# Patient Record
Sex: FEMALE | Race: WHITE | NOT HISPANIC OR LATINO | Employment: FULL TIME | URBAN - NONMETROPOLITAN AREA
[De-identification: names, ages, dates, MRNs, and addresses within clinical notes are randomized per-mention and may not be internally consistent; named-entity substitution may affect disease eponyms.]

---

## 2017-01-20 ENCOUNTER — HISTORY (OUTPATIENT)
Dept: FAMILY MEDICINE | Facility: OTHER | Age: 27
End: 2017-01-20

## 2017-01-20 ENCOUNTER — OFFICE VISIT - GICH (OUTPATIENT)
Dept: FAMILY MEDICINE | Facility: OTHER | Age: 27
End: 2017-01-20

## 2017-01-20 DIAGNOSIS — F41.1 GENERALIZED ANXIETY DISORDER: ICD-10-CM

## 2017-01-20 DIAGNOSIS — K21.00 GASTRO-ESOPHAGEAL REFLUX DISEASE WITH ESOPHAGITIS: ICD-10-CM

## 2017-01-20 ASSESSMENT — ANXIETY QUESTIONNAIRES
4. TROUBLE RELAXING: NEARLY EVERY DAY
2. NOT BEING ABLE TO STOP OR CONTROL WORRYING: MORE THAN HALF THE DAYS
1. FEELING NERVOUS, ANXIOUS, OR ON EDGE: NEARLY EVERY DAY
7. FEELING AFRAID AS IF SOMETHING AWFUL MIGHT HAPPEN: NOT AT ALL
6. BECOMING EASILY ANNOYED OR IRRITABLE: NEARLY EVERY DAY
5. BEING SO RESTLESS THAT IT IS HARD TO SIT STILL: NEARLY EVERY DAY
3. WORRYING TOO MUCH ABOUT DIFFERENT THINGS: MORE THAN HALF THE DAYS
GAD7 TOTAL SCORE: 16

## 2017-01-20 ASSESSMENT — PATIENT HEALTH QUESTIONNAIRE - PHQ9: SUM OF ALL RESPONSES TO PHQ QUESTIONS 1-9: 23

## 2017-02-03 ENCOUNTER — OFFICE VISIT - GICH (OUTPATIENT)
Dept: FAMILY MEDICINE | Facility: OTHER | Age: 27
End: 2017-02-03

## 2017-02-03 DIAGNOSIS — E28.2 POLYCYSTIC OVARIAN SYNDROME: ICD-10-CM

## 2017-02-03 DIAGNOSIS — F41.0 PANIC DISORDER WITHOUT AGORAPHOBIA: ICD-10-CM

## 2017-02-03 DIAGNOSIS — F41.1 GENERALIZED ANXIETY DISORDER: ICD-10-CM

## 2017-02-03 LAB — ESTRADIOL SERPL-MCNC: 33 PG/ML

## 2017-02-03 ASSESSMENT — PATIENT HEALTH QUESTIONNAIRE - PHQ9: SUM OF ALL RESPONSES TO PHQ QUESTIONS 1-9: 20

## 2017-02-03 ASSESSMENT — ANXIETY QUESTIONNAIRES
2. NOT BEING ABLE TO STOP OR CONTROL WORRYING: MORE THAN HALF THE DAYS
3. WORRYING TOO MUCH ABOUT DIFFERENT THINGS: MORE THAN HALF THE DAYS
1. FEELING NERVOUS, ANXIOUS, OR ON EDGE: MORE THAN HALF THE DAYS
5. BEING SO RESTLESS THAT IT IS HARD TO SIT STILL: NEARLY EVERY DAY
6. BECOMING EASILY ANNOYED OR IRRITABLE: MORE THAN HALF THE DAYS
GAD7 TOTAL SCORE: 14
4. TROUBLE RELAXING: NEARLY EVERY DAY
7. FEELING AFRAID AS IF SOMETHING AWFUL MIGHT HAPPEN: NOT AT ALL

## 2017-03-06 ENCOUNTER — AMBULATORY - GICH (OUTPATIENT)
Dept: FAMILY MEDICINE | Facility: OTHER | Age: 27
End: 2017-03-06

## 2017-03-10 ENCOUNTER — OFFICE VISIT - GICH (OUTPATIENT)
Dept: FAMILY MEDICINE | Facility: OTHER | Age: 27
End: 2017-03-10

## 2017-03-10 ENCOUNTER — HISTORY (OUTPATIENT)
Dept: FAMILY MEDICINE | Facility: OTHER | Age: 27
End: 2017-03-10

## 2017-03-10 DIAGNOSIS — M25.512 PAIN IN LEFT SHOULDER: ICD-10-CM

## 2017-03-13 ENCOUNTER — OFFICE VISIT - GICH (OUTPATIENT)
Dept: FAMILY MEDICINE | Facility: OTHER | Age: 27
End: 2017-03-13

## 2017-03-13 DIAGNOSIS — M25.512 PAIN IN LEFT SHOULDER: ICD-10-CM

## 2017-03-22 ENCOUNTER — OFFICE VISIT - GICH (OUTPATIENT)
Dept: FAMILY MEDICINE | Facility: OTHER | Age: 27
End: 2017-03-22

## 2017-03-22 ENCOUNTER — HISTORY (OUTPATIENT)
Dept: FAMILY MEDICINE | Facility: OTHER | Age: 27
End: 2017-03-22

## 2017-03-22 DIAGNOSIS — F41.1 GENERALIZED ANXIETY DISORDER: ICD-10-CM

## 2017-03-22 ASSESSMENT — ANXIETY QUESTIONNAIRES
7. FEELING AFRAID AS IF SOMETHING AWFUL MIGHT HAPPEN: NOT AT ALL
3. WORRYING TOO MUCH ABOUT DIFFERENT THINGS: MORE THAN HALF THE DAYS
2. NOT BEING ABLE TO STOP OR CONTROL WORRYING: NEARLY EVERY DAY
1. FEELING NERVOUS, ANXIOUS, OR ON EDGE: MORE THAN HALF THE DAYS
GAD7 TOTAL SCORE: 15
5. BEING SO RESTLESS THAT IT IS HARD TO SIT STILL: MORE THAN HALF THE DAYS
4. TROUBLE RELAXING: NEARLY EVERY DAY
6. BECOMING EASILY ANNOYED OR IRRITABLE: NEARLY EVERY DAY

## 2017-03-22 ASSESSMENT — PATIENT HEALTH QUESTIONNAIRE - PHQ9: SUM OF ALL RESPONSES TO PHQ QUESTIONS 1-9: 21

## 2017-05-03 ENCOUNTER — OFFICE VISIT - GICH (OUTPATIENT)
Dept: FAMILY MEDICINE | Facility: OTHER | Age: 27
End: 2017-05-03

## 2017-05-03 ENCOUNTER — HISTORY (OUTPATIENT)
Dept: FAMILY MEDICINE | Facility: OTHER | Age: 27
End: 2017-05-03

## 2017-05-03 DIAGNOSIS — F41.1 GENERALIZED ANXIETY DISORDER: ICD-10-CM

## 2017-05-03 ASSESSMENT — ANXIETY QUESTIONNAIRES
5. BEING SO RESTLESS THAT IT IS HARD TO SIT STILL: MORE THAN HALF THE DAYS
7. FEELING AFRAID AS IF SOMETHING AWFUL MIGHT HAPPEN: NOT AT ALL
GAD7 TOTAL SCORE: 14
1. FEELING NERVOUS, ANXIOUS, OR ON EDGE: MORE THAN HALF THE DAYS
4. TROUBLE RELAXING: MORE THAN HALF THE DAYS
3. WORRYING TOO MUCH ABOUT DIFFERENT THINGS: MORE THAN HALF THE DAYS
6. BECOMING EASILY ANNOYED OR IRRITABLE: NEARLY EVERY DAY
2. NOT BEING ABLE TO STOP OR CONTROL WORRYING: NEARLY EVERY DAY

## 2017-05-03 ASSESSMENT — PATIENT HEALTH QUESTIONNAIRE - PHQ9: SUM OF ALL RESPONSES TO PHQ QUESTIONS 1-9: 21

## 2017-05-04 ENCOUNTER — OFFICE VISIT - GICH (OUTPATIENT)
Dept: OBGYN | Facility: OTHER | Age: 27
End: 2017-05-04

## 2017-05-04 ENCOUNTER — HISTORY (OUTPATIENT)
Dept: OBGYN | Facility: OTHER | Age: 27
End: 2017-05-04

## 2017-05-04 DIAGNOSIS — E28.2 POLYCYSTIC OVARIAN SYNDROME: ICD-10-CM

## 2017-05-04 LAB
A/G RATIO - HISTORICAL: 1.3 (ref 1–2)
ALBUMIN SERPL-MCNC: 4.1 G/DL (ref 3.5–5.7)
ALP SERPL-CCNC: 68 IU/L (ref 34–104)
ALT (SGPT) - HISTORICAL: 20 IU/L (ref 7–52)
AST SERPL-CCNC: 17 IU/L (ref 13–39)
BILIRUB SERPL-MCNC: 0.4 MG/DL (ref 0.3–1)
BILIRUBIN, DIRECT: 0.07 MG/DL (ref 0.03–0.18)
BILIRUBIN,INDIRECT: 0.3 MG/DL (ref 0.2–0.8)
CREAT SERPL-MCNC: 0.68 MG/DL (ref 0.7–1.3)
GFR IF NOT AFRICAN AMERICAN - HISTORICAL: >60 ML/MIN/1.73M2
GLOBULIN - HISTORICAL: 3.1 G/DL (ref 2–3.7)
PROLACTIN - HISTORICAL: 16.32 NG/ML
PROT SERPL-MCNC: 7.2 G/DL (ref 6.4–8.9)
TESTOST SERPL-MCNC: 63 NG/DL (ref 175–781)

## 2017-05-05 LAB — CORTISOL, SERUM: 7.3 UG/DL

## 2017-05-10 ENCOUNTER — AMBULATORY - GICH (OUTPATIENT)
Dept: OBGYN | Facility: OTHER | Age: 27
End: 2017-05-10

## 2017-05-10 LAB — 17 OH PROGESTERONE - HISTORICAL: <40 NG/DL

## 2017-05-23 ENCOUNTER — OFFICE VISIT - GICH (OUTPATIENT)
Dept: OBGYN | Facility: OTHER | Age: 27
End: 2017-05-23

## 2017-05-23 DIAGNOSIS — E28.2 POLYCYSTIC OVARIAN SYNDROME: ICD-10-CM

## 2017-05-23 DIAGNOSIS — N97.0 FEMALE INFERTILITY ASSOCIATED WITH ANOVULATION: ICD-10-CM

## 2017-06-20 ENCOUNTER — AMBULATORY - GICH (OUTPATIENT)
Dept: FAMILY MEDICINE | Facility: OTHER | Age: 27
End: 2017-06-20

## 2017-06-20 DIAGNOSIS — E28.2 POLYCYSTIC OVARIAN SYNDROME: ICD-10-CM

## 2017-06-22 ENCOUNTER — AMBULATORY - GICH (OUTPATIENT)
Dept: FAMILY MEDICINE | Facility: OTHER | Age: 27
End: 2017-06-22

## 2017-06-22 DIAGNOSIS — E28.2 POLYCYSTIC OVARIAN SYNDROME: ICD-10-CM

## 2017-06-23 ENCOUNTER — OFFICE VISIT - GICH (OUTPATIENT)
Dept: OBGYN | Facility: OTHER | Age: 27
End: 2017-06-23

## 2017-06-23 DIAGNOSIS — E28.2 POLYCYSTIC OVARIAN SYNDROME: ICD-10-CM

## 2017-06-23 DIAGNOSIS — N97.0 FEMALE INFERTILITY ASSOCIATED WITH ANOVULATION: ICD-10-CM

## 2017-07-19 ENCOUNTER — HISTORY (OUTPATIENT)
Dept: FAMILY MEDICINE | Facility: OTHER | Age: 27
End: 2017-07-19

## 2017-07-19 ENCOUNTER — OFFICE VISIT - GICH (OUTPATIENT)
Dept: FAMILY MEDICINE | Facility: OTHER | Age: 27
End: 2017-07-19

## 2017-07-19 DIAGNOSIS — E28.2 POLYCYSTIC OVARIAN SYNDROME: ICD-10-CM

## 2017-07-19 DIAGNOSIS — R10.13 EPIGASTRIC PAIN: ICD-10-CM

## 2017-07-19 LAB
A/G RATIO - HISTORICAL: 1.4 (ref 1–2)
ABSOLUTE BASOPHILS - HISTORICAL: 0 THOU/CU MM
ABSOLUTE EOSINOPHILS - HISTORICAL: 0.2 THOU/CU MM
ABSOLUTE IMMATURE GRANULOCYTES(METAS,MYELOS,PROS) - HISTORICAL: 0 THOU/CU MM
ABSOLUTE LYMPHOCYTES - HISTORICAL: 2.1 THOU/CU MM (ref 0.9–2.9)
ABSOLUTE MONOCYTES - HISTORICAL: 0.4 THOU/CU MM
ABSOLUTE NEUTROPHILS - HISTORICAL: 3.7 THOU/CU MM (ref 1.7–7)
ALBUMIN SERPL-MCNC: 3.7 G/DL (ref 3.5–5.7)
ALP SERPL-CCNC: 53 IU/L (ref 34–104)
ALT (SGPT) - HISTORICAL: 18 IU/L (ref 7–52)
AMYLASE SERPL-CCNC: 34 IU/L (ref 29–103)
ANION GAP - HISTORICAL: 9 (ref 5–18)
AST SERPL-CCNC: 14 IU/L (ref 13–39)
BASOPHILS # BLD AUTO: 0.6 %
BILIRUB SERPL-MCNC: 0.3 MG/DL (ref 0.3–1)
BUN SERPL-MCNC: 12 MG/DL (ref 7–25)
BUN/CREAT RATIO - HISTORICAL: 20
CALCIUM SERPL-MCNC: 8.8 MG/DL (ref 8.6–10.3)
CHLORIDE SERPLBLD-SCNC: 109 MMOL/L (ref 98–107)
CO2 SERPL-SCNC: 22 MMOL/L (ref 21–31)
CREAT SERPL-MCNC: 0.61 MG/DL (ref 0.7–1.3)
EOSINOPHIL NFR BLD AUTO: 2.3 %
ERYTHROCYTE [DISTWIDTH] IN BLOOD BY AUTOMATED COUNT: 12.1 % (ref 11.5–15.5)
ESTIMATED AVERAGE GLUCOSE: 105 MG/DL
GFR IF NOT AFRICAN AMERICAN - HISTORICAL: >60 ML/MIN/1.73M2
GLOBULIN - HISTORICAL: 2.6 G/DL (ref 2–3.7)
GLUCOSE SERPL-MCNC: 102 MG/DL (ref 70–105)
HCG BETA QUANT,PREGNANCY - HISTORICAL: <0.6 MIU/ML
HCT VFR BLD AUTO: 37.6 % (ref 33–51)
HEMOGLOBIN A1C MONITORING (POCT) - HISTORICAL: 5.3 % (ref 4–6.2)
HEMOGLOBIN: 12.5 G/DL (ref 12–16)
IMMATURE GRANULOCYTES(METAS,MYELOS,PROS) - HISTORICAL: 0.5 %
LIPASE SERPL-CCNC: 30.1 IU/L (ref 11–82)
LYMPHOCYTES NFR BLD AUTO: 32.8 % (ref 20–44)
MCH RBC QN AUTO: 28.8 PG (ref 26–34)
MCHC RBC AUTO-ENTMCNC: 33.2 G/DL (ref 32–36)
MCV RBC AUTO: 87 FL (ref 80–100)
MONOCYTES NFR BLD AUTO: 5.8 %
NEUTROPHILS NFR BLD AUTO: 58 % (ref 42–72)
PLATELET # BLD AUTO: 277 THOU/CU MM (ref 140–440)
PMV BLD: 9.8 FL (ref 6.5–11)
POTASSIUM SERPL-SCNC: 4.2 MMOL/L (ref 3.5–5.1)
PROT SERPL-MCNC: 6.3 G/DL (ref 6.4–8.9)
RED BLOOD COUNT - HISTORICAL: 4.34 MIL/CU MM (ref 4–5.2)
SODIUM SERPL-SCNC: 140 MMOL/L (ref 133–143)
WHITE BLOOD COUNT - HISTORICAL: 6.4 THOU/CU MM (ref 4.5–11)

## 2017-08-24 ENCOUNTER — HISTORY (OUTPATIENT)
Dept: FAMILY MEDICINE | Facility: OTHER | Age: 27
End: 2017-08-24

## 2017-08-24 ENCOUNTER — OFFICE VISIT - GICH (OUTPATIENT)
Dept: FAMILY MEDICINE | Facility: OTHER | Age: 27
End: 2017-08-24

## 2017-08-24 DIAGNOSIS — M25.511 PAIN IN RIGHT SHOULDER: ICD-10-CM

## 2017-08-24 DIAGNOSIS — S46.911A STRAIN OF UNSPECIFIED MUSCLE, FASCIA AND TENDON AT SHOULDER AND UPPER ARM LEVEL, RIGHT ARM, INITIAL ENCOUNTER: ICD-10-CM

## 2017-11-09 ENCOUNTER — AMBULATORY - GICH (OUTPATIENT)
Dept: FAMILY MEDICINE | Facility: OTHER | Age: 27
End: 2017-11-09

## 2017-11-09 DIAGNOSIS — N92.1 EXCESSIVE AND FREQUENT MENSTRUATION WITH IRREGULAR CYCLE: ICD-10-CM

## 2017-12-15 ENCOUNTER — AMBULATORY - GICH (OUTPATIENT)
Dept: FAMILY MEDICINE | Facility: OTHER | Age: 27
End: 2017-12-15

## 2017-12-27 NOTE — PROGRESS NOTES
Patient Information     Patient Name MRN Ana Beyer [id number] Female 1990      Progress Notes by Abiola Ashton DO at 2017  8:45 AM     Author:  Abiola Ashton DO Service:  (none) Author Type:  PHYS- Osteopathic     Filed:  2017 10:17 AM Encounter Date:  2017 Status:  Signed     :  Abiola Ashton DO (PHYS- Osteopathic)            SUBJECTIVE:  Ana Stahl is a 26 y.o. female who presents for concerns of stomach.    RAEANN Perez is here with ~2+ months of epigastric pain.  Has tried tracking it with eating, menstrual cycle, medication intake, stooling/voiding - all without pattern.  She states food can occasionally make it worse (~1 hour after eating) but not necessarily all the time.  Symptoms can last all day long; occasionally having difficulty falling asleep. She is busy working a lot, therefore usually sleeps well.  Her mood has worsened the last few months as it has been the anniversary of her conception date, her sister is due and now coming up on the date of her miscarriage.  She is currently not trying at this time, and wants to get through the next couple months before trying again.  Had previously used Clomid x 2 months due to her PCOS.    Also stopped all of her medications one week ago, including metformin, wellbutrin, zoloft, xanax, zantac, clomid.  She states this did not worsen, or improve her abdominal symptoms; however she does notice more acid reflux.  Is coughing more frequently.    Sustained a fall a week ago and has a stiff R shoulder and L knee; has used Naproxen for it occasionally without worsening of stomach symptoms.  No blood in stools, or changes in stools/urination.  Gallbladder remains intact.      No Known Allergies,   Current Outpatient Prescriptions on File Prior to Visit       Medication  Sig Dispense Refill     metFORMIN (GLUCOPHAGE XR) 500 mg Extended-Release tablet 1 tablet po tid with meal. 270 tablet 3     prenatal  vitamin-folic acid 1 mg (PRENATAL VITAMIN) tablet/capsule Take 1 tablet by mouth once daily.  0     No current facility-administered medications on file prior to visit.     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   Respiratory: Positive for cough. Negative for hemoptysis.    Gastrointestinal: Positive for nausea.   All other systems reviewed and are negative.      OBJECTIVE:  /82  Pulse 72  Wt 110.6 kg (243 lb 12.8 oz)  LMP 06/22/2017  BMI 40.38 kg/m2    EXAM:   Physical Exam   Constitutional: She is well-developed, well-nourished, and in no distress.   HENT:   Head: Normocephalic and atraumatic.   Right Ear: External ear normal.   Left Ear: External ear normal.   Eyes: Pupils are equal, round, and reactive to light.   Cardiovascular: Normal rate and normal heart sounds.    Pulmonary/Chest: Effort normal and breath sounds normal.   Abdominal: Soft. Bowel sounds are normal. She exhibits no distension. There is no hepatosplenomegaly. There is tenderness in the right lower quadrant, epigastric area, suprapubic area and left lower quadrant. There is no rebound and no CVA tenderness.   Nursing note and vitals reviewed.    Results for orders placed or performed in visit on 07/19/17      COMPLETE METABOLIC PANEL      Result  Value Ref Range    SODIUM 140 133 - 143 mmol/L    POTASSIUM 4.2 3.5 - 5.1 mmol/L    CHLORIDE 109 (H) 98 - 107 mmol/L    CO2,TOTAL 22 21 - 31 mmol/L    ANION GAP 9 5 - 18                    GLUCOSE 102 70 - 105 mg/dL    CALCIUM 8.8 8.6 - 10.3 mg/dL    BUN 12 7 - 25 mg/dL    CREATININE 0.61 (L) 0.70 - 1.30 mg/dL    BUN/CREAT RATIO           20                    GFR if African American >60 >60 ml/min/1.73m2    GFR if not African American >60 >60 ml/min/1.73m2    ALBUMIN  3.7 3.5 - 5.7 g/dL    PROTEIN,TOTAL 6.3 (L) 6.4 - 8.9 g/dL    GLOBULIN                  2.6 2.0 - 3.7 g/dL    A/G RATIO 1.4 1.0 - 2.0                    BILIRUBIN,TOTAL 0.3 0.3 - 1.0 mg/dL    ALK PHOSPHATASE 53 34 - 104 IU/L    ALT (SGPT) 18 7 - 52 IU/L    AST (SGOT) 14 13 - 39 IU/L   LIPASE      Result  Value Ref Range    LIPASE 30.1 11.0 - 82.0 IU/L   AMYLASE      Result  Value Ref Range    AMYLASE 34 29 - 103 IU/L   Hgb A1c      Result  Value Ref Range    HEMOGLOBIN A1C MONITORING (POCT) 5.3 4.0 - 6.2 %    ESTIMATED AVERAGE GLUCOSE  105 mg/dL   HCG BETA QUANT,PREGNANCY      Result  Value Ref Range    HCG BETA QUANT,PREGNANCY GIH <0.6 <0.6 mIU/mL   CBC WITH AUTO DIFFERENTIAL      Result  Value Ref Range    WHITE BLOOD COUNT         6.4 4.5 - 11.0 thou/cu mm    RED BLOOD COUNT           4.34 4.00 - 5.20 mil/cu mm    HEMOGLOBIN                12.5 12.0 - 16.0 g/dL    HEMATOCRIT                37.6 33.0 - 51.0 %    MCV                       87 80 - 100 fL    MCH                       28.8 26.0 - 34.0 pg    MCHC                      33.2 32.0 - 36.0 g/dL    RDW                       12.1 11.5 - 15.5 %    PLATELET COUNT            277 140 - 440 thou/cu mm    MPV                       9.8 6.5 - 11.0 fL    NEUTROPHILS               58.0 42.0 - 72.0 %    LYMPHOCYTES               32.8 20.0 - 44.0 %    MONOCYTES                 5.8 <12.0 %    EOSINOPHILS               2.3 <8.0 %    BASOPHILS                 0.6 <3.0 %    IMMATURE GRANULOCYTES(METAS,MYELOS,PROS) 0.5 %    ABSOLUTE NEUTROPHILS      3.7 1.7 - 7.0 thou/cu mm    ABSOLUTE LYMPHOCYTES      2.1 0.9 - 2.9 thou/cu mm    ABSOLUTE MONOCYTES        0.4 <0.9 thou/cu mm    ABSOLUTE EOSINOPHILS      0.2 <0.5 thou/cu mm    ABSOLUTE BASOPHILS        0.0 <0.3 thou/cu mm    ABSOLUTE IMMATURE GRANULOCYTES(METAS,MYELOS,PROS) 0.0 <=0.3 thou/cu mm       ASSESSMENT/PLAN:    ICD-10-CM    1. Epigastric abdominal pain R10.13 CBC WITH DIFFERENTIAL      COMPLETE METABOLIC PANEL       LIPASE      AMYLASE      omeprazole (PRILOSEC) 20 mg Delayed-Release capsule      sucralfate (CARAFATE) 1 gram tablet      CBC WITH DIFFERENTIAL      COMPLETE METABOLIC PANEL      LIPASE      AMYLASE      CBC WITH AUTO DIFFERENTIAL   2. PCOS (polycystic ovarian syndrome) E28.2 Hgb A1c      HCG BETA QUANT,PREGNANCY      Hgb A1c      HCG BETA QUANT,PREGNANCY        Plan:   1. Epigastric abdominal pain:  Differential diagnosis discussed including: gastritis/worsening GERD, ulcer, gallbladder pathology/stones, pancreatitis, colitis, others.  Labs ordered as above and reassuring.  Due to history of uncontrolled reflux, even on Ranitidine - will plan to start omeprazole 20mg-40mg daily and carafate TID-QID x 2 weeks.  If significant improvement is seen, will wean off carafate and continue omperazole.  If no improvement, consider abdominal imaging vs [facility] referral for possible endoscopy.  2. With history of PCOS, encouraged use of metformin, and will also add Hcg level to above blood testing.    Follow up pending above response/results.

## 2017-12-27 NOTE — PROGRESS NOTES
Patient Information     Patient Name MRN Ana Beyer [id number] Female 1990      Progress Notes by Gerry Kim MD at 2017  9:00 AM     Author:  Gerry Kim MD Service:  (none) Author Type:  Physician     Filed:  2017  8:39 AM Encounter Date:  2017 Status:  Signed     :  Gerry Kim MD (Physician)            SUBJECTIVE:    Ana Stahl is a 26 y.o. female who presents for continued treatment for anovulation/PCOS    HPI  She was ovulatory last month on Clomid around day 18. She is day two of her menses. Patient's last menstrual period was 2017.    No Known Allergies and   Current Outpatient Prescriptions on File Prior to Visit       Medication  Sig Dispense Refill     ALPRAZolam (XANAX) 0.5 mg tablet Take 1 tablet by mouth 2 times daily if needed. 30 tablet 2     buPROPion (WELLBUTRIN XL) 300 mg Extended-Release tablet Take 1 tablet by mouth every morning. 90 tablet 4     clomiPHENE citrate (CLOMID) 50 mg tablet Take 1 tablet by mouth once daily. Max of 5 days 5 tablet 0     metFORMIN (GLUCOPHAGE XR) 500 mg Extended-Release tablet 1 tablet po tid with meal. 270 tablet 3     prenatal vitamin-folic acid 1 mg (PRENATAL VITAMIN) tablet/capsule Take 1 tablet by mouth once daily.  0     ranitidine (ZANTAC) 150 mg tablet Take 1 tablet by mouth 2 times daily. 60 tablet 11     sertraline (ZOLOFT) 100 mg tablet Take 1 tablet by mouth once daily. 90 tablet 4     No current facility-administered medications on file prior to visit.        REVIEW OF SYSTEMS:  Review of Systems   Constitutional: Negative.    Gastrointestinal: Positive for abdominal pain.   Endo/Heme/Allergies: Negative.    Psychiatric/Behavioral: Negative.        OBJECTIVE:  /78  Temp 98.4  F (36.9  C) (Tympanic)  Wt 108 kg (238 lb)  LMP 2017  Breastfeeding? No  BMI 39.41 kg/m2    EXAM:   Physical Exam   Genitourinary: Uterus is not deviated, not enlarged and not tender. Right adnexum  displays no mass and no tenderness. Left adnexum displays no mass and no tenderness.       ASSESSMENT/PLAN:    ICD-10-CM    1. Anovulation N97.0 clomiPHENE citrate (CLOMID) 50 mg tablet        Plan:  Clomid once daily for 5 days starting 6/26/17  Then timed intercourse every other day starting day 16 (7/7/17)  for one week.  Use ovulation detection kit starting day 16 until positive (check in afternoon)  Call with pos pregnancy test, or if no period by day 35

## 2017-12-27 NOTE — PROGRESS NOTES
Patient Information     Patient Name MRN Sex Ana Alonso [id number] Female 1990      Progress Notes by Klinefelter, Kristin K, RD at 2017  1:00 PM     Author:  Klinefelter, Kristin K, RD Service:  (none) Author Type:  NUTR- Registered Dietitian     Filed:  2017  9:03 AM Encounter Date:  2017 Status:  Signed     :  Klinefelter, Kristin K, RD (NUTR- Registered Dietitian)            MEDICAL NUTRITION THERAPY  INITIAL VISIT      Provider: Abiola Ashton    Reason for referral: PCOS, Trying to conceive, BMI 39    Assessment:  Client History: Ana is here today to review diet for PCOS. Is on Metformin. All other medications reviewed.  Taking a prenatal, B12 and Vit D.  She reports that she would like to lose a few pounds. BMI 39. Weight loss clinical goal initially: 7%.   Her work schedule has changed recently and she works two jobs, which makes it difficult to follow a meal plan.      24hr Diet Recall:  Time: Food/Drink:   bfast Often skips   lunch At work. Clinic or [facility]   dinner She or boyfriend cooks. Carbs and protein   snacks Very hungry and large portions at night   beverages Diet coke, trying to cut down, limited H2O     Weekly Activity: She does have a [employer] membership and [employer]  Cardio: Resistance: Stretching:   none none none      Nutrition Diagnosis:  Energy Balance: Predicted excessive energy intake  Weight: Overweight / obesity: Overweight, adult or pediatric  Other: PCOS     Intervention:  Nutrition Prescription  Nutrition Intervention: Meals and Snacks:  General/healthful diet:  Modify composition of meals/snacks: Energy-modified diet  Carbohydrate-modified diet  Nutrition Counseling:  Strategies:  Goal setting  Self-monitoring  Problem solving  Goal: (1) 6 small meals per day. Limit CHO to 15-30 Grams per meal  (2) limit/avoid simple sugars and diet sodas. She will try to increase her water intake  (3) 150 minutes of exercise per week    Education Materials  Provided:   Food List for Meal Planning  Sample Meals and Snacks    Monitoring and Evaluation:  Food Intake  Weight  Biochemical Data, Medical Tests and Procedures    Follow Up: 4 week(s)    Time spent with patient: 45 minutes.   Patient encouraged to call with further questions. Contact information provided.    Kristin K Klinefelter, RD ....................  6/23/2017   8:57 AM

## 2017-12-28 NOTE — PROGRESS NOTES
Patient Information     Patient Name MRN Ana Beyer [id number] Female 1990      Progress Notes by Merly Davis NP at 2017  5:00 PM     Author:  Merly Davis NP Service:  (none) Author Type:  PHYS- Nurse Practitioner     Filed:  2017  6:30 PM Encounter Date:  2017 Status:  Signed     :  Merly Davis NP (PHYS- Nurse Practitioner)            HPI:    Ana Stahl is a 26 y.o. female who presents to clinic today for arm pain.   Right shoulder and right arm pain started yesterday.  Today with some intermittent numbness and tingling into right fingers.  She does lots of over head reaching and lifting at her job but denies any known injury.  Random sharp pain in her right hand, wrist and shoulder.  Pain is worse with lifting.  Pulling sensation in right shoulder joint with movement.  Generalized joint aches and stiffness for the past couple of weeks.  Pain is interrupting sleep recently.  No fevers.  No rashes.  Chronically fatigued.  Denies any known tick bites, rarely outside as she works multiple jobs.   Taking Aleve twice daily on regular basis.  She has not tried any ice or heat.  She sees a chiropractor for chronic neck pain.  She had left shoulder pain in March of this year but she states this feels different.            Past Medical History:     Diagnosis  Date     Abnormal pap      Patellar malalignment syndrome-bilateral 2016     PCOS (polycystic ovarian syndrome) 3/18/2016     Past Surgical History:      Procedure  Laterality Date     NO PREVIOUS SURGERY       Social History       Substance Use Topics         Smoking status:   Never Smoker     Smokeless tobacco:   Never Used     Alcohol use   No      Comment: rare      Current Outpatient Prescriptions       Medication  Sig Dispense Refill     metFORMIN (GLUCOPHAGE XR) 500 mg Extended-Release tablet 1 tablet po tid with meal. 270 tablet 3     omeprazole (PRILOSEC) 20 mg Delayed-Release  "capsule Take 1-2 capsules by mouth once daily before a meal. 60 capsule 5     prenatal vitamin-folic acid 1 mg (PRENATAL VITAMIN) tablet/capsule Take 1 tablet by mouth once daily.  0     sucralfate (CARAFATE) 1 gram tablet Take 1 tablet by mouth 3 times daily before meals. 90 tablet 1     No current facility-administered medications for this visit.      Medications have been reviewed by me and are current to the best of my knowledge and ability.    No Known Allergies    Past medical history, past surgical history, current medications and allergies reviewed and accurate to the best of my knowledge.        ROS:  Refer to HPI    /70  Pulse 72  Temp 99.7  F (37.6  C) (Tympanic)   Ht 1.645 m (5' 4.75\")  Wt 111.1 kg (245 lb)  BMI 41.09 kg/m2    EXAM:  General Appearance: Well appearing adult female, appropriate appearance for age. No acute distress  Neck: supple without adenopathy, normal ROM   Respiratory: normal chest wall and respirations.  Normal effort.  Clear to auscultation bilaterally, no wheezing, crackles or rhonchi.  No increased work of breathing.  No cough appreciated  Cardiac: RRR with no murmurs.   CMS intact to right upper extremity, brisk capillary refill, strong radial pulse  Musculoskeletal:  Right shoulder and upper extremity without swelling or visible deformity.  Minimal tenderness to palpation over the AC joint.  Mild tenderness over right scapular area.  No tenderness to palpation over right clavicular area.  Full active ROM of right shoulder.  Negative empty can test.  strong right hand grasp.  Strength intact to right upper extremity against resistance.  Normal gait.  Equal movement of bilateral upper extremities.  Equal movement of bilateral lower extremities.    Dermatological: no rashes or lesions noted of exposed skin  Psychological: normal affect, alert and pleasant      Labs:  Not indicated    Xray:  Not indicated      ASSESSMENT/PLAN:    ICD-10-CM    1. Nontraumatic shoulder " pain, right M25.511 methocarbamol (ROBAXIN) 750 mg tablet      acetaminophen-codeine, 300-30 mg, (TYLENOL #3) tablet   2. Right shoulder strain, initial encounter S46.911A          Exam most consistent with muscle strain than injury   Robaxin 750 mg BID PRN (20 tabs, no refills)  Tylenol # 3 may take at bedtime PRN (8 tabs, no refills)  Recommend OTC TENS unit   Symptomatic treatment - Alternate ice and heat TID, rest as needed, avoid repetitive movements, avoid prolonged lifting, etc   Tylenol or ibuprofen or Naproxen PRN  Follow up with medical orthopedics if symptoms persist or worsen or concerns          Patient Instructions   Tylenol with codeine at bedtime as needed    Robaxin twice daily as needed    Over the counter TENS unit    Alternate ice and heat 15 minutes each 3 x day      Tylenol as needed     Aleve twice daily as needed      Follow up if symptoms persist or worsen

## 2017-12-28 NOTE — PATIENT INSTRUCTIONS
Patient Information     Patient Name MRN Sex Ana Alonso [id number] Female 1990      Patient Instructions by Merly Davis NP at 2017  5:00 PM     Author:  Merly Davis NP Service:  (none) Author Type:  PHYS- Nurse Practitioner     Filed:  2017  5:43 PM Encounter Date:  2017 Status:  Signed     :  Merly Davis NP (PHYS- Nurse Practitioner)            Tylenol with codeine at bedtime as needed    Robaxin twice daily as needed    Over the counter TENS unit    Alternate ice and heat 15 minutes each 3 x day      Tylenol as needed     Aleve twice daily as needed      Follow up if symptoms persist or worsen

## 2017-12-29 NOTE — PATIENT INSTRUCTIONS
Patient Information     Patient Name MRN nAa Beyer [id number] Female 1990      Patient Instructions by Gerry Kim MD at 2017  9:00 AM     Author:  Gerry Kim MD Service:  (none) Author Type:  Physician     Filed:  2017  8:26 AM Encounter Date:  2017 Status:  Signed     :  Gerry Kim MD (Physician)            Clomid once daily for 5 days starting 17  Then timed intercourse every other day starting day 14 for one week.  Use ovulation detection kit starting day 14 until positive (check in afternoon)  Call with pos pregnancy test, or if no period by day 35

## 2017-12-30 NOTE — NURSING NOTE
Patient Information     Patient Name MRN Ana Beyer [id number] Female 1990      Nursing Note by Christina Martel at 2017  8:45 AM     Author:  Christina Martel Service:  (none) Author Type:  (none)     Filed:  2017  8:51 AM Encounter Date:  2017 Status:  Signed     :  Christina Martel            Patient states she has been feeling sick all the time for the last few months. She has cramps, nausea, feels like she could vomit all the time. She also states her acid reflux does not go away anymore as well.  Christina Martel LPN............................... 2017 8:43 AM

## 2017-12-30 NOTE — NURSING NOTE
Patient Information     Patient Name MRN Sex Ana Alonso [id number] Female 1990      Nursing Note by Abiola Parra DO at 2017  7:41 AM     Author:  Abiola Parra DO Service:  (none) Author Type:  PHYS- Osteopathic     Filed:  2017  7:44 AM Encounter Date:  2017 Status:  Signed     :  Abiola Parra DO (PHYS- Osteopathic)            Patient with period x 20+ days; and trying to go through fertility treatments off and on; would like to get this regulated.  Will Rx Lo-ovral as directed.  ABIOLA PARRA DO

## 2017-12-30 NOTE — NURSING NOTE
Patient Information     Patient Name MRN Ana Beyer [id number] Female 1990      Nursing Note by Damari Chau at 2017  5:00 PM     Author:  Damari Chau Service:  (none) Author Type:  (none)     Filed:  2017  5:30 PM Encounter Date:  2017 Status:  Signed     :  Damari Chau            Patient presents to the clinic for right arm pain that started yesterday.  Damari BOLIVAR CMA.......2017..5:17 PM

## 2018-01-03 NOTE — PROGRESS NOTES
Patient Information     Patient Name MRN Ana Beyer [id number] Female 1990      Progress Notes by Merly Davis NP at 3/10/2017 12:45 PM     Author:  Merly Davis NP Service:  (none) Author Type:  PHYS- Nurse Practitioner     Filed:  3/10/2017  8:09 PM Encounter Date:  3/10/2017 Status:  Signed     :  Merly Davis NP (PHYS- Nurse Practitioner)            HPI:    Ana Stahl is a 26 y.o. female who presents to clinic today for left shoulder pain.  Left shoulder pain started yesterday.  No known injury.  Does not recall sleeping on it wrong.  Not lifting weights currently.  Pain worse with lifting.  Random sharp pain across front of left shoulder that radiates down the left arm.  No numbness or tingling.   Decreased ability to lift due to pain.  Taking Ibuprofen 400 mg and Tylenol 500 mg.  No ice or heat.            Past Medical History     Diagnosis  Date     Abnormal pap      Patellar malalignment syndrome-bilateral 2016     PCOS (polycystic ovarian syndrome) 3/18/2016     Past Surgical History      Procedure  Laterality Date     No previous surgery       Social History       Substance Use Topics         Smoking status:   Never Smoker     Smokeless tobacco:   Never Used     Alcohol use   No      Comment: rare      Current Outpatient Prescriptions       Medication  Sig Dispense Refill     ALPRAZolam (XANAX) 0.5 mg tablet Take 1 tablet by mouth 2 times daily if needed. 30 tablet 2     buPROPion (WELLBUTRIN XL) 300 mg Extended-Release tablet Take 1 tablet by mouth every morning. 30 tablet 2     LORazepam (ATIVAN) 0.5 mg tab Take 1 tablet by mouth at bedtime if needed for Anxiety or Sleep. 30 tablet 0     metFORMIN sustained release (GLUMETZA) 1,000 mg tablet Take 1 tablet by mouth once daily with a meal. 90 tablet 4     prenatal vitamin-folic acid 1 mg (PRENATAL VITAMIN) tablet/capsule Take 1 tablet by mouth once daily.  0     ranitidine (ZANTAC) 150 mg tablet  "Take 1 tablet by mouth 2 times daily. 60 tablet 11     traZODone (DESYREL) 50 mg tablet Take 1 tablet by mouth at bedtime if needed for Sleep. 30 tablet 0     No current facility-administered medications for this visit.      Medications have been reviewed by me and are current to the best of my knowledge and ability.    No Known Allergies    ROS:  Refer to HPI    Visit Vitals       Pulse 74     Temp 97.5  F (36.4  C) (Tympanic)     Resp 16     Ht 1.655 m (5' 5.16\")     Wt 105.1 kg (231 lb 9.6 oz)     Breastfeeding No     BMI 38.35 kg/m2       EXAM:  General Appearance: Well appearing female adult, appropriate appearance for age. No acute distress  Head: normocephalic, atraumatic  Neck: supple without adenopathy  Respiratory: normal chest wall and respirations.  Normal effort.  Clear to auscultation bilaterally, no wheezes or rhonchi or congestion, no cough appreciated  Cardiac: RRR with no murmurs.  CMS intact to left upper extremity, brisk capillary refill, strong radial pulse.    Musculoskeletal:   Left shoulder without visible swelling or deformity.  Left shoulder with tenderness to palpation over the AC joint.  No tenderness to palpation over left scapula.  No tenderness to palpation over left humerus.   Decreased ROM with forward extension and abduction.  Normal ROM with hyperextension.   Negative apprehension sign on left.  Normal ROM of left elbow and wrist.   No cervical spine tenderness to palpation.  Normal ROM of neck.  No thoracic tenderness to palpation.    Dermatological: No erythema, warmth, bruising, or rash noted of neck, chest, back, and upper extremities  Psychological: normal affect, alert and pleasant        ASSESSMENT/PLAN:    ICD-10-CM    1. Nontraumatic shoulder pain, left M25.512          No known injury.  No indications for xray.  Likely strain   Activity as tolerated, avoid lifting  ICE  Ibuprofen 600 mg Q 6 hours PRN  Tylenol PRN   Follow up with orthopedics early next week for further " evaluation and management           Patient Instructions   Ibuprofen 600 mg every 6 hours, take with food    Ice 15 minutes every 4 hours, alternating with heat    Rest, activity as tolerated, avoid lifting    Follow up with orthopedics

## 2018-01-03 NOTE — PROGRESS NOTES
"Patient Information     Patient Name MRN Ana Beyer [id number] Female 1990      Progress Notes by Abiola Ashton DO at 2017  8:30 AM     Author:  Abiola Ashton DO Service:  (none) Author Type:  PHYS- Osteopathic     Filed:  2017  9:08 AM Encounter Date:  2017 Status:  Signed     :  Abiola Ashton DO (PHYS- Osteopathic)            SUBJECTIVE:  Ana Stahl is a 26 y.o. female who presents for anxiety    HPI  Ana is here for concerns of worsening anxiety.  She has noticed she is having more anxiety attacks up to 3-4 times per week.  Worsened in the last month, but overall worse since her miscarriage in September.  She can feel her symptoms building, where she gets tightening of her chest, sweaty hands, heart beat feels faster.  Has missed many doses of her Zoloft, but doesn't feel like it is working.  She has Ativan to take, but it makes her very sleepy - and doesn't like taking it.  Has used 1/2 trazodone 50mg prn for sleeping; more makes her feel \"intoxicated\" within 20 minutes of taking it.  Is interested/has thought about in seeing a therapist or counselor.    VITA-7 ANXIETY SCREENING 2016   VITA date (doc flow) 2016   Nervous, anxious 3 3   Cannot stop worrying 3 2   Worry about different things 3 2   Cannot relax 2 3   Feeling restless 3 3   Easily annoyed/irritated 3 3   Afraid of awful event 0 0   Score 17 16   Severity severe anxiety severe anxiety     PHQ Depression Screening 2016   Date of PHQ exam (doc flow) 2016   1. Lack of interest/pleasure - 3 - Nearly every day   2. Feeling down/depressed - 3 - Nearly every day   PHQ-2 TOTAL SCORE - 6   3. Trouble sleeping - 3 - Nearly every day   4. Decreased energy - 3 - Nearly every day   5. Appetite change - 3 - Nearly every day   6. Feelings of failure - 2 - More than half the days   7. Trouble concentrating - 2 - More than half the days   8. " Activity level - 3 - Nearly every day   9. Hurting yourself - 1 - Several days   PHQ-9 TOTAL SCORE - 23   PHQ-9 Severity Level - severe   Functional Impairment - extremely difficult     No Known Allergies,   Current Outpatient Prescriptions on File Prior to Visit       Medication  Sig Dispense Refill     LORazepam (ATIVAN) 0.5 mg tab Take 1 tablet by mouth at bedtime if needed for Anxiety or Sleep. 30 tablet 0     metFORMIN sustained release (GLUMETZA) 1,000 mg tablet Take 1 tablet by mouth once daily with a meal. 90 tablet 4     prenatal vitamin-folic acid 1 mg (PRENATAL VITAMIN) tablet/capsule Take 1 tablet by mouth once daily.  0     traZODone (DESYREL) 50 mg tablet Take 1 tablet by mouth at bedtime if needed for Sleep. 30 tablet 0     No current facility-administered medications on file prior to visit.     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   Respiratory: Negative for cough.    Gastrointestinal: Negative for abdominal pain, nausea and vomiting.   Musculoskeletal: Positive for joint pain (R shoulder).   Skin: Negative for rash.       OBJECTIVE:  /76  Pulse 68  Wt 103 kg (227 lb)  LMP 10/28/2016  BMI 37.77 kg/m2    EXAM:   Physical Exam   Constitutional: She is well-developed, well-nourished, and in no distress.   HENT:   Head: Normocephalic and atraumatic.   Right Ear: External ear normal.   Left Ear: External ear normal.   Eyes: EOM are normal. Pupils are equal, round, and reactive to light.   Cardiovascular: Normal rate.    Pulmonary/Chest: Effort normal.   Psychiatric: Her mood appears anxious. She has a flat affect.   Nursing note and vitals reviewed.      ASSESSMENT/PLAN:    ICD-10-CM    1. Generalized anxiety disorder F41.1 buPROPion 75 mg tablet   2. Reflux esophagitis  K21.0 ranitidine (ZANTAC) 150 mg tablet        Plan:   1. VITA, worsening with panic attacks now:  D/C zoloft.  Start Wellbutrin 75mg BID.  Follow up in 2-4 weeks and likely change to XL version.  She will look into counseling/therapy in the meantime.    2. Reflux, chronic, stable:  Needing refill of her Zantac, which was also sent today.

## 2018-01-03 NOTE — NURSING NOTE
Patient Information     Patient Name MRN Ana Beyer [id number] Female 1990      Nursing Note by Ce Castro at 3/13/2017 10:00 AM     Author:  Ce Castro Service:  (none) Author Type:  (none)     Filed:  3/13/2017 10:12 AM Encounter Date:  3/13/2017 Status:  Signed     :  Ce Castro            Patient presents for a consult on left shoulder. States that she was unable to move her left arm on Friday.  Ce Castro LPN   3/13/2017  9:46 AM

## 2018-01-03 NOTE — PROGRESS NOTES
Patient Information     Patient Name MRN Ana Beyer [id number] Female 1990      Progress Notes by Abiola Ashton DO at 2/3/2017 12:45 PM     Author:  Abiola Ashton DO Service:  (none) Author Type:  PHYS- Osteopathic     Filed:  2017  6:44 AM Encounter Date:  2/3/2017 Status:  Signed     :  Abiola Ashton DO (PHYS- Osteopathic)            SUBJECTIVE:  Ana Stahl is a 26 y.o. female who presents for follow up to anxiety    HPI  Ana is here to follow up from Wellbutrin start.  She has not had any side effects of the medications; however she does not believe she has noticed a big difference.  She continues to have panic attacks occasionally.  With her PCOS and abnormal menstrual cycles; she is wondering if anything hormonal could be playing a role with her moods.    PHQ Depression Screening 2017 2/3/2017   Date of PHQ exam (doc flow) 2017 2/3/2017   1. Lack of interest/pleasure 3 - Nearly every day 2 - More than half the days   2. Feeling down/depressed 3 - Nearly every day 3 - Nearly every day   PHQ-2 TOTAL SCORE 6 5   3. Trouble sleeping 3 - Nearly every day 3 - Nearly every day   4. Decreased energy 3 - Nearly every day 3 - Nearly every day   5. Appetite change 3 - Nearly every day 3 - Nearly every day   6. Feelings of failure 2 - More than half the days 1 - Several days   7. Trouble concentrating 2 - More than half the days 2 - More than half the days   8. Activity level 3 - Nearly every day 3 - Nearly every day   9. Hurting yourself 1 - Several days 0 - Not at all   PHQ-9 TOTAL SCORE 23 20   PHQ-9 Severity Level severe severe   Functional Impairment extremely difficult extremely difficult     VITA-7 ANXIETY SCREENING 2017 2/3/2017   VITA date (doc flow) 2017 2/3/2017   Nervous, anxious 3 2   Cannot stop worrying 2 2   Worry about different things 2 2   Cannot relax 3 3   Feeling restless 3 3   Easily annoyed/irritated 3 2   Afraid of awful event 0  0   Score 16 14   Severity severe anxiety moderate anxiety       No Known Allergies,   Current Outpatient Prescriptions on File Prior to Visit       Medication  Sig Dispense Refill     LORazepam (ATIVAN) 0.5 mg tab Take 1 tablet by mouth at bedtime if needed for Anxiety or Sleep. 30 tablet 0     metFORMIN sustained release (GLUMETZA) 1,000 mg tablet Take 1 tablet by mouth once daily with a meal. 90 tablet 4     prenatal vitamin-folic acid 1 mg (PRENATAL VITAMIN) tablet/capsule Take 1 tablet by mouth once daily.  0     ranitidine (ZANTAC) 150 mg tablet Take 1 tablet by mouth 2 times daily. 60 tablet 11     traZODone (DESYREL) 50 mg tablet Take 1 tablet by mouth at bedtime if needed for Sleep. 30 tablet 0     No current facility-administered medications on file prior to visit.     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   Cardiovascular: Negative for chest pain and palpitations.   Neurological: Negative for headaches.   Psychiatric/Behavioral: The patient is nervous/anxious and has insomnia.        OBJECTIVE:  /74  Pulse 68  Wt 102.5 kg (226 lb)  LMP 10/28/2016  BMI 37.61 kg/m2    EXAM:   Physical Exam   Constitutional: She is well-developed, well-nourished, and in no distress.   HENT:   Head: Normocephalic and atraumatic.   Cardiovascular: Normal rate and normal heart sounds.    Pulmonary/Chest: Effort normal and breath sounds normal.   Psychiatric: Mood normal. She has a flat affect.   Nursing note and vitals reviewed.      ASSESSMENT/PLAN:    ICD-10-CM    1. Generalized anxiety disorder F41.1 buPROPion (WELLBUTRIN XL) 300 mg Extended-Release tablet   2. Panic attacks F41.0 ALPRAZolam (XANAX) 0.5 mg tablet   3. PCOS (polycystic ovarian syndrome) E28.2 ESTRADIOL      FSH       LUTEINIZING HORMONE      ESTRADIOL        Plan:   1. VITA, chronic, unchanged:  Increase dose of Wellbutrin due to no side effects.  Increase to 300mg daily.    2. Panic attacks, chronic, unchanged:  Start Xanax up to BID prn.  First few doses instructed to be at bedtime or when she could fall asleep due to sedation effects.  Exercising caution when taking medication and keeping medication around the house also reviewed due to abuse potential.  3. PCOS, chronic, unchanged:  Estradiol, FSH and LH obtained to monitor levels.  On Metformin for ovulation/menses regulation.      Follow up in 4-6 weeks.

## 2018-01-03 NOTE — PATIENT INSTRUCTIONS
Patient Information     Patient Name MRN Sex Ana Alonso [id number] Female 1990      Patient Instructions by Merly Davis NP at 3/10/2017 12:45 PM     Author:  Merly Davis NP Service:  (none) Author Type:  PHYS- Nurse Practitioner     Filed:  3/10/2017 12:42 PM Encounter Date:  3/10/2017 Status:  Signed     :  Merly Davis NP (PHYS- Nurse Practitioner)            Ibuprofen 600 mg every 6 hours, take with food    Ice 15 minutes every 4 hours, alternating with heat    Rest, activity as tolerated, avoid lifting    Follow up with orthopedics

## 2018-01-03 NOTE — PROGRESS NOTES
"Patient Information     Patient Name MRN Sex Ana Alonso [id number] Female 1990      Progress Notes by Laith Zurita MD at 3/13/2017 10:00 AM     Author:  Laith Zurita MD Service:  (none) Author Type:  Physician     Filed:  3/13/2017 10:12 AM Encounter Date:  3/13/2017 Status:  Signed     :  Laith Zurita MD (Physician)            SUBJECTIVE:  Ana Stahl is a 26 y.o. female here for left shoulder pain. Patient reports that she developed left shoulder pain on  and worsened on March 10. She states on March 10 she was \"barely able to move her left shoulder.\" Since then she's been using some ibuprofen and ice and she has had some improvement over the weekend. She does not recall any particular activity or trauma that brought this on. She is right-hand dominant. She's had no previous left shoulder problems.    ROS:    As above otherwise ROS is unremarkable.    OBJECTIVE:  /74  Pulse 68  Wt 102.5 kg (226 lb)  BMI 37.43 kg/m2    EXAM:  General Appearance: Pleasant, alert, appropriate appearance for age. No acute distress  Musculoskeletal: Left shoulder shows normal flexion, abduction and internal rotation. She has mild tenderness over her acromium clavicular joint. She has normal sleep spinatus, delta, internal rotation and external rotation strength testing. Pain with Albert, normal Neer's.. Normal biceps testing.  Neurologic Exam: Normal distal sensation to light touch.    ASSESSEMENT AND PLAN:    Ana was seen today for shoulder pain/problem.    Diagnoses and all orders for this visit:    Acute pain of left shoulder    Suspect that she likely has some subacromial bursitis. Recommend Aleve twice daily with meals for the next 2 weeks, ice and activity modification. She'll follow-up if her symptoms are not improving.      Amrit Zurita MD    This document was prepared using voice generated softwear.  While every attempt was made for accuracy, grammatical errors may " exist.

## 2018-01-03 NOTE — NURSING NOTE
Patient Information     Patient Name MRN Ana Beyer [id number] Female 1990      Nursing Note by Elisabeth Wood at 3/10/2017 12:45 PM     Author:  Elisabeth Wood Service:  (none) Author Type:  NURS- Student Practical Nurse     Filed:  3/10/2017 12:36 PM Encounter Date:  3/10/2017 Status:  Signed     :  Elisabeth Wood (NURS- Student Practical Nurse)            Patient presents with left shoulder pain when moving and deep breaths starting yesterday. Patient does not recall any reasoning. Patient has tried ibuprofen and tylenol without relief. Elisabeth Wood LPN .............3/10/2017  12:33 PM

## 2018-01-04 NOTE — PROGRESS NOTES
Patient Information     Patient Name MRN Ana Beyer [id number] Female 1990      Progress Notes by Abiola Ashton DO at 5/3/2017  7:45 AM     Author:  Abiola Ashton DO Service:  (none) Author Type:  PHYS- Osteopathic     Filed:  5/3/2017  8:15 AM Encounter Date:  5/3/2017 Status:  Signed     :  Abiola Ashton DO (PHYS- Osteopathic)            SUBJECTIVE:  Ana Stahl is a 26 y.o. female who presents for anxiety follow up/med check.    RAEANN Perez is here for medication follow up.  At last visit, ~6 weeks ago, we added Zoloft 50 --> 100mg to her current treatment of Wellbutrin.  She had been having difficulties due to the time of the year and her prior miscarriage. (Tomorrow, 17 would have been her due date).  She states things have been at least stable from last visit.  She has an appointment with Dr. Kim tomorrow to discuss future options of PCOS, fertility.  She continues to use her Xanax on a very infrequent basis.  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    PHQ Depression Screening 3/22/2017 5/3/2017   Date of PHQ exam (doc flow) 3/22/2017 5/3/2017   1. Lack of interest/pleasure 2 - More than half the days 3 - Nearly every day   2. Feeling down/depressed 3 - Nearly every day 2 - More than half the days   PHQ-2 TOTAL SCORE 5 5   3. Trouble sleeping 3 - Nearly every day 3 - Nearly every day   4. Decreased energy 3 - Nearly every day 3 - Nearly every day   5. Appetite change 3 - Nearly every day 3 - Nearly every day   6. Feelings of failure 2 - More than half the days 2 - More than half the days   7. Trouble concentrating 1 - Several days 2 - More than half the days   8. Activity level 3 - Nearly every day 2 - More than half the days   9. Hurting yourself 1 - Several days 1 - Several days   PHQ-9 TOTAL SCORE 21 21   PHQ-9 Severity Level severe severe   Functional Impairment very  difficult very difficult     VITA-7 ANXIETY SCREENING 3/22/2017 5/3/2017   VITA date (doc flow) 3/22/2017 5/3/2017   Nervous, anxious 2 2   Cannot stop worrying 3 3   Worry about different things 2 2   Cannot relax 3 2   Feeling restless 2 2   Easily annoyed/irritated 3 3   Afraid of awful event 0 0   Score 15 14   Severity severe anxiety moderate anxiety         No Known Allergies,   Current Outpatient Prescriptions on File Prior to Visit       Medication  Sig Dispense Refill     ALPRAZolam (XANAX) 0.5 mg tablet Take 1 tablet by mouth 2 times daily if needed. 30 tablet 2     buPROPion (WELLBUTRIN XL) 300 mg Extended-Release tablet Take 1 tablet by mouth every morning. 30 tablet 2     LORazepam (ATIVAN) 0.5 mg tab Take 1 tablet by mouth at bedtime if needed for Anxiety or Sleep. 30 tablet 0     prenatal vitamin-folic acid 1 mg (PRENATAL VITAMIN) tablet/capsule Take 1 tablet by mouth once daily.  0     ranitidine (ZANTAC) 150 mg tablet Take 1 tablet by mouth 2 times daily. 60 tablet 11     sertraline (ZOLOFT) 50 mg tablet Take 1 tablet by mouth once daily. For two weeks; then increase to 100mg daily. 180 tablet 1     traZODone (DESYREL) 50 mg tablet Take 1 tablet by mouth at bedtime if needed for Sleep. 30 tablet 0     No current facility-administered medications on file prior to visit.     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      OBJECTIVE:  /84  Pulse 68  Wt 104.8 kg (231 lb)  BMI 38.26 kg/m2    EXAM:   Physical Exam   Constitutional: She is well-developed, well-nourished, and in no distress.   HENT:   Head: Normocephalic and atraumatic.   Right Ear: External ear normal.   Left Ear: External ear normal.   Eyes: EOM are normal. Pupils are equal, round, and reactive to light.    Cardiovascular: Normal heart sounds.    Pulmonary/Chest: Effort normal and breath sounds normal.   Psychiatric: Mood and affect normal.   Nursing note and vitals reviewed.      ASSESSMENT/PLAN:    ICD-10-CM    1. Generalized anxiety disorder F41.1         Plan:   Continue with current plan.  Medications refilled.  Discussed dietary referral and discussion of weight loss to help with conceiving.   Continues on a prenatal vitamin and aware of need to stop her benzodiazepines with pregnancy.    Follow up in 6 months; sooner prn.

## 2018-01-04 NOTE — PROGRESS NOTES
Patient Information     Patient Name MRN Ana Beyer [id number] Female 1990      Progress Notes by Gerry Kim MD at 2017  2:00 PM     Author:  Gerry Kim MD Service:  (none) Author Type:  Physician     Filed:  2017  1:47 PM Encounter Date:  2017 Status:  Signed     :  Gerry Kim MD (Physician)            SUBJECTIVE:    Ana Stahl is a 26 y.o. female who presents for discussion of PCOS as it pertains to becoming pregnant.    HPI  She has very irregular cycles, about every four months. She was started on Metformin last . She gets pos LH tests but are pos throughout the month. She had a conception last  but miscarried early.    No Known Allergies,   Family History       Problem   Relation Age of Onset     Good Health  Mother      Good Health  Father      Cancer  Maternal Grandmother      Diabetes  Other      maternal side     ,   Current Outpatient Prescriptions on File Prior to Visit       Medication  Sig Dispense Refill     ALPRAZolam (XANAX) 0.5 mg tablet Take 1 tablet by mouth 2 times daily if needed. 30 tablet 2     buPROPion (WELLBUTRIN XL) 300 mg Extended-Release tablet Take 1 tablet by mouth every morning. 90 tablet 4     LORazepam (ATIVAN) 0.5 mg tab Take 1 tablet by mouth at bedtime if needed for Anxiety or Sleep. 30 tablet 0     prenatal vitamin-folic acid 1 mg (PRENATAL VITAMIN) tablet/capsule Take 1 tablet by mouth once daily.  0     ranitidine (ZANTAC) 150 mg tablet Take 1 tablet by mouth 2 times daily. 60 tablet 11     sertraline (ZOLOFT) 100 mg tablet Take 1 tablet by mouth once daily. 90 tablet 4     traZODone (DESYREL) 50 mg tablet Take 1 tablet by mouth at bedtime if needed for Sleep. 30 tablet 0     No current facility-administered medications on file prior to visit.    ,   Past Medical History:     Diagnosis  Date     Abnormal pap      Patellar malalignment syndrome-bilateral 2016     PCOS (polycystic ovarian syndrome)  3/18/2016   ,   Past Surgical History:      Procedure  Laterality Date     NO PREVIOUS SURGERY      and   Social History       Substance Use Topics         Smoking status:   Never Smoker     Smokeless tobacco:   Never Used     Alcohol use   No      Comment: rare        REVIEW OF SYSTEMS:  Review of Systems   Constitutional: Negative.    Skin: Negative.         No nipple discharge   Neurological: Positive for headaches.   Endo/Heme/Allergies: Negative.    All other systems reviewed and are negative.      OBJECTIVE:  /68  Wt 104.8 kg (231 lb)  LMP 02/27/2017  Breastfeeding? No  BMI 38.26 kg/m2    EXAM:   Physical Exam   Constitutional: She is well-developed, well-nourished, and in no distress.   Vitals reviewed.      ASSESSMENT/PLAN:    ICD-10-CM    1. PCOS (polycystic ovarian syndrome) E28.2 metFORMIN (GLUCOPHAGE XR) 500 mg Extended-Release tablet      PROLACTIN      17 OH PROGESTERONE      TESTOSTERONE,TOTAL      CORTISOL TOTAL      HEPATIC FUNCTION PANEL      CREATININE        Plan:  Discussed PCOS as a diagnosis of exclusion for CAH, cushing's syndrome, testosterone producing tumors, adrenal tumors, hyperprolactinemia due to pituitary tumors.  If her workup is normal (tsh was already normal) could consider ovulation induction with clomid. WOuld increase her metformin to 1500 mg per day if she tolerates it. Encouraged weight loss.  If her labs are normal could send in a provera challenge to star her cycle and begin clomid use. Patient verbalizes understanding and agreement.    Gerry Kim MD FACOG  1:47 PM 5/4/2017

## 2018-01-04 NOTE — PROGRESS NOTES
Patient Information     Patient Name MRN Ana Beyer [id number] Female 1990      Progress Notes by Abiola Ashton DO at 3/22/2017 12:45 PM     Author:  Abiola Ashton DO Service:  (none) Author Type:  PHYS- Osteopathic     Filed:  3/23/2017  9:56 PM Encounter Date:  3/22/2017 Status:  Signed     :  Abiola Ashton DO (PHYS- Osteopathic)            SUBJECTIVE:  Ana Stahl is a 26 y.o. female who presents for follow up for depression/medication changes.    RAEANN Perez has been titrating up on Wellbutrin for symptoms of depression and anxiety.  She admits today that this is a bad time of year for her because of her prior miscarriage and a lot of people around her are announcing pregnancies.  Therefore she has been undoubtedly more emotional since last visit.  She is uncertain if the Wellbutrin is doing much for her.  Has not met with counseling/therapy - wouldn't know how she could find time for that.    PHQ Depression Screening 3/13/2017 3/22/2017   Date of PHQ exam (doc flow) 3/13/2017 3/22/2017   1. Lack of interest/pleasure - 2 - More than half the days   2. Feeling down/depressed - 3 - Nearly every day   PHQ-2 TOTAL SCORE - 5   3. Trouble sleeping - 3 - Nearly every day   4. Decreased energy - 3 - Nearly every day   5. Appetite change - 3 - Nearly every day   6. Feelings of failure - 2 - More than half the days   7. Trouble concentrating - 1 - Several days   8. Activity level - 3 - Nearly every day   9. Hurting yourself - 1 - Several days   PHQ-9 TOTAL SCORE - 21   PHQ-9 Severity Level - severe   Functional Impairment - very difficult     VITA-7 ANXIETY SCREENING 2/3/2017 3/22/2017   VITA date (doc flow) 2/3/2017 3/22/2017   Nervous, anxious 2 2   Cannot stop worrying 2 3   Worry about different things 2 2   Cannot relax 3 3   Feeling restless 3 2   Easily annoyed/irritated 2 3   Afraid of awful event 0 0   Score 14 15   Severity moderate anxiety severe anxiety       No  Known Allergies,   Current Outpatient Prescriptions on File Prior to Visit       Medication  Sig Dispense Refill     ALPRAZolam (XANAX) 0.5 mg tablet Take 1 tablet by mouth 2 times daily if needed. 30 tablet 2     buPROPion (WELLBUTRIN XL) 300 mg Extended-Release tablet Take 1 tablet by mouth every morning. 30 tablet 2     LORazepam (ATIVAN) 0.5 mg tab Take 1 tablet by mouth at bedtime if needed for Anxiety or Sleep. 30 tablet 0     metFORMIN sustained release (GLUMETZA) 1,000 mg tablet Take 1 tablet by mouth once daily with a meal. 90 tablet 4     prenatal vitamin-folic acid 1 mg (PRENATAL VITAMIN) tablet/capsule Take 1 tablet by mouth once daily.  0     ranitidine (ZANTAC) 150 mg tablet Take 1 tablet by mouth 2 times daily. 60 tablet 11     traZODone (DESYREL) 50 mg tablet Take 1 tablet by mouth at bedtime if needed for Sleep. 30 tablet 0     No current facility-administered medications on file prior to visit.     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   Cardiovascular: Negative for chest pain and palpitations.   Skin: Negative for rash.       OBJECTIVE:  /82  Pulse 76  Wt 105 kg (231 lb 6.4 oz)  LMP 02/27/2017  BMI 38.32 kg/m2    EXAM:   Physical Exam   Constitutional: She is well-developed, well-nourished, and in no distress.   HENT:   Head: Normocephalic and atraumatic.   Eyes: EOM are normal. Pupils are equal, round, and reactive to light.   Cardiovascular: Normal rate and normal heart sounds.    Pulmonary/Chest: Effort normal and breath sounds normal.   Abdominal: Soft. Bowel sounds are normal.   Psychiatric:   Distant; fidgets and seems uneasy/anxious.   Nursing note and vitals reviewed.      ASSESSMENT/PLAN:    ICD-10-CM    1. Generalized anxiety disorder F41.1 sertraline  (ZOLOFT) 50 mg tablet        Plan:   Discussed addition of ssri to current regimen. Encourage some degree of counseling as a lot of her feelings are being bottled up.  Discussed a few coping mechanisms and praised her for the explanation of their struggles with miscarriage/infertility to family and friends.    I've explained to her that drugs of the SSRI class can have side effects such as weight gain, sexual dysfunction, insomnia, headache, nausea. These medications are generally effective at alleviating symptoms of anxiety and/or depression. Let me know if significant side effects do occur.     Follow up in 6 weeks; sooner prn.

## 2018-01-05 NOTE — PATIENT INSTRUCTIONS
Patient Information     Patient Name MRN Ana Beyer [id number] Female 1990      Patient Instructions by Gerry Kim MD at 2017  3:00 PM     Author:  Gerry Kim MD Service:  (none) Author Type:  Physician     Filed:  2017  1:42 PM Encounter Date:  2017 Status:  Signed     :  Gerry Kim MD (Physician)            Clomid once daily for 5 days starting 17  Then timed intercourse every other day starting day 14 (6/3/17) for one week.  Use ovulation detection kit starting day 14 until positive (check in afternoon)  Call with pos pregnancy test, or if no period by day 35

## 2018-01-05 NOTE — PROGRESS NOTES
Patient Information     Patient Name MRN Sex Ana Alonso [id number] Female 1990      Progress Notes by Gerry Kim MD at 2017  3:00 PM     Author:  Gerry Kim MD Service:  (none) Author Type:  Physician     Filed:  2017  1:48 PM Encounter Date:  2017 Status:  Signed     :  Gerry Kim MD (Physician)            SUBJECTIVE:    Ana Stahl is a 26 y.o. female who presents for ovulation induction with clomid.    HPI  OB History                    Para  Term     AB  Living     1   0  0  0   1  0     SAB   TAB  Ectopic  Multiple   Live Births       1   0  0  0                           # Outcome Date  GA  Lbr Mandeep/2nd  Weight Sex  Delivery Anes PTL Lv   1 SAB                                   She is day 3 today. Normal cycle. Just increased metformin to TID.    No Known Allergies,   Family History       Problem   Relation Age of Onset     Good Health  Mother      Good Health  Father      Cancer  Maternal Grandmother      Diabetes  Other      maternal side     ,   Current Outpatient Prescriptions on File Prior to Visit       Medication  Sig Dispense Refill     ALPRAZolam (XANAX) 0.5 mg tablet Take 1 tablet by mouth 2 times daily if needed. 30 tablet 2     buPROPion (WELLBUTRIN XL) 300 mg Extended-Release tablet Take 1 tablet by mouth every morning. 90 tablet 4     metFORMIN (GLUCOPHAGE XR) 500 mg Extended-Release tablet 1 tablet po tid with meal. 270 tablet 3     prenatal vitamin-folic acid 1 mg (PRENATAL VITAMIN) tablet/capsule Take 1 tablet by mouth once daily.  0     ranitidine (ZANTAC) 150 mg tablet Take 1 tablet by mouth 2 times daily. 60 tablet 11     sertraline (ZOLOFT) 100 mg tablet Take 1 tablet by mouth once daily. 90 tablet 4     No current facility-administered medications on file prior to visit.    ,   Past Medical History:     Diagnosis  Date     Abnormal pap      Patellar malalignment syndrome-bilateral 2016     PCOS (polycystic  ovarian syndrome) 3/18/2016   ,   Past Surgical History:      Procedure  Laterality Date     NO PREVIOUS SURGERY      and   Social History       Substance Use Topics         Smoking status:   Never Smoker     Smokeless tobacco:   Never Used     Alcohol use   No      Comment: rare        REVIEW OF SYSTEMS:  Review of Systems   Gastrointestinal: Negative.    Genitourinary: Negative.    Neurological: Negative.    Endo/Heme/Allergies: Negative.        OBJECTIVE:  /76  Temp 99.1  F (37.3  C) (Tympanic)  Wt 106.4 kg (234 lb 8 oz)  LMP 05/21/2017  Breastfeeding? No  BMI 38.84 kg/m2    EXAM:   Physical Exam   Constitutional: She is well-developed, well-nourished, and in no distress.   Genitourinary: Uterus normal, right adnexa normal and left adnexa normal. Uterus is not deviated, not enlarged, not fixed and not tender. Right adnexum displays no mass and no tenderness. Left adnexum displays no mass and no tenderness.   Vitals reviewed.      ASSESSMENT/PLAN:    ICD-10-CM    1. Anovulation N97.0 clomiPHENE citrate (CLOMID) 50 mg tablet        Plan:  Clomid once daily for 5 days starting 5/25/17  Then timed intercourse every other day starting day 14 for one week.  Use ovulation detection kit starting day 14 until positive (check in afternoon)  Call with pos pregnancy test, or if no period by day 35.

## 2018-01-22 ENCOUNTER — HISTORY (OUTPATIENT)
Dept: FAMILY MEDICINE | Facility: OTHER | Age: 28
End: 2018-01-22

## 2018-01-22 ENCOUNTER — OFFICE VISIT - GICH (OUTPATIENT)
Dept: FAMILY MEDICINE | Facility: OTHER | Age: 28
End: 2018-01-22

## 2018-01-22 DIAGNOSIS — B97.4 RESPIRATORY SYNCYTIAL VIRUS AS THE CAUSE OF DISEASES CLASSIFIED ELSEWHERE: ICD-10-CM

## 2018-01-22 DIAGNOSIS — R50.9 FEVER: ICD-10-CM

## 2018-01-22 DIAGNOSIS — R69 ILLNESS: ICD-10-CM

## 2018-01-22 LAB — RSV RNA SPEC QL NAA+PROBE: DETECTED

## 2018-01-25 ENCOUNTER — COMMUNICATION - GICH (OUTPATIENT)
Dept: FAMILY MEDICINE | Facility: OTHER | Age: 28
End: 2018-01-25

## 2018-01-25 VITALS — WEIGHT: 231 LBS | SYSTOLIC BLOOD PRESSURE: 114 MMHG | BODY MASS INDEX: 39.63 KG/M2 | DIASTOLIC BLOOD PRESSURE: 68 MMHG

## 2018-01-25 VITALS
WEIGHT: 227 LBS | DIASTOLIC BLOOD PRESSURE: 76 MMHG | HEIGHT: 65 IN | SYSTOLIC BLOOD PRESSURE: 128 MMHG | DIASTOLIC BLOOD PRESSURE: 74 MMHG | HEART RATE: 72 BPM | BODY MASS INDEX: 40.82 KG/M2 | WEIGHT: 245 LBS | DIASTOLIC BLOOD PRESSURE: 70 MMHG | BODY MASS INDEX: 38.32 KG/M2 | WEIGHT: 226 LBS | WEIGHT: 231.4 LBS | HEART RATE: 68 BPM | DIASTOLIC BLOOD PRESSURE: 82 MMHG | HEART RATE: 68 BPM | BODY MASS INDEX: 37.77 KG/M2 | SYSTOLIC BLOOD PRESSURE: 118 MMHG | TEMPERATURE: 99.7 F | BODY MASS INDEX: 37.43 KG/M2 | SYSTOLIC BLOOD PRESSURE: 104 MMHG | SYSTOLIC BLOOD PRESSURE: 108 MMHG | HEART RATE: 76 BPM

## 2018-01-25 VITALS
SYSTOLIC BLOOD PRESSURE: 112 MMHG | RESPIRATION RATE: 16 BRPM | HEART RATE: 68 BPM | HEART RATE: 72 BPM | DIASTOLIC BLOOD PRESSURE: 74 MMHG | TEMPERATURE: 97.5 F | BODY MASS INDEX: 40.38 KG/M2 | WEIGHT: 243.8 LBS | BODY MASS INDEX: 38.77 KG/M2 | WEIGHT: 226 LBS | WEIGHT: 231.6 LBS | SYSTOLIC BLOOD PRESSURE: 108 MMHG | BODY MASS INDEX: 38.59 KG/M2 | HEIGHT: 65 IN | HEART RATE: 74 BPM | DIASTOLIC BLOOD PRESSURE: 82 MMHG

## 2018-01-25 VITALS
TEMPERATURE: 99.1 F | SYSTOLIC BLOOD PRESSURE: 106 MMHG | WEIGHT: 234.5 LBS | DIASTOLIC BLOOD PRESSURE: 76 MMHG | DIASTOLIC BLOOD PRESSURE: 84 MMHG | WEIGHT: 231 LBS | SYSTOLIC BLOOD PRESSURE: 128 MMHG | BODY MASS INDEX: 39.63 KG/M2 | BODY MASS INDEX: 40.23 KG/M2 | HEART RATE: 68 BPM

## 2018-01-25 VITALS
WEIGHT: 238 LBS | TEMPERATURE: 98.4 F | DIASTOLIC BLOOD PRESSURE: 78 MMHG | SYSTOLIC BLOOD PRESSURE: 108 MMHG | BODY MASS INDEX: 39.41 KG/M2

## 2018-01-30 ENCOUNTER — HISTORY (OUTPATIENT)
Dept: FAMILY MEDICINE | Facility: OTHER | Age: 28
End: 2018-01-30

## 2018-01-30 ENCOUNTER — OFFICE VISIT - GICH (OUTPATIENT)
Dept: FAMILY MEDICINE | Facility: OTHER | Age: 28
End: 2018-01-30

## 2018-01-30 DIAGNOSIS — Z00.00 ENCOUNTER FOR GENERAL ADULT MEDICAL EXAMINATION WITHOUT ABNORMAL FINDINGS: ICD-10-CM

## 2018-01-30 DIAGNOSIS — F41.1 GENERALIZED ANXIETY DISORDER: ICD-10-CM

## 2018-01-30 ASSESSMENT — ANXIETY QUESTIONNAIRES
GAD7 TOTAL SCORE: 16
5. BEING SO RESTLESS THAT IT IS HARD TO SIT STILL: MORE THAN HALF THE DAYS
3. WORRYING TOO MUCH ABOUT DIFFERENT THINGS: SEVERAL DAYS
7. FEELING AFRAID AS IF SOMETHING AWFUL MIGHT HAPPEN: SEVERAL DAYS
GAD7 TOTAL SCORE: 13
GAD7 TOTAL SCORE: 14
2. NOT BEING ABLE TO STOP OR CONTROL WORRYING: SEVERAL DAYS
1. FEELING NERVOUS, ANXIOUS, OR ON EDGE: MORE THAN HALF THE DAYS
GAD7 TOTAL SCORE: 15
4. TROUBLE RELAXING: NEARLY EVERY DAY
GAD7 TOTAL SCORE: 14
6. BECOMING EASILY ANNOYED OR IRRITABLE: NEARLY EVERY DAY

## 2018-01-30 ASSESSMENT — PATIENT HEALTH QUESTIONNAIRE - PHQ9
SUM OF ALL RESPONSES TO PHQ QUESTIONS 1-9: 20
SUM OF ALL RESPONSES TO PHQ QUESTIONS 1-9: 23
SUM OF ALL RESPONSES TO PHQ QUESTIONS 1-9: 24
SUM OF ALL RESPONSES TO PHQ QUESTIONS 1-9: 21
SUM OF ALL RESPONSES TO PHQ QUESTIONS 1-9: 21

## 2018-02-09 ENCOUNTER — DOCUMENTATION ONLY (OUTPATIENT)
Dept: FAMILY MEDICINE | Facility: OTHER | Age: 28
End: 2018-02-09

## 2018-02-09 VITALS
SYSTOLIC BLOOD PRESSURE: 110 MMHG | HEART RATE: 80 BPM | DIASTOLIC BLOOD PRESSURE: 78 MMHG | BODY MASS INDEX: 40.21 KG/M2 | WEIGHT: 239.8 LBS

## 2018-02-09 VITALS
WEIGHT: 240.52 LBS | TEMPERATURE: 100.8 F | DIASTOLIC BLOOD PRESSURE: 78 MMHG | HEART RATE: 88 BPM | BODY MASS INDEX: 40.07 KG/M2 | SYSTOLIC BLOOD PRESSURE: 120 MMHG | HEIGHT: 65 IN

## 2018-02-09 PROBLEM — F41.1 GENERALIZED ANXIETY DISORDER: Status: ACTIVE | Noted: 2017-05-03

## 2018-02-09 RX ORDER — METHOCARBAMOL 750 MG/1
1 TABLET, FILM COATED ORAL 2 TIMES DAILY PRN
COMMUNITY
Start: 2017-08-24 | End: 2018-03-01

## 2018-02-09 RX ORDER — METFORMIN HCL 500 MG
1 TABLET, EXTENDED RELEASE 24 HR ORAL 4 TIMES DAILY
COMMUNITY
Start: 2017-05-04 | End: 2018-11-21

## 2018-02-09 RX ORDER — SUCRALFATE 1 G/1
1 TABLET ORAL
COMMUNITY
Start: 2017-07-19 | End: 2018-08-20

## 2018-02-09 RX ORDER — PRENATAL VIT/IRON FUM/FOLIC AC 27MG-0.8MG
1 TABLET ORAL DAILY
COMMUNITY
Start: 2016-03-18 | End: 2020-03-24

## 2018-02-10 ASSESSMENT — ANXIETY QUESTIONNAIRES: GAD7 TOTAL SCORE: 13

## 2018-02-10 ASSESSMENT — PATIENT HEALTH QUESTIONNAIRE - PHQ9: SUM OF ALL RESPONSES TO PHQ QUESTIONS 1-9: 24

## 2018-02-13 NOTE — PROGRESS NOTES
Patient Information     Patient Name MRN Ana Beyer [id number] Female 1990      Progress Notes by Abiola Ashton DO at 2018 12:45 PM     Author:  Abiola Ashton DO Service:  (none) Author Type:  PHYS- Osteopathic     Filed:  2018 10:59 AM Encounter Date:  2018 Status:  Signed     :  Abiola Ashton DO (PHYS- Osteopathic)            Nursing Notes:   Christina Martel  2018  1:05 PM  Signed  Patient states she would like to discuss a sleep med and depression medication.  Christina Martel LPN............................... 2018 1:00 PM      ANNUAL PHYSICAL - FEMALE    HPI: Ana Stahl is a 27 y.o. female who presents for a yearly exam.  Concerns include:  1.  Needing to go back on her anxiety medication.  Having difficulty sleeping - but is also now working nights and days alternating and not getting good sleep.  Her frustrations/overwhelming feelings are increasing with stress of working, planning a wedding and still becomes sad easily with thoughts of her miscarriage.  Had been on Prozac in the past with good relief, but with possible pregnancy - elected to change to Zoloft.  Most recently was on a combination of Zoloft/Wellbutrin.  Has been off everything for ~6 months.    PHQ Depression Screening 2018   Date of PHQ exam (doc flow) (No Data) 2018   1. Lack of interest/pleasure - 3 - Nearly every day   2. Feeling down/depressed - 3 - Nearly every day   PHQ-2 TOTAL SCORE - 6   3. Trouble sleeping - 3 - Nearly every day   4. Decreased energy - 3 - Nearly every day   5. Appetite change - 2 - More than half the days   6. Feelings of failure - 3 - Nearly every day   7. Trouble concentrating - 3 - Nearly every day   8. Activity level - 2 - More than half the days   9. Hurting yourself - 2 - More than half the days   PHQ-9 TOTAL SCORE - 24   PHQ-9 Severity Level - severe   Functional Impairment - extremely difficult   Some recent data  might be hidden       VITA-7 ANXIETY SCREENING 5/3/2017 1/30/2018   VITA date (doc flow) 5/3/2017 1/30/2018   Nervous, anxious 2 2   Cannot stop worrying 3 1   Worry about different things 2 1   Cannot relax 2 3   Feeling restless 2 2   Easily annoyed/irritated 3 3   Afraid of awful event 0 1   Score 14 13   Severity moderate anxiety moderate anxiety   Some recent data might be hidden       2. Also recently recovering from RSV.    No LMP recorded.   Contraception: None currently.  Has been getting some regular periods as of recently.  Usually irregular secondary to PCOS.  Risk for STI?: No  Last pap: 3/18/2016; normal. Due in 3 years.  Any hx of abnormal paps:  No  FH of early CA?: No  Cholesterol/DM concerns/screening: No  Tobacco?: No  Calcium intake: NA  DEXA: NA  Last mammo: NA  Colonoscopy: NA  Immunizations: Tdap and flu UTD.    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         Family History       Problem   Relation Age of Onset     Good Health  Mother      Good Health  Father      Cancer  Maternal Grandmother      Diabetes  Other      maternal side         Current Outpatient Prescriptions       Medication  Sig Dispense Refill     metFORMIN (GLUCOPHAGE XR) 500 mg Extended-Release tablet 1 tablet po tid with meal. 270 tablet 3     omeprazole (PRILOSEC) 20 mg Delayed-Release capsule Take 1-2 capsules by mouth once daily before a meal. 60 capsule 5     prenatal vitamin-folic acid 1 mg (PRENATAL VITAMIN) tablet/capsule Take 1 tablet by mouth once daily.  0     sertraline (ZOLOFT) 100 mg tablet Start: 1/2 tablet PO daily x 6 days; then increase to 1 tablet daily. 90 tablet 4     sucralfate (CARAFATE) 1 gram tablet Take 1 tablet by mouth 3 times daily before meals. 90 tablet 1     No current facility-administered medications for this visit.      Medications have been reviewed by me and are current to the best of my knowledge and ability.       REVIEW OF SYSTEMS:  Refer to HPI; all other systems reviewed and negative.    PHYSICAL EXAM:  /78 (Cuff Site: Right Arm, Position: Sitting, Cuff Size: Adult Regular)  Pulse 80  Wt 108.8 kg (239 lb 12.8 oz)  BMI 40.21 kg/m2  CONSTITUTIONAL:  Alert, cooperative, NAD.  EYES: No scleral icterus.  PERRLA.  Conjunctiva clear.  ENT/MOUTH: External ears and nose normal.  TMs normal.  Moist mucous membranes. Oropharynx clear.    ENDO: No thyromegaly or thyroid nodules.  LYMPH:  No cervical or supraclavicular LA.    BREASTS: deferred  CARDIOVASCULAR: Regular, S1, S2.  No S3 or S4.  No murmur/gallop/rub.  No peripheral edema.  RESPIRATORY: CTA bilaterally, no wheezes, rhonchi or rales.  GI: Bowel sounds wnl.  Soft, nontender, nondistended.  No masses or HSM.  No rebound or guarding.  : deferred as pap not due.  Pap smear obtained: no  MSKEL: Grossly normal ROM.   No clubbing.  INTEGUMENTARY:  Warm, dry.  No rash noted on exposed skin.  NEUROLOGIC: Facies symmetric.  Grossly normal movement and tone.  No tremor.  PSYCHIATRIC: Affect normal.  Speech fluent.      PHQ Depression Screen  Date of PHQ exam: 18  Over the last 2 weeks, how often have you been bothered by any of the following problems?  1. Little interest or pleasure in doing things: 3 - Nearly every day  2. Feeling down, depressed, or hopeless: 3 - Nearly every day  3. Trouble falling or staying asleep, or sleeping too much: 3 - Nearly every day  4. Feeling tired or having little energy: 3 - Nearly every day  5. Poor appetite or overeatin - More than half the days  6. Feeling bad about yourself - or that you are a failure or have let yourself or your family down: 3 - Nearly every day  7. Trouble concentrating on things, such as reading the newspaper or watching television: 3 - Nearly every day  8. Moving or speaking so slowly that other people could have noticed. Or the opposite - being so fidgety or restless that you have been moving around a lot more than usual: 2 - More than half the days  9. Thoughts that you would be better off dead, or of hurting yourself in some way: 2 - More than half the days    PHQ-9 TOTAL SCORE: (!) 24  Depression Severity Level: severe  If any answers were positive, how difficult have these problems made it for you to do your work, take care of things at home, or get along with other people: extremely difficult    ASSESSMENT/PLAN:    ICD-10-CM    1. Annual physical exam Z00.00    2. Generalized anxiety disorder F41.1 sertraline (ZOLOFT) 100 mg tablet      DISCONTINUED: sertraline (ZOLOFT) 100 mg tablet       Relevant cancer screening discussed.    Counseled on healthy diet, Calcium and vitamin D intake, and exercise.    Anxiety, acutely worsened: likely due to increased stressors with wedding and fiancee's pressure to having children sooner than later.  Re-start zoloft.  I've  explained to her that drugs of the SSRI class can have side effects such as weight gain, sexual dysfunction, insomnia, headache, nausea. These medications are generally effective at alleviating symptoms of anxiety and/or depression. Let me know if significant side effects do occur.   Consideration for addition of Wellbutrin in the future if needed.    CINDY PARRA, DO

## 2018-02-13 NOTE — NURSING NOTE
Patient Information     Patient Name MRN Ana Beyer [id number] Female 1990      Nursing Note by Allie Rowe at 2018  5:30 PM     Author:  Allie Rowe Service:  (none) Author Type:  (none)     Filed:  2018  5:51 PM Encounter Date:  2018 Status:  Signed     :  Allie Rowe            Patient presents today for cough, congestion, and headache that started yesterday. Patient has been exposed to influenza a, and b at her job. Patient has a fever of 100.8.    Allie Rowe LPN..............2018 5:44 PM

## 2018-02-13 NOTE — NURSING NOTE
Patient Information     Patient Name MRN Ana Beyer [id number] Female 1990      Nursing Note by Christina Martel at 2018 12:45 PM     Author:  Christina Martel Service:  (none) Author Type:  (none)     Filed:  2018  1:05 PM Encounter Date:  2018 Status:  Signed     :  Christina Martel            Patient states she would like to discuss a sleep med and depression medication.  Christina Martel LPN............................... 2018 1:00 PM

## 2018-02-13 NOTE — PROGRESS NOTES
Patient Information     Patient Name MRN Ana Beyer [id number] Female 1990      Progress Notes by Francisca Melvin NP at 2018  5:30 PM     Author:  Francisca Melvin NP Service:  (none) Author Type:  PHYS- Nurse Practitioner     Filed:  2018  9:25 AM Encounter Date:  2018 Status:  Signed     :  Francisca Melvin NP (PHYS- Nurse Practitioner)            HPI:  Nursing Notes:   Allie Rowe  2018  5:51 PM  Signed  Patient presents today for cough, congestion, and headache that started yesterday. Patient has been exposed to influenza a, and b at her job. Patient has a fever of 100.8.    Allie Rowe LPN..............2018 5:44 PM      Ana Stahl is a 27 y.o. female who presents to clinic today for cough, congestion, headache, fever, sore throat that started three days ago. Denies body aches, diarrhea or vomiting. Decreased appetite, drinking fluids without difficulty. Has been exposed to Influenza A/B and RSV.     Past Medical History:     Diagnosis  Date     Abnormal pap      Patellar malalignment syndrome-bilateral 2016     PCOS (polycystic ovarian syndrome) 3/18/2016     Past Surgical History:      Procedure  Laterality Date     NO PREVIOUS SURGERY       Social History       Substance Use Topics         Smoking status:   Never Smoker     Smokeless tobacco:   Never Used     Alcohol use   No      Comment: rare      Current Outpatient Prescriptions       Medication  Sig Dispense Refill     acetaminophen-codeine, 300-30 mg, (TYLENOL #3) tablet Take 1 tablet by mouth once daily if needed. Max acetaminophen dose: 4000mg in 24 hrs. 8 tablet 0     metFORMIN (GLUCOPHAGE XR) 500 mg Extended-Release tablet 1 tablet po tid with meal. 270 tablet 3     methocarbamol (ROBAXIN) 750 mg tablet Take one tab twice daily as needed 20 tablet 0     norgestrel-ethinyl estradiol, 0.3-30 mg-mcg, (LO-OVRAL) 0.3-30 mg-mcg tablet Take 4  "tablets PO daily x 5 days; then throw the pack away.  On Day #6, start NORMAL birth control pack (for one month.) 2 Packet 0     omeprazole (PRILOSEC) 20 mg Delayed-Release capsule Take 1-2 capsules by mouth once daily before a meal. 60 capsule 5     prenatal vitamin-folic acid 1 mg (PRENATAL VITAMIN) tablet/capsule Take 1 tablet by mouth once daily.  0     sucralfate (CARAFATE) 1 gram tablet Take 1 tablet by mouth 3 times daily before meals. 90 tablet 1     No current facility-administered medications for this visit.      Medications have been reviewed by me and are current to the best of my knowledge and ability.    No Known Allergies    ROS:  Refer to HPI    /78 (Cuff Site: Right Arm, Position: Sitting, Cuff Size: Adult Regular)  Pulse 88  Temp 100.8  F (38.2  C) (Tympanic)   Ht 1.645 m (5' 4.75\")  Wt 109.1 kg (240 lb 8.4 oz)  Breastfeeding? No  BMI 40.33 kg/m2    EXAM:  General Appearance: Ill appearing female, appropriate appearance for age. No acute distress  Ears: Left TM with bony landmarks appreciated with cone of light, no erythema, no effusion, no bulging, no purulence.  Right TM with bony landmarks appreciated with cone of light, no erythema, no effusion, no bulging, no purulence.   Left auditory canal clear, Right auditory canal clear, normal external ears, non tender.  Orophayrnx: moist mucous membranes, posterior pharynx, tonsils without hypertrophy, no erythema, no exudates or petechiae,  No sinus pain upon palpation of the frontal, maxillary, or ethmoid sinuses  Neck: supple without adenopathy  Respiratory: normal chest wall and respirations.  Normal effort.  Clear to auscultation bilaterally  Cardiac: RRR   Psychological: normal affect, alert and pleasant    ASSESSMENT/PLAN:    ICD-10-CM    1. RSV infection B97.4    2. Fever, unspecified fever cause R50.9 [facility] ONLY INFLUENZA A/B PCR      [facility] ONLY INFLUENZA A/B PCR      RSV PCR [facility] ONLY      RSV PCR [facility] ONLY   3. Influenza-like " illness R69 oseltamivir (TAMIFLU) 75 mg capsule   Cough, congestion and fever  On exam: ill appearing female with fever, lungs clear to auscultation, tonsils without erythema, ears without erythema  Results for orders placed or performed in visit on 01/22/18      [facility] ONLY INFLUENZA A/B PCR      Result  Value Ref Range    INFLUENZA  A  PCR Negative      INFLUENZA B PCR Negative     RSV PCR [facility] ONLY      Result  Value Ref Range    Respiratory Syncytial Virus Detected (A) NOT Detected   Diagnosis: RSV  Treat with rest and fluids  Tylenol or ibuprofen PRN  Follow up as needed    There are no Patient Instructions on file for this visit.

## 2018-03-01 ENCOUNTER — OFFICE VISIT (OUTPATIENT)
Dept: FAMILY MEDICINE | Facility: OTHER | Age: 28
End: 2018-03-01
Attending: FAMILY MEDICINE
Payer: COMMERCIAL

## 2018-03-01 VITALS
BODY MASS INDEX: 40.62 KG/M2 | SYSTOLIC BLOOD PRESSURE: 124 MMHG | HEIGHT: 65 IN | WEIGHT: 243.8 LBS | HEART RATE: 80 BPM | DIASTOLIC BLOOD PRESSURE: 86 MMHG

## 2018-03-01 DIAGNOSIS — M22.2X1 PATELLOFEMORAL SYNDROME, BILATERAL: ICD-10-CM

## 2018-03-01 DIAGNOSIS — S83.242A ACUTE MEDIAL MENISCUS TEAR, LEFT, INITIAL ENCOUNTER: Primary | ICD-10-CM

## 2018-03-01 DIAGNOSIS — M22.2X2 PATELLOFEMORAL SYNDROME, BILATERAL: ICD-10-CM

## 2018-03-01 PROCEDURE — 99213 OFFICE O/P EST LOW 20 MIN: CPT | Performed by: FAMILY MEDICINE

## 2018-03-01 RX ORDER — SERTRALINE HYDROCHLORIDE 100 MG/1
TABLET, FILM COATED ORAL
COMMUNITY
Start: 2018-01-30 | End: 2019-03-01

## 2018-03-01 ASSESSMENT — ANXIETY QUESTIONNAIRES
5. BEING SO RESTLESS THAT IT IS HARD TO SIT STILL: NEARLY EVERY DAY
7. FEELING AFRAID AS IF SOMETHING AWFUL MIGHT HAPPEN: NEARLY EVERY DAY
6. BECOMING EASILY ANNOYED OR IRRITABLE: NEARLY EVERY DAY
1. FEELING NERVOUS, ANXIOUS, OR ON EDGE: NEARLY EVERY DAY
GAD7 TOTAL SCORE: 21
2. NOT BEING ABLE TO STOP OR CONTROL WORRYING: NEARLY EVERY DAY
3. WORRYING TOO MUCH ABOUT DIFFERENT THINGS: NEARLY EVERY DAY

## 2018-03-01 ASSESSMENT — PAIN SCALES - GENERAL: PAINLEVEL: MILD PAIN (3)

## 2018-03-01 ASSESSMENT — PATIENT HEALTH QUESTIONNAIRE - PHQ9: 5. POOR APPETITE OR OVEREATING: NEARLY EVERY DAY

## 2018-03-01 NOTE — PROGRESS NOTES
Nursing Notes:   Tanja Greenfield LPN  3/1/2018  2:39 PM  Signed  She noticed 9 days ago with left knee swelling. She stated she is having left knee pain and swelling.  Tanja Greenfield LPN..................3/1/2018   2:32 PM      SUBJECTIVE:  Ana Stahl  is a 27 year old female who comes in today for knee pain.  She has a history of internal derangement of her  Right  knee.  She had x-ray of both knees 2 years ago that did not show significant degenerative change.  She has a history of patellar malalignment syndrome.  She saw Dr. Parry in 2016.  He felt that she had bilateral patellofemoral pain with tracking issues with the patella with chondromalacia patellae bilaterally.  She previously had a knee scope on the right with Dr. Landeros and physical therapy after that.    She has had swelling of her left knee. NKI. Hurts to have it bent too long.  It has been swollen for 1 week.  She has been taking ibuprofen. She iced some, but not much.     Past Medical, Family, and Social History reviewed and updated as noted below.   ROS is negative except as noted above       Allergies   Allergen Reactions     Yellow Dye Unknown     Based on allergy testing as an intolerance.   ,   Family History   Problem Relation Age of Onset     Family History Negative Mother      Good Health     Family History Negative Father      Good Health     CANCER Maternal Grandmother      Cancer     DIABETES Other      Diabetes,maternal side   ,   Current Outpatient Prescriptions   Medication     sertraline (ZOLOFT) 100 MG tablet     metFORMIN (GLUCOPHAGE-XR) 500 MG 24 hr tablet     omeprazole (PRILOSEC) 20 MG CR capsule     Prenatal Vit-Fe Fumarate-FA (PRENATAL MULTIVITAMIN PLUS IRON) 27-0.8 MG TABS per tablet     sucralfate (CARAFATE) 1 GM tablet     No current facility-administered medications for this visit.    ,   Past Medical History:   Diagnosis Date     Internal derangement of knee     5/23/2016     Personal history of other medical  "treatment (CODE)     2013     Polycystic ovarian syndrome     3/18/2016   ,   Patient Active Problem List    Diagnosis Date Noted     Generalized anxiety disorder 05/03/2017     Priority: Medium     Patellar malalignment syndrome 05/23/2016     Priority: Medium     Abnormal pap 03/18/2016     Priority: Medium     Overview:   LGSIL with + HPV (12/2013)  Colposcopy with CIN1 (1/2014)  ASCUS with - HPV (4/2015)       PCOS (polycystic ovarian syndrome) 03/18/2016     Priority: Medium   ,   Past Surgical History:   Procedure Laterality Date     OTHER SURGICAL HISTORY      VCB386,NO PREVIOUS SURGERY    and   Social History   Substance Use Topics     Smoking status: Never Smoker     Smokeless tobacco: Never Used     Alcohol use No      Comment: Alcoholic Drinks/day: rare     OBJECTIVE:  /86 (BP Location: Right arm, Patient Position: Sitting, Cuff Size: Adult Large)  Pulse 80  Ht 5' 5\" (1.651 m)  Wt 243 lb 12.8 oz (110.6 kg)  Breastfeeding? No  BMI 40.57 kg/m2   EXAM:  Is nonantalgic, no limp.  She does have bilateral over pronation.  She has tight hamstrings bilaterally.  VMO strength is fairly good.  She has mild tightness of the iliotibial band on the left.  She has mild effusion on the left with medial joint line tenderness.  Gage's is mildly positive for pain medially but no clunk.  Lachman's anterior posterior drawer signs are negative and joint is stable. Thessaly test is positive medially on the left negative on the right.  She has bilateral patella brittany.  Increased Q angle is noted bilaterally.  ASSESSMENT/Plan :    Ana was seen today for knee pain.    Diagnoses and all orders for this visit:    Acute medial meniscus tear, left, initial encounter  -     PHYSICAL THERAPY REFERRAL    Patellofemoral syndrome, bilateral  -     PHYSICAL THERAPY REFERRAL      I think the effusion in her left knee is probably due to a mild medial meniscus injury.  Discussed ice and anti-inflammatories and time.  At this " point, since it is really not affecting gait significantly would not pursue further imaging.  Discussed the fact most of these will heal on their own with expectant and conservative management.    She is referred to physical therapy for evaluation and treatment of her patellofemoral syndrome and her medial meniscus injury.  Discussed hamstring stretches, quadriceps strengthening, appropriate footwear with a firm last, good arch support and solid heel counter.  Advised follow-up if worsening or not improving.    Robin Carrillo MD            Alert and cooperative, no distress.

## 2018-03-01 NOTE — NURSING NOTE
She noticed 9 days ago with left knee swelling. She stated she is having left knee pain and swelling.  Tanja Greenfield LPN..................3/1/2018   2:32 PM

## 2018-03-01 NOTE — MR AVS SNAPSHOT
After Visit Summary   3/1/2018    Ana Stahl    MRN: [id number]           Patient Information     Date Of Birth          1990        Visit Information        Provider Department      3/1/2018 2:15 PM Robin Carrillo MD [facility]        Today's Diagnoses     Acute medial meniscus tear, left, initial encounter    -  1    Patellofemoral syndrome, bilateral           Follow-ups after your visit        Additional Services     PHYSICAL THERAPY REFERRAL                 Your next 10 appointments already scheduled     Mar 13, 2018  1:45 PM CDT   Evaluation with Neel Peratalo, PT   [facility] ([facility])    [address] [address]   [phone]            Mar 15, 2018 10:45 AM CDT   Treatment with Neel Peratalo, PT   [facility] ([facility])    [address] [address]   [phone]            Mar 20, 2018  2:30 PM CDT   Treatment with Neel Peratalo, PT   [facility] ([facility])    [address] [address]   [phone]            Mar 22, 2018  8:45 AM CDT   Treatment with Neel Peratalo, PT   [facility] ([facility])    [address] [address]   [phone]            Mar 22, 2018 10:15 AM CDT   CONSULT with Gerry Kim MD   [facility] ([facility])    [address]  [place] [address]   [phone]              Who to contact     If you have questions or need follow up information about today's clinic visit or your schedule please contact [facility] directly at [phone].  Normal or non-critical lab and imaging results will be communicated to you by MyChart, letter or phone within 4 business days after the clinic has received the  "results. If you do not hear from us within 7 days, please contact the clinic through foodjunky or phone. If you have a critical or abnormal lab result, we will notify you by phone as soon as possible.  Submit refill requests through foodjunky or call your pharmacy and they will forward the refill request to us. Please allow 3 business days for your refill to be completed.          Additional Information About Your Visit        foodjunky Information     foodjunky lets you send messages to your doctor, view your test results, renew your prescriptions, schedule appointments and more. To sign up, go to [email or web address] . Click on \"Log in\" on the left side of the screen, which will take you to the Welcome page. Then click on \"Sign up Now\" on the right side of the page.     You will be asked to enter the access code listed below, as well as some personal information. Please follow the directions to create your username and password.     Your access code is: [email or web address]  Expires: 2018  3:01 PM     Your access code will  in 90 days. If you need help or a new code, please call your [facility] clinic or [phone].        Care EveryWhere ID     This is your Care EveryWhere ID. This could be used by other organizations to access your [facility] medical records  [id number]        Your Vitals Were     Pulse Height Breastfeeding? BMI (Body Mass Index)          80 5' 5\" (1.651 m) No 40.57 kg/m2         Blood Pressure from Last 3 Encounters:   18 124/86   18 110/78   18 120/78    Weight from Last 3 Encounters:   18 243 lb 12.8 oz (110.6 kg)   18 239 lb 12.8 oz (108.8 kg)   18 240 lb 8.4 oz (109.1 kg)              We Performed the Following     PHYSICAL THERAPY REFERRAL        Primary Care Provider Office Phone # Fax #    Abiola Ashton [phone] [address] [facility] [address]        Equal Access to Services     STU CÁRDENAS AH: Thomasii jose luis wolfe " sue Wynne, dorcasda guillaumerufinoha, qadanielta kahilaria guerra, rachael nydiain hayaan courtneyjennifer parksherrie laethanyasir tomer. So [facility] [phone].    ATENCIÓN: Si habla español, tiene a rollins disposición servicios gratuitos de asistencia lingüística. Pablo al [phone].    We comply with applicable federal civil rights laws and [place] laws. We do not discriminate on the basis of race, color, national origin, age, disability, sex, sexual orientation, or gender identity.            Thank you!     Thank you for choosing [facility] AND [facility]  for your care. Our goal is always to provide you with excellent care. Hearing back from our patients is one way we can continue to improve our services. Please take a few minutes to complete the written survey that you may receive in the mail after your visit with us. Thank you!             Your Updated Medication List - Protect others around you: Learn how to safely use, store and throw away your medicines at www.disposemymeds.org.          This list is accurate as of 3/1/18 11:59 PM.  Always use your most recent med list.                   Brand Name Dispense Instructions for use Diagnosis    metFORMIN 500 MG 24 hr tablet    GLUCOPHAGE-XR     Take 1 tablet by mouth 3 times daily (with meals)        omeprazole 20 MG CR capsule    priLOSEC     Take 20-40 mg by mouth daily Take before a meal.        prenatal multivitamin plus iron 27-0.8 MG Tabs per tablet      Take 1 tablet by mouth daily        sertraline 100 MG tablet    ZOLOFT     Start: 1/2 tablet PO daily x 6 days; then increase to 1 tablet daily.        sucralfate 1 GM tablet    CARAFATE     Take 1 g by mouth 3 times daily (before meals)

## 2018-03-02 ASSESSMENT — ANXIETY QUESTIONNAIRES: GAD7 TOTAL SCORE: 21

## 2018-03-02 ASSESSMENT — PATIENT HEALTH QUESTIONNAIRE - PHQ9: SUM OF ALL RESPONSES TO PHQ QUESTIONS 1-9: 25

## 2018-03-12 ENCOUNTER — TELEPHONE (OUTPATIENT)
Dept: FAMILY MEDICINE | Facility: OTHER | Age: 28
End: 2018-03-12

## 2018-03-12 DIAGNOSIS — Z32.00 POSSIBLE PREGNANCY, NOT CONFIRMED: ICD-10-CM

## 2018-03-12 DIAGNOSIS — Z32.00 POSSIBLE PREGNANCY, NOT CONFIRMED: Primary | ICD-10-CM

## 2018-03-12 LAB
B-HCG SERPL-ACNC: <1 IU/L
PROGEST SERPL-MCNC: 0.8 NG/ML

## 2018-03-12 PROCEDURE — 36415 COLL VENOUS BLD VENIPUNCTURE: CPT | Performed by: FAMILY MEDICINE

## 2018-03-12 PROCEDURE — 84702 CHORIONIC GONADOTROPIN TEST: CPT | Performed by: FAMILY MEDICINE

## 2018-03-12 PROCEDURE — 84144 ASSAY OF PROGESTERONE: CPT | Performed by: FAMILY MEDICINE

## 2018-03-12 NOTE — TELEPHONE ENCOUNTER
PCOS patient with history of miscarriage is calling with concerns over possible pregnancy. Does not have urine pregnancy tests available, requesting blood tests.  Kelly La RN............. 3/12/2018 11:15 AM

## 2018-03-13 ENCOUNTER — OFFICE VISIT (OUTPATIENT)
Dept: FAMILY MEDICINE | Facility: OTHER | Age: 28
End: 2018-03-13
Attending: FAMILY MEDICINE
Payer: COMMERCIAL

## 2018-03-13 ENCOUNTER — HOSPITAL ENCOUNTER (OUTPATIENT)
Dept: PHYSICAL THERAPY | Facility: OTHER | Age: 28
Setting detail: THERAPIES SERIES
End: 2018-03-13
Attending: FAMILY MEDICINE
Payer: COMMERCIAL

## 2018-03-13 VITALS
WEIGHT: 241.6 LBS | BODY MASS INDEX: 40.2 KG/M2 | HEART RATE: 80 BPM | DIASTOLIC BLOOD PRESSURE: 78 MMHG | SYSTOLIC BLOOD PRESSURE: 112 MMHG

## 2018-03-13 DIAGNOSIS — K21.9 GASTROESOPHAGEAL REFLUX DISEASE WITHOUT ESOPHAGITIS: Primary | ICD-10-CM

## 2018-03-13 PROCEDURE — 40000185 ZZHC STATISTIC PT OUTPT VISIT

## 2018-03-13 PROCEDURE — 97110 THERAPEUTIC EXERCISES: CPT | Mod: GP

## 2018-03-13 PROCEDURE — 99213 OFFICE O/P EST LOW 20 MIN: CPT | Performed by: FAMILY MEDICINE

## 2018-03-13 PROCEDURE — 97161 PT EVAL LOW COMPLEX 20 MIN: CPT | Mod: GP

## 2018-03-13 RX ORDER — ONDANSETRON 4 MG/1
4-8 TABLET, ORALLY DISINTEGRATING ORAL
Qty: 20 TABLET | Refills: 1 | Status: SHIPPED | OUTPATIENT
Start: 2018-03-13 | End: 2019-03-01

## 2018-03-13 RX ORDER — ESOMEPRAZOLE MAGNESIUM 40 MG/1
40 CAPSULE, DELAYED RELEASE ORAL
Qty: 90 CAPSULE | Refills: 1 | Status: SHIPPED | OUTPATIENT
Start: 2018-03-13 | End: 2019-03-03

## 2018-03-13 NOTE — MR AVS SNAPSHOT
After Visit Summary   3/13/2018    Ana Stahl    MRN: [id number]           Patient Information     Date Of Birth          1990        Visit Information        Provider Department      3/13/2018 3:30 PM Abiola Ashton DO [facility]        Today's Diagnoses     Gastroesophageal reflux disease without esophagitis    -  1       Follow-ups after your visit        Your next 10 appointments already scheduled     Mar 15, 2018 10:45 AM CDT   Treatment with Neel Peratalo, PT   [facility] ([facility] [place] [facility])    [address] [place] [address]   [phone]            Mar 20, 2018  2:30 PM CDT   Treatment with Neel Peratalo, PT   [facility] ([facility] [place] [facility])    [address] [address]   [phone]            Mar 22, 2018  8:45 AM CDT   Treatment with Neel Peratalo, PT   [facility] ([facility] [place] [facility])    [address] [address]   [phone]            Mar 22, 2018 10:15 AM CDT   CONSULT with Gerry Kim MD   [facility] ([facility])    [address]  [facility] [address]   [phone]              Who to contact     If you have questions or need follow up information about today's clinic visit or your schedule please contact [facility] AND [facility] directly at [phone].  Normal or non-critical lab and imaging results will be communicated to you by Re-Composehart, letter or phone within 4 business days after the clinic has received the results. If you do not hear from us within 7 days, please contact the clinic through Re-Composehart or phone. If you have a critical or abnormal lab result, we will notify you by phone as soon as possible.  Submit refill requests through [employer] or call your pharmacy and they will forward the refill request to us.  Please allow 3 business days for your refill to be completed.          Additional Information About Your Visit        Canvas Networkshart Information     [employer] gives you secure access to your electronic health record. If you see a primary care provider, you can also send messages to your care team and make appointments. If you have questions, please call your primary care clinic.  If you do not have a primary care provider, please call [phone] and they will assist you.        Care EveryWhere ID     This is your Care EveryWhere ID. This could be used by other organizations to access your [facility] medical records  [id number]        Your Vitals Were     Pulse Last Period BMI (Body Mass Index)             80 01/29/2018 40.2 kg/m2          Blood Pressure from Last 3 Encounters:   03/13/18 112/78   03/01/18 124/86   01/30/18 110/78    Weight from Last 3 Encounters:   03/13/18 241 lb 9.6 oz (109.6 kg)   03/01/18 243 lb 12.8 oz (110.6 kg)   01/30/18 239 lb 12.8 oz (108.8 kg)              Today, you had the following     No orders found for display         Today's Medication Changes          These changes are accurate as of 3/13/18 11:59 PM.  If you have any questions, ask your nurse or doctor.               Start taking these medicines.        Dose/Directions    esomeprazole 40 MG CR capsule   Commonly known as:  nexIUM   Used for:  Gastroesophageal reflux disease without esophagitis   Started by:  Abiola Ashton DO        Dose:  40 mg   Take 1 capsule (40 mg) by mouth every morning (before breakfast) Take 30-60 minutes before eating.   Quantity:  90 capsule   Refills:  1       ondansetron 4 MG ODT tab   Commonly known as:  ZOFRAN ODT   Used for:  Gastroesophageal reflux disease without esophagitis   Started by:  Abiola Ashton DO        Dose:  4-8 mg   Take 1-2 tablets (4-8 mg) by mouth 3 times daily (before meals)   Quantity:  20 tablet   Refills:  1            Where to get your medicines      These medications were  sent to [facility], - [place], [place] - [address]  [address], [place] [address]     Phone:  [phone]     esomeprazole 40 MG CR capsule    ondansetron 4 MG ODT tab                Primary Care Provider Office Phone # Fax #    Abiola Ashton [phone]       [address]  [place] [address]        Equal Access to Services     CLAY CÁRDENAS : Hadii aad ku hadasho Soomaali, waaxda luqadaha, qaybta kaalmada adeegyada, waxay idiin hayaan adeeg kharash la'alonzo . So [facility] [phone].    ATENCIÓN: Si habla español, tiene a orllins disposición servicios gratuitos de asistencia lingüística. [facility] [phone].    We comply with applicable federal civil rights laws and [place] laws. We do not discriminate on the basis of race, color, national origin, age, disability, sex, sexual orientation, or gender identity.            Thank you!     Thank you for choosing [facility] AND [facility]  for your care. Our goal is always to provide you with excellent care. Hearing back from our patients is one way we can continue to improve our services. Please take a few minutes to complete the written survey that you may receive in the mail after your visit with us. Thank you!             Your Updated Medication List - Protect others around you: Learn how to safely use, store and throw away your medicines at www.disposemymeds.org.          This list is accurate as of 3/13/18 11:59 PM.  Always use your most recent med list.                   Brand Name Dispense Instructions for use Diagnosis    esomeprazole 40 MG CR capsule    nexIUM    90 capsule    Take 1 capsule (40 mg) by mouth every morning (before breakfast) Take 30-60 minutes before eating.    Gastroesophageal reflux disease without esophagitis       metFORMIN 500 MG 24 hr tablet    GLUCOPHAGE-XR     Take 1 tablet by mouth 3 times daily (with meals)        omeprazole 20 MG CR capsule    priLOSEC      Take 20-40 mg by mouth daily Take before a meal.        ondansetron 4 MG ODT tab    ZOFRAN ODT    20 tablet    Take 1-2 tablets (4-8 mg) by mouth 3 times daily (before meals)    Gastroesophageal reflux disease without esophagitis       prenatal multivitamin plus iron 27-0.8 MG Tabs per tablet      Take 1 tablet by mouth daily        sertraline 100 MG tablet    ZOLOFT     Start: 1/2 tablet PO daily x 6 days; then increase to 1 tablet daily.        sucralfate 1 GM tablet    CARAFATE     Take 1 g by mouth 3 times daily (before meals)

## 2018-03-13 NOTE — PROGRESS NOTES
SUBJECTIVE:   Ana Stahl is a 27 year old female who presents to clinic today for the following health issues:    HPI  Gastrointestinal symptoms - Nausea      Duration: months    Description:           Nausea    Intensity:  mild    Accompanying signs and symptoms:  nausea and vomitting.  + heartburn is still bad.    History  Previous {similar problem: no   Previous evaluation:  none    Aggravating factors: certain foods - dairy especially    Alleviating factors: nothing    Other Therapies tried: None  Was worried of a possible pregnancy - therefore an Hcg was done.  This was negative yesterday.  Results were made known to Tali.    Problem list and histories reviewed & adjusted, as indicated.  Additional history: none    Patient Active Problem List   Diagnosis     Abnormal pap     Generalized anxiety disorder     Patellar malalignment syndrome     PCOS (polycystic ovarian syndrome)     Gastroesophageal reflux disease without esophagitis     Past Surgical History:   Procedure Laterality Date     ARTHROSCOPY KNEE Right 2010    Dr. Landeros     MANDIBLE SURGERY Right     2001       Social History   Substance Use Topics     Smoking status: Never Smoker     Smokeless tobacco: Never Used     Alcohol use No      Comment: Alcoholic Drinks/day: rare     Family History   Problem Relation Age of Onset     Family History Negative Mother      Good Health     Family History Negative Father      Good Health     CANCER Maternal Grandmother      Cancer     DIABETES Other      Diabetes,maternal side         Current Outpatient Prescriptions   Medication Sig Dispense Refill     esomeprazole (NEXIUM) 40 MG CR capsule Take 1 capsule (40 mg) by mouth every morning (before breakfast) Take 30-60 minutes before eating. 90 capsule 1     ondansetron (ZOFRAN ODT) 4 MG ODT tab Take 1-2 tablets (4-8 mg) by mouth 3 times daily (before meals) 20 tablet 1     sertraline (ZOLOFT) 100 MG tablet Start: 1/2 tablet PO daily x 6 days; then increase  to 1 tablet daily.       metFORMIN (GLUCOPHAGE-XR) 500 MG 24 hr tablet Take 1 tablet by mouth 3 times daily (with meals)       omeprazole (PRILOSEC) 20 MG CR capsule Take 20-40 mg by mouth daily Take before a meal.       Prenatal Vit-Fe Fumarate-FA (PRENATAL MULTIVITAMIN PLUS IRON) 27-0.8 MG TABS per tablet Take 1 tablet by mouth daily       sucralfate (CARAFATE) 1 GM tablet Take 1 g by mouth 3 times daily (before meals)       Allergies   Allergen Reactions     Yellow Dye Unknown     Based on allergy testing as an intolerance.       ROS:  CONSTITUTIONAL: NEGATIVE for fever, chills, change in weight  ENT/MOUTH: NEGATIVE for ear, mouth and throat problems  RESP: NEGATIVE for significant cough or SOB  CV: NEGATIVE for chest pain, palpitations or peripheral edema    OBJECTIVE:     /78 (BP Location: Right arm, Patient Position: Sitting, Cuff Size: Adult Large)  Pulse 80  Wt 241 lb 9.6 oz (109.6 kg)  LMP 01/29/2018  BMI 40.2 kg/m2  Body mass index is 40.2 kg/(m^2).  GENERAL: healthy, alert and no distress  NECK: no adenopathy, no asymmetry, masses, or scars and thyroid normal to palpation  RESP: lungs clear to auscultation - no rales, rhonchi or wheezes  CV: regular rate and rhythm, normal S1 S2, no S3 or S4, no murmur, click or rub, no peripheral edema and peripheral pulses strong  ABDOMEN: tenderness RLQ and LLQ, no organomegaly or masses, liver span normal to percussion, bowel sounds normal and umbilicus normal    Diagnostic Test Results:  No results found for this or any previous visit (from the past 24 hour(s)).    ASSESSMENT/PLAN:     1. Gastroesophageal reflux disease without esophagitis  Nausea; most likely being caused from uncontrolled acid reflux/GERD.  Will stop her omeprazole and change to Nexium 40mg daily.  Rx for zofran to be able to have at work.  If not improved, consideration for imaging vs [facility] referral for EGD discussion.  - esomeprazole (NEXIUM) 40 MG CR capsule; Take 1 capsule (40 mg) by  mouth every morning (before breakfast) Take 30-60 minutes before eating.  Dispense: 90 capsule; Refill: 1  - ondansetron (ZOFRAN ODT) 4 MG ODT tab; Take 1-2 tablets (4-8 mg) by mouth 3 times daily (before meals)  Dispense: 20 tablet; Refill: 1    Follow up prn.    Abiola Ashton, [facility] AND \[facility] [place] [facility]\""

## 2018-03-14 NOTE — PROGRESS NOTES
03/13/18 1300   General Information   Type of Visit Initial OP Ortho PT Evaluation   Start of Care Date 03/13/18   Referring Physician Dr. Carrillo   Patient/Family Goals Statement Wants to reduce her pain in the knee   Orders Evaluate and Treat   Date of Order 03/01/18   Insurance Type Other   Insurance Comments/Visits Authorized [employer]   Medical Diagnosis Acute medial meniscus tear, left, initial encounter S83.242A, Patellofemoral syndrome, bilateral M22.2X1, M22.2X2    Surgical/Medical history reviewed Yes   Precautions/Limitations no known precautions/limitations   General Information Comments Patient is a 27 year old female referred to physical therapy with left knee pain. Patient states that she has been having knee pain for quite some time. Lately she noticed that her knee has gotten more stiff, more painful, and more inflammed. States that she has had previous knee surgery on her right knee in what she thinks is 2010. Since then she has developed pain in the left knee that has gotten worse. He reports that the left knee has at times locked up on her and will catch with movement. Is taking Ibuprofen and tylenol for pain with some relief. Icing give minimal relief. Has most difficulty with bending/squatting, walking longer distances, ascending/descending stairs       Present No   Body Part(s)   Body Part(s) Knee   Presentation and Etiology   Pertinent history of current problem (include personal factors and/or comorbidities that impact the POC) None reported   Impairments A. Pain;C. Swelling;D. Decreased ROM;E. Decreased flexibility;F. Decreased strength and endurance   Functional Limitations perform required work activities;perform activities of daily living   Symptom Location left knee    How/Where did it occur With repetition/overuse;From insidious onset   Onset date of current episode/exacerbation 03/01/18   Pain rating (0-10 point scale) Best (/10);Worst (/10)   Best (/10) 2    Worst (/10) 8   Pain quality B. Dull;C. Aching;F. Stabbing   Frequency of pain/symptoms A. Constant   Pain/symptoms are: Other   Pain symptoms comment Worse with activity   Pain/symptoms exacerbated by B. Walking;C. Lifting;G. Certain positions;I. Bending   Pain/symptoms eased by I. OTC medication(s);H. Cold;C. Rest   Progression of symptoms since onset: Worsened   Prior Level of Function   Prior Level of Function-Mobility Independent    Prior Level of Function-ADLs Independent   Current Level of Function   Patient role/employment history A. Employed   Employment Comments Nurse at [employer]   Current equipment-Gait/Locomotion None   Current equipment-ADL None   Fall Risk Screen   Fall screen completed by PT   Have you fallen 2 or more times in the past year? No   Have you fallen and had an injury in the past year? No   Is patient a fall risk? No   Knee Objective Findings   Side (if bilateral, select both right and left) Left   Observation No significant finding on observation.   Integumentary  No significant findings   Posture Rounded shoulders. Patient demonstrates hyperextension of her knees at various times when standing   Gait/Locomotion No significant deviations   Knee/Hip Strength Comments No deficits noted however painful with knee extension   Lachmans Test Negative   Anterior Drawer Test Negative   Posterior Drawer Test Negative   Varus Stress Test Negative   Valgus Stress Test Negative   Gage's Test Mild pain on left knee - medial aspect   Knee Special Test Comments Thessaly's: pain on medial and lateral aspect of joint line of left knee   Palpation Distal quad and IT band discomfort with moderate palpation   Left Knee Extension AROM WNL   Left Knee Extension PROM 3 degrees into hyperextension   Left Knee Flexion AROM WNL   Left Knee Flexion PROM WNL   Left Knee Flexion Strength 5/5   Left Knee Extension Strength 5/5 - painful   Left Hip Abduction Strength 5/5   Left  Quad Set Strength 5/5   Left Hamstring Flexibility Min limited on left   Planned Therapy Interventions   Planned Therapy Interventions balance training;gait training;joint mobilization;manual therapy;ROM;strengthening;stretching   Planned Modality Interventions   Planned Modality Interventions Cryotherapy;Electrical stimulation;Hot packs;TENS;Ultrasound   Clinical Impression   Criteria for Skilled Therapeutic Interventions Met yes, treatment indicated   PT Diagnosis Impaired mobility, functional strength, and endurance   Influenced by the following impairments pain, weakness, stiffness   Functional limitations due to impairments Squatting, bending, running, walking longer distances   Clinical Presentation Stable/Uncomplicated   Clinical Presentation Rationale Minimal reported comorbidities, 1 treatment area   Clinical Decision Making (Complexity) Low complexity   Therapy Frequency (1-2 times per week)   Predicted Duration of Therapy Intervention (days/wks) 6 weeks   Risk & Benefits of therapy have been explained Yes   Patient, Family & other staff in agreement with plan of care Yes   Clinical Impression Comments Patient is a 27 year old female referred to physical therapy with left knee pain. Patient reports no injury that made the knee start hurting. Demonstrating hyperextension of bilateral knees when standing at various times. Crepitus noted with be bending at the knee. She would continue to benefit from physical therapy services in order to reduce her knee pain and improve the stability, mobility, and strength around the knee   Education Assessment   Barriers to Learning No barriers   ORTHO GOALS   PT Ortho Eval Goals 1;2;3;4   Ortho Goal 1   Goal Identifier HEP   Goal Description Patient will demonstrate compliance and independence with her HEP in order to improve her overall functional mobility and strength   Target Date 04/03/18   Ortho Goal 2   Goal Identifier Strength   Goal Description Patient will be able  to ascend/descend 1 flight of stairs with minimal to no pain in order to improve her community mobility   Target Date 04/24/18   Ortho Goal 3   Goal Identifier Mobility   Goal Description Patient will be able to bend/squat to the ground in order to lift an item off of the floor with minimal to no pain in order to help with home related duties.    Target Date 04/24/18   Ortho Goal 4   Goal Identifier Mobility   Goal Description Patient will be able to ambulate longer than 20 minutes on even and uneven surfaces with minimal to no pain in order to improve her community mobility   Target Date 04/24/18   Total Evaluation Time   Total Evaluation Time 35

## 2018-03-15 ENCOUNTER — HOSPITAL ENCOUNTER (OUTPATIENT)
Dept: PHYSICAL THERAPY | Facility: OTHER | Age: 28
Setting detail: THERAPIES SERIES
End: 2018-03-15
Attending: FAMILY MEDICINE
Payer: COMMERCIAL

## 2018-03-15 PROCEDURE — 40000185 ZZHC STATISTIC PT OUTPT VISIT

## 2018-03-15 PROCEDURE — 97110 THERAPEUTIC EXERCISES: CPT | Mod: GP

## 2018-03-22 ENCOUNTER — OFFICE VISIT (OUTPATIENT)
Dept: OBGYN | Facility: OTHER | Age: 28
End: 2018-03-22
Attending: OBSTETRICS & GYNECOLOGY
Payer: COMMERCIAL

## 2018-03-22 VITALS
HEIGHT: 65 IN | DIASTOLIC BLOOD PRESSURE: 82 MMHG | HEART RATE: 76 BPM | WEIGHT: 245 LBS | BODY MASS INDEX: 40.82 KG/M2 | SYSTOLIC BLOOD PRESSURE: 114 MMHG

## 2018-03-22 DIAGNOSIS — N91.2 ABSENCE OF MENSTRUATION: Primary | ICD-10-CM

## 2018-03-22 PROCEDURE — 99213 OFFICE O/P EST LOW 20 MIN: CPT | Performed by: OBSTETRICS & GYNECOLOGY

## 2018-03-22 RX ORDER — MEDROXYPROGESTERONE ACETATE 10 MG
10 TABLET ORAL DAILY
Qty: 10 TABLET | Refills: 0 | Status: SHIPPED | OUTPATIENT
Start: 2018-03-22 | End: 2018-04-01

## 2018-03-22 ASSESSMENT — ANXIETY QUESTIONNAIRES
1. FEELING NERVOUS, ANXIOUS, OR ON EDGE: NEARLY EVERY DAY
2. NOT BEING ABLE TO STOP OR CONTROL WORRYING: MORE THAN HALF THE DAYS
GAD7 TOTAL SCORE: 14
6. BECOMING EASILY ANNOYED OR IRRITABLE: NEARLY EVERY DAY
3. WORRYING TOO MUCH ABOUT DIFFERENT THINGS: SEVERAL DAYS
5. BEING SO RESTLESS THAT IT IS HARD TO SIT STILL: MORE THAN HALF THE DAYS
7. FEELING AFRAID AS IF SOMETHING AWFUL MIGHT HAPPEN: SEVERAL DAYS

## 2018-03-22 ASSESSMENT — PATIENT HEALTH QUESTIONNAIRE - PHQ9: 5. POOR APPETITE OR OVEREATING: MORE THAN HALF THE DAYS

## 2018-03-22 ASSESSMENT — PAIN SCALES - GENERAL: PAINLEVEL: NO PAIN (0)

## 2018-03-22 NOTE — MR AVS SNAPSHOT
After Visit Summary   3/22/2018    Ana Stahl    MRN: [id number]           Patient Information     Date Of Birth          1990        Visit Information        Provider Department      3/22/2018 10:15 AM Gerry Kim MD [facility]        Today's Diagnoses     Absence of menstruation    -  1       Follow-ups after your visit        Your next 10 appointments already scheduled     Mar 27, 2018  9:15 AM CDT   Treatment with Neel Savage PT   [facility] (General acute hospital)    [address] [facility] [id number] [phone]              Who to contact     If you have questions or need follow up information about today's clinic visit or your schedule please contact [facility] directly at [phone].  Normal or non-critical lab and imaging results will be communicated to you by Socialcasthart, letter or phone within 4 business days after the clinic has received the results. If you do not hear from us within 7 days, please contact the clinic through Socialcasthart or phone. If you have a critical or abnormal lab result, we will notify you by phone as soon as possible.  Submit refill requests through Moat or call your pharmacy and they will forward the refill request to us. Please allow 3 business days for your refill to be completed.          Additional Information About Your Visit        MyChart Information     Moat gives you secure access to your electronic health record. If you see a primary care provider, you can also send messages to your care team and make appointments. If you have questions, please call your primary care clinic.  If you do not have a primary care provider, please call [phone] and they will assist you.        Care EveryWhere ID     This is your Care EveryWhere ID. This could be used by other organizations to access your [facility] medical records  [id number]        Your Vitals Were   "   Pulse Height Last Period Breastfeeding? BMI (Body Mass Index)       76 1.651 m (5' 5\") 01/30/2018 (Exact Date) No 40.77 kg/m2        Blood Pressure from Last 3 Encounters:   03/22/18 114/82   03/13/18 112/78   03/01/18 124/86    Weight from Last 3 Encounters:   03/22/18 111.1 kg (245 lb)   03/13/18 109.6 kg (241 lb 9.6 oz)   03/01/18 110.6 kg (243 lb 12.8 oz)              Today, you had the following     No orders found for display         Today's Medication Changes          These changes are accurate as of 3/22/18 10:59 AM.  If you have any questions, ask your nurse or doctor.               Start taking these medicines.        Dose/Directions    medroxyPROGESTERone 10 MG tablet   Commonly known as:  PROVERA   Used for:  Absence of menstruation   Started by:  Gerry Kim MD        Dose:  10 mg   Take 1 tablet (10 mg) by mouth daily for 10 days   Quantity:  10 tablet   Refills:  0            Where to get your medicines      These medications were sent to [facility], - [place], [place] - [address]  [address], [place] [address]     Phone:  [phone]     medroxyPROGESTERone 10 MG tablet                Primary Care Provider Office Phone # Fax #    Abiola Ashton  [phone]       [address]  [facility] [address]        Equal Access to Services     [facility]: Lupe bunno Somajor, waaxda luqadaha, qaybta kaalmada rachael guerra adejennifer jeff. So [facility] [phone].    ATENCIÓN: Si dwaynela ga, tiene a rollins disposición servicios gratuitos de asistencia lingüística. Llame al [phone].    We comply with applicable federal civil rights laws and [place] laws. We do not discriminate on the basis of race, color, national origin, age, disability, sex, sexual orientation, or gender identity.            Thank you!     Thank you for choosing [facility] AND [facility]  for your care. Our goal is " always to provide you with excellent care. Hearing back from our patients is one way we can continue to improve our services. Please take a few minutes to complete the written survey that you may receive in the mail after your visit with us. Thank you!             Your Updated Medication List - Protect others around you: Learn how to safely use, store and throw away your medicines at www.disposemymeds.org.          This list is accurate as of 3/22/18 10:59 AM.  Always use your most recent med list.                   Brand Name Dispense Instructions for use Diagnosis    esomeprazole 40 MG CR capsule    nexIUM    90 capsule    Take 1 capsule (40 mg) by mouth every morning (before breakfast) Take 30-60 minutes before eating.    Gastroesophageal reflux disease without esophagitis       medroxyPROGESTERone 10 MG tablet    PROVERA    10 tablet    Take 1 tablet (10 mg) by mouth daily for 10 days    Absence of menstruation       metFORMIN 500 MG 24 hr tablet    GLUCOPHAGE-XR     Take 1 tablet by mouth 3 times daily (with meals)        ondansetron 4 MG ODT tab    ZOFRAN ODT    20 tablet    Take 1-2 tablets (4-8 mg) by mouth 3 times daily (before meals)    Gastroesophageal reflux disease without esophagitis       prenatal multivitamin plus iron 27-0.8 MG Tabs per tablet      Take 1 tablet by mouth daily        sertraline 100 MG tablet    ZOLOFT     Start: 1/2 tablet PO daily x 6 days; then increase to 1 tablet daily.        sucralfate 1 GM tablet    CARAFATE     Take 1 g by mouth 3 times daily (before meals)

## 2018-03-22 NOTE — LETTER
3/22/2018       RE: Ana Stahl  [address]   [facility] [address]     Dear Colleague,    Thank you for referring your patient, Ana Stahl, to the [facility] at [facility]. Please see a copy of my visit note below.    CC:irregular menses  HPI:  Ana is a 27 year old female who presents for management of amenorrhea and irregular menses.  Patient's last menstrual period was 2018 (exact date).  Menstrual history: unpredictable, has PCOS. She would like to be pregnant. She had a spontaneous conception and SAB 2016. She is attempting to lose weight.    Pap Smears:normal  Mammograms:n/a    Obstetric History       T0      L0     SAB1   TAB0   Ectopic0   Multiple0   Live Births0       # Outcome Date GA Lbr Mandeep/2nd Weight Sex Delivery Anes PTL Lv   1 SAB                 Past Medical History:   Diagnosis Date     Internal derangement of knee     2016     Personal history of other medical treatment (CODE)          Polycystic ovarian syndrome     3/18/2016     Past Surgical History:   Procedure Laterality Date     ARTHROSCOPY KNEE Right     Dr. Landeros     MANDIBLE SURGERY Right          Social History     Social History     Marital status: Single     Spouse name: N/A     Number of children: N/A     Years of education: N/A     Occupational History     Not on file.     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     Family History   Problem Relation Age of Onset     Family History Negative Mother      Good Health     Family History Negative Father      Good Health     CANCER  "Maternal Grandmother      Cancer     DIABETES Other      Diabetes,maternal side       Current Outpatient Prescriptions   Medication     medroxyPROGESTERone (PROVERA) 10 MG tablet     esomeprazole (NEXIUM) 40 MG CR capsule     ondansetron (ZOFRAN ODT) 4 MG ODT tab     sertraline (ZOLOFT) 100 MG tablet     metFORMIN (GLUCOPHAGE-XR) 500 MG 24 hr tablet     Prenatal Vit-Fe Fumarate-FA (PRENATAL MULTIVITAMIN PLUS IRON) 27-0.8 MG TABS per tablet     sucralfate (CARAFATE) 1 GM tablet     No current facility-administered medications for this visit.      Allergies   Allergen Reactions     Blue Dyes Other (See Comments)     Showed up on allergy testing     Yellow Dye Unknown     Based on allergy testing as an intolerance.     /82 (BP Location: Right arm, Patient Position: Sitting, Cuff Size: Adult Large)  Pulse 76  Ht 1.651 m (5' 5\")  Wt 111.1 kg (245 lb)  LMP 01/30/2018 (Exact Date)  Breastfeeding? No  BMI 40.77 kg/m2    REVIEW OF SYSTEMS  General: negative  Resp: No shortness of breath, dyspnea on exertion, cough, or hemoptysis  CV: negative  GI:positive for food intolerance/allergy  Psychiatric: negative  Endocrine: positive for PCOS    Exam:  Constitutional: healthy, alert and no distress      ASSESSMENT/PLAN :  1. Absence of menstruation      We discussed management plan for amenorrhea/irregular menses.  She would like to become pregnant in the near future.  I sent over a prescription for oral Provera for 10 days in order to induce menses.  She will call with the onset of her cycle and then recommend that we start Femara 2.5 mg daily for 5 days.  I would then instruct them to have timed intercourse every other day starting about day 14 through day 24.  We discussed use of urinary LH kits to try and protect her ovulatory days.  We discussed the risks of twinning along with avoidance of nonsteroidal anti-inflammatories around the time of ovulation.  She is to begin prenatal vitamins without delay.    TT:20 " minutes with over half spent in discussion of management of amenorrhea.      Again, thank you for allowing me to participate in the care of your patient.      Sincerely,    Gerry Kim MD

## 2018-03-22 NOTE — NURSING NOTE
Patient presents today for a consult for fertility issues.  Anny Lo LPN  3/22/2018  10:07 AM

## 2018-03-22 NOTE — PROGRESS NOTES
CC:irregular menses  HPI:  Ana is a 27 year old female who presents for management of amenorrhea and irregular menses.  Patient's last menstrual period was 2018 (exact date).  Menstrual history: unpredictable, has PCOS. She would like to be pregnant. She had a spontaneous conception and SAB 2016. She is attempting to lose weight.    Pap Smears:normal  Mammograms:n/a    Obstetric History       T0      L0     SAB1   TAB0   Ectopic0   Multiple0   Live Births0       # Outcome Date GA Lbr Mandeep/2nd Weight Sex Delivery Anes PTL Lv   1 SAB                 Past Medical History:   Diagnosis Date     Internal derangement of knee     2016     Personal history of other medical treatment (CODE)     2013     Polycystic ovarian syndrome     3/18/2016     Past Surgical History:   Procedure Laterality Date     ARTHROSCOPY KNEE Right     Dr. Landeros     MANDIBLE SURGERY Right          Social History     Social History     Marital status: Single     Spouse name: N/A     Number of children: N/A     Years of education: N/A     Occupational History     Not on file.     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     Family History   Problem Relation Age of Onset     Family History Negative Mother      Good Health     Family History Negative Father      Good Health     CANCER Maternal Grandmother      Cancer     DIABETES Other      Diabetes,maternal side       Current Outpatient Prescriptions   Medication     medroxyPROGESTERone (PROVERA) 10 MG tablet     esomeprazole (NEXIUM) 40 MG CR capsule     ondansetron (ZOFRAN ODT) 4 MG ODT tab     sertraline (ZOLOFT)  "100 MG tablet     metFORMIN (GLUCOPHAGE-XR) 500 MG 24 hr tablet     Prenatal Vit-Fe Fumarate-FA (PRENATAL MULTIVITAMIN PLUS IRON) 27-0.8 MG TABS per tablet     sucralfate (CARAFATE) 1 GM tablet     No current facility-administered medications for this visit.      Allergies   Allergen Reactions     Blue Dyes Other (See Comments)     Showed up on allergy testing     Yellow Dye Unknown     Based on allergy testing as an intolerance.     /82 (BP Location: Right arm, Patient Position: Sitting, Cuff Size: Adult Large)  Pulse 76  Ht 1.651 m (5' 5\")  Wt 111.1 kg (245 lb)  LMP 01/30/2018 (Exact Date)  Breastfeeding? No  BMI 40.77 kg/m2    REVIEW OF SYSTEMS  General: negative  Resp: No shortness of breath, dyspnea on exertion, cough, or hemoptysis  CV: negative  GI:positive for food intolerance/allergy  Psychiatric: negative  Endocrine: positive for PCOS    Exam:  Constitutional: healthy, alert and no distress      ASSESSMENT/PLAN :  1. Absence of menstruation      We discussed management plan for amenorrhea/irregular menses.  She would like to become pregnant in the near future.  I sent over a prescription for oral Provera for 10 days in order to induce menses.  She will call with the onset of her cycle and then recommend that we start Femara 2.5 mg daily for 5 days.  I would then instruct them to have timed intercourse every other day starting about day 14 through day 24.  We discussed use of urinary LH kits to try and protect her ovulatory days.  We discussed the risks of twinning along with avoidance of nonsteroidal anti-inflammatories around the time of ovulation.  She is to begin prenatal vitamins without delay.    TT:20 minutes with over half spent in discussion of management of amenorrhea.    Gerry Kim MD FACOG  10:36 AM 3/22/2018   "

## 2018-03-23 ASSESSMENT — ANXIETY QUESTIONNAIRES: GAD7 TOTAL SCORE: 14

## 2018-03-23 ASSESSMENT — PATIENT HEALTH QUESTIONNAIRE - PHQ9: SUM OF ALL RESPONSES TO PHQ QUESTIONS 1-9: 16

## 2018-03-27 ENCOUNTER — HOSPITAL ENCOUNTER (OUTPATIENT)
Dept: PHYSICAL THERAPY | Facility: OTHER | Age: 28
Setting detail: THERAPIES SERIES
End: 2018-03-27
Attending: FAMILY MEDICINE
Payer: COMMERCIAL

## 2018-03-27 PROCEDURE — 97110 THERAPEUTIC EXERCISES: CPT | Mod: GP

## 2018-03-27 PROCEDURE — 40000185 ZZHC STATISTIC PT OUTPT VISIT

## 2018-04-03 ENCOUNTER — OFFICE VISIT (OUTPATIENT)
Dept: OBGYN | Facility: OTHER | Age: 28
End: 2018-04-03
Attending: OBSTETRICS & GYNECOLOGY
Payer: COMMERCIAL

## 2018-04-03 VITALS
WEIGHT: 241.5 LBS | SYSTOLIC BLOOD PRESSURE: 108 MMHG | DIASTOLIC BLOOD PRESSURE: 76 MMHG | BODY MASS INDEX: 40.19 KG/M2 | HEART RATE: 78 BPM

## 2018-04-03 DIAGNOSIS — E28.2 PCOS (POLYCYSTIC OVARIAN SYNDROME): Primary | ICD-10-CM

## 2018-04-03 PROCEDURE — 99213 OFFICE O/P EST LOW 20 MIN: CPT | Performed by: OBSTETRICS & GYNECOLOGY

## 2018-04-03 RX ORDER — LETROZOLE 2.5 MG/1
2.5 TABLET, FILM COATED ORAL DAILY
Qty: 5 TABLET | Refills: 0 | Status: SHIPPED | OUTPATIENT
Start: 2018-04-03 | End: 2018-05-07

## 2018-04-03 ASSESSMENT — PAIN SCALES - GENERAL: PAINLEVEL: NO PAIN (0)

## 2018-04-03 NOTE — LETTER
4/3/2018       RE: Ana Stahl  [address]   [facility] [address]     Dear Colleague,    Thank you for referring your patient, Ana Stahl, to the [facility] at [facility]. Please see a copy of my visit note below.    CC: anovulation  HPI:  Ana is a 27 year old female who presents for pelvic exam to initiate ovarian stimulation.  Patient's last menstrual period was 2018.  Menstrual history:  She is day 3, cycles are irregular    Obstetric History       T0      L0     SAB1   TAB0   Ectopic0   Multiple0   Live Births0       # Outcome Date GA Lbr Mandeep/2nd Weight Sex Delivery Anes PTL Lv   1 SAB                 Past Medical History:   Diagnosis Date     Internal derangement of knee     2016     Personal history of other medical treatment (CODE)          Polycystic ovarian syndrome     3/18/2016     Past Surgical History:   Procedure Laterality Date     ARTHROSCOPY KNEE Right     Dr. Landeros     MANDIBLE SURGERY Right          Social History     Social History     Marital status: Single     Spouse name: N/A     Number of children: N/A     Years of education: N/A     Occupational History     Not on file.     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     Family History   Problem Relation Age of Onset     Family History Negative Mother      Good Health     Family History Negative Father      Good Health     CANCER Maternal Grandmother      Cancer     DIABETES Other      Diabetes,maternal side       Current Outpatient Prescriptions   Medication     letrozole (FEMARA) 2.5 MG  tablet     esomeprazole (NEXIUM) 40 MG CR capsule     ondansetron (ZOFRAN ODT) 4 MG ODT tab     sertraline (ZOLOFT) 100 MG tablet     metFORMIN (GLUCOPHAGE-XR) 500 MG 24 hr tablet     Prenatal Vit-Fe Fumarate-FA (PRENATAL MULTIVITAMIN PLUS IRON) 27-0.8 MG TABS per tablet     sucralfate (CARAFATE) 1 GM tablet     No current facility-administered medications for this visit.      Allergies   Allergen Reactions     Blue Dyes Other (See Comments)     Showed up on allergy testing     Yellow Dye Unknown     Based on allergy testing as an intolerance.     /76 (BP Location: Right arm, Patient Position: Chair, Cuff Size: Adult Regular)  Pulse 78  Wt 109.5 kg (241 lb 8 oz)  LMP 04/01/2018  BMI 40.19 kg/m2    REVIEW OF SYSTEMS  Negative    Exam:  Pelvic exam: normal vagina and vulva, uterus normal size anteverted, adenxa normal in size without tenderness, exam chaperoned by nurse.    Lab:   Results for orders placed or performed in visit on 03/12/18   HCG quantitative pregnancy   Result Value Ref Range    HCG Quantitative Serum <1 IU/L   Progesterone   Result Value Ref Range    Progesterone 0.8 ng/mL       ASSESSMENT/PLAN :  1. PCOS (polycystic ovarian syndrome)      (E28.2) PCOS (polycystic ovarian syndrome)  (primary encounter diagnosis)  Comment:   Plan: letrozole (FEMARA) 2.5 MG tablet          Femara once daily for 5 days starting 4/3/2018  Then timed intercourse every other day starting day 14 for one week.  Use ovulation detection kit starting day 14 until positive (check in afternoon)  Call with pospregnancy test, or if no period by day 35    Again, thank you for allowing me to participate in the care of your patient.      Sincerely,    Gerry Kim MD

## 2018-04-03 NOTE — PROGRESS NOTES
CC: anovulation  HPI:  Ana is a 27 year old female who presents for pelvic exam to initiate ovarian stimulation.  Patient's last menstrual period was 2018.  Menstrual history:  She is day 3, cycles are irregular    Obstetric History       T0      L0     SAB1   TAB0   Ectopic0   Multiple0   Live Births0       # Outcome Date GA Lbr Mandeep/2nd Weight Sex Delivery Anes PTL Lv   1 SAB                 Past Medical History:   Diagnosis Date     Internal derangement of knee     2016     Personal history of other medical treatment (CODE)     2013     Polycystic ovarian syndrome     3/18/2016     Past Surgical History:   Procedure Laterality Date     ARTHROSCOPY KNEE Right     Dr. Landeros     MANDIBLE SURGERY Right          Social History     Social History     Marital status: Single     Spouse name: N/A     Number of children: N/A     Years of education: N/A     Occupational History     Not on file.     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     Family History   Problem Relation Age of Onset     Family History Negative Mother      Good Health     Family History Negative Father      Good Health     CANCER Maternal Grandmother      Cancer     DIABETES Other      Diabetes,maternal side       Current Outpatient Prescriptions   Medication     letrozole (FEMARA) 2.5 MG tablet     esomeprazole (NEXIUM) 40 MG CR capsule     ondansetron (ZOFRAN ODT) 4 MG ODT tab     sertraline (ZOLOFT) 100 MG tablet     metFORMIN (GLUCOPHAGE-XR) 500 MG 24 hr tablet     Prenatal Vit-Fe Fumarate-FA (PRENATAL MULTIVITAMIN PLUS IRON) 27-0.8 MG TABS per tablet      sucralfate (CARAFATE) 1 GM tablet     No current facility-administered medications for this visit.      Allergies   Allergen Reactions     Blue Dyes Other (See Comments)     Showed up on allergy testing     Yellow Dye Unknown     Based on allergy testing as an intolerance.     /76 (BP Location: Right arm, Patient Position: Chair, Cuff Size: Adult Regular)  Pulse 78  Wt 109.5 kg (241 lb 8 oz)  LMP 04/01/2018  BMI 40.19 kg/m2    REVIEW OF SYSTEMS  Negative    Exam:  Pelvic exam: normal vagina and vulva, uterus normal size anteverted, adenxa normal in size without tenderness, exam chaperoned by nurse.    Lab:   Results for orders placed or performed in visit on 03/12/18   HCG quantitative pregnancy   Result Value Ref Range    HCG Quantitative Serum <1 IU/L   Progesterone   Result Value Ref Range    Progesterone 0.8 ng/mL       ASSESSMENT/PLAN :  1. PCOS (polycystic ovarian syndrome)      (E28.2) PCOS (polycystic ovarian syndrome)  (primary encounter diagnosis)  Comment:   Plan: letrozole (FEMARA) 2.5 MG tablet          Femara once daily for 5 days starting 4/3/2018  Then timed intercourse every other day starting day 14 for one week.  Use ovulation detection kit starting day 14 until positive (check in afternoon)  Call with pospregnancy test, or if no period by day 35      Gerry Kim MD FACOG  12:03 PM 4/3/2018

## 2018-05-07 ENCOUNTER — MYC MEDICAL ADVICE (OUTPATIENT)
Dept: OBGYN | Facility: OTHER | Age: 28
End: 2018-05-07

## 2018-05-07 DIAGNOSIS — E28.2 PCOS (POLYCYSTIC OVARIAN SYNDROME): ICD-10-CM

## 2018-05-07 RX ORDER — LETROZOLE 2.5 MG/1
5 TABLET, FILM COATED ORAL DAILY
Qty: 10 TABLET | Refills: 0 | Status: SHIPPED | OUTPATIENT
Start: 2018-05-07 | End: 2018-10-08

## 2018-05-07 NOTE — TELEPHONE ENCOUNTER
Lets go up to five mg daily, consider the day you start as Day 5, plan for ovulation prediction testing starting about day 12, timed intercourse every other day starting day 14 or with pos urinary LH.

## 2018-05-10 ENCOUNTER — MYC MEDICAL ADVICE (OUTPATIENT)
Dept: OBGYN | Facility: OTHER | Age: 28
End: 2018-05-10

## 2018-05-10 NOTE — TELEPHONE ENCOUNTER
Per verbal discussion with Dr. Gerry Kim MD, FACOG, patient to continue current regimen. Patient notified via [employer].    Awa Parry RN...................5/10/2018 2:00 PM

## 2018-05-10 NOTE — TELEPHONE ENCOUNTER
"Plan from 5/7/18 \"Lets go up to five mg daily, consider the day you start as Day 5, plan for ovulation prediction testing starting about day 12, timed intercourse every other day starting day 14 or with pos urinary LH.\"    Patient started increased dose of letrozole on 5/7/18, started her cycle on 5/9/18. Please advise on how patient should proceed.    Awa Parry RN...................5/10/2018 1:12 PM    "

## 2018-06-01 ENCOUNTER — HOSPITAL ENCOUNTER (OUTPATIENT)
Dept: ULTRASOUND IMAGING | Facility: OTHER | Age: 28
Discharge: HOME | End: 2018-06-01
Attending: FAMILY MEDICINE | Admitting: FAMILY MEDICINE
Payer: COMMERCIAL

## 2018-06-01 DIAGNOSIS — E28.2 PCOS (POLYCYSTIC OVARIAN SYNDROME): ICD-10-CM

## 2018-06-01 PROCEDURE — 76830 TRANSVAGINAL US NON-OB: CPT

## 2018-06-04 ENCOUNTER — MYC MEDICAL ADVICE (OUTPATIENT)
Dept: OBGYN | Facility: OTHER | Age: 28
End: 2018-06-04

## 2018-06-06 ENCOUNTER — MYC MEDICAL ADVICE (OUTPATIENT)
Dept: OBGYN | Facility: OTHER | Age: 28
End: 2018-06-06

## 2018-06-06 DIAGNOSIS — N91.2 ABSENCE OF MENSTRUATION: Primary | ICD-10-CM

## 2018-06-06 DIAGNOSIS — O36.80X0 ENCOUNTER TO DETERMINE FETAL VIABILITY OF PREGNANCY, SINGLE OR UNSPECIFIED FETUS: Primary | ICD-10-CM

## 2018-06-06 DIAGNOSIS — Z34.90 EARLY STAGE OF PREGNANCY: ICD-10-CM

## 2018-06-06 DIAGNOSIS — Z87.59 HISTORY OF MISCARRIAGE: ICD-10-CM

## 2018-06-06 NOTE — ADDENDUM NOTE
Encounter addended by: Neel Savage, PT on: 6/6/2018  3:23 PM<BR>     Actions taken: Sign clinical note, Episode resolved

## 2018-06-06 NOTE — PROGRESS NOTES
Outpatient Physical Therapy Discharge Note     Patient: Ana Stahl  : 1990    Beginning/End Dates of Reporting Period:  3/13/2018 to 2018    Referring Provider: Dr. Carrillo    Therapy Diagnosis: Impaired mobility, functional strength, and endurance     Client Self Report: Patient states that she cancelled last week because her knee was stiff and sore. She started a new job and it has been causing more discomfort in the knee because she stands much more. Exercises have been going well at home. She notices most of her pain in the knee after being on her feet more.     *This is subjective information from her last scheduled visit     Objective Measurements:  Objective Measure: Pain rating  Details: 0/10     Goals:  Goal Identifier HEP   Goal Description Patient will demonstrate compliance and independence with her HEP in order to improve her overall functional mobility and strength   Target Date 18   Date Met      Progress:     Goal Identifier Strength   Goal Description Patient will be able to ascend/descend 1 flight of stairs with minimal to no pain in order to improve her community mobility   Target Date 18   Date Met      Progress:     Goal Identifier Mobility   Goal Description Patient will be able to bend/squat to the ground in order to lift an item off of the floor with minimal to no pain in order to help with home related duties.    Target Date 18   Date Met      Progress:     Goal Identifier Mobility   Goal Description Patient will be able to ambulate longer than 20 minutes on even and uneven surfaces with minimal to no pain in order to improve her community mobility   Target Date 18   Date Met      Progress:       Progress Toward Goals:   Unable to assess progress towards goals as patient did not attend her last session.       Plan:  Discharge from therapy.    Discharge: yes    Reason for Discharge: Patient did not attend her final scheduled session and did not  reschedule more visits.     Equipment Issued: none    Discharge Plan: Patient to continue home program.

## 2018-06-06 NOTE — TELEPHONE ENCOUNTER
Patient will be seen by Dr. Gerry Kim MD, FACOG for an Initial OB Physical. Patient has not had any imaging at this time. Provider prefers an ultrasound to determine dating and viability prior to appointment time.     Order placed per provider preference.    Awa Parry ............. 6/6/2018 1:25 PM

## 2018-06-06 NOTE — TELEPHONE ENCOUNTER
"Patient sent the following mon.ki message. Please advise.    \"Do I need to worry about testing my progesterone levels? I know that was a concern when we tested a few months ago.\"    Awa Parry RN...................6/6/2018 1:31 PM    "

## 2018-06-07 DIAGNOSIS — N91.2 ABSENCE OF MENSTRUATION: ICD-10-CM

## 2018-06-07 LAB
B-HCG SERPL-ACNC: 95 IU/L
PROGEST SERPL-MCNC: 16.9 NG/ML

## 2018-06-07 PROCEDURE — 84144 ASSAY OF PROGESTERONE: CPT | Performed by: OBSTETRICS & GYNECOLOGY

## 2018-06-07 PROCEDURE — 36415 COLL VENOUS BLD VENIPUNCTURE: CPT | Performed by: OBSTETRICS & GYNECOLOGY

## 2018-06-07 PROCEDURE — 84702 CHORIONIC GONADOTROPIN TEST: CPT | Performed by: OBSTETRICS & GYNECOLOGY

## 2018-06-09 DIAGNOSIS — N91.2 ABSENCE OF MENSTRUATION: ICD-10-CM

## 2018-06-09 LAB — B-HCG SERPL-ACNC: 182 IU/L

## 2018-06-09 PROCEDURE — 84702 CHORIONIC GONADOTROPIN TEST: CPT | Performed by: OBSTETRICS & GYNECOLOGY

## 2018-06-09 PROCEDURE — 36415 COLL VENOUS BLD VENIPUNCTURE: CPT | Performed by: OBSTETRICS & GYNECOLOGY

## 2018-06-13 ENCOUNTER — TELEPHONE (OUTPATIENT)
Dept: LAB | Facility: OTHER | Age: 28
End: 2018-06-13

## 2018-06-13 DIAGNOSIS — Z87.59 HISTORY OF MISCARRIAGE: Primary | ICD-10-CM

## 2018-06-13 DIAGNOSIS — Z34.90 EARLY STAGE OF PREGNANCY: ICD-10-CM

## 2018-06-17 ENCOUNTER — TELEPHONE (OUTPATIENT)
Dept: FAMILY MEDICINE | Facility: OTHER | Age: 28
End: 2018-06-17

## 2018-06-17 DIAGNOSIS — Z34.90 EARLY STAGE OF PREGNANCY: ICD-10-CM

## 2018-06-17 DIAGNOSIS — Z87.59 HISTORY OF MISCARRIAGE: ICD-10-CM

## 2018-06-17 LAB — B-HCG SERPL-ACNC: 27 IU/L

## 2018-06-17 PROCEDURE — 36415 COLL VENOUS BLD VENIPUNCTURE: CPT | Performed by: OBSTETRICS & GYNECOLOGY

## 2018-06-17 PROCEDURE — 84702 CHORIONIC GONADOTROPIN TEST: CPT | Performed by: OBSTETRICS & GYNECOLOGY

## 2018-06-17 NOTE — TELEPHONE ENCOUNTER
Patient called, wanting to know lab results from today, notified her that they are not back yet. Patti Camilo LPN......................6/17/2018 11:03 AM

## 2018-06-18 ENCOUNTER — OFFICE VISIT (OUTPATIENT)
Dept: OBGYN | Facility: OTHER | Age: 28
End: 2018-06-18
Attending: OBSTETRICS & GYNECOLOGY
Payer: COMMERCIAL

## 2018-06-18 ENCOUNTER — APPOINTMENT (OUTPATIENT)
Dept: LAB | Facility: OTHER | Age: 28
End: 2018-06-18
Attending: OBSTETRICS & GYNECOLOGY
Payer: COMMERCIAL

## 2018-06-18 ENCOUNTER — MYC MEDICAL ADVICE (OUTPATIENT)
Dept: OBGYN | Facility: OTHER | Age: 28
End: 2018-06-18

## 2018-06-18 VITALS
DIASTOLIC BLOOD PRESSURE: 76 MMHG | HEART RATE: 76 BPM | BODY MASS INDEX: 39.94 KG/M2 | SYSTOLIC BLOOD PRESSURE: 124 MMHG | WEIGHT: 240 LBS

## 2018-06-18 DIAGNOSIS — Z67.91 RH NEGATIVE, ANTEPARTUM, FIRST TRIMESTER: ICD-10-CM

## 2018-06-18 DIAGNOSIS — O03.9 SAB (SPONTANEOUS ABORTION): Primary | ICD-10-CM

## 2018-06-18 DIAGNOSIS — O26.891 RH NEGATIVE, ANTEPARTUM, FIRST TRIMESTER: ICD-10-CM

## 2018-06-18 DIAGNOSIS — Z71.1 CONCERN ABOUT MISCARRIAGE WITHOUT DIAGNOSIS: Primary | ICD-10-CM

## 2018-06-18 DIAGNOSIS — N96 RECURRENT PREGNANCY LOSS WITHOUT CURRENT PREGNANCY: ICD-10-CM

## 2018-06-18 LAB
ABO + RH BLD: NORMAL
BLD GP AB SCN SERPL QL: NORMAL
BLOOD BANK CMNT PATIENT-IMP: NORMAL
DATE RH IMM GL GVN: NORMAL
FETAL CELL SCN BLD QL ROSETTE: NORMAL
RH IG VIALS RECOM PATIENT: NORMAL
SPECIMEN EXP DATE BLD: NORMAL

## 2018-06-18 PROCEDURE — 96372 THER/PROPH/DIAG INJ SC/IM: CPT | Performed by: OBSTETRICS & GYNECOLOGY

## 2018-06-18 PROCEDURE — 86850 RBC ANTIBODY SCREEN: CPT | Performed by: OBSTETRICS & GYNECOLOGY

## 2018-06-18 PROCEDURE — 36415 COLL VENOUS BLD VENIPUNCTURE: CPT | Performed by: OBSTETRICS & GYNECOLOGY

## 2018-06-18 PROCEDURE — 86900 BLOOD TYPING SEROLOGIC ABO: CPT | Performed by: OBSTETRICS & GYNECOLOGY

## 2018-06-18 PROCEDURE — 86901 BLOOD TYPING SEROLOGIC RH(D): CPT | Mod: XU | Performed by: OBSTETRICS & GYNECOLOGY

## 2018-06-18 PROCEDURE — 85461 HEMOGLOBIN FETAL: CPT | Performed by: OBSTETRICS & GYNECOLOGY

## 2018-06-18 PROCEDURE — 99213 OFFICE O/P EST LOW 20 MIN: CPT | Performed by: OBSTETRICS & GYNECOLOGY

## 2018-06-18 PROCEDURE — 86901 BLOOD TYPING SEROLOGIC RH(D): CPT | Performed by: OBSTETRICS & GYNECOLOGY

## 2018-06-18 PROCEDURE — 25000128 H RX IP 250 OP 636: Performed by: OBSTETRICS & GYNECOLOGY

## 2018-06-18 RX ADMIN — RHO(D) IMMUNE GLOBULIN (HUMAN) 300 MCG: 1500 SOLUTION INTRAMUSCULAR at 14:23

## 2018-06-18 ASSESSMENT — PAIN SCALES - GENERAL: PAINLEVEL: MODERATE PAIN (4)

## 2018-06-18 NOTE — TELEPHONE ENCOUNTER
Patient will be seen today at 2 PM with 1 PM lab for type screen.    Awa Parry RN...................6/18/2018 10:21 AM

## 2018-06-18 NOTE — MR AVS SNAPSHOT
After Visit Summary   2018    Ana Stahl    MRN: [id number]           Patient Information     Date Of Birth          1990        Visit Information        Provider Department      2018 2:00 PM Gerry Kim MD [facility]        Today's Diagnoses     SAB (spontaneous )    -  1    Rh negative, antepartum, first trimester        Recurrent pregnancy loss without current pregnancy           Follow-ups after your visit        Future tests that were ordered for you today     Open Future Orders        Priority Expected Expires Ordered    HCG quantitative pregnancy Routine 2018    Protein S Antigen Free Routine  2018    Beta 2 Glycoprotein 1 Antibody IgG Routine  2018    Cardiolipin Mami IgG and IgM Routine  2018    Beta 2 Glycoprotein 1 Antibody IgM Routine  2018    Homocysteine Routine  2018    Lupus Anticoagulant Panel Routine  2019    INR Routine  2018    Partial thromboplastin time Routine  2018    Activated protein C resistance Routine  2018    Factor 5 leiden mutation analysis Routine  2018    F2 prothrombin 24003Y Mut Anal Routine  2018    Antithrombin III Routine  2018    Protein C chromogenic Routine  2018            Who to contact     If you have questions or need follow up information about today's clinic visit or your schedule please contact [facility] directly at [phone].  Normal or non-critical lab and imaging results will be communicated to you by MyChart, letter or phone within 4 business days after the clinic has received the results. If you do not hear from us within 7 days, please contact the clinic through MyChart or phone. If you have a critical or abnormal lab result, we will notify you by phone  as soon as possible.  Submit refill requests through [employer] or call your pharmacy and they will forward the refill request to us. Please allow 3 business days for your refill to be completed.          Additional Information About Your Visit        [employer] Information     [employer] gives you secure access to your electronic health record. If you see a primary care provider, you can also send messages to your care team and make appointments. If you have questions, please call your primary care clinic.  If you do not have a primary care provider, please call [phone] and they will assist you.        Care EveryWhere ID     This is your Care EveryWhere ID. This could be used by other organizations to access your [facility] medical records  [id number]        Your Vitals Were     Pulse Last Period BMI (Body Mass Index)             76 05/09/2018 39.94 kg/m2          Blood Pressure from Last 3 Encounters:   06/18/18 124/76   04/03/18 108/76   03/22/18 114/82    Weight from Last 3 Encounters:   06/18/18 108.9 kg (240 lb)   04/03/18 109.5 kg (241 lb 8 oz)   03/22/18 111.1 kg (245 lb)              We Performed the Following     RHOGAM ORDER        Primary Care Provider Office Phone # Fax #    [place] [phone]       [address] [facility] [address]        Equal Access to Services     CLAY CÁRDENAS : Hadii aad ku hadasho Soomaali, waaxda luqadaha, qaybta kaalmada adeegyada, rachael jeff. So [facility] [phone].    ATENCIÓN: Si habla español, tiene a rollins disposición servicios gratuitos de asistencia lingüística. Llame al [phone].    We comply with applicable federal civil rights laws and [place] laws. We do not discriminate on the basis of race, color, national origin, age, disability, sex, sexual orientation, or gender identity.            Thank you!     Thank you for choosing [facility] [place] [facility] AND [facility] [place]  for your care. Our goal is always to provide  you with excellent care. Hearing back from our patients is one way we can continue to improve our services. Please take a few minutes to complete the written survey that you may receive in the mail after your visit with us. Thank you!             Your Updated Medication List - Protect others around you: Learn how to safely use, store and throw away your medicines at www.disposemymeds.org.          This list is accurate as of 6/18/18  2:41 PM.  Always use your most recent med list.                   Brand Name Dispense Instructions for use Diagnosis    esomeprazole 40 MG CR capsule    nexIUM    90 capsule    Take 1 capsule (40 mg) by mouth every morning (before breakfast) Take 30-60 minutes before eating.    Gastroesophageal reflux disease without esophagitis       letrozole 2.5 MG tablet    FEMARA    10 tablet    Take 2 tablets (5 mg) by mouth daily    PCOS (polycystic ovarian syndrome)       metFORMIN 500 MG 24 hr tablet    GLUCOPHAGE-XR     Take 1 tablet by mouth 3 times daily (with meals)        ondansetron 4 MG ODT tab    ZOFRAN ODT    20 tablet    Take 1-2 tablets (4-8 mg) by mouth 3 times daily (before meals)    Gastroesophageal reflux disease without esophagitis       prenatal multivitamin plus iron 27-0.8 MG Tabs per tablet      Take 1 tablet by mouth daily        progesterone 100 MG capsule    PROMETRIUM    90 capsule    Take 1 capsule (100 mg) by mouth daily    Absence of menstruation, History of miscarriage       sertraline 100 MG tablet    ZOLOFT     Start: 1/2 tablet PO daily x 6 days; then increase to 1 tablet daily.        sucralfate 1 GM tablet    CARAFATE     Take 1 g by mouth 3 times daily (before meals)

## 2018-06-18 NOTE — LETTER
2018       RE: Ana Stahl  [address]  [facility] [address]     Dear Colleague,    Thank you for referring your patient, Ana Stahl, to the [facility] at [facility]. Please see a copy of my visit note below.    CC: recurrent miscarriage  HPI:  Ana is a 27 year old female who presents for evaluation of recurrent pregnancy loss  Patient's last menstrual period was 2018.  She became pregnant twice with ovulation induction and has suffered early pregnancy loss with both pregnancies.  Her hcg is dropping and she is bleeding like her normal menses today.      Obstetric History       T0      L0     SAB2   TAB0   Ectopic0   Multiple0   Live Births0       # Outcome Date GA Lbr Mandeep/2nd Weight Sex Delivery Anes PTL Lv   2 SAB 18           1 SAB 16                Past Medical History:   Diagnosis Date     Internal derangement of knee     2016     Personal history of other medical treatment (CODE)     2013     Polycystic ovarian syndrome     3/18/2016     Past Surgical History:   Procedure Laterality Date     ARTHROSCOPY KNEE Right     Dr. Landeros     MANDIBLE SURGERY Right          Social History     Social History     Marital status: Single     Spouse name: N/A     Number of children: N/A     Years of education: N/A     Occupational History     Not on file.     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     Family History   Problem Relation Age of Onset     Family History Negative Mother      Good Health     Family History Negative Father      Good Health      CANCER Maternal Grandmother      Cancer     DIABETES Other      Diabetes,maternal side       Current Outpatient Prescriptions   Medication     esomeprazole (NEXIUM) 40 MG CR capsule     letrozole (FEMARA) 2.5 MG tablet     metFORMIN (GLUCOPHAGE-XR) 500 MG 24 hr tablet     ondansetron (ZOFRAN ODT) 4 MG ODT tab     Prenatal Vit-Fe Fumarate-FA (PRENATAL MULTIVITAMIN PLUS IRON) 27-0.8 MG TABS per tablet     progesterone (PROMETRIUM) 100 MG capsule     sertraline (ZOLOFT) 100 MG tablet     sucralfate (CARAFATE) 1 GM tablet     Current Facility-Administered Medications   Medication     rho(D) immune globulin (HYPERRHO/RHOGAM) injection 300 mcg     Allergies   Allergen Reactions     Blue Dyes Other (See Comments)     Showed up on allergy testing     Yellow Dye Unknown     Based on allergy testing as an intolerance.     /76 (BP Location: Right arm)  Pulse 76  Wt 108.9 kg (240 lb)  LMP 2018  BMI 39.94 kg/m2    REVIEW OF SYSTEMS  Hematologic: negative  Endocrine: negative    Exam:  Constitutional: healthy, alert, active and no distress    Lab:   Results for orders placed or performed in visit on 18   RHOGAM ORDER   Result Value Ref Range    Rhogam Order Order received        ASSESSMENT/PLAN :  1. SAB (spontaneous )    2. Rh negative, antepartum, first trimester    3. Recurrent pregnancy loss without current pregnancy      Follow hcg to negative.  Recommend thrombophilia panel in six weeks, diagnostic hysteroscopy in 2 months (prior to day 9 of her cycle)  To rule out polyp or fibroid, septum as a cause for her losses.    TT:20 minutes with over half spent in discussion of management of recurrent pregnancy loss    Gerry Kim MD FACOG  1:59 PM 2018

## 2018-06-18 NOTE — PROGRESS NOTES
CC: recurrent miscarriage  HPI:  Ana is a 27 year old female who presents for evaluation of recurrent pregnancy loss  Patient's last menstrual period was 2018.  She became pregnant twice with ovulation induction and has suffered early pregnancy loss with both pregnancies.  Her hcg is dropping and she is bleeding like her normal menses today.      Obstetric History       T0      L0     SAB2   TAB0   Ectopic0   Multiple0   Live Births0       # Outcome Date GA Lbr Mandeep/2nd Weight Sex Delivery Anes PTL Lv   2 SAB 18           1 SAB 16                Past Medical History:   Diagnosis Date     Internal derangement of knee     2016     Personal history of other medical treatment (CODE)     2013     Polycystic ovarian syndrome     3/18/2016     Past Surgical History:   Procedure Laterality Date     ARTHROSCOPY KNEE Right     Dr. Landeros     MANDIBLE SURGERY Right          Social History     Social History     Marital status: Single     Spouse name: N/A     Number of children: N/A     Years of education: N/A     Occupational History     Not on file.     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     Family History   Problem Relation Age of Onset     Family History Negative Mother      Good Health     Family History Negative Father      Good Health     CANCER Maternal Grandmother      Cancer     DIABETES Other      Diabetes,maternal side       Current Outpatient Prescriptions   Medication     esomeprazole (NEXIUM) 40 MG CR capsule     letrozole (FEMARA) 2.5 MG tablet     metFORMIN (GLUCOPHAGE-XR) 500 MG 24 hr tablet     ondansetron  (ZOFRAN ODT) 4 MG ODT tab     Prenatal Vit-Fe Fumarate-FA (PRENATAL MULTIVITAMIN PLUS IRON) 27-0.8 MG TABS per tablet     progesterone (PROMETRIUM) 100 MG capsule     sertraline (ZOLOFT) 100 MG tablet     sucralfate (CARAFATE) 1 GM tablet     Current Facility-Administered Medications   Medication     rho(D) immune globulin (HYPERRHO/RHOGAM) injection 300 mcg     Allergies   Allergen Reactions     Blue Dyes Other (See Comments)     Showed up on allergy testing     Yellow Dye Unknown     Based on allergy testing as an intolerance.     /76 (BP Location: Right arm)  Pulse 76  Wt 108.9 kg (240 lb)  LMP 2018  BMI 39.94 kg/m2    REVIEW OF SYSTEMS  Hematologic: negative  Endocrine: negative    Exam:  Constitutional: healthy, alert, active and no distress    Lab:   Results for orders placed or performed in visit on 18   RHOGAM ORDER   Result Value Ref Range    Rhogam Order Order received        ASSESSMENT/PLAN :  1. SAB (spontaneous )    2. Rh negative, antepartum, first trimester    3. Recurrent pregnancy loss without current pregnancy      Follow hcg to negative.  Recommend thrombophilia panel in six weeks, diagnostic hysteroscopy in 2 months (prior to day 9 of her cycle)  To rule out polyp or fibroid, septum as a cause for her losses.    TT:20 minutes with over half spent in discussion of management of recurrent pregnancy loss    Gerry Kim MD FACOG  1:59 PM 2018

## 2018-06-18 NOTE — LETTER
[facility]  [address]  [place] [address]          June 18, 2018    RE:  Ana Sthal                                                                                                                                                          [facility] [address]            To whom it may concern:    Ana Stahl is under my professional care for     She  may return to work with the following: The employee is ABLE to return to work tomorrow.      Sincerely,        Gerry Kim MD FACOG  2:06 PM 6/18/2018

## 2018-06-29 DIAGNOSIS — O03.9 SAB (SPONTANEOUS ABORTION): ICD-10-CM

## 2018-06-29 LAB — B-HCG SERPL-ACNC: 1 IU/L

## 2018-06-29 PROCEDURE — 84702 CHORIONIC GONADOTROPIN TEST: CPT | Performed by: OBSTETRICS & GYNECOLOGY

## 2018-06-29 PROCEDURE — 36415 COLL VENOUS BLD VENIPUNCTURE: CPT | Performed by: OBSTETRICS & GYNECOLOGY

## 2018-07-23 NOTE — PROGRESS NOTES
Patient Information     Patient Name  Ana Stahl MRN  [id number] Sex  Female   1990      Letter by Merari Garcia NP at      Author:  Merari Garcia NP Service:  (none) Author Type:  (none)    Filed:   Encounter Date:  2017 Status:  (Other)             Work/School Excuse and Restrictions                    Discharged:                                                              5:44 PM  2017        Ana Stahl  was seen today in the [facility] for shoulder pain.    Ana is unable to return to work until 17.               *As an acute care facility, we do not provide follow-up care and are therefore unable to complete paperwork for injuries requiring more than 72 hours away from work. Patients need to follow up with a primary care or occupational health provider.    **If you have questions regarding the validity of this note, please call the number above.                MERARI GARCIA NP ....................  2017   5:45 PM

## 2018-07-23 NOTE — PROGRESS NOTES
Patient Information     Patient Name  Ana Stahl MRN  [id number] Sex  Female   1990      Letter by Merari Garcia NP at      Author:  Merari Garcia NP Service:  (none) Author Type:  (none)    Filed:   Encounter Date:  3/10/2017 Status:  (Other)             Work/School Excuse and Restrictions                    Discharged:                                                              12:43 PM  3/10/2017        Ana Stahl  was seen today in the [facility] and is unable to work until 3/14/17.              If you have any questions regarding the validity of this note please call the number above.       *As an acute care facility, we do not provide follow-up care and are therefore unable to complete paperwork for injuries requiring more than 72 hours away from work. Patients need to follow up with a primary care or occupational health provider.    **If you have questions regarding the validity of this note, please call the number above.      MERARI GARCIA NP ....................  3/10/2017   12:43 PM

## 2018-07-24 NOTE — PROGRESS NOTES
Patient Information     Patient Name  Ana Stahl MRN  [id number] Sex  Female   1990      Letter by Francisca Melvin NP at      Author:  Francisca Melvin NP Service:  (none) Author Type:  (none)    Filed:   Encounter Date:  2018 Status:  (Other)       To Whom it May Concern    Ana Stahl may return to work on 18.    Sincerely    Judy MO

## 2018-07-24 NOTE — PROGRESS NOTES
Patient Information     Patient Name  Ana Stahl MRN  [id number] Sex  Female   1990      Letter by Laith Zurita MD at      Author:  Laith Zurita MD Service:  (none) Author Type:  (none)    Filed:   Encounter Date:  3/13/2017 Status:  (Other)           Ana Stahl   [address]  [facility] [address]          2017      CERTIFICATE TO RETURN TO WORK OR SCHOOL      Ana Stahl was seen in clinic today, 3/13/2017.  She can return to work without any restrictions.    Sincerely,          Laith Zurita MD

## 2018-07-24 NOTE — PROGRESS NOTES
Patient Information     Patient Name  Ana Stahl MRN  [id number] Sex  Female   1990      Letter by Francisca Melvin NP at      Author:  Francisca Melvin NP Service:  (none) Author Type:  (none)    Filed:   Encounter Date:  2018 Status:  (Other)             Work/School Excuse and Restrictions       Arrived:               Discharged:                                                              1815  2018        Ana Stahl  was seen today in the [facility], please excuse from work until symptoms are improving.    Sincerely-    Judy Melvin APRN          If you have any questions regarding the validity of this note please call the number above.       *As an acute care facility, we do not provide follow-up care and are therefore unable to complete paperwork for injuries requiring more than 72 hours away from work. Patients need to follow up with a primary care or occupational health provider.    **If you have questions regarding the validity of this note, please call the number above.

## 2018-08-09 DIAGNOSIS — N96 RECURRENT PREGNANCY LOSS WITHOUT CURRENT PREGNANCY: ICD-10-CM

## 2018-08-09 LAB
APTT PPP: 41 SEC (ref 26–39)
INR PPP: 1.07 (ref 0–1.3)

## 2018-08-09 PROCEDURE — 85597 PHOSPHOLIPID PLTLT NEUTRALIZ: CPT | Performed by: OBSTETRICS & GYNECOLOGY

## 2018-08-09 PROCEDURE — 85732 THROMBOPLASTIN TIME PARTIAL: CPT | Performed by: OBSTETRICS & GYNECOLOGY

## 2018-08-09 PROCEDURE — 85613 RUSSELL VIPER VENOM DILUTED: CPT | Performed by: OBSTETRICS & GYNECOLOGY

## 2018-08-09 PROCEDURE — 85300 ANTITHROMBIN III ACTIVITY: CPT | Performed by: OBSTETRICS & GYNECOLOGY

## 2018-08-09 PROCEDURE — 85610 PROTHROMBIN TIME: CPT | Performed by: OBSTETRICS & GYNECOLOGY

## 2018-08-09 PROCEDURE — 83090 ASSAY OF HOMOCYSTEINE: CPT | Performed by: OBSTETRICS & GYNECOLOGY

## 2018-08-09 PROCEDURE — 00000401 ZZHCL STATISTIC THROMBIN TIME NC: Performed by: OBSTETRICS & GYNECOLOGY

## 2018-08-09 PROCEDURE — 85306 CLOT INHIBIT PROT S FREE: CPT | Performed by: OBSTETRICS & GYNECOLOGY

## 2018-08-09 PROCEDURE — 81240 F2 GENE: CPT | Performed by: OBSTETRICS & GYNECOLOGY

## 2018-08-09 PROCEDURE — 85730 THROMBOPLASTIN TIME PARTIAL: CPT | Performed by: OBSTETRICS & GYNECOLOGY

## 2018-08-09 PROCEDURE — 81241 F5 GENE: CPT | Performed by: OBSTETRICS & GYNECOLOGY

## 2018-08-09 PROCEDURE — 86146 BETA-2 GLYCOPROTEIN ANTIBODY: CPT | Performed by: OBSTETRICS & GYNECOLOGY

## 2018-08-09 PROCEDURE — 85307 ASSAY ACTIVATED PROTEIN C: CPT | Performed by: OBSTETRICS & GYNECOLOGY

## 2018-08-09 PROCEDURE — 36415 COLL VENOUS BLD VENIPUNCTURE: CPT | Performed by: OBSTETRICS & GYNECOLOGY

## 2018-08-09 PROCEDURE — 86147 CARDIOLIPIN ANTIBODY EA IG: CPT | Performed by: OBSTETRICS & GYNECOLOGY

## 2018-08-09 PROCEDURE — 85303 CLOT INHIBIT PROT C ACTIVITY: CPT | Performed by: OBSTETRICS & GYNECOLOGY

## 2018-08-11 LAB — APCR PPP: 2.99 {RATIO}

## 2018-08-13 LAB
AT III ACT/NOR PPP CHRO: 95 % (ref 85–135)
CARDIOLIPIN ANTIBODY IGG: <1.6 GPL-U/ML (ref 0–19.9)
CARDIOLIPIN ANTIBODY IGM: 0.5 MPL-U/ML (ref 0–19.9)

## 2018-08-14 LAB
LA PPP-IMP: POSITIVE
PROT C ACT/NOR PPP CHRO: 87 % (ref 70–170)
PROT S FREE AG ACT/NOR PPP IA: 87 % (ref 55–125)

## 2018-08-15 LAB — HCYS SERPL-SCNC: 4.8 UMOL/L (ref 4–12)

## 2018-08-16 ENCOUNTER — ALLIED HEALTH/NURSE VISIT (OUTPATIENT)
Dept: OBGYN | Facility: OTHER | Age: 28
End: 2018-08-16
Attending: OBSTETRICS & GYNECOLOGY
Payer: COMMERCIAL

## 2018-08-16 ENCOUNTER — MYC MEDICAL ADVICE (OUTPATIENT)
Dept: OBGYN | Facility: OTHER | Age: 28
End: 2018-08-16

## 2018-08-16 VITALS
HEART RATE: 78 BPM | SYSTOLIC BLOOD PRESSURE: 112 MMHG | WEIGHT: 235.6 LBS | DIASTOLIC BLOOD PRESSURE: 74 MMHG | BODY MASS INDEX: 39.21 KG/M2

## 2018-08-16 DIAGNOSIS — N96 RECURRENT PREGNANCY LOSS WITHOUT CURRENT PREGNANCY: Primary | ICD-10-CM

## 2018-08-16 DIAGNOSIS — N96 RECURRENT PREGNANCY LOSS (CODE): Primary | ICD-10-CM

## 2018-08-16 LAB
B2 GLYCOPROT1 IGG SERPL IA-ACNC: 1.1 U/ML
B2 GLYCOPROT1 IGM SERPL IA-ACNC: <0.9 U/ML
COPATH REPORT: NORMAL

## 2018-08-16 PROCEDURE — 99213 OFFICE O/P EST LOW 20 MIN: CPT | Performed by: OBSTETRICS & GYNECOLOGY

## 2018-08-16 ASSESSMENT — PAIN SCALES - GENERAL: PAINLEVEL: NO PAIN (0)

## 2018-08-16 NOTE — LETTER
2018       RE: Ana Patrick  [address]  [facility] [address]     Dear Colleague,    Thank you for referring your patient, Ana Patrick, to the [facility] at [facility]. Please see a copy of my visit note below.    CC: recurrent first trimester pregnancy loss.  HPI:  Ana is a 27 year old female who presents for discussion of her work up for recurrent pregnancy loss. She has two first trimester losses. She has conceived with letrozole twice and has been treated for PCOS with metformin. She had a thrombophilia panel performed which part is still pending but came back with a mildly prolonged PTT and pos for lupus anticoagulant. No history of DVT.    Patient's last menstrual period was 2018 (exact date).  =    Obstetric History       T0      L0     SAB2   TAB0   Ectopic0   Multiple0   Live Births0       # Outcome Date GA Lbr Mandeep/2nd Weight Sex Delivery Anes PTL Lv   2 SAB 18           1 SAB 16                Past Medical History:   Diagnosis Date     Internal derangement of knee     2016     Personal history of other medical treatment (CODE)     2013     Polycystic ovarian syndrome     3/18/2016     Past Surgical History:   Procedure Laterality Date     ARTHROSCOPY KNEE Right     Dr. Landeros     MANDIBLE SURGERY Right          Social History     Social History     Marital status:      Spouse name: N/A     Number of children: N/A     Years of education: N/A     Occupational History     Not on file.     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     Family History   Problem Relation Age of Onset     Family History Negative Mother      Good Health     Family History Negative Father      Good Health     Cancer Maternal Grandmother      Cancer     Diabetes Other      Diabetes,maternal side       Current Outpatient Prescriptions   Medication     esomeprazole (NEXIUM) 40 MG CR capsule     letrozole (FEMARA) 2.5 MG tablet     metFORMIN (GLUCOPHAGE-XR) 500 MG 24 hr tablet     ondansetron (ZOFRAN ODT) 4 MG ODT tab     Prenatal Vit-Fe Fumarate-FA (PRENATAL MULTIVITAMIN PLUS IRON) 27-0.8 MG TABS per tablet     progesterone (PROMETRIUM) 100 MG capsule     sertraline (ZOLOFT) 100 MG tablet     sucralfate (CARAFATE) 1 GM tablet     No current facility-administered medications for this visit.      Allergies   Allergen Reactions     Blue Dyes Other (See Comments)     Showed up on allergy testing     Yellow Dye Unknown     Based on allergy testing as an intolerance.     /74 (BP Location: Right arm)  Pulse 78  Wt 106.9 kg (235 lb 9.6 oz)  LMP 07/27/2018 (Exact Date)  Breastfeeding? No  BMI 39.21 kg/m2    REVIEW OF SYSTEMS  CV: negative  Hematologic: negative  Endocrine: negative    Exam:  Constitutional: healthy, alert, active and no distress      Lab:   Results for orders placed or performed in visit on 08/09/18   Lupus Anticoagulant Panel   Result Value Ref Range    Lupus Result Positive (A) NEG^Negative   INR   Result Value Ref Range    INR 1.07 0 - 1.3   Partial thromboplastin time   Result Value Ref Range    PTT 41 (H) 26 - 39 sec   Activated protein C resistance   Result Value Ref Range    Activated Prot C Resistance Ratio 2.99 >2.12   Antithrombin III   Result Value Ref Range    Antithrombin III Chromogenic 95 85 - 135 %   Protein C chromogenic   Result Value Ref Range    Prot C Chromogenic 87 70 - 170 %   Protein S Antigen Free   Result Value Ref Range    Protein S Free 87 55 - 125 %   Cardiolipin Mami IgG and IgM    Result Value Ref Range    Cardiolipin Antibody IgG <1.6 0.0 - 19.9 GPL-U/mL    Cardiolipin Antibody IgM 0.5 0.0 - 19.9 MPL-U/mL   Homocysteine   Result Value Ref Range    Homocysteine umol/L 4.8 4.0 - 12.0 umol/L       ASSESSMENT/PLAN :  1. Recurrent pregnancy loss without current pregnancy      Would plan for baby aspirin daily and LMW heparin starting at 12 weeks and through 6 weeks postpartum with her next baby. Repeat Lupus Anticoagulant in 12 weeks. If negative, then baby aspirin only. Plan for diagnostic hysteroscopy and endometrial scratching prior to day 9 of her next cycle.    TT:20 minutes with over half spent in discussion of management of possible anti-phospholipid antibody syndrome.    Gerry Kim MD FACOG  8:43 AM 8/16/2018

## 2018-08-16 NOTE — PROGRESS NOTES
CC: recurrent first trimester pregnancy loss.  HPI:  Ana is a 27 year old female who presents for discussion of her work up for recurrent pregnancy loss. She has two first trimester losses. She has conceived with letrozole twice and has been treated for PCOS with metformin. She had a thrombophilia panel performed which part is still pending but came back with a mildly prolonged PTT and pos for lupus anticoagulant. No history of DVT.    Patient's last menstrual period was 2018 (exact date).  =    Obstetric History       T0      L0     SAB2   TAB0   Ectopic0   Multiple0   Live Births0       # Outcome Date GA Lbr Mandeep/2nd Weight Sex Delivery Anes PTL Lv   2 SAB 18           1 SAB 16                Past Medical History:   Diagnosis Date     Internal derangement of knee     2016     Personal history of other medical treatment (CODE)          Polycystic ovarian syndrome     3/18/2016     Past Surgical History:   Procedure Laterality Date     ARTHROSCOPY KNEE Right     Dr. Landeros     MANDIBLE SURGERY Right          Social History     Social History     Marital status:      Spouse name: N/A     Number of children: N/A     Years of education: N/A     Occupational History     Not on file.     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     Family History   Problem Relation Age of Onset     Family History Negative Mother      Good Health     Family History Negative Father      Good Health     Cancer Maternal Grandmother      Cancer     Diabetes Other      Diabetes,maternal side        Current Outpatient Prescriptions   Medication     esomeprazole (NEXIUM) 40 MG CR capsule     letrozole (FEMARA) 2.5 MG tablet     metFORMIN (GLUCOPHAGE-XR) 500 MG 24 hr tablet     ondansetron (ZOFRAN ODT) 4 MG ODT tab     Prenatal Vit-Fe Fumarate-FA (PRENATAL MULTIVITAMIN PLUS IRON) 27-0.8 MG TABS per tablet     progesterone (PROMETRIUM) 100 MG capsule     sertraline (ZOLOFT) 100 MG tablet     sucralfate (CARAFATE) 1 GM tablet     No current facility-administered medications for this visit.      Allergies   Allergen Reactions     Blue Dyes Other (See Comments)     Showed up on allergy testing     Yellow Dye Unknown     Based on allergy testing as an intolerance.     /74 (BP Location: Right arm)  Pulse 78  Wt 106.9 kg (235 lb 9.6 oz)  LMP 07/27/2018 (Exact Date)  Breastfeeding? No  BMI 39.21 kg/m2    REVIEW OF SYSTEMS  CV: negative  Hematologic: negative  Endocrine: negative    Exam:  Constitutional: healthy, alert, active and no distress      Lab:   Results for orders placed or performed in visit on 08/09/18   Lupus Anticoagulant Panel   Result Value Ref Range    Lupus Result Positive (A) NEG^Negative   INR   Result Value Ref Range    INR 1.07 0 - 1.3   Partial thromboplastin time   Result Value Ref Range    PTT 41 (H) 26 - 39 sec   Activated protein C resistance   Result Value Ref Range    Activated Prot C Resistance Ratio 2.99 >2.12   Antithrombin III   Result Value Ref Range    Antithrombin III Chromogenic 95 85 - 135 %   Protein C chromogenic   Result Value Ref Range    Prot C Chromogenic 87 70 - 170 %   Protein S Antigen Free   Result Value Ref Range    Protein S Free 87 55 - 125 %   Cardiolipin Mami IgG and IgM   Result Value Ref Range    Cardiolipin Antibody IgG <1.6 0.0 - 19.9 GPL-U/mL    Cardiolipin Antibody IgM 0.5 0.0 - 19.9 MPL-U/mL   Homocysteine   Result Value Ref Range    Homocysteine umol/L 4.8 4.0 - 12.0 umol/L       ASSESSMENT/PLAN :  1. Recurrent pregnancy loss without current  pregnancy      Would plan for baby aspirin daily and LMW heparin starting at 12 weeks and through 6 weeks postpartum with her next baby. Repeat Lupus Anticoagulant in 12 weeks. If negative, then baby aspirin only. Plan for diagnostic hysteroscopy and endometrial scratching prior to day 9 of her next cycle.    TT:20 minutes with over half spent in discussion of management of possible anti-phospholipid antibody syndrome.    Gerry Kim MD FACOG  8:43 AM 8/16/2018

## 2018-08-16 NOTE — NURSING NOTE
"Date of Surgery: TBD - Tentatively scheduled 9/5/18, but patient will call with onset of menses to finalize scheduling.     Type of Surgery: Diagnostic Hysteroscopy with endometrial scraping  Surgeon: Dr. Gerry Kim MD, FACOG     Patient's consents were signed and appropriate appointments were scheduled by the [facility] . Patient was given surgical folder. Surgical forms were faxed to [phone] and [phone], copies made and kept for informative purposes, and originals were delivered to Day-surgery. Patient denies questions at this time.        STOP BANG    Fever/Chills or other infectious symptoms in past month? no  >10 pound weight loss in the past 2 months? Yes, intentional  Health Care Directive on file? no  History of blood transfusions? no  Td up to date? yes  History of VRE/MRSA? no      Obstructive Sleep Apnea screening    Preoperative Evaluation: Obstructive Sleep Apnea screening    S: Snore -  Do you snore loudly? (louder than talking or loud enough to be heard through closed doors)no  T: Tired - Do you often feel tired, fatigued, or sleepy during the daytime?no  O: Observed - Has anyone ever observed you stop breathing during your sleep?no  P: Pressure - Do you have or are you being treated for high blood pressure?no  B: BMI - BMI greater than 35kg/m2?yes  A: Age - Age over 50 years old?no  N: Neck - Neck circumference greater than 40 cm?no  G: Gender - Gender: Male?no    Total number of \"YES\" responses:  1    Scoring: Low risk of JARRET 0-2  At Risk of JARRET: >3 High Risk of JARRET: 5-8      Total yes answers in JARRET section:    Low risk 0-2  At risk 3-4  High risk 5-8    Awa Parry RN............. 8/16/2018 11:04 AM     "

## 2018-08-16 NOTE — MR AVS SNAPSHOT
After Visit Summary   8/16/2018    Ana Patrick    MRN: [id number]           Patient Information     Date Of Birth          1990        Visit Information        Provider Department      8/16/2018 8:15 AM Gerry Kim MD [facility]        Today's Diagnoses     Recurrent pregnancy loss without current pregnancy    -  1       Follow-ups after your visit        Future tests that were ordered for you today     Open Future Orders        Priority Expected Expires Ordered    Lupus Anticoagulant Panel Routine 11/16/2018 8/16/2019 8/16/2018            Who to contact     If you have questions or need follow up information about today's clinic visit or your schedule please contact [facility] directly at [phone].  Normal or non-critical lab and imaging results will be communicated to you by [employer] Lightinghart, letter or phone within 4 business days after the clinic has received the results. If you do not hear from us within 7 days, please contact the clinic through [employer] or phone. If you have a critical or abnormal lab result, we will notify you by phone as soon as possible.  Submit refill requests through Wattbot or call your pharmacy and they will forward the refill request to us. Please allow 3 business days for your refill to be completed.          Additional Information About Your Visit        MyChart Information     Wattbot gives you secure access to your electronic health record. If you see a primary care provider, you can also send messages to your care team and make appointments. If you have questions, please call your primary care clinic.  If you do not have a primary care provider, please call [phone] and they will assist you.        Care EveryWhere ID     This is your Care EveryWhere ID. This could be used by other organizations to access your [place] medical records  [id number]        Your Vitals Were     Pulse Last Period Breastfeeding?  BMI (Body Mass Index)          78 07/27/2018 (Exact Date) No 39.21 kg/m2         Blood Pressure from Last 3 Encounters:   08/16/18 112/74   06/18/18 124/76   04/03/18 108/76    Weight from Last 3 Encounters:   08/16/18 106.9 kg (235 lb 9.6 oz)   06/18/18 108.9 kg (240 lb)   04/03/18 109.5 kg (241 lb 8 oz)               Primary Care Provider Office Phone # Fax #    Abiola STARK Poli,  [phone]       [address] [facility] [address]        Equal Access to Services     [facility] : Hadii jose luis wolfe hadarpito Somajor, waaxda luqadaha, qaybta kaalmada charlie, rachael jeff. So [facility] [phone].    ATENCIÓN: Si habla español, tiene a rollins disposición servicios gratuitos de asistencia lingüística. Llame al [phone].    We comply with applicable federal civil rights laws and [place] laws. We do not discriminate on the basis of race, color, national origin, age, disability, sex, sexual orientation, or gender identity.            Thank you!     Thank you for choosing [facility] AND [facility]  for your care. Our goal is always to provide you with excellent care. Hearing back from our patients is one way we can continue to improve our services. Please take a few minutes to complete the written survey that you may receive in the mail after your visit with us. Thank you!             Your Updated Medication List - Protect others around you: Learn how to safely use, store and throw away your medicines at www.disposemymeds.org.          This list is accurate as of 8/16/18  8:56 AM.  Always use your most recent med list.                   Brand Name Dispense Instructions for use Diagnosis    esomeprazole 40 MG CR capsule    nexIUM    90 capsule    Take 1 capsule (40 mg) by mouth every morning (before breakfast) Take 30-60 minutes before eating.    Gastroesophageal reflux disease without esophagitis       letrozole 2.5 MG tablet    FEMARA    10 tablet    Take 2  tablets (5 mg) by mouth daily    PCOS (polycystic ovarian syndrome)       metFORMIN 500 MG 24 hr tablet    GLUCOPHAGE-XR     Take 1 tablet by mouth 3 times daily (with meals)        ondansetron 4 MG ODT tab    ZOFRAN ODT    20 tablet    Take 1-2 tablets (4-8 mg) by mouth 3 times daily (before meals)    Gastroesophageal reflux disease without esophagitis       prenatal multivitamin plus iron 27-0.8 MG Tabs per tablet      Take 1 tablet by mouth daily        progesterone 100 MG capsule    PROMETRIUM    90 capsule    Take 1 capsule (100 mg) by mouth daily    Absence of menstruation, History of miscarriage       sertraline 100 MG tablet    ZOLOFT     Start: 1/2 tablet PO daily x 6 days; then increase to 1 tablet daily.        sucralfate 1 GM tablet    CARAFATE     Take 1 g by mouth 3 times daily (before meals)

## 2018-08-16 NOTE — MR AVS SNAPSHOT
After Visit Summary   8/16/2018    Ana Patrick    MRN: [id number]           Patient Information     Date Of Birth          1990        Visit Information        Provider Department      8/16/2018 10:45 AM Nurse, Deandra Ob [facility]        Today's Diagnoses     Recurrent pregnancy loss (CODE)    -  1       Follow-ups after your visit        Future tests that were ordered for you today     Open Future Orders        Priority Expected Expires Ordered    Lupus Anticoagulant Panel Routine 11/16/2018 8/16/2019 8/16/2018            Who to contact     If you have questions or need follow up information about today's clinic visit or your schedule please contact [facility] directly at [phone].  Normal or non-critical lab and imaging results will be communicated to you by [employer], letter or phone within 4 business days after the clinic has received the results. If you do not hear from us within 7 days, please contact the clinic through ColoraderdamÂ®t or phone. If you have a critical or abnormal lab result, we will notify you by phone as soon as possible.  Submit refill requests through PeerApp or call your pharmacy and they will forward the refill request to us. Please allow 3 business days for your refill to be completed.          Additional Information About Your Visit        MyChart Information     PeerApp gives you secure access to your electronic health record. If you see a primary care provider, you can also send messages to your care team and make appointments. If you have questions, please call your primary care clinic.  If you do not have a primary care provider, please call [phone] and they will assist you.        Care EveryWhere ID     This is your Care EveryWhere ID. This could be used by other organizations to access your [place] medical records  [id number]        Your Vitals Were     Last Period                   07/27/2018 (Exact Date)             Blood Pressure from Last 3 Encounters:   08/16/18 112/74   06/18/18 124/76   04/03/18 108/76    Weight from Last 3 Encounters:   08/16/18 106.9 kg (235 lb 9.6 oz)   06/18/18 108.9 kg (240 lb)   04/03/18 109.5 kg (241 lb 8 oz)              Today, you had the following     No orders found for display       Primary Care Provider Office Phone # Fax #    Abiola Ashton [phone]       [address] [facility] [address]        Equal Access to Services     [facility]: Hadii aad ku hadasho Soomaali, waaxda luqadaha, qaybta kaalmada adejenniferyarich, rachael langley . So [facility] [phone].    ATENCIÓN: Si habla español, tiene a rollins disposición servicios gratuitos de asistencia lingüística. Llame al [phone].    We comply with applicable federal civil rights laws and [place] laws. We do not discriminate on the basis of race, color, national origin, age, disability, sex, sexual orientation, or gender identity.            Thank you!     Thank you for choosing [facility]  for your care. Our goal is always to provide you with excellent care. Hearing back from our patients is one way we can continue to improve our services. Please take a few minutes to complete the written survey that you may receive in the mail after your visit with us. Thank you!             Your Updated Medication List - Protect others around you: Learn how to safely use, store and throw away your medicines at www.disposemymeds.org.          This list is accurate as of 8/16/18 11:07 AM.  Always use your most recent med list.                   Brand Name Dispense Instructions for use Diagnosis    esomeprazole 40 MG CR capsule    nexIUM    90 capsule    Take 1 capsule (40 mg) by mouth every morning (before breakfast) Take 30-60 minutes before eating.    Gastroesophageal reflux disease without esophagitis       letrozole 2.5 MG tablet    FEMARA    10 tablet    Take 2 tablets (5  mg) by mouth daily    PCOS (polycystic ovarian syndrome)       metFORMIN 500 MG 24 hr tablet    GLUCOPHAGE-XR     Take 1 tablet by mouth 3 times daily (with meals)        ondansetron 4 MG ODT tab    ZOFRAN ODT    20 tablet    Take 1-2 tablets (4-8 mg) by mouth 3 times daily (before meals)    Gastroesophageal reflux disease without esophagitis       prenatal multivitamin plus iron 27-0.8 MG Tabs per tablet      Take 1 tablet by mouth daily        progesterone 100 MG capsule    PROMETRIUM    90 capsule    Take 1 capsule (100 mg) by mouth daily    Absence of menstruation, History of miscarriage       sertraline 100 MG tablet    ZOLOFT     Start: 1/2 tablet PO daily x 6 days; then increase to 1 tablet daily.        sucralfate 1 GM tablet    CARAFATE     Take 1 g by mouth 3 times daily (before meals)

## 2018-08-20 DIAGNOSIS — K21.9 GASTROESOPHAGEAL REFLUX DISEASE WITHOUT ESOPHAGITIS: Primary | ICD-10-CM

## 2018-08-20 RX ORDER — SUCRALFATE 1 G/1
TABLET ORAL
Qty: 90 TABLET | Refills: 2 | Status: SHIPPED | OUTPATIENT
Start: 2018-08-20 | End: 2019-03-01

## 2018-08-20 NOTE — MR AVS SNAPSHOT
After Visit Summary   4/3/2018    Ana Stahl    MRN: [id number]           Patient Information     Date Of Birth          1990        Visit Information        Provider Department      4/3/2018 12:45 PM Gerry Kim MD [facility]        Today's Diagnoses     PCOS (polycystic ovarian syndrome)    -  1       Follow-ups after your visit        Your next 10 appointments already scheduled     Apr 03, 2018 12:45 PM CDT   SHORT with Gerry Kim MD   [facility] ([facility])    [address]  [facility] [address]   [phone]            Apr 04, 2018  8:45 AM CDT   Treatment with Neel Peratalo, PT   [facility] ([facility])    [address] [address]   [phone]            Apr 10, 2018  1:00 PM CDT   Treatment with Neel Peratalo, PT   [facility] ([facility])    [address] [address]   [phone]            Apr 12, 2018  9:30 AM CDT   Treatment with Neel Peratalo, PT   [facility] ([facility])    [address] [address]   [phone]            Apr 19, 2018 10:45 AM CDT   Treatment with Neel Peratalo, PT   [facility] ([facility])    [address] [address]   [phone]              Who to contact     If you have questions or need follow up information about today's clinic visit or your schedule please contact [facility] directly at [phone].  Normal or non-critical lab and imaging results will be communicated to you by MyChart, letter or phone within 4 business days after the clinic has received the results. If you do not hear from us within 7 days, please contact the clinic through MyChart or phone. If you have a critical or  abnormal lab result, we will notify you by phone as soon as possible.  Submit refill requests through [employer] or call your pharmacy and they will forward the refill request to us. Please allow 3 business days for your refill to be completed.          Additional Information About Your Visit        [employer] Information     [employer] gives you secure access to your electronic health record. If you see a primary care provider, you can also send messages to your care team and make appointments. If you have questions, please call your primary care clinic.  If you do not have a primary care provider, please call [phone] and they will assist you.        Care EveryWhere ID     This is your Care EveryWhere ID. This could be used by other organizations to access your [place] medical records  [id number]        Your Vitals Were     Pulse Last Period BMI (Body Mass Index)             78 04/01/2018 40.19 kg/m2          Blood Pressure from Last 3 Encounters:   04/03/18 108/76   03/22/18 114/82   03/13/18 112/78    Weight from Last 3 Encounters:   04/03/18 109.5 kg (241 lb 8 oz)   03/22/18 111.1 kg (245 lb)   03/13/18 109.6 kg (241 lb 9.6 oz)              Today, you had the following     No orders found for display         Today's Medication Changes          These changes are accurate as of 4/3/18 12:06 PM.  If you have any questions, ask your nurse or doctor.               Start taking these medicines.        Dose/Directions    letrozole 2.5 MG tablet   Commonly known as:  FEMARA   Used for:  PCOS (polycystic ovarian syndrome)   Started by:  Gerry Kim MD        Dose:  2.5 mg   Take 1 tablet (2.5 mg) by mouth daily for 5 days   Quantity:  5 tablet   Refills:  0            Where to get your medicines      These medications were sent to [facility], - [place], [place] - [address]  [address], [place] [address]     Phone:  [phone]     letrozole 2.5 MG tablet                 Primary Care Provider Office Phone # Fax #    Abiola Ashton [phone] [address]   [facility] [address]        Equal Access to Services     STU CÁRDENAS : Lupe wolfe sue Wynne, wajordonda luqadaha, qaybta kaalmada charlie, rachael flores courtneyjennifer garciaatif jeff. So [facility] [phone].    ATENCIÓN: Si habla español, tiene a rollins disposición servicios gratuitos de asistencia lingüística. Llame al [phone].    We comply with applicable federal civil rights laws and [place] laws. We do not discriminate on the basis of race, color, national origin, age, disability, sex, sexual orientation, or gender identity.            Thank you!     Thank you for choosing [facility]  for your care. Our goal is always to provide you with excellent care. Hearing back from our patients is one way we can continue to improve our services. Please take a few minutes to complete the written survey that you may receive in the mail after your visit with us. Thank you!             Your Updated Medication List - Protect others around you: Learn how to safely use, store and throw away your medicines at www.disposemymeds.org.          This list is accurate as of 4/3/18 12:06 PM.  Always use your most recent med list.                   Brand Name Dispense Instructions for use Diagnosis    esomeprazole 40 MG CR capsule    nexIUM    90 capsule    Take 1 capsule (40 mg) by mouth every morning (before breakfast) Take 30-60 minutes before eating.    Gastroesophageal reflux disease without esophagitis       letrozole 2.5 MG tablet    FEMARA    5 tablet    Take 1 tablet (2.5 mg) by mouth daily for 5 days    PCOS (polycystic ovarian syndrome)       metFORMIN 500 MG 24 hr tablet    GLUCOPHAGE-XR     Take 1 tablet by mouth 3 times daily (with meals)        ondansetron 4 MG ODT tab    ZOFRAN ODT    20 tablet    Take 1-2 tablets (4-8 mg) by mouth 3 times daily (before meals)     Gastroesophageal reflux disease without esophagitis       prenatal multivitamin plus iron 27-0.8 MG Tabs per tablet      Take 1 tablet by mouth daily        sertraline 100 MG tablet    ZOLOFT     Start: 1/2 tablet PO daily x 6 days; then increase to 1 tablet daily.        sucralfate 1 GM tablet    CARAFATE     Take 1 g by mouth 3 times daily (before meals)           none

## 2018-08-31 ENCOUNTER — MYC MEDICAL ADVICE (OUTPATIENT)
Dept: OBGYN | Facility: OTHER | Age: 28
End: 2018-08-31

## 2018-09-04 ENCOUNTER — ANESTHESIA EVENT (OUTPATIENT)
Dept: SURGERY | Facility: OTHER | Age: 28
End: 2018-09-04
Payer: COMMERCIAL

## 2018-09-05 ENCOUNTER — ANESTHESIA (OUTPATIENT)
Dept: SURGERY | Facility: OTHER | Age: 28
End: 2018-09-05
Payer: COMMERCIAL

## 2018-09-10 ENCOUNTER — MYC MEDICAL ADVICE (OUTPATIENT)
Dept: OBGYN | Facility: OTHER | Age: 28
End: 2018-09-10

## 2018-09-10 DIAGNOSIS — N92.6 IRREGULAR MENSTRUAL CYCLE: Primary | ICD-10-CM

## 2018-09-11 RX ORDER — MEDROXYPROGESTERONE ACETATE 10 MG
10 TABLET ORAL DAILY
Qty: 10 TABLET | Refills: 0 | Status: SHIPPED | OUTPATIENT
Start: 2018-09-11 | End: 2018-09-21

## 2018-09-11 NOTE — TELEPHONE ENCOUNTER
Per Dr. Gerry Kim MD, FACOG, patient to complete Provera 10 mg x 10 days. She should call on day one of menses. Hysteroscopy tentatively rescheduled for 9/19/18. If no menses after Provera, patient should take another pregnancy test and call with results.    Patient notified.    Awa Parry RN...................9/11/2018 9:30 AM

## 2018-09-27 ENCOUNTER — HOSPITAL ENCOUNTER (OUTPATIENT)
Facility: OTHER | Age: 28
Discharge: HOME | End: 2018-09-27
Attending: OBSTETRICS & GYNECOLOGY | Admitting: OBSTETRICS & GYNECOLOGY
Payer: COMMERCIAL

## 2018-09-27 ENCOUNTER — SURGERY (OUTPATIENT)
Age: 28
End: 2018-09-27

## 2018-09-27 VITALS
RESPIRATION RATE: 18 BRPM | TEMPERATURE: 97.6 F | SYSTOLIC BLOOD PRESSURE: 107 MMHG | HEIGHT: 65 IN | WEIGHT: 230 LBS | BODY MASS INDEX: 38.32 KG/M2 | OXYGEN SATURATION: 96 % | DIASTOLIC BLOOD PRESSURE: 70 MMHG

## 2018-09-27 LAB — HCG UR QL: NEGATIVE

## 2018-09-27 PROCEDURE — 25000128 H RX IP 250 OP 636: Performed by: OBSTETRICS & GYNECOLOGY

## 2018-09-27 PROCEDURE — 25000128 H RX IP 250 OP 636: Performed by: NURSE ANESTHETIST, CERTIFIED REGISTERED

## 2018-09-27 PROCEDURE — 40000306 ZZH STATISTIC PRE PROC ASSESS II: Performed by: OBSTETRICS & GYNECOLOGY

## 2018-09-27 PROCEDURE — 36000058 ZZH SURGERY LEVEL 3 EA 15 ADDTL MIN: Performed by: OBSTETRICS & GYNECOLOGY

## 2018-09-27 PROCEDURE — 25800025 ZZH RX 258: Performed by: OBSTETRICS & GYNECOLOGY

## 2018-09-27 PROCEDURE — 37000009 ZZH ANESTHESIA TECHNICAL FEE, EACH ADDTL 15 MIN: Performed by: OBSTETRICS & GYNECOLOGY

## 2018-09-27 PROCEDURE — 58558 HYSTEROSCOPY BIOPSY: CPT | Performed by: OBSTETRICS & GYNECOLOGY

## 2018-09-27 PROCEDURE — 37000008 ZZH ANESTHESIA TECHNICAL FEE, 1ST 30 MIN: Performed by: OBSTETRICS & GYNECOLOGY

## 2018-09-27 PROCEDURE — 25000125 ZZHC RX 250: Performed by: NURSE ANESTHETIST, CERTIFIED REGISTERED

## 2018-09-27 PROCEDURE — 71000027 ZZH RECOVERY PHASE 2 EACH 15 MINS: Performed by: OBSTETRICS & GYNECOLOGY

## 2018-09-27 PROCEDURE — 25000125 ZZHC RX 250: Performed by: OBSTETRICS & GYNECOLOGY

## 2018-09-27 PROCEDURE — 88305 TISSUE EXAM BY PATHOLOGIST: CPT

## 2018-09-27 PROCEDURE — 36000056 ZZH SURGERY LEVEL 3 1ST 30 MIN: Performed by: OBSTETRICS & GYNECOLOGY

## 2018-09-27 PROCEDURE — 58558 HYSTEROSCOPY BIOPSY: CPT | Performed by: NURSE ANESTHETIST, CERTIFIED REGISTERED

## 2018-09-27 PROCEDURE — 81025 URINE PREGNANCY TEST: CPT | Performed by: OBSTETRICS & GYNECOLOGY

## 2018-09-27 PROCEDURE — 27210794 ZZH OR GENERAL SUPPLY STERILE: Performed by: OBSTETRICS & GYNECOLOGY

## 2018-09-27 RX ORDER — DEXAMETHASONE SODIUM PHOSPHATE 4 MG/ML
4 INJECTION, SOLUTION INTRA-ARTICULAR; INTRALESIONAL; INTRAMUSCULAR; INTRAVENOUS; SOFT TISSUE EVERY 10 MIN PRN
Status: DISCONTINUED | OUTPATIENT
Start: 2018-09-27 | End: 2018-09-27 | Stop reason: HOSPADM

## 2018-09-27 RX ORDER — LIDOCAINE 40 MG/G
CREAM TOPICAL
Status: DISCONTINUED | OUTPATIENT
Start: 2018-09-27 | End: 2018-09-27

## 2018-09-27 RX ORDER — DEXAMETHASONE SODIUM PHOSPHATE 4 MG/ML
INJECTION, SOLUTION INTRA-ARTICULAR; INTRALESIONAL; INTRAMUSCULAR; INTRAVENOUS; SOFT TISSUE PRN
Status: DISCONTINUED | OUTPATIENT
Start: 2018-09-27 | End: 2018-09-27

## 2018-09-27 RX ORDER — ONDANSETRON 4 MG/1
4 TABLET, ORALLY DISINTEGRATING ORAL EVERY 30 MIN PRN
Status: DISCONTINUED | OUTPATIENT
Start: 2018-09-27 | End: 2018-09-27 | Stop reason: HOSPADM

## 2018-09-27 RX ORDER — CEFAZOLIN SODIUM 2 G/100ML
2 INJECTION, SOLUTION INTRAVENOUS
Status: COMPLETED | OUTPATIENT
Start: 2018-09-27 | End: 2018-09-27

## 2018-09-27 RX ORDER — ONDANSETRON 2 MG/ML
4 INJECTION INTRAMUSCULAR; INTRAVENOUS EVERY 30 MIN PRN
Status: DISCONTINUED | OUTPATIENT
Start: 2018-09-27 | End: 2018-09-27 | Stop reason: HOSPADM

## 2018-09-27 RX ORDER — MEPERIDINE HYDROCHLORIDE 50 MG/ML
12.5 INJECTION INTRAMUSCULAR; INTRAVENOUS; SUBCUTANEOUS
Status: DISCONTINUED | OUTPATIENT
Start: 2018-09-27 | End: 2018-09-27 | Stop reason: HOSPADM

## 2018-09-27 RX ORDER — SODIUM CHLORIDE, SODIUM LACTATE, POTASSIUM CHLORIDE, CALCIUM CHLORIDE 600; 310; 30; 20 MG/100ML; MG/100ML; MG/100ML; MG/100ML
INJECTION, SOLUTION INTRAVENOUS CONTINUOUS
Status: DISCONTINUED | OUTPATIENT
Start: 2018-09-27 | End: 2018-09-27

## 2018-09-27 RX ORDER — ONDANSETRON 4 MG/1
4 TABLET, ORALLY DISINTEGRATING ORAL EVERY 30 MIN PRN
Status: DISCONTINUED | OUTPATIENT
Start: 2018-09-27 | End: 2018-09-27

## 2018-09-27 RX ORDER — OXYCODONE HYDROCHLORIDE 5 MG/1
5 TABLET ORAL EVERY 4 HOURS PRN
Status: DISCONTINUED | OUTPATIENT
Start: 2018-09-27 | End: 2018-09-27 | Stop reason: HOSPADM

## 2018-09-27 RX ORDER — NALOXONE HYDROCHLORIDE 0.4 MG/ML
.1-.4 INJECTION, SOLUTION INTRAMUSCULAR; INTRAVENOUS; SUBCUTANEOUS
Status: DISCONTINUED | OUTPATIENT
Start: 2018-09-27 | End: 2018-09-27 | Stop reason: HOSPADM

## 2018-09-27 RX ORDER — PROPOFOL 10 MG/ML
INJECTION, EMULSION INTRAVENOUS CONTINUOUS PRN
Status: DISCONTINUED | OUTPATIENT
Start: 2018-09-27 | End: 2018-09-27

## 2018-09-27 RX ORDER — LIDOCAINE HYDROCHLORIDE 20 MG/ML
INJECTION, SOLUTION INFILTRATION; PERINEURAL PRN
Status: DISCONTINUED | OUTPATIENT
Start: 2018-09-27 | End: 2018-09-27

## 2018-09-27 RX ORDER — SODIUM CHLORIDE, SODIUM LACTATE, POTASSIUM CHLORIDE, CALCIUM CHLORIDE 600; 310; 30; 20 MG/100ML; MG/100ML; MG/100ML; MG/100ML
INJECTION, SOLUTION INTRAVENOUS CONTINUOUS
Status: DISCONTINUED | OUTPATIENT
Start: 2018-09-27 | End: 2018-09-27 | Stop reason: HOSPADM

## 2018-09-27 RX ORDER — NALOXONE HYDROCHLORIDE 0.4 MG/ML
.1-.4 INJECTION, SOLUTION INTRAMUSCULAR; INTRAVENOUS; SUBCUTANEOUS
Status: DISCONTINUED | OUTPATIENT
Start: 2018-09-27 | End: 2018-09-27

## 2018-09-27 RX ORDER — CEFAZOLIN SODIUM 1 G/50ML
1 INJECTION, SOLUTION INTRAVENOUS SEE ADMIN INSTRUCTIONS
Status: DISCONTINUED | OUTPATIENT
Start: 2018-09-27 | End: 2018-09-27 | Stop reason: HOSPADM

## 2018-09-27 RX ORDER — HYDROMORPHONE HYDROCHLORIDE 1 MG/ML
.3-.5 INJECTION, SOLUTION INTRAMUSCULAR; INTRAVENOUS; SUBCUTANEOUS EVERY 10 MIN PRN
Status: DISCONTINUED | OUTPATIENT
Start: 2018-09-27 | End: 2018-09-27

## 2018-09-27 RX ORDER — FENTANYL CITRATE 50 UG/ML
INJECTION, SOLUTION INTRAMUSCULAR; INTRAVENOUS PRN
Status: DISCONTINUED | OUTPATIENT
Start: 2018-09-27 | End: 2018-09-27

## 2018-09-27 RX ORDER — FENTANYL CITRATE 50 UG/ML
25-50 INJECTION, SOLUTION INTRAMUSCULAR; INTRAVENOUS EVERY 5 MIN PRN
Status: DISCONTINUED | OUTPATIENT
Start: 2018-09-27 | End: 2018-09-27 | Stop reason: HOSPADM

## 2018-09-27 RX ORDER — FENTANYL CITRATE 50 UG/ML
50 INJECTION, SOLUTION INTRAMUSCULAR; INTRAVENOUS
Status: DISCONTINUED | OUTPATIENT
Start: 2018-09-27 | End: 2018-09-27 | Stop reason: HOSPADM

## 2018-09-27 RX ORDER — HYDROMORPHONE HYDROCHLORIDE 1 MG/ML
.3-.5 INJECTION, SOLUTION INTRAMUSCULAR; INTRAVENOUS; SUBCUTANEOUS EVERY 10 MIN PRN
Status: DISCONTINUED | OUTPATIENT
Start: 2018-09-27 | End: 2018-09-27 | Stop reason: HOSPADM

## 2018-09-27 RX ORDER — ONDANSETRON 2 MG/ML
4 INJECTION INTRAMUSCULAR; INTRAVENOUS EVERY 30 MIN PRN
Status: DISCONTINUED | OUTPATIENT
Start: 2018-09-27 | End: 2018-09-27

## 2018-09-27 RX ORDER — ONDANSETRON 2 MG/ML
INJECTION INTRAMUSCULAR; INTRAVENOUS PRN
Status: DISCONTINUED | OUTPATIENT
Start: 2018-09-27 | End: 2018-09-27

## 2018-09-27 RX ORDER — LIDOCAINE 40 MG/G
CREAM TOPICAL
Status: DISCONTINUED | OUTPATIENT
Start: 2018-09-27 | End: 2018-09-27 | Stop reason: HOSPADM

## 2018-09-27 RX ORDER — FENTANYL CITRATE 50 UG/ML
25-50 INJECTION, SOLUTION INTRAMUSCULAR; INTRAVENOUS
Status: DISCONTINUED | OUTPATIENT
Start: 2018-09-27 | End: 2018-09-27

## 2018-09-27 RX ORDER — HYDRALAZINE HYDROCHLORIDE 20 MG/ML
2.5-5 INJECTION INTRAMUSCULAR; INTRAVENOUS EVERY 10 MIN PRN
Status: DISCONTINUED | OUTPATIENT
Start: 2018-09-27 | End: 2018-09-27 | Stop reason: HOSPADM

## 2018-09-27 RX ORDER — MEPERIDINE HYDROCHLORIDE 50 MG/ML
12.5 INJECTION INTRAMUSCULAR; INTRAVENOUS; SUBCUTANEOUS
Status: DISCONTINUED | OUTPATIENT
Start: 2018-09-27 | End: 2018-09-27

## 2018-09-27 RX ORDER — FENTANYL CITRATE 50 UG/ML
25-50 INJECTION, SOLUTION INTRAMUSCULAR; INTRAVENOUS
Status: DISCONTINUED | OUTPATIENT
Start: 2018-09-27 | End: 2018-09-27 | Stop reason: HOSPADM

## 2018-09-27 RX ORDER — BUPIVACAINE HYDROCHLORIDE AND EPINEPHRINE 5; 5 MG/ML; UG/ML
INJECTION, SOLUTION EPIDURAL; INTRACAUDAL; PERINEURAL PRN
Status: DISCONTINUED | OUTPATIENT
Start: 2018-09-27 | End: 2018-09-27 | Stop reason: HOSPADM

## 2018-09-27 RX ORDER — PROPOFOL 10 MG/ML
INJECTION, EMULSION INTRAVENOUS PRN
Status: DISCONTINUED | OUTPATIENT
Start: 2018-09-27 | End: 2018-09-27

## 2018-09-27 RX ORDER — ALBUTEROL SULFATE 0.83 MG/ML
2.5 SOLUTION RESPIRATORY (INHALATION) EVERY 4 HOURS PRN
Status: DISCONTINUED | OUTPATIENT
Start: 2018-09-27 | End: 2018-09-27 | Stop reason: HOSPADM

## 2018-09-27 RX ADMIN — DEXAMETHASONE SODIUM PHOSPHATE 4 MG: 4 INJECTION, SOLUTION INTRA-ARTICULAR; INTRALESIONAL; INTRAMUSCULAR; INTRAVENOUS; SOFT TISSUE at 08:03

## 2018-09-27 RX ADMIN — LIDOCAINE HYDROCHLORIDE 60 MG: 20 INJECTION, SOLUTION INFILTRATION; PERINEURAL at 07:34

## 2018-09-27 RX ADMIN — FENTANYL CITRATE 25 MCG: 50 INJECTION, SOLUTION INTRAMUSCULAR; INTRAVENOUS at 07:38

## 2018-09-27 RX ADMIN — ONDANSETRON 4 MG: 2 INJECTION INTRAMUSCULAR; INTRAVENOUS at 07:35

## 2018-09-27 RX ADMIN — SODIUM CHLORIDE 1500 ML: 900 IRRIGANT IRRIGATION at 08:08

## 2018-09-27 RX ADMIN — FENTANYL CITRATE 25 MCG: 50 INJECTION, SOLUTION INTRAMUSCULAR; INTRAVENOUS at 07:59

## 2018-09-27 RX ADMIN — FENTANYL CITRATE 25 MCG: 50 INJECTION, SOLUTION INTRAMUSCULAR; INTRAVENOUS at 07:44

## 2018-09-27 RX ADMIN — SODIUM CHLORIDE, SODIUM LACTATE, POTASSIUM CHLORIDE, AND CALCIUM CHLORIDE: 600; 310; 30; 20 INJECTION, SOLUTION INTRAVENOUS at 07:28

## 2018-09-27 RX ADMIN — SODIUM CHLORIDE, SODIUM LACTATE, POTASSIUM CHLORIDE, AND CALCIUM CHLORIDE: 600; 310; 30; 20 INJECTION, SOLUTION INTRAVENOUS at 07:32

## 2018-09-27 RX ADMIN — PROPOFOL 50 MG: 10 INJECTION, EMULSION INTRAVENOUS at 07:34

## 2018-09-27 RX ADMIN — BUPIVACAINE HYDROCHLORIDE AND EPINEPHRINE BITARTRATE 9 ML: 5; .005 INJECTION, SOLUTION EPIDURAL; INTRACAUDAL; PERINEURAL at 08:08

## 2018-09-27 RX ADMIN — FENTANYL CITRATE 25 MCG: 50 INJECTION, SOLUTION INTRAMUSCULAR; INTRAVENOUS at 08:09

## 2018-09-27 RX ADMIN — PROPOFOL 140 MCG/KG/MIN: 10 INJECTION, EMULSION INTRAVENOUS at 07:34

## 2018-09-27 RX ADMIN — SILVER NITRATE APPLICATORS 2 APPLICATOR: 25; 75 STICK TOPICAL at 08:08

## 2018-09-27 RX ADMIN — CEFAZOLIN SODIUM 2 G: 2 INJECTION, SOLUTION INTRAVENOUS at 07:39

## 2018-09-27 RX ADMIN — PROPOFOL 30 MG: 10 INJECTION, EMULSION INTRAVENOUS at 07:37

## 2018-09-27 NOTE — IP AVS SNAPSHOT
[facility]    [address]    [place] [address]    Phone:  [phone]    Fax:  [phone]                                       After Visit Summary   9/27/2018    Ana Patrick    MRN: [id number]           After Visit Summary Signature Page     I have received my discharge instructions, and my questions have been answered. I have discussed any challenges I see with this plan with the nurse or doctor.    ..........................................................................................................................................  Patient/Patient Representative Signature      ..........................................................................................................................................  Patient Representative Print Name and Relationship to Patient    ..................................................               ................................................  Date                                   Time    ..........................................................................................................................................  Reviewed by Signature/Title    ...................................................              ..............................................  Date                                               Time          22EPIC Rev 08/18

## 2018-09-27 NOTE — ANESTHESIA CARE TRANSFER NOTE
Patient: Ana Patrick    Procedure(s):  Diagnostic Hysteroscopy with Endometrial Scratching.      . - Wound Class: II-Clean Contaminated    Diagnosis: recurrent first trimester pregnancy loss  Diagnosis Additional Information: No value filed.    Anesthesia Type:   MAC     Note:  Airway :Room Air  Patient transferred to:Phase II  Handoff Report: Identifed the Patient, Identified the Reponsible Provider, Reviewed the pertinent medical history, Discussed the surgical course, Reviewed Intra-OP anesthesia mangement and issues during anesthesia, Set expectations for post-procedure period and Allowed opportunity for questions and acknowledgement of understanding      Vitals: (Last set prior to Anesthesia Care Transfer)    CRNA VITALS  9/27/2018 0745 - 9/27/2018 0821      9/27/2018             Resp Rate (set): 10                Electronically Signed By: CHELY Onofre CRNA  September 27, 2018  8:21 AM

## 2018-09-27 NOTE — ANESTHESIA POSTPROCEDURE EVALUATION
Patient: Ana Patrick    Procedure(s):  Diagnostic Hysteroscopy with Endometrial Scratching.      . - Wound Class: II-Clean Contaminated    Diagnosis:recurrent first trimester pregnancy loss  Diagnosis Additional Information: No value filed.    Anesthesia Type:  MAC    Note:  Anesthesia Post Evaluation    Patient location during evaluation: Phase 2  Patient participation: Able to fully participate in evaluation  Level of consciousness: awake and alert  Pain management: adequate  Airway patency: patent  Cardiovascular status: blood pressure returned to baseline, acceptable and hemodynamically stable  Respiratory status: acceptable  Hydration status: acceptable  PONV: none     Anesthetic complications: None          Last vitals:  Vitals:    09/27/18 0820 09/27/18 0830 09/27/18 0845   BP: 108/74 107/70    Resp: 16 18    Temp: 97.2  F (36.2  C) 97.6  F (36.4  C)    SpO2: 96% 97% 96%         Electronically Signed By: CHELY Onofre CRNA  September 27, 2018  9:29 AM

## 2018-09-27 NOTE — OP NOTE
[facility] Gynecology Operative Note    Pre-operative diagnosis: Recurrent miscarriage   Post-operative diagnosis: Same, normal uterine cavity   Procedure: Diagnostic hysteroscopy with endometrial scratching   Surgeon: Gerry Kim MD   Assistant(s): None   Anesthesia: MAC (monitored anesthesia care)   Estimated blood loss: less than 50ml   Total IV fluids: (See anesthesia record)   Blood transfusion: No transfusion was given during surgery   Total urine output: (See anesthesia record)   Drains: None   Specimens: Endometrial curretage   Findings: Normal uterine cavity   Complications: After consent was obtained thepatient was taken to the OR suite and placed under Monitored Anesthesia.  She was prepped and draped sterile and positioned in dorsal lithotomy position in candy cane stirrups.  After timeout was peformed a speculum wasplaced in the vagina and the cervix was visualized.  0.25 % Marcaine with dilute epinephrine was used to infiltrate the cervix anteriorly and a paracervical block was injected at 3 and 9 o'clock.  The cervix was grasped witha tenaculum and a hysteroscope introduced after sounding the uterus to 8 cm. The cavity was distended with saline.  No Polyps or pseudopolyps were evident and both tubal ostia were seen.  Sharp curettage was then performed. Bleeding was minimal at that point.  The tenaculum was released from the cervix and the tenaculum site treated with silver nitrate.  Pathology was submitted as endometrial curettings. Fluid deficit was minimal. The speculum was removed and the patient awakened form anesthesia and taken to PACU in stable condition. EBL was minimal. No complications.

## 2018-09-27 NOTE — DISCHARGE INSTRUCTIONS
[facility]   Adult Discharge Orders & Instructions     For 12 hours after surgery    1. Get plenty of rest.  A responsible adult must stay with you for at least 12 hours after you leave the hospital.   2. Do not drive or use heavy equipment.  If you have weakness or tingling, don't drive or use heavy equipment until this feeling goes away.  3. Do not drink alcohol.  4. Avoid strenuous or risky activities.  Ask for help when climbing stairs.   5. You may feel lightheaded.  IF so, sit for a few minutes before standing.  Have someone help you get up.   6. If you have nausea (feel sick to your stomach): Drink only clear liquids such as apple juice, ginger ale, broth or 7-Up.  Rest may also help.  Be sure to drink enough fluids.  Move to a regular diet as you feel able.  7. You may have a slight fever. Call the doctor if your fever is over 101 F (38.3 C) (taken under the tongue) or lasts longer than 24 hours.  8. You may have a dry mouth, a sore throat, muscle aches or trouble sleeping.  These should go away after 24 hours.  9. Do not make important or legal decisions.   Call your doctor for any of the followin.  Signs of infection (fever, growing tenderness at the surgery site, a large amount of drainage or bleeding, severe pain, foul-smelling drainage, redness, swelling).    2. It has been over 8 to 10 hours since surgery and you are still not able to urinate (pass water).    3.  Headache for over 24 hours.    4.  Numbness, tingling or weakness the day after surgery (if you had spinal anesthesia).  To contact a doctor, call _________[phone]_______________________

## 2018-09-27 NOTE — IP AVS SNAPSHOT
MRN:[id number]                      After Visit Summary   9/27/2018    Ana Patrick    MRN: [id number]           Thank you!     Thank you for choosing [facility] for your care. Our goal is always to provide you with excellent care. Hearing back from our patients is one way we can continue to improve our services. Please take a few minutes to complete the written survey that you may receive in the mail after you visit with us. Thank you!        Patient Information     Date Of Birth          1990        About your hospital stay     You were admitted on:  September 27, 2018 You last received care in the:  [facility]    You were discharged on:  September 27, 2018        Reason for your hospital stay       Diagnostic hysteroscopy                  Who to Call     For medical emergencies, please call 911.  For non-urgent questions about your medical care, please call your primary care provider or clinic, [phone]  For questions related to your surgery, please call your surgery clinic        Attending Provider     Provider Specialty    Gerry Kim MD OB/Gyn       Primary Care Provider Office Phone # Fax #    Abiola STARK PoliDO [phone]      After Care Instructions     Activity       Your activity upon discharge: activity as tolerated, vaginal rest for 2 weeks weeks.            Diet       Follow this diet upon discharge: Regular            Discharge Instructions                 Follow-up Appointments     Follow-up and recommended labs and tests        Follow up with me,  Gerry Kim, within 2. to evaluate after surgery.  No follow up labs or test are needed.                  Further instructions from your care team       [facility]   Adult Discharge Orders & Instructions     For 12 hours after surgery    1. Get plenty of rest.  A responsible adult must stay with you for at least 12 hours after you leave the hospital.   2. Do not  "drive or use heavy equipment.  If you have weakness or tingling, don't drive or use heavy equipment until this feeling goes away.  3. Do not drink alcohol.  4. Avoid strenuous or risky activities.  Ask for help when climbing stairs.   5. You may feel lightheaded.  IF so, sit for a few minutes before standing.  Have someone help you get up.   6. If you have nausea (feel sick to your stomach): Drink only clear liquids such as apple juice, ginger ale, broth or 7-Up.  Rest may also help.  Be sure to drink enough fluids.  Move to a regular diet as you feel able.  7. You may have a slight fever. Call the doctor if your fever is over 101 F (38.3 C) (taken under the tongue) or lasts longer than 24 hours.  8. You may have a dry mouth, a sore throat, muscle aches or trouble sleeping.  These should go away after 24 hours.  9. Do not make important or legal decisions.   Call your doctor for any of the followin.  Signs of infection (fever, growing tenderness at the surgery site, a large amount of drainage or bleeding, severe pain, foul-smelling drainage, redness, swelling).    2. It has been over 8 to 10 hours since surgery and you are still not able to urinate (pass water).    3.  Headache for over 24 hours.    4.  Numbness, tingling or weakness the day after surgery (if you had spinal anesthesia).  To contact a doctor, call _________[phone]_______________________    Pending Results     No orders found from 2018 to 2018.            Admission Information     Date & Time Provider Department Dept. Phone    2018 Gerry Kim MD [facility] [phone]      Your Vitals Were     Blood Pressure Temperature Respirations Height Weight Pulse Oximetry    108/74 97.2  F (36.2  C) (Temporal) 16 1.651 m (5' 5\") 104.3 kg (230 lb) 96%    BMI (Body Mass Index)                   38.27 kg/m2           [employer] Information     [employer] gives you secure access to your electronic health record. If " you see a primary care provider, you can also send messages to your care team and make appointments. If you have questions, please call your primary care clinic.  If you do not have a primary care provider, please call [phone] and they will assist you.        Care EveryWhere ID     This is your Care EveryWhere ID. This could be used by other organizations to access your [place] medical records  [id number]        Equal Access to Services     STU CÁRDENAS : Hadii jose luis bunno Sojoshuaali, waaxda luqadaha, qaybta kaalmada adejenniferbarrettda, rachael nickrenettaatif jeff. So [facility] [phone].    ATENCIÓN: Si habla espchente, tiene a rollins disposición servicios gratuitos de asistencia lingüística. Llame al [phone].    We comply with applicable federal civil rights laws and [place] laws. We do not discriminate on the basis of race, color, national origin, age, disability, sex, sexual orientation, or gender identity.               Review of your medicines      CONTINUE these medicines which have NOT CHANGED        Dose / Directions    ASPIRIN ADULT LOW STRENGTH PO        Dose:  81 mg   Take 81 mg by mouth   Refills:  0       esomeprazole 40 MG CR capsule   Commonly known as:  nexIUM   Used for:  Gastroesophageal reflux disease without esophagitis        Dose:  40 mg   Take 1 capsule (40 mg) by mouth every morning (before breakfast) Take 30-60 minutes before eating.   Quantity:  90 capsule   Refills:  1       letrozole 2.5 MG tablet   Commonly known as:  FEMARA   Used for:  PCOS (polycystic ovarian syndrome)        Dose:  5 mg   Take 2 tablets (5 mg) by mouth daily   Quantity:  10 tablet   Refills:  0       metFORMIN 500 MG 24 hr tablet   Commonly known as:  GLUCOPHAGE-XR        Dose:  1 tablet   Take 1 tablet by mouth 3 times daily (with meals)   Refills:  0       ondansetron 4 MG ODT tab   Commonly known as:  ZOFRAN ODT   Used for:  Gastroesophageal reflux disease without esophagitis        Dose:  4-8 mg    Take 1-2 tablets (4-8 mg) by mouth 3 times daily (before meals)   Quantity:  20 tablet   Refills:  1       prenatal multivitamin plus iron 27-0.8 MG Tabs per tablet        Dose:  1 tablet   Take 1 tablet by mouth daily   Refills:  0       progesterone 100 MG capsule   Commonly known as:  PROMETRIUM   Used for:  Absence of menstruation, History of miscarriage        Dose:  100 mg   Take 1 capsule (100 mg) by mouth daily   Quantity:  90 capsule   Refills:  0       sertraline 100 MG tablet   Commonly known as:  ZOLOFT        Refills:  0       sucralfate 1 GM tablet   Commonly known as:  CARAFATE   Used for:  Gastroesophageal reflux disease without esophagitis        TAKE 1 TABLET BY MOUTH 3 TIMES DAILY BEFORE MEALS   Quantity:  90 tablet   Refills:  2                Protect others around you: Learn how to safely use, store and throw away your medicines at www.disposemymeds.org.             Medication List: This is a list of all your medications and when to take them. Check marks below indicate your daily home schedule. Keep this list as a reference.      Medications           Morning Afternoon Evening Bedtime As Needed    ASPIRIN ADULT LOW STRENGTH PO   Take 81 mg by mouth                                esomeprazole 40 MG CR capsule   Commonly known as:  nexIUM   Take 1 capsule (40 mg) by mouth every morning (before breakfast) Take 30-60 minutes before eating.                                letrozole 2.5 MG tablet   Commonly known as:  FEMARA   Take 2 tablets (5 mg) by mouth daily                                metFORMIN 500 MG 24 hr tablet   Commonly known as:  GLUCOPHAGE-XR   Take 1 tablet by mouth 3 times daily (with meals)                                ondansetron 4 MG ODT tab   Commonly known as:  ZOFRAN ODT   Take 1-2 tablets (4-8 mg) by mouth 3 times daily (before meals)                                prenatal multivitamin plus iron 27-0.8 MG Tabs per tablet   Take 1 tablet by mouth daily                                 progesterone 100 MG capsule   Commonly known as:  PROMETRIUM   Take 1 capsule (100 mg) by mouth daily                                sertraline 100 MG tablet   Commonly known as:  ZOLOFT                                sucralfate 1 GM tablet   Commonly known as:  CARAFATE   TAKE 1 TABLET BY MOUTH 3 TIMES DAILY BEFORE MEALS

## 2018-09-27 NOTE — H&P
History and Physical    Ana Patrick MRN# [id number]   Age: 27 year old YOB: 1990     Date of Admission:  2018    Primary care provider: Abiola Ashton           Chief Complaint:   Ana Patrick is a 27 year old female who is present for diagnostic hysteroscopy for recurrent miscarriage.          Pregnancy history:     OBSTETRIC HISTORY:    Obstetric History       T0      L0     SAB2   TAB0   Ectopic0   Multiple0   Live Births0       # Outcome Date GA Lbr Mandeep/2nd Weight Sex Delivery Anes PTL Lv   2 SAB 18           1 SAB 16                  EDC: Estimated Date of Delivery: None noted.    Prenatal Labs:   Lab Results   Component Value Date    ABO A 2018    RH Neg 2018    AS Neg 2018    HGB 12.5 2017       GBS Status:   No results found for: GBS    Active Problem List  Patient Active Problem List   Diagnosis     Abnormal pap     Generalized anxiety disorder     Patellar malalignment syndrome     PCOS (polycystic ovarian syndrome)     Gastroesophageal reflux disease without esophagitis       Medication Prior to Admission  Prescriptions Prior to Admission   Medication Sig Dispense Refill Last Dose     ASPIRIN ADULT LOW STRENGTH PO Take 81 mg by mouth   not started yet     esomeprazole (NEXIUM) 40 MG CR capsule Take 1 capsule (40 mg) by mouth every morning (before breakfast) Take 30-60 minutes before eating. 90 capsule 1 2018 at Unknown time     metFORMIN (GLUCOPHAGE-XR) 500 MG 24 hr tablet Take 1 tablet by mouth 3 times daily (with meals)   2018 at Unknown time     ondansetron (ZOFRAN ODT) 4 MG ODT tab Take 1-2 tablets (4-8 mg) by mouth 3 times daily (before meals) 20 tablet 1 Past Month at Unknown time     Prenatal Vit-Fe Fumarate-FA (PRENATAL MULTIVITAMIN PLUS IRON) 27-0.8 MG TABS per tablet Take 1 tablet by mouth daily   2018 at Unknown time     sertraline (ZOLOFT) 100 MG tablet    2018 at Unknown time      sucralfate (CARAFATE) 1 GM tablet TAKE 1 TABLET BY MOUTH 3 TIMES DAILY BEFORE MEALS 90 tablet 2 9/26/2018 at Unknown time     letrozole (FEMARA) 2.5 MG tablet Take 2 tablets (5 mg) by mouth daily 10 tablet 0 not using     progesterone (PROMETRIUM) 100 MG capsule Take 1 capsule (100 mg) by mouth daily 90 capsule 0 not using   .        Maternal Past Medical History:     Past Medical History:   Diagnosis Date     Internal derangement of knee     5/23/2016     Personal history of other medical treatment (CODE)     2013     Polycystic ovarian syndrome     3/18/2016              Review of Systems:   CONSTITUTIONAL: NEGATIVE for fever, chills, change in weight  RESP: NEGATIVE for significant cough or SOB  CV: NEGATIVE for chest pain, palpitations or peripheral edema          Physical Exam:   Vitals were reviewed  Lungs:   No increased work of breathing, good air exchange, clear to auscultation bilaterally, no crackles or wheezing     Cardiovascular:   Normal apical impulse, regular rate and rhythm, normal S1 and S2, no S3 or S4, and no murmur noted              Assessment:   Ana Patrick is OK for MAC for diagnostic hysteroscopy        Plan:   MAC and diagnostic hysteroscopy    Gerry Kim MD

## 2018-10-08 ENCOUNTER — OFFICE VISIT (OUTPATIENT)
Dept: OBGYN | Facility: OTHER | Age: 28
End: 2018-10-08
Attending: OBSTETRICS & GYNECOLOGY
Payer: COMMERCIAL

## 2018-10-08 VITALS
HEART RATE: 72 BPM | TEMPERATURE: 98.3 F | BODY MASS INDEX: 39.33 KG/M2 | DIASTOLIC BLOOD PRESSURE: 76 MMHG | WEIGHT: 236.38 LBS | SYSTOLIC BLOOD PRESSURE: 118 MMHG

## 2018-10-08 DIAGNOSIS — N96 HISTORY OF RECURRENT MISCARRIAGES, NOT CURRENTLY PREGNANT: ICD-10-CM

## 2018-10-08 DIAGNOSIS — E28.2 PCOS (POLYCYSTIC OVARIAN SYNDROME): Primary | ICD-10-CM

## 2018-10-08 DIAGNOSIS — R76.0 LUPUS ANTICOAGULANT POSITIVE: ICD-10-CM

## 2018-10-08 PROCEDURE — 99213 OFFICE O/P EST LOW 20 MIN: CPT | Performed by: OBSTETRICS & GYNECOLOGY

## 2018-10-08 RX ORDER — LETROZOLE 2.5 MG/1
5 TABLET, FILM COATED ORAL DAILY
Qty: 10 TABLET | Refills: 0 | Status: SHIPPED | OUTPATIENT
Start: 2018-10-08 | End: 2018-10-31

## 2018-10-08 ASSESSMENT — ANXIETY QUESTIONNAIRES
GAD7 TOTAL SCORE: 17
1. FEELING NERVOUS, ANXIOUS, OR ON EDGE: MORE THAN HALF THE DAYS
IF YOU CHECKED OFF ANY PROBLEMS ON THIS QUESTIONNAIRE, HOW DIFFICULT HAVE THESE PROBLEMS MADE IT FOR YOU TO DO YOUR WORK, TAKE CARE OF THINGS AT HOME, OR GET ALONG WITH OTHER PEOPLE: VERY DIFFICULT
6. BECOMING EASILY ANNOYED OR IRRITABLE: MORE THAN HALF THE DAYS
2. NOT BEING ABLE TO STOP OR CONTROL WORRYING: NEARLY EVERY DAY
7. FEELING AFRAID AS IF SOMETHING AWFUL MIGHT HAPPEN: SEVERAL DAYS
3. WORRYING TOO MUCH ABOUT DIFFERENT THINGS: NEARLY EVERY DAY
5. BEING SO RESTLESS THAT IT IS HARD TO SIT STILL: NEARLY EVERY DAY

## 2018-10-08 ASSESSMENT — PATIENT HEALTH QUESTIONNAIRE - PHQ9: 5. POOR APPETITE OR OVEREATING: NEARLY EVERY DAY

## 2018-10-08 ASSESSMENT — PAIN SCALES - GENERAL: PAINLEVEL: NO PAIN (0)

## 2018-10-08 NOTE — PROGRESS NOTES
Ana Patrick presents todayfor a postop checkup at 2 weeks after hysteroscopy with endometrial scratching.    S: Bowels and bladder are functioning normally, no abnormal bleeding or pain. No concerns about the incision(s).    Current Outpatient Prescriptions   Medication     ASPIRIN ADULT LOW STRENGTH PO     esomeprazole (NEXIUM) 40 MG CR capsule     metFORMIN (GLUCOPHAGE-XR) 500 MG 24 hr tablet     ondansetron (ZOFRAN ODT) 4 MG ODT tab     Prenatal Vit-Fe Fumarate-FA (PRENATAL MULTIVITAMIN PLUS IRON) 27-0.8 MG TABS per tablet     sertraline (ZOLOFT) 100 MG tablet     sucralfate (CARAFATE) 1 GM tablet     No current facility-administered medications for this visit.      Allergies   Allergen Reactions     Blue Dyes Other (See Comments)     Showed up on allergy testing     Yellow Dye Unknown     Based on allergy testing as an intolerance.     Past Medical History:   Diagnosis Date     Internal derangement of knee     5/23/2016     Personal history of other medical treatment (CODE)     2013     Polycystic ovarian syndrome     3/18/2016     Past Surgical History:   Procedure Laterality Date     ARTHROSCOPY KNEE Right 2010    Dr. Landeros     HYSTEROSCOPY DIAGNOSTIC N/A 9/27/2018    Procedure: HYSTEROSCOPY DIAGNOSTIC;  Diagnostic Hysteroscopy with Endometrial Scratching.      .;  Surgeon: Gerry Kim MD;  Location: [facility] OR     MANDIBLE SURGERY Right     2001       COMPLETE REVIEW OF SYSTEMS: Neg except as above        O: /76 (BP Location: Right arm, Patient Position: Sitting, Cuff Size: Adult Large)  Pulse 72  Temp 98.3  F (36.8  C) (Tympanic)  Wt 107.2 kg (236 lb 6 oz)  BMI 39.33 kg/m2 Body mass index is 39.33 kg/(m^2).    EXAM:  NAD    I/P:  (E28.2) PCOS (polycystic ovarian syndrome)  (primary encounter diagnosis)    (R76.0) Lupus anticoagulant positive    (N96) History of recurrent miscarriages, not currently pregnant    Plan to start femara with onset of next menses or in two weeks. US day 10-12 for  follicular development. Trigger ovidrel shot with appropriate development, timed IC, prometrium support (200 mg po at HS starting 2 yadav after trigger shot.)    Discussed implications of pregnancy with lupus anticoagulant, recommend baby asa and prophylactic lovenox during pregnancy and six weeks after delivery. Discussed increased risks of DVT. Recommend continuing metformin until 12 weeks pregnant as well, then as needed if she gets GDM.      Gerry Kim MD FACOG  2:32 PM 10/8/2018

## 2018-10-08 NOTE — MR AVS SNAPSHOT
After Visit Summary   10/8/2018    Aan Patrick    MRN: [id number]           Patient Information     Date Of Birth          1990        Visit Information        Provider Department      10/8/2018 2:00 PM Gerry Kim MD [facility]        Today's Diagnoses     PCOS (polycystic ovarian syndrome)    -  1    Lupus anticoagulant positive        History of recurrent miscarriages, not currently pregnant           Follow-ups after your visit        Who to contact     If you have questions or need follow up information about today's clinic visit or your schedule please contact [facility] directly at [phone].  Normal or non-critical lab and imaging results will be communicated to you by [employer], letter or phone within 4 business days after the clinic has received the results. If you do not hear from us within 7 days, please contact the clinic through [employer] or phone. If you have a critical or abnormal lab result, we will notify you by phone as soon as possible.  Submit refill requests through [employer] or call your pharmacy and they will forward the refill request to us. Please allow 3 business days for your refill to be completed.          Additional Information About Your Visit        MyChart Information     [employer] gives you secure access to your electronic health record. If you see a primary care provider, you can also send messages to your care team and make appointments. If you have questions, please call your primary care clinic.  If you do not have a primary care provider, please call [phone] and they will assist you.        Care EveryWhere ID     This is your Care EveryWhere ID. This could be used by other organizations to access your [facility] medical records  [id number]        Your Vitals Were     Pulse Temperature BMI (Body Mass Index)             72 98.3  F (36.8  C) (Tympanic) 39.33 kg/m2          Blood Pressure from Last 3  Encounters:   10/08/18 118/76   09/27/18 107/70   08/16/18 112/74    Weight from Last 3 Encounters:   10/08/18 107.2 kg (236 lb 6 oz)   09/27/18 104.3 kg (230 lb)   08/16/18 106.9 kg (235 lb 9.6 oz)              Today, you had the following     No orders found for display         Today's Medication Changes          These changes are accurate as of 10/8/18  3:07 PM.  If you have any questions, ask your nurse or doctor.               Start taking these medicines.        Dose/Directions    letrozole 2.5 MG tablet   Commonly known as:  FEMARA   Used for:  PCOS (polycystic ovarian syndrome)   Started by:  Gerry Kim MD        Dose:  5 mg   Take 2 tablets (5 mg) by mouth daily for 5 days   Quantity:  10 tablet   Refills:  0            Where to get your medicines      These medications were sent to [facility], - [place], [place] - [address]  [address], [place] [address]     Phone:  [phone]     letrozole 2.5 MG tablet                Primary Care Provider Office Phone # Fax #    Abiola Ashton,  [phone]       [address]  [facility] [address]        Equal Access to Services     STU CÁRDENAS AH: Hadii jose luis wolfe hadasho Soomaali, waaxda luqadaha, qaybta kaalmada adeegyada, rachael jeff. So [facility] [phone].    ATENCIÓN: Si habla español, tiene a rollins disposición servicios gratuitos de asistencia lingüística. Llame al [phone].    We comply with applicable federal civil rights laws and [place] laws. We do not discriminate on the basis of race, color, national origin, age, disability, sex, sexual orientation, or gender identity.            Thank you!     Thank you for choosing [facility] AND [facility]  for your care. Our goal is always to provide you with excellent care. Hearing back from our patients is one way we can continue to improve our services. Please take a few minutes to complete the written  survey that you may receive in the mail after your visit with us. Thank you!             Your Updated Medication List - Protect others around you: Learn how to safely use, store and throw away your medicines at www.disposemymeds.org.          This list is accurate as of 10/8/18  3:07 PM.  Always use your most recent med list.                   Brand Name Dispense Instructions for use Diagnosis    ASPIRIN ADULT LOW STRENGTH PO      Take 81 mg by mouth        esomeprazole 40 MG CR capsule    nexIUM    90 capsule    Take 1 capsule (40 mg) by mouth every morning (before breakfast) Take 30-60 minutes before eating.    Gastroesophageal reflux disease without esophagitis       letrozole 2.5 MG tablet    FEMARA    10 tablet    Take 2 tablets (5 mg) by mouth daily for 5 days    PCOS (polycystic ovarian syndrome)       metFORMIN 500 MG 24 hr tablet    GLUCOPHAGE-XR     Take 1 tablet by mouth 3 times daily (with meals)        ondansetron 4 MG ODT tab    ZOFRAN ODT    20 tablet    Take 1-2 tablets (4-8 mg) by mouth 3 times daily (before meals)    Gastroesophageal reflux disease without esophagitis       prenatal multivitamin plus iron 27-0.8 MG Tabs per tablet      Take 1 tablet by mouth daily        sertraline 100 MG tablet    ZOLOFT          sucralfate 1 GM tablet    CARAFATE    90 tablet    TAKE 1 TABLET BY MOUTH 3 TIMES DAILY BEFORE MEALS    Gastroesophageal reflux disease without esophagitis

## 2018-10-09 ENCOUNTER — TELEPHONE (OUTPATIENT)
Dept: OBGYN | Facility: OTHER | Age: 28
End: 2018-10-09

## 2018-10-09 ASSESSMENT — ANXIETY QUESTIONNAIRES: GAD7 TOTAL SCORE: 17

## 2018-10-09 NOTE — TELEPHONE ENCOUNTER
Patient called today stating she was told to start Femara with next cycle or in two weeks. She states she is not due for her period for 3 weeks. She is wondering if she should wait 3 weeks or start Femara after 2 weeks.    Per Dr. Gerry Kim MD, FACOG, Okay to wait 3 weeks then start Letrozole after onset of menses or negative home pregnancy test.    Patient notified via [employer] per her request.    Awa Parry RN...................10/9/2018 4:13 PM

## 2018-10-31 ENCOUNTER — OFFICE VISIT (OUTPATIENT)
Dept: FAMILY MEDICINE | Facility: OTHER | Age: 28
End: 2018-10-31
Attending: FAMILY MEDICINE
Payer: COMMERCIAL

## 2018-10-31 VITALS
BODY MASS INDEX: 39.27 KG/M2 | RESPIRATION RATE: 18 BRPM | SYSTOLIC BLOOD PRESSURE: 122 MMHG | HEART RATE: 76 BPM | WEIGHT: 236 LBS | DIASTOLIC BLOOD PRESSURE: 72 MMHG

## 2018-10-31 DIAGNOSIS — S39.012A STRAIN OF LUMBAR REGION, INITIAL ENCOUNTER: Primary | ICD-10-CM

## 2018-10-31 PROCEDURE — 99213 OFFICE O/P EST LOW 20 MIN: CPT | Performed by: FAMILY MEDICINE

## 2018-10-31 RX ORDER — MELOXICAM 15 MG/1
15 TABLET ORAL DAILY
Qty: 30 TABLET | Refills: 0 | Status: SHIPPED | OUTPATIENT
Start: 2018-10-31 | End: 2018-11-01 | Stop reason: SINTOL

## 2018-10-31 RX ORDER — METHOCARBAMOL 500 MG/1
1000 TABLET, FILM COATED ORAL 3 TIMES DAILY PRN
Qty: 30 TABLET | Refills: 1 | Status: SHIPPED | OUTPATIENT
Start: 2018-10-31 | End: 2018-12-21

## 2018-10-31 ASSESSMENT — PAIN SCALES - GENERAL: PAINLEVEL: MODERATE PAIN (4)

## 2018-10-31 NOTE — PATIENT INSTRUCTIONS
Meloxicam daily for 1-2 weeks with food. No ibuprofen or naproxen  Methocarbamol three times a day as needed  Continue with chiropractor

## 2018-10-31 NOTE — PROGRESS NOTES
"Nursing Notes:   Rylie Gay, LPN  10/31/2018  2:45 PM  Signed  Chief Complaint   Patient presents with     Musculoskeletal Problem       Initial /72 (BP Location: Right arm, Patient Position: Chair, Cuff Size: Adult Large)  Pulse 76  Resp 18  Wt 236 lb (107 kg)  LMP 09/23/2018  Breastfeeding? No  BMI 39.27 kg/m2 Estimated body mass index is 39.27 kg/(m^2) as calculated from the following:    Height as of 9/27/18: 5' 5\" (1.651 m).    Weight as of this encounter: 236 lb (107 kg).  Medication Reconciliation: complete    Rylie Gay LPN   SUBJECTIVE:  27 year old female presents for back spasms for 2 weeks. No known injury. Maybe slept wrong. She has not had similar problems in the past.  No numbness or tingling down legs.  Tenderness in bilateral low back and upper sacral area.  Tried chiropractor once, but did not improve.    Current Outpatient Prescriptions   Medication Sig Dispense Refill     ASPIRIN ADULT LOW STRENGTH PO Take 81 mg by mouth       esomeprazole (NEXIUM) 40 MG CR capsule Take 1 capsule (40 mg) by mouth every morning (before breakfast) Take 30-60 minutes before eating. 90 capsule 1     metFORMIN (GLUCOPHAGE-XR) 500 MG 24 hr tablet Take 1 tablet by mouth 3 times daily (with meals)       ondansetron (ZOFRAN ODT) 4 MG ODT tab Take 1-2 tablets (4-8 mg) by mouth 3 times daily (before meals) 20 tablet 1     Prenatal Vit-Fe Fumarate-FA (PRENATAL MULTIVITAMIN PLUS IRON) 27-0.8 MG TABS per tablet Take 1 tablet by mouth daily       sertraline (ZOLOFT) 100 MG tablet        sucralfate (CARAFATE) 1 GM tablet TAKE 1 TABLET BY MOUTH 3 TIMES DAILY BEFORE MEALS 90 tablet 2     Allergies   Allergen Reactions     Blue Dyes Other (See Comments)     Showed up on allergy testing     Yellow Dye Unknown     Based on allergy testing as an intolerance.       OBJECTIVE:  /72 (BP Location: Right arm, Patient Position: Chair, Cuff Size: Adult Large)  Pulse 76  Resp 18  Wt 236 lb (107 kg)  LMP " 09/23/2018  Breastfeeding? No  BMI 39.27 kg/m2    EXAM:  General Appearance: Pleasant, alert, appropriate appearance for age. No acute distress  Musculoskeletal Exam: Lumbar Spine: tenderness over lumbar paraspinous muscles inferiorly.  No SI joint tenderness  Neurologic Exam: reflexes 2+, strength symmetric lower extremities  Psychiatric: Normal affect and mentation      ASSESSMENT/PLAN:    ICD-10-CM    1. Strain of lumbar region, initial encounter S39.012A methocarbamol (ROBAXIN) 500 MG tablet     DISCONTINUED: meloxicam (MOBIC) 15 MG tablet     Lumbar strain.  She has no concerning symptoms.  Recommend use of NSAID and muscle relaxer.  Given lack of previous history, no need for physical therapy at this time, but if not improving then consider PT.  She will take meloxicam 15 mg daily for 1-2 weeks with food.  Has tolerated methocarbamol while in the past and may use this thousand milligrams 3 times daily as needed.  Continue with chiropractic care, which should help her improve.    F/U as needed     Domo Tang MD    This document was prepared using a combination of typing and voice generated software.  While every attempt was made for accuracy, spelling and grammatical errors may exist.

## 2018-10-31 NOTE — MR AVS SNAPSHOT
After Visit Summary   10/31/2018    Ana Patrick    MRN: [id number]           Patient Information     Date Of Birth          1990        Visit Information        Provider Department      10/31/2018 2:45 PM Domo Tang MD [facility]        Today's Diagnoses     Strain of lumbar region, initial encounter    -  1      Care Instructions    Meloxicam daily for 1-2 weeks with food. No ibuprofen or naproxen  Methocarbamol three times a day as needed  Continue with chiropractor          Follow-ups after your visit        Your next 10 appointments already scheduled     Nov 07, 2018  8:15 AM CST   (Arrive by 8:00 AM)   US PELVIC LIMITED with GHUS1   [facility] ([facility])    [address]  [place] [phone]           How do I prepare for my exam? (Food and drink instructions) Adults: Drink four 8-ounce glasses of fluid an hour before your exam. If you need to empty your bladder before your exam, try to release only a little urine. Then, drink another glass of fluid.  Children: * Children who are potty trained up to 6 years old should drink at least 2 cups (16 oz) of water/non-carbonated beverage 30 minutes prior to the exam. * Children who are 6-10 years should drink at least 3 cups (24 oz) of water/non-carbonated beverage 45 minutes prior to the exam. * Children who are 10 years or older should drink at least 4 cups (32 oz) of water/non-carbonated beverage 45 minutes prior to the exam.  If your child is very uncomfortable or has an urgent need to pee, please notify a technologist; they will try to find out how much longer the wait may be and provide instructions to help relieve the pressure.  What should I wear: Wear comfortable clothes.  How long does the exam take: Most ultrasounds take 30 to 60 minutes.  What should I bring: Bring a list of your medicines, including vitamins, minerals and  over-the-counter drugs. It is safest to leave personal items at home.  Do I need a :  No  is needed.  What do I need to tell my doctor: Tell your doctor about any allergies you may have.  What should I do after the exam: No restrictions, You may resume normal activities.  What is this test: An ultrasound uses sound waves to make pictures of the body. Sound waves do not cause pain. The only discomfort may be the pressure of the wand against your skin or full bladder.  Who should I call with questions: If you have any questions, please call the Imaging Department where you will have your exam. Directions, parking instructions, and other information is available on our website, [email or web address].            Nov 07, 2018  9:15 AM CST   Nurse Only with  Ob Nurse   [facility]    [address]  [place] [address]   [phone]              Future tests that were ordered for you today     Open Future Orders        Priority Expected Expires Ordered    US Pelvic Limited Routine  11/2/2019 11/2/2018            Who to contact     If you have questions or need follow up information about today's clinic visit or your schedule please contact [facility] directly at [phone].  Normal or non-critical lab and imaging results will be communicated to you by [employer], letter or phone within 4 business days after the clinic has received the results. If you do not hear from us within 7 days, please contact the clinic through [employer] or phone. If you have a critical or abnormal lab result, we will notify you by phone as soon as possible.  Submit refill requests through [employer] or call your pharmacy and they will forward the refill request to us. Please allow 3 business days for your refill to be completed.          Additional Information About Your Visit        [employer] Information     [employer] gives you secure access to your  electronic health record. If you see a primary care provider, you can also send messages to your care team and make appointments. If you have questions, please call your primary care clinic.  If you do not have a primary care provider, please call [phone] and they will assist you.        Care EveryWhere ID     This is your Care EveryWhere ID. This could be used by other organizations to access your [facility] medical records  [id number]        Your Vitals Were     Pulse Respirations Last Period Breastfeeding? BMI (Body Mass Index)       76 18 09/23/2018 No 39.27 kg/m2        Blood Pressure from Last 3 Encounters:   11/01/18 122/80   10/31/18 122/72   10/08/18 118/76    Weight from Last 3 Encounters:   11/01/18 243 lb 6.4 oz (110.4 kg)   10/31/18 236 lb (107 kg)   10/08/18 236 lb 6 oz (107.2 kg)              Today, you had the following     No orders found for display         Today's Medication Changes          These changes are accurate as of 10/31/18 11:59 PM.  If you have any questions, ask your nurse or doctor.               Start taking these medicines.        Dose/Directions    meloxicam 15 MG tablet   Commonly known as:  MOBIC   Used for:  Strain of lumbar region, initial encounter   Started by:  Domo Tang MD        Dose:  15 mg   Take 1 tablet (15 mg) by mouth daily   Quantity:  30 tablet   Refills:  0       methocarbamol 500 MG tablet   Commonly known as:  ROBAXIN   Used for:  Strain of lumbar region, initial encounter   Started by:  Domo Tang MD        Dose:  1000 mg   Take 2 tablets (1,000 mg) by mouth 3 times daily as needed for muscle spasms   Quantity:  30 tablet   Refills:  1         Stop taking these medicines if you haven't already. Please contact your care team if you have questions.     letrozole 2.5 MG tablet   Commonly known as:  FEMARA   Stopped by:  Domo Tang MD                Where to get your medicines      These medications were sent to [facility], - [place], [place] - [address]  [address], [place] [address]     Phone:  [phone]     meloxicam 15 MG tablet    methocarbamol 500 MG tablet                Primary Care Provider Office Phone # Fax Edouard Ashton [phone]       [address]   [facility] [address]        Equal Access to Services     CLAY CÁRDENAS : Hadii aad ku hadasho Soomaali, waaxda luqadaha, qaybta kaalmada adeegyada, waxay idiin hayaan adeeg khrenettash lalucille . So [facility] [phone].    ATENCIÓN: Si habla espchente, tiene a rollins disposición servicios gratuitos de asistencia lingüística. Saleemame al [phone].    We comply with applicable federal civil rights laws and [place] laws. We do not discriminate on the basis of race, color, national origin, age, disability, sex, sexual orientation, or gender identity.            Thank you!     Thank you for choosing [facility] AND [facility]  for your care. Our goal is always to provide you with excellent care. Hearing back from our patients is one way we can continue to improve our services. Please take a few minutes to complete the written survey that you may receive in the mail after your visit with us. Thank you!             Your Updated Medication List - Protect others around you: Learn how to safely use, store and throw away your medicines at www.disposemymeds.org.          This list is accurate as of 10/31/18 11:59 PM.  Always use your most recent med list.                   Brand Name Dispense Instructions for use Diagnosis    ASPIRIN ADULT LOW STRENGTH PO      Take 81 mg by mouth        esomeprazole 40 MG CR capsule    nexIUM    90 capsule    Take 1 capsule (40 mg) by mouth every morning (before breakfast) Take 30-60 minutes before eating.    Gastroesophageal reflux disease without esophagitis       meloxicam 15 MG tablet    MOBIC    30 tablet    Take 1 tablet (15 mg) by mouth daily    Strain of lumbar region,  initial encounter       metFORMIN 500 MG 24 hr tablet    GLUCOPHAGE-XR     Take 1 tablet by mouth 3 times daily (with meals)        methocarbamol 500 MG tablet    ROBAXIN    30 tablet    Take 2 tablets (1,000 mg) by mouth 3 times daily as needed for muscle spasms    Strain of lumbar region, initial encounter       ondansetron 4 MG ODT tab    ZOFRAN ODT    20 tablet    Take 1-2 tablets (4-8 mg) by mouth 3 times daily (before meals)    Gastroesophageal reflux disease without esophagitis       prenatal multivitamin plus iron 27-0.8 MG Tabs per tablet      Take 1 tablet by mouth daily        sertraline 100 MG tablet    ZOLOFT          sucralfate 1 GM tablet    CARAFATE    90 tablet    TAKE 1 TABLET BY MOUTH 3 TIMES DAILY BEFORE MEALS    Gastroesophageal reflux disease without esophagitis

## 2018-10-31 NOTE — NURSING NOTE
"Chief Complaint   Patient presents with     Musculoskeletal Problem       Initial /72 (BP Location: Right arm, Patient Position: Chair, Cuff Size: Adult Large)  Pulse 76  Resp 18  Wt 236 lb (107 kg)  LMP 09/23/2018  Breastfeeding? No  BMI 39.27 kg/m2 Estimated body mass index is 39.27 kg/(m^2) as calculated from the following:    Height as of 9/27/18: 5' 5\" (1.651 m).    Weight as of this encounter: 236 lb (107 kg).  Medication Reconciliation: complete    Rylie Gay LPN  "

## 2018-11-01 ENCOUNTER — OFFICE VISIT (OUTPATIENT)
Dept: FAMILY MEDICINE | Facility: OTHER | Age: 28
End: 2018-11-01
Attending: NURSE PRACTITIONER
Payer: COMMERCIAL

## 2018-11-01 VITALS
BODY MASS INDEX: 40.5 KG/M2 | SYSTOLIC BLOOD PRESSURE: 122 MMHG | HEART RATE: 67 BPM | TEMPERATURE: 99.1 F | DIASTOLIC BLOOD PRESSURE: 80 MMHG | WEIGHT: 243.4 LBS | OXYGEN SATURATION: 98 %

## 2018-11-01 DIAGNOSIS — T78.40XA ALLERGIC REACTION TO DRUG, INITIAL ENCOUNTER: Primary | ICD-10-CM

## 2018-11-01 PROCEDURE — 25000128 H RX IP 250 OP 636: Performed by: NURSE PRACTITIONER

## 2018-11-01 PROCEDURE — 25000132 ZZH RX MED GY IP 250 OP 250 PS 637: Performed by: NURSE PRACTITIONER

## 2018-11-01 PROCEDURE — 96372 THER/PROPH/DIAG INJ SC/IM: CPT

## 2018-11-01 PROCEDURE — 99213 OFFICE O/P EST LOW 20 MIN: CPT | Performed by: NURSE PRACTITIONER

## 2018-11-01 RX ORDER — PREDNISONE 20 MG/1
20 TABLET ORAL DAILY
Qty: 5 TABLET | Refills: 0 | Status: SHIPPED | OUTPATIENT
Start: 2018-11-01 | End: 2018-12-21

## 2018-11-01 RX ORDER — METHYLPREDNISOLONE SODIUM SUCCINATE 125 MG/2ML
80 INJECTION, POWDER, LYOPHILIZED, FOR SOLUTION INTRAMUSCULAR; INTRAVENOUS ONCE
Status: COMPLETED | OUTPATIENT
Start: 2018-11-01 | End: 2018-11-01

## 2018-11-01 RX ORDER — DIPHENHYDRAMINE HCL 25 MG
50 CAPSULE ORAL ONCE
Status: COMPLETED | OUTPATIENT
Start: 2018-11-01 | End: 2018-11-01

## 2018-11-01 RX ADMIN — METHYLPREDNISOLONE SODIUM SUCCINATE 81.25 MG: 125 INJECTION, POWDER, FOR SOLUTION INTRAMUSCULAR; INTRAVENOUS at 17:53

## 2018-11-01 RX ADMIN — DIPHENHYDRAMINE HYDROCHLORIDE 50 MG: 25 CAPSULE ORAL at 17:49

## 2018-11-01 ASSESSMENT — PAIN SCALES - GENERAL: PAINLEVEL: NO PAIN (0)

## 2018-11-01 NOTE — PATIENT INSTRUCTIONS
ASSESSMENT/PLAN:     1. Allergic reaction to drug, initial encounter  Acute, symptomatic.  - Solumedrol today, then start prednisone tomorrow.   - predniSONE (DELTASONE) 20 MG tablet; Take 1 tablet (20 mg) by mouth daily  Dispense: 5 tablet; Refill: 0  - Benadryl per package instructions to help with symptoms.       FUTURE APPOINTMENTS:       - Follow-up visit: no improvement or worsening symptoms.    Anika Espinoza CNP  [facility] AND [facility]

## 2018-11-01 NOTE — NURSING NOTE
Patient presents with redness, burning to face and neck. Patient tried meloxicam today. Elisabeth Wood LPN .............11/1/2018  5:23 PM

## 2018-11-01 NOTE — PROGRESS NOTES
"  SUBJECTIVE:   Ana Patrick is a 27 year old female who presents to clinic today for the following health issues:      Concern - Rash  Onset: today    Description: Took Meloxicam (first dose ever) at 10 am this morning. At about 2 pm broke out in red, rash- has burning sensation. s    Intensity: moderate    Progression of Symptoms:  worsening    Accompanying Signs & Symptoms: Denies pruritis, rash feels like a burning sensation \"like a really bad sunburn.\" Denies sensation of throat swelling, difficulty breathing, SOB    Previous history of similar problem: Denies any history of allergic reaction rashes.     Precipitating factors:   Worsened by: Touching    Alleviating factors:  Improved by: cool cloth/ice    Therapies Tried and outcome: cool cloth/ice- helped with discomfort but not redness.        Problem list and histories reviewed & adjusted, as indicated.  Additional history: as documented    Patient Active Problem List   Diagnosis     Abnormal pap     Generalized anxiety disorder     Patellar malalignment syndrome     PCOS (polycystic ovarian syndrome)     Gastroesophageal reflux disease without esophagitis     Lupus anticoagulant positive     History of recurrent miscarriages, not currently pregnant     Past Surgical History:   Procedure Laterality Date     ARTHROSCOPY KNEE Right 2010    Dr. Landeros     HYSTEROSCOPY DIAGNOSTIC N/A 9/27/2018    Procedure: HYSTEROSCOPY DIAGNOSTIC;  Diagnostic Hysteroscopy with Endometrial Scratching.      .;  Surgeon: Gerry Kim MD;  Location: [facility] OR     MANDIBLE SURGERY Right     2001       Social History   Substance Use Topics     Smoking status: Never Smoker     Smokeless tobacco: Never Used     Alcohol use 0.0 oz/week      Comment: Alcoholic Drinks/day: rare     Family History   Problem Relation Age of Onset     Family History Negative Mother      Good Health     Family History Negative Father      Good Health     Cancer Maternal Grandmother      Cancer     " Diabetes Other      Diabetes,maternal side         Current Outpatient Prescriptions   Medication Sig Dispense Refill     predniSONE (DELTASONE) 20 MG tablet Take 1 tablet (20 mg) by mouth daily 5 tablet 0     ASPIRIN ADULT LOW STRENGTH PO Take 81 mg by mouth       esomeprazole (NEXIUM) 40 MG CR capsule Take 1 capsule (40 mg) by mouth every morning (before breakfast) Take 30-60 minutes before eating. 90 capsule 1     metFORMIN (GLUCOPHAGE-XR) 500 MG 24 hr tablet Take 1 tablet by mouth 3 times daily (with meals)       methocarbamol (ROBAXIN) 500 MG tablet Take 2 tablets (1,000 mg) by mouth 3 times daily as needed for muscle spasms 30 tablet 1     ondansetron (ZOFRAN ODT) 4 MG ODT tab Take 1-2 tablets (4-8 mg) by mouth 3 times daily (before meals) 20 tablet 1     Prenatal Vit-Fe Fumarate-FA (PRENATAL MULTIVITAMIN PLUS IRON) 27-0.8 MG TABS per tablet Take 1 tablet by mouth daily       sertraline (ZOLOFT) 100 MG tablet        sucralfate (CARAFATE) 1 GM tablet TAKE 1 TABLET BY MOUTH 3 TIMES DAILY BEFORE MEALS 90 tablet 2     Allergies   Allergen Reactions     Blue Dyes Other (See Comments)     Showed up on allergy testing     Yellow Dye Unknown     Based on allergy testing as an intolerance.     Recent Labs   Lab Test  07/19/17   1003  05/04/17   1426  06/02/16   0948   LDL   --    --   84   HDL   --    --   32   TRIG   --    --   69   CR  0.61*  0.68*  0.57*   GFRESTBLACK  >60  >60  >60   POTASSIUM  4.2   --   3.9      BP Readings from Last 3 Encounters:   11/01/18 122/80   10/31/18 122/72   10/08/18 118/76    Wt Readings from Last 3 Encounters:   11/01/18 243 lb 6.4 oz (110.4 kg)   10/31/18 236 lb (107 kg)   10/08/18 236 lb 6 oz (107.2 kg)                    Reviewed and updated as needed this visit by clinical staff  Tobacco  Allergies  Meds  Med Hx  Surg Hx  Fam Hx  Soc Hx      Reviewed and updated as needed this visit by Provider         ROS:  Constitutional, HEENT, cardiovascular, pulmonary, gi and gu systems  are negative, except as otherwise noted.    OBJECTIVE:     /80 (BP Location: Right arm, Patient Position: Sitting, Cuff Size: Adult Large)  Pulse 67  Temp 99.1  F (37.3  C) (Tympanic)  Wt 243 lb 6.4 oz (110.4 kg)  SpO2 98%  Breastfeeding? No  BMI 40.5 kg/m2  Body mass index is 40.5 kg/(m^2).     GENERAL: alert and no distress, nontoxic  EYES: Glasses. Eyes grossly normal to inspection, conjunctivae and sclerae normal  HENT: normocephalic, atraumatic.  SKIN: diffuse erythematous rash from forehead/scalp line extending proximally to upper chest, non tender, no bullae, no drainage.    NEURO: Normal strength and tone, mentation intact and speech normal  PSYCH: mentation appears normal, affect normal    Diagnostic Test Results:  none     ASSESSMENT/PLAN:     1. Allergic reaction to drug, initial encounter  Acute, symptomatic. Suspecting possible phototoxic reaction given description of sunburn sensation. Reviewed s/s of frank's brayan's syndrome since oxicam NSAIDs are known causative medications and need for emergent evaluation if she starts to develop systemic symptoms, blistering, etc.   - Solumedrol today, then start prednisone tomorrow.   - predniSONE (DELTASONE) 20 MG tablet; Take 1 tablet (20 mg) by mouth daily  Dispense: 5 tablet; Refill: 0  - Benadryl per package instructions to help with symptoms.     FUTURE APPOINTMENTS:       - Follow-up visit: no improvement or worsening symptoms.    Anika Espinoza CNP  [facility] [place]

## 2018-11-02 DIAGNOSIS — E28.2 PCOS (POLYCYSTIC OVARIAN SYNDROME): Primary | ICD-10-CM

## 2018-11-07 ENCOUNTER — HOSPITAL ENCOUNTER (OUTPATIENT)
Dept: ULTRASOUND IMAGING | Facility: OTHER | Age: 28
Discharge: HOME | End: 2018-11-07
Attending: OBSTETRICS & GYNECOLOGY | Admitting: OBSTETRICS & GYNECOLOGY
Payer: COMMERCIAL

## 2018-11-07 DIAGNOSIS — N97.8 FEMALE INFERTILITY DUE TO OVULATORY DISORDER: Primary | ICD-10-CM

## 2018-11-07 DIAGNOSIS — E28.2 PCOS (POLYCYSTIC OVARIAN SYNDROME): ICD-10-CM

## 2018-11-07 PROCEDURE — 76857 US EXAM PELVIC LIMITED: CPT

## 2018-11-07 NOTE — PROGRESS NOTES
Pt stopped by the OB Clinic desk.  Pt is an LPN at [employer] and was inquiring about the cost of us administering the Ovidrel injection versus she giving it to herself.  Rx sent to [facility] [facility] retail pharmacy for price evaluation.  Pharmacist states employee cost of Ovidrel Rx is $6.05.  Pt advised and instructed of above, as well as follicle count on US this morning, instructing her to proceed with Dr. EULALIO Kim's treatment plan noted in 10-8-18 office visit .  Pt verbalizes understanding.  Delmi Gastelum RN on 11/7/2018 at 9:47 AM

## 2018-11-20 ENCOUNTER — MYC MEDICAL ADVICE (OUTPATIENT)
Dept: OBGYN | Facility: OTHER | Age: 28
End: 2018-11-20

## 2018-11-20 DIAGNOSIS — N97.8 FEMALE INFERTILITY DUE TO OVULATORY DISORDER: Primary | ICD-10-CM

## 2018-11-20 DIAGNOSIS — N96 RECURRENT PREGNANCY LOSS WITHOUT CURRENT PREGNANCY: ICD-10-CM

## 2018-11-20 PROCEDURE — 85732 THROMBOPLASTIN TIME PARTIAL: CPT | Performed by: OBSTETRICS & GYNECOLOGY

## 2018-11-20 PROCEDURE — 85613 RUSSELL VIPER VENOM DILUTED: CPT | Performed by: OBSTETRICS & GYNECOLOGY

## 2018-11-20 PROCEDURE — 00000401 ZZHCL STATISTIC THROMBIN TIME NC: Performed by: OBSTETRICS & GYNECOLOGY

## 2018-11-20 PROCEDURE — 00000167 ZZHCL STATISTIC INR NC: Performed by: OBSTETRICS & GYNECOLOGY

## 2018-11-20 PROCEDURE — 85730 THROMBOPLASTIN TIME PARTIAL: CPT | Performed by: OBSTETRICS & GYNECOLOGY

## 2018-11-20 PROCEDURE — 36415 COLL VENOUS BLD VENIPUNCTURE: CPT | Performed by: OBSTETRICS & GYNECOLOGY

## 2018-11-20 PROCEDURE — 85597 PHOSPHOLIPID PLTLT NEUTRALIZ: CPT | Performed by: OBSTETRICS & GYNECOLOGY

## 2018-11-20 RX ORDER — LETROZOLE 2.5 MG/1
5 TABLET, FILM COATED ORAL DAILY
Qty: 10 TABLET | Refills: 0 | Status: SHIPPED | OUTPATIENT
Start: 2018-11-20 | End: 2018-12-28

## 2018-11-20 NOTE — TELEPHONE ENCOUNTER
Patient notified. She'd like to do her own Ovidrel injection. Please sign order and return to nursing for delivery.    Awa Parry RN...................11/20/2018 10:03 AM

## 2018-11-21 ENCOUNTER — MYC MEDICAL ADVICE (OUTPATIENT)
Dept: FAMILY MEDICINE | Facility: OTHER | Age: 28
End: 2018-11-21

## 2018-11-21 DIAGNOSIS — E28.2 PCOS (POLYCYSTIC OVARIAN SYNDROME): Primary | ICD-10-CM

## 2018-11-21 RX ORDER — METFORMIN HCL 500 MG
500 TABLET, EXTENDED RELEASE 24 HR ORAL 4 TIMES DAILY
Qty: 360 TABLET | Refills: 4 | Status: SHIPPED | OUTPATIENT
Start: 2018-11-21 | End: 2019-04-01

## 2018-11-23 LAB — LA PPP-IMP: POSITIVE

## 2018-11-29 ENCOUNTER — HOSPITAL ENCOUNTER (OUTPATIENT)
Dept: ULTRASOUND IMAGING | Facility: OTHER | Age: 28
Discharge: HOME | End: 2018-11-29
Attending: OBSTETRICS & GYNECOLOGY | Admitting: OBSTETRICS & GYNECOLOGY
Payer: COMMERCIAL

## 2018-11-29 DIAGNOSIS — N97.8 FEMALE INFERTILITY DUE TO OVULATORY DISORDER: ICD-10-CM

## 2018-11-29 PROCEDURE — 76857 US EXAM PELVIC LIMITED: CPT

## 2018-12-13 ENCOUNTER — MYC MEDICAL ADVICE (OUTPATIENT)
Dept: OBGYN | Facility: OTHER | Age: 28
End: 2018-12-13

## 2018-12-17 DIAGNOSIS — E28.2 PCOS (POLYCYSTIC OVARIAN SYNDROME): Primary | ICD-10-CM

## 2018-12-18 RX ORDER — LETROZOLE 2.5 MG/1
5 TABLET, FILM COATED ORAL DAILY
Qty: 10 TABLET | Refills: 0 | Status: SHIPPED | OUTPATIENT
Start: 2018-12-18 | End: 2018-12-21

## 2018-12-21 ENCOUNTER — HOSPITAL ENCOUNTER (OUTPATIENT)
Dept: GENERAL RADIOLOGY | Facility: OTHER | Age: 28
Discharge: HOME | End: 2018-12-21
Attending: PHYSICIAN ASSISTANT | Admitting: PHYSICIAN ASSISTANT
Payer: COMMERCIAL

## 2018-12-21 ENCOUNTER — OFFICE VISIT (OUTPATIENT)
Dept: FAMILY MEDICINE | Facility: OTHER | Age: 28
End: 2018-12-21
Attending: PHYSICIAN ASSISTANT
Payer: COMMERCIAL

## 2018-12-21 VITALS
DIASTOLIC BLOOD PRESSURE: 78 MMHG | TEMPERATURE: 99.8 F | SYSTOLIC BLOOD PRESSURE: 120 MMHG | OXYGEN SATURATION: 98 % | WEIGHT: 243.5 LBS | HEART RATE: 73 BPM | BODY MASS INDEX: 40.52 KG/M2

## 2018-12-21 DIAGNOSIS — R10.13 ABDOMINAL PAIN, EPIGASTRIC: ICD-10-CM

## 2018-12-21 DIAGNOSIS — N30.00 ACUTE CYSTITIS WITHOUT HEMATURIA: Primary | ICD-10-CM

## 2018-12-21 DIAGNOSIS — R07.81 RIB PAIN ON LEFT SIDE: ICD-10-CM

## 2018-12-21 LAB
ALBUMIN SERPL-MCNC: 4 G/DL (ref 3.5–5.7)
ALBUMIN UR-MCNC: NEGATIVE MG/DL
ALP SERPL-CCNC: 73 U/L (ref 34–104)
ALT SERPL W P-5'-P-CCNC: 35 U/L (ref 7–52)
ANION GAP SERPL CALCULATED.3IONS-SCNC: 7 MMOL/L (ref 3–14)
APPEARANCE UR: ABNORMAL
AST SERPL W P-5'-P-CCNC: 22 U/L (ref 13–39)
BACTERIA #/AREA URNS HPF: ABNORMAL /HPF
BASOPHILS # BLD AUTO: 0.1 10E9/L (ref 0–0.2)
BASOPHILS NFR BLD AUTO: 0.8 %
BILIRUB DIRECT SERPL-MCNC: 0.1 MG/DL (ref 0–0.2)
BILIRUB SERPL-MCNC: 0.3 MG/DL (ref 0.3–1)
BILIRUB UR QL STRIP: NEGATIVE
BUN SERPL-MCNC: 9 MG/DL (ref 7–25)
CALCIUM SERPL-MCNC: 9 MG/DL (ref 8.6–10.3)
CHLORIDE SERPL-SCNC: 106 MMOL/L (ref 98–107)
CO2 SERPL-SCNC: 24 MMOL/L (ref 21–31)
COLOR UR AUTO: YELLOW
CREAT SERPL-MCNC: 0.61 MG/DL (ref 0.6–1.2)
DIFFERENTIAL METHOD BLD: NORMAL
EOSINOPHIL # BLD AUTO: 0.2 10E9/L (ref 0–0.7)
EOSINOPHIL NFR BLD AUTO: 2.5 %
ERYTHROCYTE [DISTWIDTH] IN BLOOD BY AUTOMATED COUNT: 12.2 % (ref 10–15)
GFR SERPL CREATININE-BSD FRML MDRD: >90 ML/MIN/{1.73_M2}
GLUCOSE SERPL-MCNC: 113 MG/DL (ref 70–105)
GLUCOSE UR STRIP-MCNC: NEGATIVE MG/DL
HCT VFR BLD AUTO: 38.8 % (ref 35–47)
HGB BLD-MCNC: 13.2 G/DL (ref 11.7–15.7)
HGB UR QL STRIP: ABNORMAL
IMM GRANULOCYTES # BLD: 0 10E9/L (ref 0–0.4)
IMM GRANULOCYTES NFR BLD: 0.3 %
KETONES UR STRIP-MCNC: NEGATIVE MG/DL
LEUKOCYTE ESTERASE UR QL STRIP: ABNORMAL
LIPASE SERPL-CCNC: 49 U/L (ref 11–82)
LYMPHOCYTES # BLD AUTO: 2.5 10E9/L (ref 0.8–5.3)
LYMPHOCYTES NFR BLD AUTO: 31.2 %
MCH RBC QN AUTO: 29.5 PG (ref 26.5–33)
MCHC RBC AUTO-ENTMCNC: 34 G/DL (ref 31.5–36.5)
MCV RBC AUTO: 87 FL (ref 78–100)
MONOCYTES # BLD AUTO: 0.4 10E9/L (ref 0–1.3)
MONOCYTES NFR BLD AUTO: 5.3 %
NEUTROPHILS # BLD AUTO: 4.8 10E9/L (ref 1.6–8.3)
NEUTROPHILS NFR BLD AUTO: 59.9 %
NITRATE UR QL: NEGATIVE
NON-SQ EPI CELLS #/AREA URNS LPF: ABNORMAL /LPF
PH UR STRIP: 5 PH (ref 5–9)
PLATELET # BLD AUTO: 339 10E9/L (ref 150–450)
POTASSIUM SERPL-SCNC: 3.9 MMOL/L (ref 3.5–5.1)
PROT SERPL-MCNC: 6.9 G/DL (ref 6.4–8.9)
RBC # BLD AUTO: 4.48 10E12/L (ref 3.8–5.2)
RBC #/AREA URNS AUTO: ABNORMAL /HPF
SODIUM SERPL-SCNC: 137 MMOL/L (ref 134–144)
SOURCE: ABNORMAL
SP GR UR STRIP: >1.03 (ref 1–1.03)
UROBILINOGEN UR STRIP-ACNC: 0.2 EU/DL (ref 0.2–1)
WBC # BLD AUTO: 8 10E9/L (ref 4–11)
WBC #/AREA URNS AUTO: ABNORMAL /HPF

## 2018-12-21 PROCEDURE — 85025 COMPLETE CBC W/AUTO DIFF WBC: CPT | Performed by: PHYSICIAN ASSISTANT

## 2018-12-21 PROCEDURE — 80048 BASIC METABOLIC PNL TOTAL CA: CPT | Performed by: PHYSICIAN ASSISTANT

## 2018-12-21 PROCEDURE — 71101 X-RAY EXAM UNILAT RIBS/CHEST: CPT | Mod: LT

## 2018-12-21 PROCEDURE — 87086 URINE CULTURE/COLONY COUNT: CPT | Performed by: PHYSICIAN ASSISTANT

## 2018-12-21 PROCEDURE — 36415 COLL VENOUS BLD VENIPUNCTURE: CPT | Performed by: PHYSICIAN ASSISTANT

## 2018-12-21 PROCEDURE — 99214 OFFICE O/P EST MOD 30 MIN: CPT | Performed by: PHYSICIAN ASSISTANT

## 2018-12-21 PROCEDURE — 80076 HEPATIC FUNCTION PANEL: CPT | Performed by: PHYSICIAN ASSISTANT

## 2018-12-21 PROCEDURE — 81001 URINALYSIS AUTO W/SCOPE: CPT | Performed by: PHYSICIAN ASSISTANT

## 2018-12-21 PROCEDURE — 83690 ASSAY OF LIPASE: CPT | Performed by: PHYSICIAN ASSISTANT

## 2018-12-21 RX ORDER — SULFAMETHOXAZOLE/TRIMETHOPRIM 800-160 MG
1 TABLET ORAL 2 TIMES DAILY
Qty: 6 TABLET | Refills: 0 | Status: SHIPPED | OUTPATIENT
Start: 2018-12-21 | End: 2019-03-01

## 2018-12-21 ASSESSMENT — PAIN SCALES - GENERAL: PAINLEVEL: EXTREME PAIN (9)

## 2018-12-21 NOTE — NURSING NOTE
"Patient presents to the clinic today for rib pain.  Patient states that Saturday she started having pain and today she has been having spasms.   Che Mclean LPN 12/21/2018   5:36 PM    Chief Complaint   Patient presents with     Chest Pain       Initial /78 (BP Location: Left arm, Patient Position: Sitting, Cuff Size: Adult Regular)   Pulse 73   Temp 99.8  F (37.7  C) (Tympanic)   Wt 110.5 kg (243 lb 8 oz)   SpO2 98%   Breastfeeding? No   BMI 40.52 kg/m   Estimated body mass index is 40.52 kg/m  as calculated from the following:    Height as of 9/27/18: 1.651 m (5' 5\").    Weight as of this encounter: 110.5 kg (243 lb 8 oz).  Medication Reconciliation: complete    Che Mclean    "

## 2018-12-21 NOTE — PROGRESS NOTES
HPI:    Ana Patrick is a 27 year old female who presents to clinic today for back, rib, abdominal pain. Onset 7 days ago, course is worsening. Pain is becoming more intense and constant    Quality: Sharp pains with movement, muscle spasm. Constant dull ache.   Dull ache is 5/10. Sharp pain 9/10. No radiating  Aggravated by: movement, deep breath, cough, laugh  Alleviated: rest  Treatment: ibuprofen, tylenol, hot pack.  Last took this 12 hours PTA  Precipitating: she was carrying her niece around the day before she started to get painful.   No prior episodes - with the same course  She has put her ribs out in the past and had them adjusted. She was so painful now she was hesitant to go to the chiropractor until she had ruled out a fracture.       Associated symptoms:   She had a cough a couple weeks ago - this has resolved  Urine is normal  BM - normal, last one yesterday. No mucus or blood in stool  LMP - ending today    History of GERD. She is on sucralfate 1 gm tablet before meals 3 times daily and esomeprazole 40 mg CR capsule once daily. She reports compliance with these medications.   Abdominal surgeries: 9/27/18 hysterscopy      Past Medical History:   Diagnosis Date     Internal derangement of knee     5/23/2016     Personal history of other medical treatment (CODE)     2013     Polycystic ovarian syndrome     3/18/2016       Past Surgical History:   Procedure Laterality Date     ARTHROSCOPY KNEE Right 2010    Dr. Landeros     HYSTEROSCOPY DIAGNOSTIC N/A 9/27/2018    Procedure: HYSTEROSCOPY DIAGNOSTIC;  Diagnostic Hysteroscopy with Endometrial Scratching.      .;  Surgeon: Gerry Kim MD;  Location: [facility] OR     MANDIBLE SURGERY Right     2001       Current Outpatient Medications   Medication Sig Dispense Refill     ASPIRIN ADULT LOW STRENGTH PO Take 81 mg by mouth       esomeprazole (NEXIUM) 40 MG CR capsule Take 1 capsule (40 mg) by mouth every morning (before breakfast) Take 30-60 minutes before  eating. 90 capsule 1     letrozole (FEMARA) 2.5 MG tablet Take 2 tablets (5 mg) by mouth daily 10 tablet 0     metFORMIN (GLUCOPHAGE-XR) 500 MG 24 hr tablet Take 1 tablet (500 mg) by mouth 4 times daily 360 tablet 4     Prenatal Vit-Fe Fumarate-FA (PRENATAL MULTIVITAMIN PLUS IRON) 27-0.8 MG TABS per tablet Take 1 tablet by mouth daily       sertraline (ZOLOFT) 100 MG tablet        sucralfate (CARAFATE) 1 GM tablet TAKE 1 TABLET BY MOUTH 3 TIMES DAILY BEFORE MEALS 90 tablet 2     choriogonadotropin ethan (OVIDREL) 250 MCG/0.5ML injection Inject 0.5 mLs (250 mcg) Subcutaneous once for 1 dose 0.5 mL 0     ondansetron (ZOFRAN ODT) 4 MG ODT tab Take 1-2 tablets (4-8 mg) by mouth 3 times daily (before meals) (Patient not taking: Reported on 12/21/2018) 20 tablet 1     progesterone (PROMETRIUM) 200 MG capsule Take 1 capsule (200 mg) by mouth daily (Patient not taking: Reported on 12/21/2018) 30 capsule 1       Allergies   Allergen Reactions     Blue Dyes Other (See Comments)     Showed up on allergy testing     Yellow Dye Unknown     Based on allergy testing as an intolerance.     Meloxicam Rash       ROS:  General: feels well, no fever  HENT: negative  Respiratory: negative  Chest - left rib pain  Abdomen: she has reflux with increased symptoms      EXAM:  Vitals:    12/21/18 1740   BP: 120/78   BP Location: Left arm   Patient Position: Sitting   Cuff Size: Adult Regular   Pulse: 73   Temp: 99.8  F (37.7  C)   TempSrc: Tympanic   SpO2: 98%   Weight: 110.5 kg (243 lb 8 oz)     General appearance: well appearing female, in moderate distress with movement  Respiratory: clear to auscultation bilaterally  Chest wall: no abnormality noted. Ribs on left TTP lower left mid axillary line to mid clavicular line. No crepitus.   Cardiac: RRR with no murmurs  Abdomen: soft, TTP mid epigastric with guarding. TTP left midgastric with guarding. Wood's negative, McBurney's negative.   Dermatological: no rashes or lesions  Psychological:  normal affect, alert and pleasant    Results for orders placed or performed in visit on 12/21/18   UA reflex to Microscopic and Culture   Result Value Ref Range    Color Urine Yellow     Appearance Urine Slightly Cloudy     Glucose Urine Negative NEG^Negative mg/dL    Bilirubin Urine Negative NEG^Negative    Ketones Urine Negative NEG^Negative mg/dL    Specific Gravity Urine >1.030 (H) 1.000 - 1.030    Blood Urine Large (A) NEG^Negative    pH Urine 5.0 5.0 - 9.0 pH    Protein Albumin Urine Negative NEG^Negative mg/dL    Urobilinogen Urine 0.2 0.2 - 1.0 EU/dL    Nitrite Urine Negative NEG^Negative    Leukocyte Esterase Urine Trace (A) NEG^Negative    Source Midstream Urine    CBC and Differential   Result Value Ref Range    WBC 8.0 4.0 - 11.0 10e9/L    RBC Count 4.48 3.8 - 5.2 10e12/L    Hemoglobin 13.2 11.7 - 15.7 g/dL    Hematocrit 38.8 35.0 - 47.0 %    MCV 87 78 - 100 fl    MCH 29.5 26.5 - 33.0 pg    MCHC 34.0 31.5 - 36.5 g/dL    RDW 12.2 10.0 - 15.0 %    Platelet Count 339 150 - 450 10e9/L    Diff Method Automated Method     % Neutrophils 59.9 %    % Lymphocytes 31.2 %    % Monocytes 5.3 %    % Eosinophils 2.5 %    % Basophils 0.8 %    % Immature Granulocytes 0.3 %    Absolute Neutrophil 4.8 1.6 - 8.3 10e9/L    Absolute Lymphocytes 2.5 0.8 - 5.3 10e9/L    Absolute Monocytes 0.4 0.0 - 1.3 10e9/L    Absolute Eosinophils 0.2 0.0 - 0.7 10e9/L    Absolute Basophils 0.1 0.0 - 0.2 10e9/L    Abs Immature Granulocytes 0.0 0 - 0.4 10e9/L   Basic Metabolic Panel   Result Value Ref Range    Sodium 137 134 - 144 mmol/L    Potassium 3.9 3.5 - 5.1 mmol/L    Chloride 106 98 - 107 mmol/L    Carbon Dioxide 24 21 - 31 mmol/L    Anion Gap 7 3 - 14 mmol/L    Glucose 113 (H) 70 - 105 mg/dL    Urea Nitrogen 9 7 - 25 mg/dL    Creatinine 0.61 0.60 - 1.20 mg/dL    GFR Estimate >90 >60 mL/min/[1.73_m2]    GFR Estimate If Black >90 >60 mL/min/[1.73_m2]    Calcium 9.0 8.6 - 10.3 mg/dL   Hepatic Function Panel   Result Value Ref Range     Bilirubin Direct 0.1 0.0 - 0.2 mg/dL    Bilirubin Total 0.3 0.3 - 1.0 mg/dL    Albumin 4.0 3.5 - 5.7 g/dL    Protein Total 6.9 6.4 - 8.9 g/dL    Alkaline Phosphatase 73 34 - 104 U/L    ALT 35 7 - 52 U/L    AST 22 13 - 39 U/L   Lipase   Result Value Ref Range    Lipase 49 11 - 82 U/L   Urine Microscopic   Result Value Ref Range    WBC Urine 10-25 (A) OTO5^0 - 5 /HPF    RBC Urine O - 2 OTO2^O - 2 /HPF    Squamous Epithelial /LPF Urine Many (A) FEW^Few /LPF    Bacteria Urine Moderate (A) NEG^Negative /HPF     EXAM:XR RIBS & CHEST LT 3VW   CLINICAL HISTORY: Patient Age  27 years Additional clinical info: rib  pain lower left ribs mid axillary line to mid clavicular line; Rib  pain on left side   COMPARISON: Chest x-ray 6/17/2016    TECHNIQUE: 4 view                                                                IMPRESSION: Normal chest and left ribs.  KEYA HIGGINS MD      ASSESSMENT AND PLAN:  1. Acute cystitis without hematuria    2. Rib pain on left side    3. Abdominal pain, epigastric      Urinalysis does show a bladder infection  Push fluids  Start bactrim DS, one tablet, twice daily for 3 days.   Pelvic rest and good hygiene. See AVS  Ibuprofen or Tylenol as indicated for discomfort or fever  Return to clinic if symptoms persist or worsen    Rib pain, chest wall strain  Chest XR no fracture to ribs and chest is clear  Symptomatic treatments  Ice 10-20 min every 1-2 hours  Tylenol and ibuprofen as needed    Epigastric pain  Abdominal labs all normal  Increase sucralfate to 4 times daily before meals and bedtime  Can add TUMs as needed  Avoid spicy foods, caffeine, alcohol  Follow up with PCP if symptoms persist or worsen      Patient received verbal and written instruction including review of warning signs  Mary Grace Chau PA-C on 12/21/2018 at 8:38 PM

## 2018-12-22 NOTE — PATIENT INSTRUCTIONS
"Rib pain,   Chest XR no fracture to ribs and chest is clear  Symptomatic treatments  Ice 10-20 min every 1-2 hours  Tylenol and ibuprofen as needed    Epigastric pain  Abdominal labs all normal  Increase sucralfate to 4 times daily  Can add TUMs  Avoid spicy foods, caffeine, alcohol  Follow up with PCP if symptoms persist or worsen    Urinalysis does show a bladder infection  Push fluids  Start bactrim DS, one tablet, twice daily for 3 days.   Pelvic rest and good hygiene. See AVS  Ibuprofen or Tylenol as indicated for discomfort or fever  Return to clinic if symptoms persist or worsen  Seek immediate care    Fever over 101 F (38.3 C)    No improvement by the third day of treatment    Increasing back or abdominal pain    Repeated vomiting; unable to keep medicine down    Weakness, dizziness or fainting    Vaginal discharge    Pain, redness or swelling in the labia (outer vaginal area)    Patient Education     Bladder Infection, Female (Adult)    Urine is normally doesn't have any bacteria in it. But bacteria can get into the urinary tract from the skin around the rectum. Or they can travel in the blood from elsewhere in the body. Once they are in your urinary tract, they can cause infection in the urethra (urethritis), the bladder (cystitis), or the kidneys (pyelonephritis).  The most common place for an infection is in the bladder. This is called a bladder infection. This is one of the most common infections in women. Most bladder infections are easily treated. They are not serious unless the infection spreads to the kidney.  The phrases \"bladder infection,\" \"UTI,\" and \"cystitis\" are often used to describe the same thing. But they are not always the same. Cystitis is an inflammation of the bladder. The most common cause of cystitis is an infection.  Symptoms  The infection causes inflammation in the urethra and bladder. This causes many of the symptoms. The most common symptoms of a bladder infection are:    Pain " or burning when urinating    Having to urinate more often than usual    Urgent need to urinate    Only a small amount of urine comes out    Blood in urine    Abdominal discomfort. This is usually in the lower abdomen above the pubic bone.    Cloudy urine    Strong- or bad-smelling urine    Unable to urinate (urinary retention)    Unable to hold urine in (urinary incontinence)    Fever    Loss of appetite    Confusion (in older adults)  Causes  Bladder infections are not contagious. You can't get one from someone else, from a toilet seat, or from sharing a bath.  The most common cause of bladder infections is bacteria from the bowels. The bacteria get onto the skin around the opening of the urethra. From there, they can get into the urine and travel up to the bladder, causing inflammation and infection. This usually happens because of:    Wiping improperly after urinating. Always wipe from front to back.    Bowel incontinence    Pregnancy    Procedures such as having a catheter inserted    Older age    Not emptying your bladder. This can allow bacteria a chance to grow in your urine.    Dehydration    Constipation    Sex    Use of a diaphragm for birth control   Treatment  Bladder infections are diagnosed by a urine test. They are treated with antibiotics and usually clear up quickly without complications. Treatment helps prevent a more serious kidney infection.  Medicines  Medicines can help in the treatment of a bladder infection:    Take antibiotics until they are used up, even if you feel better. It is important to finish them to make sure the infection has cleared.    You can use acetaminophen or ibuprofen for pain, fever, or discomfort, unless another medicine was prescribed. If you have chronic liver or kidney disease, talk with your healthcare provider before using these medicines. Also talk with your provider if you've ever had a stomach ulcer or gastrointestinal bleeding, or are taking blood-thinner  medicines.    If you are given phenazopydridine to reduce burning with urination, it will cause your urine to become a bright orange color. This can stain clothing.  Care and prevention  These self-care steps can help prevent future infections:    Drink plenty of fluids to prevent dehydration and flush out your bladder. Do this unless you must restrict fluids for other health reasons, or your doctor told you not to.    Proper cleaning after going to the bathroom is important. Wipe from front to back after using the toilet to prevent the spread of bacteria.    Urinate more often. Don't try to hold urine in for a long time.    Wear loose-fitting clothes and cotton underwear. Avoid tight-fitting pants.    Improve your diet and prevent constipation. Eat more fresh fruit and vegetables, and fiber, and less junk and fatty foods.    Avoid sex until your symptoms are gone.    Avoid caffeine, alcohol, and spicy foods. These can irritate your bladder.    Urinate right after intercourse to flush out your bladder.    If you use birth control pills and have frequent bladder infections, discuss it with your doctor.  Follow-up care  Call your healthcare provider if all symptoms are not gone after 3 days of treatment. This is especially important if you have repeat infections.  If a culture was done, you will be told if your treatment needs to be changed. If directed, you can call to find out the results.  If X-rays were done, you will be told if the results will affect your treatment.  Call 911  Call 911 if any of the following occur:    Trouble breathing    Hard to wake up or confusion    Fainting or loss of consciousness    Rapid heart rate  When to seek medical advice  Call your healthcare provider right away if any of these occur:    Fever of 100.4 F (38.0 C) or higher, or as directed by your healthcare provider    Symptoms are not better by the third day of treatment    Back or belly (abdominal) pain that gets  worse    Repeated vomiting, or unable to keep medicine down    Weakness or dizziness    Vaginal discharge    Pain, redness, or swelling in the outer vaginal area (labia)  Date Last Reviewed: 10/1/2016    8465-9524 [employer]. [address] [place] [address], [place] [address]. All rights reserved. This information is not intended as a substitute for professional medical care. Always follow your healthcare professional's instructions.           Patient Education     Rib Contusion     A rib contusion is a bruise to one or more rib bones. It may cause pain, tenderness, swelling and a purplish discoloration. There may be a sharp pain while breathing.  You will be assessed for other injuries. You will likely be given pain medicine. Rib contusions heal on their own, without further treatment. However, pain may take weeks to months to go away.   Note that a small crack (fracture) in the rib may cause the same symptoms as a rib contusion. The small crack may not be seen on a chest X-ray. However, the conditions are managed in the same way.  Home care    Rest. Avoid heavy lifting, strenuous exertion, or any activity that causes pain.    Ice the area to reduce pain and swelling. Put ice cubes in a plastic bag or use a cold pack. (Wrap the cold source in a thin towel. Do not place it directly on your skin.) Ice the injured area for 20 minutes every 1 to 2 hours the first day. Continue with ice packs 3 to 4 times a day for the next 2 days, then as needed for the relief of pain and swelling.    Take any prescribed pain medicine as directed by your healthcare provider. If none was prescribed, take acetaminophen, ibuprofen, or naproxen to control pain.    If you have a significant injury, you may be given a device called an incentive spirometer to keep your lungs healthy. Use as directed.  Follow-up care  Follow up with your healthcare provider during the next week or as directed.  When to seek medical advice  Call your  healthcare provider for any of the following:    Shortness of breath or trouble breathing    Increasing chest pain with breathing    Coughing    Dizziness, weakness, or fainting    New or worsening pain    Fever of 100.4 F (38 C) or higher, or as directed by your healthcare provider  Date Last Reviewed: 2/1/2017 2000-2018 [employer]. [address] [place] [address]. All rights reserved. This information is not intended as a substitute for professional medical care. Always follow your healthcare professional's instructions.           Patient Education     Tips to Control Acid Reflux    To control acid reflux, you ll need to make some basic diet and lifestyle changes. The simple steps outlined below may be all you ll need to ease discomfort.  Watch what you eat    Avoid fatty foods and spicy foods.    Eat fewer acidic foods, such as citrus and tomato-based foods. These can increase symptoms.    Limit drinking alcohol, caffeine, and fizzy beverages. All increase acid reflux.    Try limiting chocolate, peppermint, and spearmint. These can worsen acid reflux in some people.  Watch when you eat    Avoid lying down for 3 hours after eating.    Do not snack before going to bed.  Raise your head  Raising your head and upper body by 4 to 6 inches helps limit reflux when you re lying down. Put blocks under the head of your bed frame to raise it.  Other changes    Lose weight, if you need to    Don t exercise near bedtime    Avoid tight-fitting clothes    Limit aspirin and ibuprofen    Stop smoking   Date Last Reviewed: 7/1/2016 2000-2018 [employer]. [address] [place] [address]. All rights reserved. This information is not intended as a substitute for professional medical care. Always follow your healthcare professional's instructions.

## 2018-12-23 LAB
BACTERIA SPEC CULT: NORMAL
SPECIMEN SOURCE: NORMAL

## 2018-12-26 ENCOUNTER — HOSPITAL ENCOUNTER (OUTPATIENT)
Dept: ULTRASOUND IMAGING | Facility: OTHER | Age: 28
Discharge: HOME | End: 2018-12-26
Attending: OBSTETRICS & GYNECOLOGY | Admitting: OBSTETRICS & GYNECOLOGY
Payer: COMMERCIAL

## 2018-12-26 DIAGNOSIS — E28.2 PCOS (POLYCYSTIC OVARIAN SYNDROME): ICD-10-CM

## 2018-12-26 PROCEDURE — 76857 US EXAM PELVIC LIMITED: CPT

## 2018-12-27 ENCOUNTER — TELEPHONE (OUTPATIENT)
Dept: OBGYN | Facility: OTHER | Age: 28
End: 2018-12-27

## 2018-12-27 NOTE — TELEPHONE ENCOUNTER
Patient notified.  She will start the letrozole on Monday.  She verbalized understanding and had no further questions.  Arianna Valdez RN on 12/27/2018 at 9:58 AM

## 2018-12-28 ENCOUNTER — MYC MEDICAL ADVICE (OUTPATIENT)
Dept: OBGYN | Facility: OTHER | Age: 28
End: 2018-12-28

## 2018-12-28 DIAGNOSIS — N97.8 FEMALE INFERTILITY DUE TO OVULATORY DISORDER: Primary | ICD-10-CM

## 2018-12-28 RX ORDER — LETROZOLE 2.5 MG/1
5 TABLET, FILM COATED ORAL DAILY
Qty: 20 TABLET | Refills: 0 | Status: SHIPPED | OUTPATIENT
Start: 2018-12-28 | End: 2019-03-14

## 2018-12-28 RX ORDER — LETROZOLE 2.5 MG/1
5 TABLET, FILM COATED ORAL DAILY
Qty: 20 TABLET | Refills: 0 | Status: SHIPPED | OUTPATIENT
Start: 2018-12-28 | End: 2018-12-28

## 2018-12-28 NOTE — TELEPHONE ENCOUNTER
Called and spoke to patient.  She was looking for her prescription for Letrozole.  Order was placed per Dr. Kim's note on 12/27/18 and sent [facility].  US pelvic order was also placed.  Arianna Valdez RN on 12/28/2018 at 11:46 AM

## 2019-01-01 ENCOUNTER — MYC MEDICAL ADVICE (OUTPATIENT)
Dept: OBGYN | Facility: OTHER | Age: 29
End: 2019-01-01

## 2019-01-01 DIAGNOSIS — N97.0 ANOVULATION: Primary | ICD-10-CM

## 2019-01-10 ENCOUNTER — HOSPITAL ENCOUNTER (OUTPATIENT)
Dept: ULTRASOUND IMAGING | Facility: OTHER | Age: 29
Discharge: HOME | End: 2019-01-10
Attending: OBSTETRICS & GYNECOLOGY | Admitting: OBSTETRICS & GYNECOLOGY
Payer: COMMERCIAL

## 2019-01-10 DIAGNOSIS — N97.8 FEMALE INFERTILITY DUE TO OVULATORY DISORDER: ICD-10-CM

## 2019-01-10 PROCEDURE — 76857 US EXAM PELVIC LIMITED: CPT

## 2019-01-21 ENCOUNTER — MYC MEDICAL ADVICE (OUTPATIENT)
Dept: OBGYN | Facility: OTHER | Age: 29
End: 2019-01-21

## 2019-01-21 DIAGNOSIS — N96 RECURRENT PREGNANCY LOSS: ICD-10-CM

## 2019-01-21 DIAGNOSIS — O36.80X0 ENCOUNTER TO DETERMINE FETAL VIABILITY OF PREGNANCY, SINGLE OR UNSPECIFIED FETUS: Primary | ICD-10-CM

## 2019-01-21 DIAGNOSIS — Z32.00 PREGNANCY EXAMINATION OR TEST, PREGNANCY UNCONFIRMED: ICD-10-CM

## 2019-01-21 DIAGNOSIS — Z32.00 PREGNANCY EXAMINATION OR TEST, PREGNANCY UNCONFIRMED: Primary | ICD-10-CM

## 2019-01-21 LAB
B-HCG SERPL-ACNC: 4071 IU/L
PROGEST SERPL-MCNC: 5.3 NG/ML

## 2019-01-21 PROCEDURE — 84144 ASSAY OF PROGESTERONE: CPT | Performed by: OBSTETRICS & GYNECOLOGY

## 2019-01-21 PROCEDURE — 36415 COLL VENOUS BLD VENIPUNCTURE: CPT | Performed by: OBSTETRICS & GYNECOLOGY

## 2019-01-21 PROCEDURE — 84702 CHORIONIC GONADOTROPIN TEST: CPT | Performed by: OBSTETRICS & GYNECOLOGY

## 2019-01-21 NOTE — TELEPHONE ENCOUNTER
Called and spoke to patient.  She was transferred to scheduling for a lab only appointment to check progesterone and HCG quant levels.  Orders placed. Arianna Valdez RN on 1/21/2019 at 8:35 AM

## 2019-01-22 ENCOUNTER — HOSPITAL ENCOUNTER (OUTPATIENT)
Dept: ULTRASOUND IMAGING | Facility: OTHER | Age: 29
Discharge: HOME | End: 2019-01-22
Attending: OBSTETRICS & GYNECOLOGY | Admitting: OBSTETRICS & GYNECOLOGY
Payer: COMMERCIAL

## 2019-01-22 DIAGNOSIS — O36.80X0 ENCOUNTER TO DETERMINE FETAL VIABILITY OF PREGNANCY, SINGLE OR UNSPECIFIED FETUS: ICD-10-CM

## 2019-01-22 PROCEDURE — 76801 OB US < 14 WKS SINGLE FETUS: CPT

## 2019-01-22 NOTE — TELEPHONE ENCOUNTER
Per Dr. Gerry Kim MD, FACOG, US OB orders placed.  Angela, , will call patient to schedule appointment.Arianna Valdez RN on 1/22/2019 at 9:40 AM

## 2019-01-24 DIAGNOSIS — N96 RECURRENT PREGNANCY LOSS: Primary | ICD-10-CM

## 2019-01-24 DIAGNOSIS — O02.1 MISSED ABORTION: ICD-10-CM

## 2019-01-24 DIAGNOSIS — O36.80X0 ENCOUNTER TO DETERMINE FETAL VIABILITY OF PREGNANCY, SINGLE OR UNSPECIFIED FETUS: ICD-10-CM

## 2019-01-24 LAB — B-HCG SERPL-ACNC: 3296 IU/L

## 2019-01-24 PROCEDURE — 84702 CHORIONIC GONADOTROPIN TEST: CPT | Performed by: OBSTETRICS & GYNECOLOGY

## 2019-01-30 DIAGNOSIS — O36.80X0 ENCOUNTER TO DETERMINE FETAL VIABILITY OF PREGNANCY, SINGLE OR UNSPECIFIED FETUS: ICD-10-CM

## 2019-01-30 LAB — B-HCG SERPL-ACNC: 1359 IU/L

## 2019-01-30 PROCEDURE — 36415 COLL VENOUS BLD VENIPUNCTURE: CPT | Performed by: OBSTETRICS & GYNECOLOGY

## 2019-01-30 PROCEDURE — 84702 CHORIONIC GONADOTROPIN TEST: CPT | Performed by: OBSTETRICS & GYNECOLOGY

## 2019-02-07 ENCOUNTER — OFFICE VISIT (OUTPATIENT)
Dept: OBGYN | Facility: OTHER | Age: 29
End: 2019-02-07
Attending: OBSTETRICS & GYNECOLOGY
Payer: COMMERCIAL

## 2019-02-07 VITALS
RESPIRATION RATE: 20 BRPM | DIASTOLIC BLOOD PRESSURE: 80 MMHG | SYSTOLIC BLOOD PRESSURE: 130 MMHG | BODY MASS INDEX: 39.66 KG/M2 | HEART RATE: 70 BPM | WEIGHT: 238.3 LBS

## 2019-02-07 DIAGNOSIS — D68.61 ANTIPHOSPHOLIPID ANTIBODY SYNDROME (H): Primary | ICD-10-CM

## 2019-02-07 DIAGNOSIS — N96 HISTORY OF RECURRENT MISCARRIAGES: ICD-10-CM

## 2019-02-07 DIAGNOSIS — O02.1 MISSED ABORTION: ICD-10-CM

## 2019-02-07 LAB — B-HCG SERPL-ACNC: 280 IU/L

## 2019-02-07 PROCEDURE — 36415 COLL VENOUS BLD VENIPUNCTURE: CPT | Performed by: OBSTETRICS & GYNECOLOGY

## 2019-02-07 PROCEDURE — 84702 CHORIONIC GONADOTROPIN TEST: CPT | Performed by: OBSTETRICS & GYNECOLOGY

## 2019-02-07 PROCEDURE — 99213 OFFICE O/P EST LOW 20 MIN: CPT | Performed by: OBSTETRICS & GYNECOLOGY

## 2019-02-07 ASSESSMENT — PAIN SCALES - GENERAL: PAINLEVEL: NO PAIN (0)

## 2019-02-07 NOTE — PROGRESS NOTES
S: Patient presents in follow up with ovarian cyst and missed . She has dropping hcg levels. Her progesterone levels were low in spite of prometrium orally on letrozole protocol per [facility] physician Dr. Buchanan.  She denies pain. She started bleeding this week. She is pos for Lupus Anticoagulant and also likely has PCOS.  This is her third SAB.    O: /80 (BP Location: Right arm, Patient Position: Sitting, Cuff Size: Adult Regular)   Pulse 70   Resp 20   Wt 108.1 kg (238 lb 4.8 oz)   LMP 2019   Breastfeeding? No   BMI 39.66 kg/m    NAD    I/P  (D68.61) Antiphospholipid antibody syndrome (H)  (primary encounter diagnosis)  Comment:   Plan: Continue asa daily    (N96) History of recurrent miscarriages  Comment:   Plan: Letrozole protocol with estradiol and progesterone levels with supplementation as needed.    Ovarian Cyst  Recheck US after her hcg levels have dropped to zero.    Weekly hcg until zero.    Gerry Kim MD FACOG  9:46 AM 2019

## 2019-02-07 NOTE — NURSING NOTE
Chief Complaint   Patient presents with     RECHECK     Fertility      Has some questions regarding miscarriages. Other options?     Medication Reconciliation: complete    Genesis Salgado LPN

## 2019-02-15 DIAGNOSIS — O02.1 MISSED ABORTION: ICD-10-CM

## 2019-02-15 LAB — B-HCG SERPL-ACNC: 26 IU/L

## 2019-02-15 PROCEDURE — 84702 CHORIONIC GONADOTROPIN TEST: CPT | Performed by: OBSTETRICS & GYNECOLOGY

## 2019-02-15 PROCEDURE — 36415 COLL VENOUS BLD VENIPUNCTURE: CPT | Performed by: OBSTETRICS & GYNECOLOGY

## 2019-03-01 ENCOUNTER — OFFICE VISIT (OUTPATIENT)
Dept: FAMILY MEDICINE | Facility: OTHER | Age: 29
End: 2019-03-01
Attending: FAMILY MEDICINE
Payer: COMMERCIAL

## 2019-03-01 VITALS
BODY MASS INDEX: 39.42 KG/M2 | SYSTOLIC BLOOD PRESSURE: 102 MMHG | DIASTOLIC BLOOD PRESSURE: 70 MMHG | TEMPERATURE: 97.8 F | HEIGHT: 65 IN | HEART RATE: 68 BPM | WEIGHT: 236.6 LBS | RESPIRATION RATE: 16 BRPM

## 2019-03-01 DIAGNOSIS — O02.1 MISSED ABORTION: ICD-10-CM

## 2019-03-01 DIAGNOSIS — N96 HISTORY OF RECURRENT MISCARRIAGES, NOT CURRENTLY PREGNANT: ICD-10-CM

## 2019-03-01 DIAGNOSIS — R87.610 PAP SMEAR ABNORMALITY OF CERVIX WITH ASCUS FAVORING BENIGN: ICD-10-CM

## 2019-03-01 DIAGNOSIS — E28.2 PCOS (POLYCYSTIC OVARIAN SYNDROME): ICD-10-CM

## 2019-03-01 DIAGNOSIS — Z00.00 ROUTINE HISTORY AND PHYSICAL EXAMINATION OF ADULT: Primary | ICD-10-CM

## 2019-03-01 DIAGNOSIS — K21.9 GASTROESOPHAGEAL REFLUX DISEASE WITHOUT ESOPHAGITIS: ICD-10-CM

## 2019-03-01 DIAGNOSIS — F41.1 GENERALIZED ANXIETY DISORDER: ICD-10-CM

## 2019-03-01 LAB
ALBUMIN SERPL-MCNC: 4 G/DL (ref 3.5–5.7)
ALP SERPL-CCNC: 63 U/L (ref 34–104)
ALT SERPL W P-5'-P-CCNC: 24 U/L (ref 7–52)
ANION GAP SERPL CALCULATED.3IONS-SCNC: 4 MMOL/L (ref 3–14)
AST SERPL W P-5'-P-CCNC: 19 U/L (ref 13–39)
B-HCG SERPL-ACNC: 1 IU/L
BASOPHILS # BLD AUTO: 0.1 10E9/L (ref 0–0.2)
BASOPHILS NFR BLD AUTO: 0.8 %
BILIRUB SERPL-MCNC: 0.4 MG/DL (ref 0.3–1)
BUN SERPL-MCNC: 7 MG/DL (ref 7–25)
CALCIUM SERPL-MCNC: 9.2 MG/DL (ref 8.6–10.3)
CHLORIDE SERPL-SCNC: 107 MMOL/L (ref 98–107)
CHOLEST SERPL-MCNC: 120 MG/DL
CO2 SERPL-SCNC: 27 MMOL/L (ref 21–31)
CREAT SERPL-MCNC: 0.59 MG/DL (ref 0.6–1.2)
DIFFERENTIAL METHOD BLD: NORMAL
EOSINOPHIL # BLD AUTO: 0.2 10E9/L (ref 0–0.7)
EOSINOPHIL NFR BLD AUTO: 2.4 %
ERYTHROCYTE [DISTWIDTH] IN BLOOD BY AUTOMATED COUNT: 12 % (ref 10–15)
GFR SERPL CREATININE-BSD FRML MDRD: >90 ML/MIN/{1.73_M2}
GLUCOSE SERPL-MCNC: 101 MG/DL (ref 70–105)
HBA1C MFR BLD: 4.7 % (ref 4–6)
HCT VFR BLD AUTO: 40.7 % (ref 35–47)
HDLC SERPL-MCNC: 32 MG/DL (ref 23–92)
HGB BLD-MCNC: 13.5 G/DL (ref 11.7–15.7)
IMM GRANULOCYTES # BLD: 0 10E9/L (ref 0–0.4)
IMM GRANULOCYTES NFR BLD: 0.3 %
LDLC SERPL CALC-MCNC: 72 MG/DL
LYMPHOCYTES # BLD AUTO: 2.1 10E9/L (ref 0.8–5.3)
LYMPHOCYTES NFR BLD AUTO: 33.7 %
MCH RBC QN AUTO: 28.7 PG (ref 26.5–33)
MCHC RBC AUTO-ENTMCNC: 33.2 G/DL (ref 31.5–36.5)
MCV RBC AUTO: 87 FL (ref 78–100)
MONOCYTES # BLD AUTO: 0.4 10E9/L (ref 0–1.3)
MONOCYTES NFR BLD AUTO: 6.1 %
NEUTROPHILS # BLD AUTO: 3.5 10E9/L (ref 1.6–8.3)
NEUTROPHILS NFR BLD AUTO: 56.7 %
NONHDLC SERPL-MCNC: 88 MG/DL
PLATELET # BLD AUTO: 307 10E9/L (ref 150–450)
POTASSIUM SERPL-SCNC: 3.9 MMOL/L (ref 3.5–5.1)
PROT SERPL-MCNC: 7.1 G/DL (ref 6.4–8.9)
RBC # BLD AUTO: 4.7 10E12/L (ref 3.8–5.2)
SODIUM SERPL-SCNC: 138 MMOL/L (ref 134–144)
TRIGL SERPL-MCNC: 82 MG/DL
TSH SERPL DL<=0.05 MIU/L-ACNC: 1.29 IU/ML (ref 0.34–5.6)
WBC # BLD AUTO: 6.2 10E9/L (ref 4–11)

## 2019-03-01 PROCEDURE — 84443 ASSAY THYROID STIM HORMONE: CPT | Performed by: FAMILY MEDICINE

## 2019-03-01 PROCEDURE — 85025 COMPLETE CBC W/AUTO DIFF WBC: CPT | Performed by: FAMILY MEDICINE

## 2019-03-01 PROCEDURE — G0123 SCREEN CERV/VAG THIN LAYER: HCPCS | Performed by: FAMILY MEDICINE

## 2019-03-01 PROCEDURE — 87624 HPV HI-RISK TYP POOLED RSLT: CPT | Performed by: FAMILY MEDICINE

## 2019-03-01 PROCEDURE — 99395 PREV VISIT EST AGE 18-39: CPT | Performed by: FAMILY MEDICINE

## 2019-03-01 PROCEDURE — 88142 CYTOPATH C/V THIN LAYER: CPT | Performed by: FAMILY MEDICINE

## 2019-03-01 PROCEDURE — 36415 COLL VENOUS BLD VENIPUNCTURE: CPT | Performed by: FAMILY MEDICINE

## 2019-03-01 PROCEDURE — 80061 LIPID PANEL: CPT | Performed by: FAMILY MEDICINE

## 2019-03-01 PROCEDURE — 84702 CHORIONIC GONADOTROPIN TEST: CPT | Performed by: OBSTETRICS & GYNECOLOGY

## 2019-03-01 PROCEDURE — 83036 HEMOGLOBIN GLYCOSYLATED A1C: CPT | Performed by: FAMILY MEDICINE

## 2019-03-01 PROCEDURE — 80053 COMPREHEN METABOLIC PANEL: CPT | Performed by: FAMILY MEDICINE

## 2019-03-01 RX ORDER — SUCRALFATE 1 G/1
TABLET ORAL
Qty: 270 TABLET | Refills: 4 | Status: SHIPPED | OUTPATIENT
Start: 2019-03-01 | End: 2020-11-03

## 2019-03-01 RX ORDER — METFORMIN HYDROCHLORIDE 1000 MG/1
1000 TABLET, FILM COATED, EXTENDED RELEASE ORAL 2 TIMES DAILY WITH MEALS
Qty: 180 TABLET | Refills: 4 | Status: SHIPPED | OUTPATIENT
Start: 2019-03-01 | End: 2020-03-24

## 2019-03-01 RX ORDER — SERTRALINE HYDROCHLORIDE 100 MG/1
TABLET, FILM COATED ORAL
Qty: 90 TABLET | Refills: 4 | Status: SHIPPED | OUTPATIENT
Start: 2019-03-01 | End: 2020-03-24

## 2019-03-01 ASSESSMENT — PAIN SCALES - GENERAL: PAINLEVEL: MILD PAIN (2)

## 2019-03-01 ASSESSMENT — ANXIETY QUESTIONNAIRES
6. BECOMING EASILY ANNOYED OR IRRITABLE: NEARLY EVERY DAY
IF YOU CHECKED OFF ANY PROBLEMS ON THIS QUESTIONNAIRE, HOW DIFFICULT HAVE THESE PROBLEMS MADE IT FOR YOU TO DO YOUR WORK, TAKE CARE OF THINGS AT HOME, OR GET ALONG WITH OTHER PEOPLE: VERY DIFFICULT
3. WORRYING TOO MUCH ABOUT DIFFERENT THINGS: NEARLY EVERY DAY
1. FEELING NERVOUS, ANXIOUS, OR ON EDGE: NEARLY EVERY DAY
7. FEELING AFRAID AS IF SOMETHING AWFUL MIGHT HAPPEN: NOT AT ALL
5. BEING SO RESTLESS THAT IT IS HARD TO SIT STILL: NEARLY EVERY DAY
GAD7 TOTAL SCORE: 18
2. NOT BEING ABLE TO STOP OR CONTROL WORRYING: NEARLY EVERY DAY

## 2019-03-01 ASSESSMENT — MIFFLIN-ST. JEOR: SCORE: 1804.09

## 2019-03-01 ASSESSMENT — PATIENT HEALTH QUESTIONNAIRE - PHQ9
5. POOR APPETITE OR OVEREATING: NEARLY EVERY DAY
SUM OF ALL RESPONSES TO PHQ QUESTIONS 1-9: 25

## 2019-03-01 NOTE — NURSING NOTE
Patient here for physical. She doesn't think her acid reflux is under control and depression is getting worse.  Medication Reconciliation: complete    Christina Martel LPN

## 2019-03-01 NOTE — PROGRESS NOTES
Nursing Notes:   Christina Martel LPN  3/1/2019  8:48 AM  Signed  Patient here for physical. She doesn't think her acid reflux is under control and depression is getting worse.  Medication Reconciliation: complete    Christina Martel LPN        ANNUAL PHYSICAL - FEMALE    HPI: patient who presents for a yearly exam.  Concerns include:  1. Reflux.  Doesn't feel like things are well controlled.  On Nexium; needing Carafate refilled.  She hasn't had EGD.  Trying to work out and lose weight which will also help.  No consistency in what triggers her - but larger meals/eating late can be a cause.  Works 2+ jobs at times.  Stress of fertility/miscarriages also occurring.  2.  Recurrent miscarriages by ~6 weeks - following with Dr. Kim.  Has known PCOS contributing.  On metformin - would like to see if insurance covers 1000mg tablets.  Some mood concerns as her  recently told her that he can't go through any more loss/emotions.  She is tearful at this point and is not ready to give up.  She would like to continue trying for at least a few more years.  Has contemplated surrogacy - however not a feasible option due to cost.    Patient's last menstrual period was 02/03/2019.   Contraception: None - desires pregnancy.  Following for fertility issues with Dr. Kim.  Risk for STI?: No  Last pap: 3/18/2016; normal.  Any hx of abnormal paps:  Yes - + HPV in 2014.  FH ofearly CA?: No  Cholesterol/DM concerns/screening: Due.  Tobacco?: No  Calcium intake: No  DEXA: NA  Last mammo: NA  Colonoscopy: NA  Immunizations: Tdap 2/22/2010; completed Hep B x3.    Patient Active Problem List   Diagnosis     Abnormal pap     Generalized anxiety disorder     Patellar malalignment syndrome     PCOS (polycystic ovarian syndrome)     Gastroesophageal reflux disease without esophagitis     Lupus anticoagulant positive     History of recurrent miscarriages, not currently pregnant       Past Medical History:   Diagnosis Date      Internal derangement of knee     5/23/2016     Personal history of other medical treatment (CODE)     2013     Polycystic ovarian syndrome     3/18/2016       Past Surgical History:   Procedure Laterality Date     ARTHROSCOPY KNEE Right 2010    Dr. Landeros     HYSTEROSCOPY DIAGNOSTIC N/A 9/27/2018    Procedure: HYSTEROSCOPY DIAGNOSTIC;  Diagnostic Hysteroscopy with Endometrial Scratching.      .;  Surgeon: Gerry Kim MD;  Location: [facility] OR     MANDIBLE SURGERY Right     2001       Social History     Socioeconomic History     Marital status:      Spouse name: Not on file     Number of children: Not on file     Years of education: Not on file     Highest education level: Not on file   Occupational History     Not on file   Social Needs     Financial resource strain: Not on file     Food insecurity:     Worry: Not on file     Inability: Not on file     Transportation needs:     Medical: Not on file     Non-medical: Not on file   Tobacco Use     Smoking status: Never Smoker     Smokeless tobacco: Never Used   Substance and Sexual Activity     Alcohol use: Yes     Alcohol/week: 0.0 oz     Comment: Alcoholic Drinks/day: rare     Drug use: No     Sexual activity: Yes     Partners: Male     Birth control/protection: None   Lifestyle     Physical activity:     Days per week: Not on file     Minutes per session: Not on file     Stress: Not on file   Relationships     Social connections:     Talks on phone: Not on file     Gets together: Not on file     Attends Hinduism service: Not on file     Active member of club or organization: Not on file     Attends meetings of clubs or organizations: Not on file     Relationship status: Not on file     Intimate partner violence:     Fear of current or ex partner: Not on file     Emotionally abused: Not on file     Physically abused: Not on file     Forced sexual activity: Not on file   Other Topics Concern     Parent/sibling w/ CABG, MI or angioplasty before 65F 55M? 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       Family History   Problem Relation Age of Onset     Family History Negative Mother         Good Health     Family History Negative Father         Good Health     Cancer Maternal Grandmother         Cancer     Diabetes Other         Diabetes,maternal side       Current Outpatient Medications   Medication Sig Dispense Refill     ASPIRIN ADULT LOW STRENGTH PO Take 81 mg by mouth       choriogonadotropin ethan (OVIDREL) 250 MCG/0.5ML injection Inject 0.5 mLs (250 mcg) Subcutaneous once for 1 dose 0.5 mL 0     esomeprazole (NEXIUM) 40 MG CR capsule Take 1 capsule (40 mg) by mouth every morning (before breakfast) Take 30-60 minutes before eating. 90 capsule 1     letrozole (FEMARA) 2.5 MG tablet Take 2 tablets (5 mg) by mouth daily (Patient not taking: Reported on 2/7/2019) 20 tablet 0     metFORMIN (GLUCOPHAGE-XR) 500 MG 24 hr tablet Take 1 tablet (500 mg) by mouth 4 times daily (Patient not taking: Reported on 2/7/2019) 360 tablet 4     ondansetron (ZOFRAN ODT) 4 MG ODT tab Take 1-2 tablets (4-8 mg) by mouth 3 times daily (before meals) 20 tablet 1     Prenatal Vit-Fe Fumarate-FA (PRENATAL MULTIVITAMIN PLUS IRON) 27-0.8 MG TABS per tablet Take 1 tablet by mouth daily       progesterone (PROMETRIUM) 200 MG capsule Take 1 capsule (200 mg) by mouth daily (Patient not taking: Reported on 12/21/2018) 30 capsule 1     sertraline (ZOLOFT) 100 MG tablet        sucralfate (CARAFATE) 1 GM tablet TAKE 1 TABLET BY MOUTH 3 TIMES DAILY BEFORE MEALS 90 tablet 2        REVIEW OF SYSTEMS:  Refer to HPI; all other systems reviewed and negative.    PHYSICAL EXAM:  LMP 02/03/2019   CONSTITUTIONAL:  Alert, cooperative, NAD.  EYES: No scleral icterus.  PERRLA.  Conjunctiva clear.  ENT/MOUTH: External ears and nose normal.  TMs normal.  Moist mucous membranes. Oropharynx clear.    ENDO: No thyromegaly or thyroid nodules.  LYMPH:  No  cervical or supraclavicular LA.    BREASTS: No skin abnormalities, no erythema.  No discrete masses.  No nipple discharge, no axillary, supra- or infraclavicular LA.   CARDIOVASCULAR: Regular, S1, S2.  No S3 or S4.  No murmur/gallop/rub.  No peripheral edema.  RESPIRATORY: CTA bilaterally, no wheezes, rhonchi or rales.  GI: Bowel sounds wnl.  Soft, nontender, non-distended.  No masses or HSM.  No rebound or guarding.  : Vulva: normal, no lesions or discharge  Urethral meatus: normal size and location, no lesions or discharge  Urethra: no tenderness or masses  Bladder: no fullness or tenderness  Vagina: normal appearance, no abnormal discharge, no lesions.  No evidence of cystocele or rectocele.  Cervix: normal appearance, no lesions, no abnormal discharge.  Uterus: normal size and position, mobile, non-tender  Adnexa: nopalpable masses bilaterally. No cervical motion tenderness.  Pap smear obtained: Yes  MSKEL: Grossly normal ROM.  No clubbing.  INTEGUMENTARY:Warm, dry.  No rash noted on exposed skin.  NEUROLOGIC: Facies symmetric.  Grossly normal movement and tone.  No tremor.  PSYCHIATRIC: Affect normal.  Speech fluent.      PHQ-9 SCORE 1/30/2018 3/1/2018 3/22/2018   PHQ-9 Total Score 24 25 16     Results for orders placed or performed in visit on 02/15/19   HCG Quantitative Pregnancy, Blood ([id number])   Result Value Ref Range    HCG Quantitative Serum 26 IU/L     ASSESSMENT/PLAN:  1. Routine history and physical examination of adult  - Pap Screen Thin Prep with HPV - recommended age 30 - 65 years (select HPV order below)    2. PCOS (polycystic ovarian syndrome)  - Lipid Panel; Future  - Comprehensive Metabolic Panel; Future  - CBC and Differential; Future  - Hemoglobin A1c; Future  - TSH; Future  - metFORMIN modified (GLUMETZA) 1000 MG 24 hr tablet; Take 1 tablet (1,000 mg) by mouth 2 times daily (with meals)  Dispense: 180 tablet; Refill: 4  - TSH  - CBC and Differential  - Hemoglobin A1c  - Comprehensive  Metabolic Panel  - Lipid Panel    3. Generalized anxiety disorder  Chronic, stable.  Discussed situational worsening of mood.  Encouraged therapy vs motivational reading.  Regular exercise and healthy eating.  - sertraline (ZOLOFT) 100 MG tablet; Take 1 tablet PO daily.  Dispense: 90 tablet; Refill: 4    4. History of recurrent miscarriages, not currently pregnant  Continue to follow with OBGYN.    5. Gastroesophageal reflux disease without esophagitis  Refilled carafate; continue Nexium.  If no significant improvement, consider EGD/gen surg referral.  - sucralfate (CARAFATE) 1 GM tablet; TAKE 1 TABLET BY MOUTH 3 TIMES DAILY BEFORE MEALS  Dispense: 270 tablet; Refill: 4    6. Missed   Due for Hcg - will complete with above labs.  - HCG Quantitative Pregnancy, Blood ([id number])      Relevant cancer screening discussed.    Counseled on healthy diet, Calcium and vitamin D intake, and exercise.    Abiola Ashton, DO  Family Practice

## 2019-03-02 ASSESSMENT — ANXIETY QUESTIONNAIRES: GAD7 TOTAL SCORE: 18

## 2019-03-03 RX ORDER — ESOMEPRAZOLE MAGNESIUM 40 MG/1
40 CAPSULE, DELAYED RELEASE ORAL
Qty: 90 CAPSULE | Refills: 4 | Status: SHIPPED | OUTPATIENT
Start: 2019-03-03 | End: 2019-12-31

## 2019-03-04 DIAGNOSIS — N83.209 CYST OF OVARY, UNSPECIFIED LATERALITY: Primary | ICD-10-CM

## 2019-03-07 ENCOUNTER — HOSPITAL ENCOUNTER (OUTPATIENT)
Dept: ULTRASOUND IMAGING | Facility: OTHER | Age: 29
Discharge: HOME | End: 2019-03-07
Attending: OBSTETRICS & GYNECOLOGY | Admitting: OBSTETRICS & GYNECOLOGY
Payer: COMMERCIAL

## 2019-03-07 DIAGNOSIS — N83.209 CYST OF OVARY, UNSPECIFIED LATERALITY: ICD-10-CM

## 2019-03-07 PROCEDURE — 76830 TRANSVAGINAL US NON-OB: CPT

## 2019-03-12 DIAGNOSIS — R87.610 PAP SMEAR ABNORMALITY OF CERVIX WITH ASCUS FAVORING BENIGN: Primary | ICD-10-CM

## 2019-03-12 NOTE — PROGRESS NOTES
Spoke with histology and you are able to add on HPV, when order prints, I need to send down to lab.  Christina Partida............................... 3/12/2019 8:32 AM

## 2019-03-13 ENCOUNTER — MYC MEDICAL ADVICE (OUTPATIENT)
Dept: OBGYN | Facility: OTHER | Age: 29
End: 2019-03-13

## 2019-03-13 DIAGNOSIS — E28.2 PCOS (POLYCYSTIC OVARIAN SYNDROME): Primary | ICD-10-CM

## 2019-03-13 DIAGNOSIS — N97.8 FEMALE INFERTILITY DUE TO OVULATORY DISORDER: ICD-10-CM

## 2019-03-14 RX ORDER — LETROZOLE 2.5 MG/1
5 TABLET, FILM COATED ORAL DAILY
Qty: 20 TABLET | Refills: 0 | Status: SHIPPED | OUTPATIENT
Start: 2019-03-14 | End: 2019-05-03

## 2019-03-14 NOTE — TELEPHONE ENCOUNTER
"Per last office visit, \"Plan: Letrozole protocol with estradiol and progesterone levels with supplementation as needed.\"    Appropriate orders placed and patient notified.    Awa Parry RN...................3/14/2019 7:44 AM    "

## 2019-03-15 LAB
FINAL DIAGNOSIS: ABNORMAL
HPV HR 12 DNA CVX QL NAA+PROBE: POSITIVE
HPV16 DNA SPEC QL NAA+PROBE: NEGATIVE
HPV18 DNA SPEC QL NAA+PROBE: NEGATIVE
SPECIMEN DESCRIPTION: ABNORMAL
SPECIMEN SOURCE CVX/VAG CYTO: ABNORMAL

## 2019-03-21 DIAGNOSIS — E28.2 PCOS (POLYCYSTIC OVARIAN SYNDROME): ICD-10-CM

## 2019-03-21 NOTE — TELEPHONE ENCOUNTER
Patient requesting Ovidrel prescription to have on hand tomorrow after her ultrasound. Order pending.    Awa Parry RN...................3/21/2019 1:16 PM

## 2019-03-22 ENCOUNTER — HOSPITAL ENCOUNTER (OUTPATIENT)
Dept: ULTRASOUND IMAGING | Facility: OTHER | Age: 29
Discharge: HOME | End: 2019-03-22
Attending: OBSTETRICS & GYNECOLOGY | Admitting: OBSTETRICS & GYNECOLOGY
Payer: COMMERCIAL

## 2019-03-22 DIAGNOSIS — E28.2 PCOS (POLYCYSTIC OVARIAN SYNDROME): Primary | ICD-10-CM

## 2019-03-22 DIAGNOSIS — N97.8 FEMALE INFERTILITY DUE TO OVULATORY DISORDER: ICD-10-CM

## 2019-03-22 DIAGNOSIS — E28.2 PCOS (POLYCYSTIC OVARIAN SYNDROME): ICD-10-CM

## 2019-03-22 PROCEDURE — 76857 US EXAM PELVIC LIMITED: CPT

## 2019-03-25 ENCOUNTER — HOSPITAL ENCOUNTER (OUTPATIENT)
Dept: ULTRASOUND IMAGING | Facility: OTHER | Age: 29
Discharge: HOME | End: 2019-03-25
Attending: OBSTETRICS & GYNECOLOGY | Admitting: OBSTETRICS & GYNECOLOGY
Payer: COMMERCIAL

## 2019-03-25 ENCOUNTER — TELEPHONE (OUTPATIENT)
Dept: OBGYN | Facility: OTHER | Age: 29
End: 2019-03-25

## 2019-03-25 ENCOUNTER — MYC MEDICAL ADVICE (OUTPATIENT)
Dept: OBGYN | Facility: OTHER | Age: 29
End: 2019-03-25

## 2019-03-25 DIAGNOSIS — E28.2 PCOS (POLYCYSTIC OVARIAN SYNDROME): ICD-10-CM

## 2019-03-25 PROCEDURE — 76857 US EXAM PELVIC LIMITED: CPT

## 2019-03-25 NOTE — TELEPHONE ENCOUNTER
Per SWJ, called patient with US results.  Advised patient to hold ovidrel shot and administer to self tomorrow (3/26/19).  Patient voiced understanding and had no further questions.   Arianna Valdez RN on 3/25/2019 at 3:35 PM

## 2019-03-25 NOTE — TELEPHONE ENCOUNTER
Called and spoke to patient.  Explained that US was moved up to 1:45 today.  Patient voiced understanding.    Arianna Valdez RN on 3/25/2019 at 9:06 AM

## 2019-04-01 ENCOUNTER — OFFICE VISIT (OUTPATIENT)
Dept: OBGYN | Facility: OTHER | Age: 29
End: 2019-04-01
Attending: OBSTETRICS & GYNECOLOGY
Payer: COMMERCIAL

## 2019-04-01 VITALS
TEMPERATURE: 98.5 F | DIASTOLIC BLOOD PRESSURE: 90 MMHG | BODY MASS INDEX: 38.61 KG/M2 | SYSTOLIC BLOOD PRESSURE: 122 MMHG | RESPIRATION RATE: 12 BRPM | HEART RATE: 78 BPM | WEIGHT: 232 LBS

## 2019-04-01 DIAGNOSIS — N96 RECURRENT PREGNANCY LOSS: ICD-10-CM

## 2019-04-01 DIAGNOSIS — Z01.812 PRE-PROCEDURE LAB EXAM: Primary | ICD-10-CM

## 2019-04-01 DIAGNOSIS — R87.810 ASCUS WITH POSITIVE HIGH RISK HPV CERVICAL: ICD-10-CM

## 2019-04-01 DIAGNOSIS — R87.610 ASCUS WITH POSITIVE HIGH RISK HPV CERVICAL: ICD-10-CM

## 2019-04-01 LAB — HCG UR QL: NEGATIVE

## 2019-04-01 PROCEDURE — 88300 SURGICAL PATH GROSS: CPT

## 2019-04-01 PROCEDURE — 88305 TISSUE EXAM BY PATHOLOGIST: CPT

## 2019-04-01 PROCEDURE — 81025 URINE PREGNANCY TEST: CPT | Performed by: OBSTETRICS & GYNECOLOGY

## 2019-04-01 PROCEDURE — 88342 IMHCHEM/IMCYTCHM 1ST ANTB: CPT

## 2019-04-01 PROCEDURE — 57454 BX/CURETT OF CERVIX W/SCOPE: CPT | Performed by: OBSTETRICS & GYNECOLOGY

## 2019-04-01 ASSESSMENT — PAIN SCALES - GENERAL: PAINLEVEL: NO PAIN (0)

## 2019-04-01 NOTE — PROGRESS NOTES
CC:Abnormal pap    HPI: Ana Patrick presents for colposcopy due to abnormal pap finding of ASCUS with pos HR HPV.  Previous abnormal paps: yes  History of LEEP or [facility] bx: no  Past Medical History:   Diagnosis Date     Internal derangement of knee     5/23/2016     Personal history of other medical treatment (CODE)     2013     Polycystic ovarian syndrome     3/18/2016     Past Surgical History:   Procedure Laterality Date     ARTHROSCOPY KNEE Right 2010    Dr. Landeros     HYSTEROSCOPY DIAGNOSTIC N/A 9/27/2018    Procedure: HYSTEROSCOPY DIAGNOSTIC;  Diagnostic Hysteroscopy with Endometrial Scratching.      .;  Surgeon: Gerry Kim MD;  Location: [facility] OR     MANDIBLE SURGERY Right     2001     Current Outpatient Medications   Medication     ASPIRIN ADULT LOW STRENGTH PO     esomeprazole (NEXIUM) 40 MG DR capsule     metFORMIN modified (GLUMETZA) 1000 MG 24 hr tablet     Prenatal Vit-Fe Fumarate-FA (PRENATAL MULTIVITAMIN PLUS IRON) 27-0.8 MG TABS per tablet     progesterone (PROMETRIUM) 200 MG capsule     sertraline (ZOLOFT) 100 MG tablet     sucralfate (CARAFATE) 1 GM tablet     choriogonadotropin ethan (OVIDREL) 250 MCG/0.5ML injection     letrozole (FEMARA) 2.5 MG tablet     No current facility-administered medications for this visit.      Allergies   Allergen Reactions     Meloxicam Rash       /90 (BP Location: Right arm, Patient Position: Sitting, Cuff Size: Adult Large)   Pulse 78   Temp 98.5  F (36.9  C) (Oral)   Resp 12   Wt 105.2 kg (232 lb)   LMP 03/13/2019   Breastfeeding? No   BMI 38.61 kg/m      Procedure:    Colposcopic exam performed for abnormal pap finding of ASCUS with HR HPV pos after consent obained. 5% acetic acid applied x 3. Acetowhite epithelium with mosaicism noted at 3 O'clock. Lugol's iodine appliedconfirming non-staining. Biopsy performed at 3 O'clock.  ECC was performed. Entire SCJ  seen. Biopsy site treated with  silver nitrate with good hemostasis noted. Patient  dismissed with instructions in stablecondition.    Impression:  satisfactory colposcopic exam suggesting JOHN 1  F/u to be determined by pathologic verification of the impression.    Gerry Kim MD FACOG  3:07 PM 4/1/2019

## 2019-04-01 NOTE — NURSING NOTE
Prior to the start of the procedure and with procedural staff participation, I verbally confirmed the patient s identity using two indicators, relevant allergies, that the procedure was appropriate and matched the consent or emergent situation, and that the correct equipment/implants were available. Immediately prior to starting the procedure I conducted the Time Out with the procedural staff and re-confirmed the patient s name, procedure, and site/side. (The Joint Commission universal protocol was followed.)  Yes    Sedation (Moderate or Deep): None  Ara Mcmillan LPN .............4/1/2019     3:10 PM

## 2019-04-01 NOTE — NURSING NOTE
Patient is here for colposcopy.   Ara Mcmillan LPN .............4/1/2019     2:00 PM      Patient's last menstrual period was 03/13/2019.  Medication Reconciliation: complete    Ara Mcmillan LPN  4/1/2019 2:06 PM

## 2019-05-03 ENCOUNTER — OFFICE VISIT (OUTPATIENT)
Dept: FAMILY MEDICINE | Facility: OTHER | Age: 29
End: 2019-05-03
Attending: FAMILY MEDICINE
Payer: COMMERCIAL

## 2019-05-03 VITALS
WEIGHT: 236 LBS | DIASTOLIC BLOOD PRESSURE: 84 MMHG | BODY MASS INDEX: 39.27 KG/M2 | HEART RATE: 76 BPM | SYSTOLIC BLOOD PRESSURE: 126 MMHG | TEMPERATURE: 98.9 F | RESPIRATION RATE: 18 BRPM

## 2019-05-03 DIAGNOSIS — F41.1 GENERALIZED ANXIETY DISORDER: Primary | ICD-10-CM

## 2019-05-03 PROCEDURE — 99213 OFFICE O/P EST LOW 20 MIN: CPT | Performed by: FAMILY MEDICINE

## 2019-05-03 ASSESSMENT — ENCOUNTER SYMPTOMS
AGITATION: 0
NERVOUS/ANXIOUS: 1
DECREASED CONCENTRATION: 1
HYPERACTIVE: 0

## 2019-05-03 NOTE — NURSING NOTE
No LMP recorded. (Menstrual status: Amenorrhea).  Medication Reconciliation: complete    Velvet Elizondo LPN  5/3/2019 10:09 AM

## 2019-05-03 NOTE — LETTER
May 3, 2019      Ana [address]  [address]   [facility] [id number]        To Whom It May Concern,     Please excuse Ana from her absences at work from 4/29/19 through 5/12/19 for medical concerns.  She will be able to return 5/13/19 without restrictions.    Feel free to contact me at the above information with any questions or concerns.      Sincerely,          Abiola Ashton, DO

## 2019-06-09 ENCOUNTER — MYC MEDICAL ADVICE (OUTPATIENT)
Dept: OBGYN | Facility: OTHER | Age: 29
End: 2019-06-09

## 2019-06-09 DIAGNOSIS — Z34.90 EARLY STAGE OF PREGNANCY: ICD-10-CM

## 2019-06-09 DIAGNOSIS — Z32.00 PREGNANCY EXAMINATION OR TEST, PREGNANCY UNCONFIRMED: Primary | ICD-10-CM

## 2019-06-10 DIAGNOSIS — Z32.00 PREGNANCY EXAMINATION OR TEST, PREGNANCY UNCONFIRMED: ICD-10-CM

## 2019-06-10 LAB
B-HCG SERPL-ACNC: 125 IU/L
PROGEST SERPL-MCNC: 16.6 NG/ML

## 2019-06-10 PROCEDURE — 84144 ASSAY OF PROGESTERONE: CPT | Performed by: OBSTETRICS & GYNECOLOGY

## 2019-06-10 PROCEDURE — 36415 COLL VENOUS BLD VENIPUNCTURE: CPT | Performed by: OBSTETRICS & GYNECOLOGY

## 2019-06-10 PROCEDURE — 84702 CHORIONIC GONADOTROPIN TEST: CPT | Performed by: OBSTETRICS & GYNECOLOGY

## 2019-06-10 NOTE — TELEPHONE ENCOUNTER
Patient reported positive pregnancy test.  Labs ordered.    Arianna Valdez RN on 6/10/2019 at 8:59 AM

## 2019-06-12 DIAGNOSIS — N96 RECURRENT PREGNANCY LOSS: ICD-10-CM

## 2019-06-12 LAB — B-HCG SERPL-ACNC: 279 IU/L

## 2019-06-12 PROCEDURE — 84702 CHORIONIC GONADOTROPIN TEST: CPT | Performed by: OBSTETRICS & GYNECOLOGY

## 2019-06-12 PROCEDURE — 36415 COLL VENOUS BLD VENIPUNCTURE: CPT | Performed by: OBSTETRICS & GYNECOLOGY

## 2019-06-17 DIAGNOSIS — O36.80X0 ENCOUNTER TO DETERMINE FETAL VIABILITY OF PREGNANCY, SINGLE OR UNSPECIFIED FETUS: Primary | ICD-10-CM

## 2019-06-17 DIAGNOSIS — Z34.90 EARLY STAGE OF PREGNANCY: ICD-10-CM

## 2019-06-17 LAB — B-HCG SERPL-ACNC: 1546 IU/L

## 2019-06-17 PROCEDURE — 36415 COLL VENOUS BLD VENIPUNCTURE: CPT | Performed by: OBSTETRICS & GYNECOLOGY

## 2019-06-17 PROCEDURE — 84702 CHORIONIC GONADOTROPIN TEST: CPT | Performed by: OBSTETRICS & GYNECOLOGY

## 2019-06-18 ENCOUNTER — HOSPITAL ENCOUNTER (OUTPATIENT)
Dept: ULTRASOUND IMAGING | Facility: OTHER | Age: 29
Discharge: HOME | End: 2019-06-18
Attending: OBSTETRICS & GYNECOLOGY | Admitting: OBSTETRICS & GYNECOLOGY
Payer: MEDICAID

## 2019-06-18 DIAGNOSIS — O36.80X0 ENCOUNTER TO DETERMINE FETAL VIABILITY OF PREGNANCY, SINGLE OR UNSPECIFIED FETUS: ICD-10-CM

## 2019-06-18 PROCEDURE — 76817 TRANSVAGINAL US OBSTETRIC: CPT

## 2019-06-19 DIAGNOSIS — O36.80X0 ENCOUNTER TO DETERMINE FETAL VIABILITY OF PREGNANCY, SINGLE OR UNSPECIFIED FETUS: Primary | ICD-10-CM

## 2019-06-25 ENCOUNTER — MYC MEDICAL ADVICE (OUTPATIENT)
Dept: OBGYN | Facility: OTHER | Age: 29
End: 2019-06-25

## 2019-06-26 ENCOUNTER — HOSPITAL ENCOUNTER (OUTPATIENT)
Dept: ULTRASOUND IMAGING | Facility: OTHER | Age: 29
Discharge: HOME | End: 2019-06-26
Attending: OBSTETRICS & GYNECOLOGY | Admitting: OBSTETRICS & GYNECOLOGY
Payer: MEDICAID

## 2019-06-26 ENCOUNTER — OFFICE VISIT (OUTPATIENT)
Dept: FAMILY MEDICINE | Facility: OTHER | Age: 29
End: 2019-06-26
Attending: NURSE PRACTITIONER
Payer: MEDICAID

## 2019-06-26 VITALS
RESPIRATION RATE: 18 BRPM | WEIGHT: 235.2 LBS | HEIGHT: 65 IN | HEART RATE: 76 BPM | SYSTOLIC BLOOD PRESSURE: 110 MMHG | BODY MASS INDEX: 39.18 KG/M2 | DIASTOLIC BLOOD PRESSURE: 70 MMHG | TEMPERATURE: 98 F

## 2019-06-26 DIAGNOSIS — R21 RASH: Primary | ICD-10-CM

## 2019-06-26 DIAGNOSIS — O36.80X0 ENCOUNTER TO DETERMINE FETAL VIABILITY OF PREGNANCY, SINGLE OR UNSPECIFIED FETUS: ICD-10-CM

## 2019-06-26 PROCEDURE — 76817 TRANSVAGINAL US OBSTETRIC: CPT

## 2019-06-26 PROCEDURE — G0463 HOSPITAL OUTPT CLINIC VISIT: HCPCS

## 2019-06-26 PROCEDURE — 99213 OFFICE O/P EST LOW 20 MIN: CPT | Performed by: NURSE PRACTITIONER

## 2019-06-26 PROCEDURE — G0463 HOSPITAL OUTPT CLINIC VISIT: HCPCS | Mod: 25

## 2019-06-26 RX ORDER — TRIAMCINOLONE ACETONIDE 1 MG/G
CREAM TOPICAL 2 TIMES DAILY
Qty: 80 G | Refills: 1 | Status: SHIPPED | OUTPATIENT
Start: 2019-06-26 | End: 2019-08-26

## 2019-06-26 ASSESSMENT — ANXIETY QUESTIONNAIRES
2. NOT BEING ABLE TO STOP OR CONTROL WORRYING: NOT AT ALL
6. BECOMING EASILY ANNOYED OR IRRITABLE: NOT AT ALL
3. WORRYING TOO MUCH ABOUT DIFFERENT THINGS: NOT AT ALL
IF YOU CHECKED OFF ANY PROBLEMS ON THIS QUESTIONNAIRE, HOW DIFFICULT HAVE THESE PROBLEMS MADE IT FOR YOU TO DO YOUR WORK, TAKE CARE OF THINGS AT HOME, OR GET ALONG WITH OTHER PEOPLE: NOT DIFFICULT AT ALL
7. FEELING AFRAID AS IF SOMETHING AWFUL MIGHT HAPPEN: NOT AT ALL
4. TROUBLE RELAXING: NOT AT ALL
1. FEELING NERVOUS, ANXIOUS, OR ON EDGE: NOT AT ALL
5. BEING SO RESTLESS THAT IT IS HARD TO SIT STILL: NOT AT ALL
GAD7 TOTAL SCORE: 0

## 2019-06-26 ASSESSMENT — PATIENT HEALTH QUESTIONNAIRE - PHQ9: SUM OF ALL RESPONSES TO PHQ QUESTIONS 1-9: 0

## 2019-06-26 ASSESSMENT — PAIN SCALES - GENERAL: PAINLEVEL: NO PAIN (0)

## 2019-06-26 ASSESSMENT — MIFFLIN-ST. JEOR: SCORE: 1797.74

## 2019-06-26 NOTE — PROGRESS NOTES
Subjective     Ana Patrick is a 28 year old female who presents to clinic today for the following health issues:    HPI     Rash    Onset: 1 week    Description:   Location: back, chest, behind knees  Character: red  Itching (Pruritis): YES- all over, even in areas without rash appearance    Progression of Symptoms:  same    Accompanying Signs & Symptoms:  Fever: no   Body aches or joint pain: no   Sore throat symptoms: no   Recent cold symptoms: no     History:   Previous similar rash: no     Precipitating factors:   Exposure to similar rash: no   New exposures: None   Recent travel: no     Alleviating factors: nothing    Therapies Tried and outcome: lotions, oils without relief    Patient Active Problem List   Diagnosis     Abnormal pap     Generalized anxiety disorder     Patellar malalignment syndrome     PCOS (polycystic ovarian syndrome)     Gastroesophageal reflux disease without esophagitis     Lupus anticoagulant positive     History of recurrent miscarriages, not currently pregnant     Past Surgical History:   Procedure Laterality Date     ARTHROSCOPY KNEE Right 2010    Dr. Landeros     HYSTEROSCOPY DIAGNOSTIC N/A 9/27/2018    Procedure: HYSTEROSCOPY DIAGNOSTIC;  Diagnostic Hysteroscopy with Endometrial Scratching.      .;  Surgeon: Gerry Kim MD;  Location: [facility] OR     MANDIBLE SURGERY Right     2001       Social History     Tobacco Use     Smoking status: Never Smoker     Smokeless tobacco: Never Used   Substance Use Topics     Alcohol use: Yes     Alcohol/week: 0.0 oz     Comment: Alcoholic Drinks/day: rare     Family History   Problem Relation Age of Onset     Family History Negative Mother         Good Health     Family History Negative Father         Good Health     Cancer Maternal Grandmother         Cancer     Diabetes Other         Diabetes,maternal side     Anxiety Disorder Sister          Current Outpatient Medications   Medication Sig Dispense Refill     ASPIRIN ADULT LOW STRENGTH PO  "Take 81 mg by mouth       esomeprazole (NEXIUM) 40 MG DR capsule Take 1 capsule (40 mg) by mouth every morning (before breakfast) Take 30-60 minutes before eating. 90 capsule 4     metFORMIN modified (GLUMETZA) 1000 MG 24 hr tablet Take 1 tablet (1,000 mg) by mouth 2 times daily (with meals) 180 tablet 4     Prenatal Vit-Fe Fumarate-FA (PRENATAL MULTIVITAMIN PLUS IRON) 27-0.8 MG TABS per tablet Take 1 tablet by mouth daily       progesterone (PROMETRIUM) 200 MG capsule Take 1 capsule (200 mg) by mouth daily  1     sertraline (ZOLOFT) 100 MG tablet Take 1 tablet PO daily. 90 tablet 4     sucralfate (CARAFATE) 1 GM tablet TAKE 1 TABLET BY MOUTH 3 TIMES DAILY BEFORE MEALS 270 tablet 4     Allergies   Allergen Reactions     Meloxicam Rash     Recent Labs   Lab Test 03/01/19  0922 12/21/18  1825  06/02/16  0948   A1C 4.7  --   --   --    LDL 72  --   --  84   HDL 32  --   --  32   TRIG 82  --   --  69   ALT 24 35  --   --    CR 0.59* 0.61   < > 0.57*   GFRESTIMATED >90 >90  --   --    GFRESTBLACK >90 >90   < > >60   POTASSIUM 3.9 3.9   < > 3.9    < > = values in this interval not displayed.      BP Readings from Last 3 Encounters:   06/26/19 110/70   05/03/19 126/84   04/01/19 122/90    Wt Readings from Last 3 Encounters:   06/26/19 106.7 kg (235 lb 3.2 oz)   05/03/19 107 kg (236 lb)   04/01/19 105.2 kg (232 lb)           Reviewed and updated as needed this visit by Provider         Review of Systems   ROS COMP: Constitutional, HEENT, cardiovascular, pulmonary, gi and gu systems are negative, except as otherwise noted.      Objective    /70 (BP Location: Right arm, Patient Position: Sitting, Cuff Size: Adult Large)   Pulse 76   Temp 98  F (36.7  C)   Resp 18   Ht 1.651 m (5' 5\")   Wt 106.7 kg (235 lb 3.2 oz)   Breastfeeding? No   BMI 39.14 kg/m    Body mass index is 39.14 kg/m .     Physical Exam   GENERAL: healthy, alert and no distress  EYES: Eyes grossly normal to inspection,  SKIN: CHEST: few small " scattered erythematous papules suspicious for bites; left back/pant line: erythematous plaque without drainage, scaling/flaking. Bilateral posterior knees: small scattered erythematous papules, 1 1 cm x 1 cm erythema without drainage , scaling/flaking  PSYCH: mentation appears normal, affect normal    Diagnostic Test Results:  none         Assessment & Plan     1. Rash  Acute, symptomatic. Discussed differentials including but not limited to mosquito bites, eczema, localized irritation, others. Education that I did not think this was related to pregnancy as concerning pregnancy induced rashes/pruritis typically occur later in pregnancy. Recommend she switch to free and clear products. Can apply steroid cream to localized areas with redness and itching but avoiding spreading to large surface areas of body. Apply emollient such as Aquaphor (generic is okay) after steroid cream.    - triamcinolone (KENALOG) 0.1 % external cream; Apply topically 2 times daily  Dispense: 80 g; Refill: 1         FUTURE APPOINTMENTS:       - Follow-up visit: No improvement or worsening symptoms.     No follow-ups on file.    Anika Espinoza CNP  [facility]

## 2019-06-26 NOTE — NURSING NOTE
Patient presents to the clinic for an all over body rash that started 1 week ago and is itchy.    Medication Reconciliation Completed.    Zhen Whiting LPN  6/26/2019 10:49 AM

## 2019-06-27 ENCOUNTER — MYC MEDICAL ADVICE (OUTPATIENT)
Dept: OBGYN | Facility: OTHER | Age: 29
End: 2019-06-27

## 2019-06-27 ASSESSMENT — ANXIETY QUESTIONNAIRES: GAD7 TOTAL SCORE: 0

## 2019-06-27 NOTE — TELEPHONE ENCOUNTER
Patient sent the following [employer] message, with the following questions for Dr. Kim:    1. Pt has been taking progesterone daily, and is wondering if this is something she should continue?    2. Pt scheduled pregnancy verification visit on 7/8 at 0945 and OB Px the same day at 1045. Pt is wondering if OB Px, should be scheduled further out?    Per face to face with Dr. Kim, Pt should continue using the progesterone and there is no need to change her appointments. Patient notified of this information via [employer] response. Delmi Carrasco RN .............. 6/27/2019  11:22 AM

## 2019-07-08 ENCOUNTER — ALLIED HEALTH/NURSE VISIT (OUTPATIENT)
Dept: OBGYN | Facility: OTHER | Age: 29
End: 2019-07-08
Attending: OBSTETRICS & GYNECOLOGY
Payer: MEDICAID

## 2019-07-08 VITALS
DIASTOLIC BLOOD PRESSURE: 74 MMHG | BODY MASS INDEX: 38.89 KG/M2 | SYSTOLIC BLOOD PRESSURE: 110 MMHG | WEIGHT: 233.4 LBS | HEIGHT: 65 IN | HEART RATE: 68 BPM

## 2019-07-08 VITALS
HEIGHT: 65 IN | BODY MASS INDEX: 38.89 KG/M2 | HEART RATE: 68 BPM | WEIGHT: 233.4 LBS | SYSTOLIC BLOOD PRESSURE: 110 MMHG | DIASTOLIC BLOOD PRESSURE: 74 MMHG

## 2019-07-08 DIAGNOSIS — Z34.91 NORMAL PREGNANCY IN FIRST TRIMESTER: Primary | ICD-10-CM

## 2019-07-08 DIAGNOSIS — Z34.90 EARLY STAGE OF PREGNANCY: Primary | ICD-10-CM

## 2019-07-08 DIAGNOSIS — O99.119 ANTIPHOSPHOLIPID ANTIBODY SYNDROME COMPLICATING PREGNANCY (H): ICD-10-CM

## 2019-07-08 DIAGNOSIS — O26.20 RECURRENT PREGNANCY LOSS WITH CURRENT PREGNANCY: ICD-10-CM

## 2019-07-08 DIAGNOSIS — D68.61 ANTIPHOSPHOLIPID ANTIBODY SYNDROME COMPLICATING PREGNANCY (H): ICD-10-CM

## 2019-07-08 LAB
ABO + RH BLD: NORMAL
ABO + RH BLD: NORMAL
ALBUMIN UR-MCNC: NEGATIVE MG/DL
APPEARANCE UR: CLEAR
BACTERIA #/AREA URNS HPF: ABNORMAL /HPF
BILIRUB UR QL STRIP: NEGATIVE
BLD GP AB SCN SERPL QL: NORMAL
BLOOD BANK CMNT PATIENT-IMP: NORMAL
C TRACH DNA SPEC QL PROBE+SIG AMP: NOT DETECTED
COLOR UR AUTO: YELLOW
ERYTHROCYTE [DISTWIDTH] IN BLOOD BY AUTOMATED COUNT: 12.3 % (ref 10–15)
GLUCOSE UR STRIP-MCNC: NEGATIVE MG/DL
HCT VFR BLD AUTO: 38.1 % (ref 35–47)
HGB BLD-MCNC: 13 G/DL (ref 11.7–15.7)
HGB UR QL STRIP: NEGATIVE
KETONES UR STRIP-MCNC: ABNORMAL MG/DL
LEUKOCYTE ESTERASE UR QL STRIP: ABNORMAL
MCH RBC QN AUTO: 29.5 PG (ref 26.5–33)
MCHC RBC AUTO-ENTMCNC: 34.1 G/DL (ref 31.5–36.5)
MCV RBC AUTO: 86 FL (ref 78–100)
N GONORRHOEA DNA SPEC QL PROBE+SIG AMP: NOT DETECTED
NITRATE UR QL: NEGATIVE
NON-SQ EPI CELLS #/AREA URNS LPF: ABNORMAL /LPF
PH UR STRIP: 5 PH (ref 5–9)
PLATELET # BLD AUTO: 282 10E9/L (ref 150–450)
RBC # BLD AUTO: 4.41 10E12/L (ref 3.8–5.2)
RBC #/AREA URNS AUTO: ABNORMAL /HPF
SOURCE: ABNORMAL
SP GR UR STRIP: >1.03 (ref 1–1.03)
SPECIMEN EXP DATE BLD: NORMAL
SPECIMEN SOURCE: NORMAL
UROBILINOGEN UR STRIP-ACNC: 0.2 EU/DL (ref 0.2–1)
WBC # BLD AUTO: 7.1 10E9/L (ref 4–11)
WBC #/AREA URNS AUTO: ABNORMAL /HPF

## 2019-07-08 PROCEDURE — 87086 URINE CULTURE/COLONY COUNT: CPT | Mod: ZL | Performed by: OBSTETRICS & GYNECOLOGY

## 2019-07-08 PROCEDURE — 86762 RUBELLA ANTIBODY: CPT | Mod: ZL | Performed by: OBSTETRICS & GYNECOLOGY

## 2019-07-08 PROCEDURE — G0463 HOSPITAL OUTPT CLINIC VISIT: HCPCS

## 2019-07-08 PROCEDURE — 84999 UNLISTED CHEMISTRY PROCEDURE: CPT | Mod: ZL | Performed by: OBSTETRICS & GYNECOLOGY

## 2019-07-08 PROCEDURE — 81329 SMN1 GENE DOS/DELETION ALYS: CPT | Mod: ZL | Performed by: OBSTETRICS & GYNECOLOGY

## 2019-07-08 PROCEDURE — 81001 URINALYSIS AUTO W/SCOPE: CPT | Mod: ZL | Performed by: OBSTETRICS & GYNECOLOGY

## 2019-07-08 PROCEDURE — 86901 BLOOD TYPING SEROLOGIC RH(D): CPT | Mod: ZL | Performed by: OBSTETRICS & GYNECOLOGY

## 2019-07-08 PROCEDURE — 87340 HEPATITIS B SURFACE AG IA: CPT | Mod: ZL | Performed by: OBSTETRICS & GYNECOLOGY

## 2019-07-08 PROCEDURE — 87389 HIV-1 AG W/HIV-1&-2 AB AG IA: CPT | Mod: ZL | Performed by: OBSTETRICS & GYNECOLOGY

## 2019-07-08 PROCEDURE — 86900 BLOOD TYPING SEROLOGIC ABO: CPT | Mod: ZL | Performed by: OBSTETRICS & GYNECOLOGY

## 2019-07-08 PROCEDURE — 36415 COLL VENOUS BLD VENIPUNCTURE: CPT | Mod: ZL | Performed by: OBSTETRICS & GYNECOLOGY

## 2019-07-08 PROCEDURE — 87491 CHLMYD TRACH DNA AMP PROBE: CPT | Mod: ZL | Performed by: OBSTETRICS & GYNECOLOGY

## 2019-07-08 PROCEDURE — 99207 ZZC OB VISIT-NO CHARGE - GICH ONLY: CPT | Performed by: OBSTETRICS & GYNECOLOGY

## 2019-07-08 PROCEDURE — 99207 ZZC OB VISIT-NO CHARGE - GICH ONLY: CPT

## 2019-07-08 PROCEDURE — 87591 N.GONORRHOEAE DNA AMP PROB: CPT | Mod: ZL | Performed by: OBSTETRICS & GYNECOLOGY

## 2019-07-08 PROCEDURE — 85027 COMPLETE CBC AUTOMATED: CPT | Mod: ZL | Performed by: OBSTETRICS & GYNECOLOGY

## 2019-07-08 PROCEDURE — 81220 CFTR GENE COM VARIANTS: CPT | Performed by: OBSTETRICS & GYNECOLOGY

## 2019-07-08 PROCEDURE — 86850 RBC ANTIBODY SCREEN: CPT | Mod: ZL | Performed by: OBSTETRICS & GYNECOLOGY

## 2019-07-08 PROCEDURE — 86780 TREPONEMA PALLIDUM: CPT | Mod: ZL | Performed by: OBSTETRICS & GYNECOLOGY

## 2019-07-08 ASSESSMENT — MIFFLIN-ST. JEOR
SCORE: 1788.96
SCORE: 1792.07

## 2019-07-08 ASSESSMENT — PAIN SCALES - GENERAL
PAINLEVEL: NO PAIN (0)
PAINLEVEL: NO PAIN (0)

## 2019-07-08 NOTE — NURSING NOTE
HPI:    This is a 28 year old female patient,  who presents for Pregnancy confirmation visit. Patient reports positive pregnancy test at home. She has already had her early OB ultrasound showing single viable IUP with GREGORY of 20    Obstetrical history, OB Questionnaire, and OB Demographics updated to the best of this nurse's ability based on patient report. PHQ-9 depression screening and routine Domestic Abuse screening completed. OB Education packet provided and reviewed by this nurse. All immediate questions and concerns answered.    Last menstrual period is reported as Patient's last menstrual period was 05/10/2019 (exact date).. GREGORY based on LMP is 20.   Her cycles are irregular.  Her last menstrual period was normal.   Since her LMP, she has experienced  nausea, fatigue and lightheadedness.     Personal OB history includes: age of first pregnancy 25, G  4 and P  0  Previous OB Provider: Dr. Gerry Kim MD, FACOG   Previous Delivering Clinic: n/a  Release of Records: n/a    Current delivery plan: GI  Preferred OB Provider: Dr. 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   Family and father of baby is supportive of current pregnancy.  Past Medical History of Father of Baby:Asthma    Past History:  Her past medical history   Past Medical History:   Diagnosis Date     Internal derangement of knee     2016     Personal history of other medical treatment (CODE)     2013     Polycystic ovarian syndrome     3/18/2016   .      She has a history of  recurrent miscarriage, infertility    Since her last LMP  she denies use of alcohol, tobacco and street drugs.    Pap smear history: YES - updated in Problem List and Health Maintenance accordingly    STD/STI history: HPV    STD/STI symptoms: no noticeable symptoms     Past medical, surgical, social and family history were reviewed and updated in EPIC.    Medications reviewed by this nurse. Current medication list:  Current Outpatient Medications   Medication Sig Dispense Refill     ASPIRIN ADULT LOW STRENGTH PO Take 81 mg by mouth       esomeprazole (NEXIUM) 40 MG DR capsule Take 1 capsule (40 mg) by mouth every morning (before breakfast) Take 30-60 minutes before eating. 90 capsule 4     metFORMIN modified (GLUMETZA) 1000 MG 24 hr tablet Take 1 tablet (1,000 mg) by mouth 2 times daily (with meals) 180 tablet 4     Prenatal Vit-Fe Fumarate-FA (PRENATAL MULTIVITAMIN PLUS IRON) 27-0.8 MG TABS per tablet Take 1 tablet by mouth daily       progesterone (PROMETRIUM) 200 MG capsule Take 1 capsule (200 mg) by mouth daily  1     sertraline (ZOLOFT) 100 MG tablet Take 1 tablet PO daily. 90 tablet 4     sucralfate (CARAFATE) 1 GM tablet TAKE 1 TABLET BY MOUTH 3 TIMES DAILY BEFORE MEALS 270 tablet 4     triamcinolone (KENALOG) 0.1 % external cream Apply topically 2 times daily 80 g 1     The following medications were recommended to be discontinued due to Pregnancy Category D status: none  Patient informed to contact her primary care provider as soon as possible to discuss a safer alternative.    Risk factors:  Moderate and moderately severe risks (consult with OB/Gyn)  Previous fetal or  demise: No  History of  delivery: No  History of heart disease Class I: No  Severe anemia, unresponsive to iron therapy: No  Pelvic mass or neoplasm: No  Previous : No  Hyper/hypothyroidism: No  History of postpartum hemorrhage requiring transfusion:No  History of Placenta Accreta: No    High Risk (Pregnancy managed by OB/Gyn)  Multiple pregnancy: No  Pre-gestational  diabetes: No  Chronic Hypertension: No  Renal Failure: No  Heart disease, class II or greater: No  Rh Isoimmunization: No  Chronic active hepatitis: No  Convulsive disorder, poorly controlled: No  Isoimmune thrombocytopenia: No  Pre-term premature rupture of membranes: No  Lupus or other autoimmune disorder: Yes - Lupus anticoagulant  Human Immunodeficiency Virus: No      ASSESSMENT/PLAN:       ICD-10-CM    1. Early stage of pregnancy Z34.90        28 year old , 8w3d of pregnancy with GREGORY of 20.    Counseling given:     - Recommended weight gain for pregnancy: 11-20 lb   BMI < 18.5  28-40 lbs   18.5 - 24.9 25-35   25 - 29.9 15-25   > 30  11-20      PLAN/PATIENT INSTRUCTIONS:    Normal exercise.  Normal sexual activity.  Prenatal vitamins.  Anticipated weight gain.    Take multivitamin or pre- vitamins and OB Education packet given    Awa Parry.................................................. 2019 10:02 AM

## 2019-07-08 NOTE — PROGRESS NOTES
CC: New OB visit  HPI:  Ana Patrick is  at 8w3d based on Patient's last menstrual period was 05/10/2019 (exact date)./first trimester US. She has history of RPL and pos LA.  She notes issues of nausea and fatigue. No vaginal bleeding. She has history of APAS by OB criteria and pos for lupus anticoagulant. Diagnostic hysteroscopy was normal.  No history of DVT or arterial clotting.    OB History    Para Term  AB Living   4 0 0 0 3 0   SAB TAB Ectopic Multiple Live Births   3 0 0 0 0      # Outcome Date GA Lbr Mandeep/2nd Weight Sex Delivery Anes PTL Lv   4 Current            3 SAB 19           2 SAB 18           1 SAB 16             STI: (denies HSV, Hep C, Hep B, HIV, Syphilis, Chlamydia, Gonorrhea)  Last pap smear: JOHN 1 on biopsy from ASCUS with HR HPV pos.  Chickenpox history: as a child  Past Medical History:   Diagnosis Date     Internal derangement of knee     2016     Personal history of other medical treatment (CODE)          Polycystic ovarian syndrome     3/18/2016      has a past surgical history that includes Mandible surgery (Right); Arthroscopy knee (Right, ); and Hysteroscopy Diagnostic (N/A, 2018).    Social History     Tobacco Use     Smoking status: Never Smoker     Smokeless tobacco: Never Used   Substance Use Topics     Alcohol use: Yes     Alcohol/week: 0.0 oz     Comment: Alcoholic Drinks/day: rare     Drug use: No     Family History   Problem Relation Age of Onset     Family History Negative Mother         Good Health     Family History Negative Father         Good Health     Cancer Maternal Grandmother         Cancer     Diabetes Other         Diabetes,maternal side     Anxiety Disorder Sister          Current Outpatient Medications   Medication     ASPIRIN ADULT LOW STRENGTH PO     esomeprazole (NEXIUM) 40 MG DR capsule     metFORMIN modified (GLUMETZA) 1000 MG 24 hr tablet     Prenatal Vit-Fe Fumarate-FA (PRENATAL MULTIVITAMIN  "PLUS IRON) 27-0.8 MG TABS per tablet     progesterone (PROMETRIUM) 200 MG capsule     sertraline (ZOLOFT) 100 MG tablet     sucralfate (CARAFATE) 1 GM tablet     triamcinolone (KENALOG) 0.1 % external cream     No current facility-administered medications for this visit.      Allergies   Allergen Reactions     Meloxicam Rash     Immunization History   Administered Date(s) Administered     DTAP (<7y) 05/30/1996, 08/08/2003     [id number] Novel Flu 02/19/2010     HepB, Unspecified 08/08/2003, 10/18/2003, 04/12/2004     Hib, Unspecified 03/01/1991, 05/08/1991, 07/10/1991, 04/28/1992     Historical DTP/aP 03/01/1991, 05/08/1991, 07/10/1991, 07/17/1992     Influenza (IIV3) PF 02/19/2010, 10/18/2015     Influenza Vaccine IM 3yrs+ 4 Valent IIV4 10/17/2016, 10/04/2017     MMR 04/28/1992, 08/08/2003     Meningococcal (Menactra ) 10/07/2014     OPV, trivalent, live 03/01/1991, 05/08/1991, 07/10/1991, 07/17/1992     Rhogam 06/18/2018     TDAP Vaccine (Adacel) 02/22/2010     REVIEW OF SYSTEMS  General: negative  Skin: negative  Resp: No shortness of breath, dyspnea on exertion, cough, or hemoptysis  CV: negative  GI: negative  : negative  Hematologic: negative  Endocrine: negative    EXAM: /74 (BP Location: Right arm, Patient Position: Sitting, Cuff Size: Adult Large)   Pulse 68   Ht 1.65 m (5' 4.96\")   Wt 105.9 kg (233 lb 6.4 oz)   LMP 05/10/2019 (Exact Date)   Breastfeeding? No   BMI 38.89 kg/m    Gen: NAD  CV: RRR with normal S1, S2, no GRM  Resp: CTA Bilaterally  Breasts: Draining 3 cm cystic area on left nipple noted. Breast exam otherwise without mass or lesion, neg axilla and subclavicular nodes.  Abdomen: NT, ND  Extremities: No TTP, no deformity  Neuro: CN II-XII intact grossly, moves all extremities  Psych: normal affect and mentation.  Bedside ultrasound was performed with an endovaginal probe demonstrating a viable bingham intrauterine gestation.  Measures 8 weeks 2 days consistent with her menstrual " diet and maintaining her estimated date of confinement of 2020.  Fetal heart rate was in the 170s.  No adnexal masses were noted.  Uterus is anteverted and otherwise normal in appearance.    I/p 8w3d gestation with RPL attributable to APAS.    Discussed safety, nutrition, screening for cystic fibrosis, spina bifida, spinal muscular atrophy, quad screen, cffDNA screening as appropriate.  F/U scheduled, discussed call schedule rotation with [facility] and Dr. Waldron, general surgery.  Return visit in 1 month     Pregnancy risk factors include:    APAS with pos LA and RPL  PCOS  Obesity, Body mass index is 38.89 kg/m .  Start baby aspirin. Start px Lovenox at 12 weeks gesation.  Recheck in a month.    Gerry Kim MD FACOG  10:45 AM 2019

## 2019-07-09 LAB
BACTERIA SPEC CULT: NORMAL
HBV SURFACE AG SERPL QL IA: NONREACTIVE
HIV 1+2 AB+HIV1 P24 AG SERPL QL IA: NONREACTIVE
RUBV IGG SERPL IA-ACNC: 33 IU/ML
SPECIMEN SOURCE: NORMAL
T PALLIDUM AB SER QL: NONREACTIVE

## 2019-07-13 LAB
CFTR ALLELE 2 BLD/T QL: NEGATIVE
CFTR P.R117H+5T VAR BLD/T QL: NORMAL
CYSTIC FIBROSIS 165 VARIANT INTERP: NORMAL

## 2019-07-16 LAB
RESULT: NORMAL
SEND OUTS MISC TEST CODE: NORMAL
SEND OUTS MISC TEST SPECIMEN: NORMAL
TEST NAME: NORMAL

## 2019-07-23 ENCOUNTER — TRANSFERRED RECORDS (OUTPATIENT)
Dept: HEALTH INFORMATION MANAGEMENT | Facility: OTHER | Age: 29
End: 2019-07-23

## 2019-07-26 ENCOUNTER — MYC MEDICAL ADVICE (OUTPATIENT)
Dept: FAMILY MEDICINE | Facility: OTHER | Age: 29
End: 2019-07-26

## 2019-07-29 ENCOUNTER — MYC MEDICAL ADVICE (OUTPATIENT)
Dept: OBGYN | Facility: OTHER | Age: 29
End: 2019-07-29

## 2019-07-29 NOTE — TELEPHONE ENCOUNTER
Called and spoke to patient.  She reported that the sharp stabbing pain has stopped and denied vaginal bleeding / leakage, UTI symptoms, fever and chills.  Scheduled patient with DAB tomorrow.  Advised patient to call us back on the onset of any of the above symptoms.  Patient voiced understanding and had no further questions.     Arianna Valdez RN on 7/29/2019 at 10:06 AM

## 2019-07-30 ENCOUNTER — PRENATAL OFFICE VISIT (OUTPATIENT)
Dept: OBGYN | Facility: OTHER | Age: 29
End: 2019-07-30
Attending: OBSTETRICS & GYNECOLOGY
Payer: MEDICAID

## 2019-07-30 VITALS
BODY MASS INDEX: 38.19 KG/M2 | SYSTOLIC BLOOD PRESSURE: 114 MMHG | HEART RATE: 76 BPM | DIASTOLIC BLOOD PRESSURE: 74 MMHG | WEIGHT: 229.2 LBS

## 2019-07-30 DIAGNOSIS — N93.9 VAGINAL BLEEDING: Primary | ICD-10-CM

## 2019-07-30 DIAGNOSIS — R76.0 LUPUS ANTICOAGULANT POSITIVE: ICD-10-CM

## 2019-07-30 PROCEDURE — 86901 BLOOD TYPING SEROLOGIC RH(D): CPT | Mod: ZL | Performed by: OBSTETRICS & GYNECOLOGY

## 2019-07-30 PROCEDURE — 36415 COLL VENOUS BLD VENIPUNCTURE: CPT | Mod: ZL | Performed by: OBSTETRICS & GYNECOLOGY

## 2019-07-30 PROCEDURE — 96372 THER/PROPH/DIAG INJ SC/IM: CPT

## 2019-07-30 PROCEDURE — 85461 HEMOGLOBIN FETAL: CPT | Mod: ZL | Performed by: OBSTETRICS & GYNECOLOGY

## 2019-07-30 PROCEDURE — 86900 BLOOD TYPING SEROLOGIC ABO: CPT | Mod: ZL | Performed by: OBSTETRICS & GYNECOLOGY

## 2019-07-30 PROCEDURE — 25000128 H RX IP 250 OP 636: Performed by: OBSTETRICS & GYNECOLOGY

## 2019-07-30 PROCEDURE — 99207 ZZC OB VISIT-NO CHARGE - GICH ONLY: CPT | Performed by: OBSTETRICS & GYNECOLOGY

## 2019-07-30 RX ADMIN — RHO(D) IMMUNE GLOBULIN (HUMAN) 300 MCG: 1500 SOLUTION INTRAMUSCULAR at 12:00

## 2019-07-30 ASSESSMENT — PAIN SCALES - GENERAL: PAINLEVEL: NO PAIN (1)

## 2019-07-30 NOTE — PROGRESS NOTES
CC: Recheck OB visit at 11w4d    HPI: Aan Patrick presents for a follow up visit and evaluation of recent RLQ abdominal pain. She has noted spotting recently. Pain is better today. No nausea, vomiting or diarrhea. She is Rh negative. She has pos LA with history of recurrent SAB in the first trimester. She has not started Lovenox or asa yet.    OB History    Para Term  AB Living   4 0 0 0 3 0   SAB TAB Ectopic Multiple Live Births   3 0 0 0 0      # Outcome Date GA Lbr Mandeep/2nd Weight Sex Delivery Anes PTL Lv   4 Current            3 SAB 19           2 SAB 18           1 SAB 16             Current Outpatient Medications   Medication     ASPIRIN ADULT LOW STRENGTH PO     enoxaparin (LOVENOX) 40 MG/0.4ML syringe     esomeprazole (NEXIUM) 40 MG DR capsule     metFORMIN modified (GLUMETZA) 1000 MG 24 hr tablet     Prenatal Vit-Fe Fumarate-FA (PRENATAL MULTIVITAMIN PLUS IRON) 27-0.8 MG TABS per tablet     progesterone (PROMETRIUM) 200 MG capsule     sertraline (ZOLOFT) 100 MG tablet     sucralfate (CARAFATE) 1 GM tablet     triamcinolone (KENALOG) 0.1 % external cream     No current facility-administered medications for this visit.          O: /74 (BP Location: Right arm)   Pulse 76   Wt 104 kg (229 lb 3.2 oz)   LMP 05/10/2019 (Exact Date)   BMI 38.19 kg/m    Body mass index is 38.19 kg/m .  See OB flow sheet  EXAM:  NAD  Martínez viable IUP noted on US at the bedside.    A/P: (N93.9) Vaginal bleeding  (primary encounter diagnosis)  Comment:   Plan: rho(D) immune globulin (HYPERRHO/RHOGAM)         injection 300 mcg            (R76.0) Lupus anticoagulant positive  Comment:   Plan: enoxaparin (LOVENOX) 40 MG/0.4ML syringe          Recheck in 4 weeks    Problem List:   Pregnancy risk factors include:    APAS with pos LA and RPL  PCOS  Obesity, Body mass index is 38.89 kg/m .  Start baby aspirin. Start px Lovenox at 12 weeks gesation.  Normal Rocheport test.    Recheck  in a month.    Gerry Kim MD FACOG  2:46 PM 7/30/2019

## 2019-07-30 NOTE — PROGRESS NOTES
Clinic Administered Medication Documentation      Injectable Medication Documentation    Patient was given Rho(D) Immune Globulin. Prior to medication administration, verified patients identity using patient s name and date of birth. Please see MAR and medication order for additional information. Patient instructed to report any adverse reaction to staff immediately .      Was entire vial of medication used? Yes  Vial/Syringe: Syringe  Expiration Date:  05/30/2021  Was this medication supplied by the patient? No    Arianna Valdez RN on 7/30/2019 at 12:04 PM

## 2019-08-06 ENCOUNTER — MYC MEDICAL ADVICE (OUTPATIENT)
Dept: FAMILY MEDICINE | Facility: OTHER | Age: 29
End: 2019-08-06

## 2019-08-07 ENCOUNTER — ALLIED HEALTH/NURSE VISIT (OUTPATIENT)
Dept: FAMILY MEDICINE | Facility: OTHER | Age: 29
End: 2019-08-07
Payer: MEDICAID

## 2019-08-26 ENCOUNTER — PRENATAL OFFICE VISIT (OUTPATIENT)
Dept: OBGYN | Facility: OTHER | Age: 29
End: 2019-08-26
Attending: OBSTETRICS & GYNECOLOGY
Payer: MEDICAID

## 2019-08-26 VITALS
HEART RATE: 72 BPM | SYSTOLIC BLOOD PRESSURE: 138 MMHG | DIASTOLIC BLOOD PRESSURE: 80 MMHG | WEIGHT: 228.6 LBS | BODY MASS INDEX: 38.09 KG/M2

## 2019-08-26 DIAGNOSIS — O09.92 HRP (HIGH RISK PREGNANCY), SECOND TRIMESTER: Primary | ICD-10-CM

## 2019-08-26 PROCEDURE — G0463 HOSPITAL OUTPT CLINIC VISIT: HCPCS

## 2019-08-26 PROCEDURE — 99207 ZZC OB VISIT-NO CHARGE - GICH ONLY: CPT | Performed by: OBSTETRICS & GYNECOLOGY

## 2019-08-26 ASSESSMENT — PAIN SCALES - GENERAL: PAINLEVEL: NO PAIN (0)

## 2019-08-26 NOTE — PROGRESS NOTES
CC: Recheck OB visit at 15w3d    HPI: nAa Patrick presents for a routine OB visit now at 15w3d  She has no concerns. Denies cramping, bleeding. Completed prometrium, now on baby asa and px lovenox.    OB History    Para Term  AB Living   4 0 0 0 3 0   SAB TAB Ectopic Multiple Live Births   3 0 0 0 0      # Outcome Date GA Lbr Mandeep/2nd Weight Sex Delivery Anes PTL Lv   4 Current            3 SAB 19           2 SAB 18           1 SAB 16             Current Outpatient Medications   Medication     ASPIRIN ADULT LOW STRENGTH PO     enoxaparin (LOVENOX) 40 MG/0.4ML syringe     esomeprazole (NEXIUM) 40 MG DR capsule     metFORMIN modified (GLUMETZA) 1000 MG 24 hr tablet     Prenatal Vit-Fe Fumarate-FA (PRENATAL MULTIVITAMIN PLUS IRON) 27-0.8 MG TABS per tablet     progesterone (PROMETRIUM) 200 MG capsule     sertraline (ZOLOFT) 100 MG tablet     sucralfate (CARAFATE) 1 GM tablet     No current facility-administered medications for this visit.          O: /80 (BP Location: Right arm, Patient Position: Sitting, Cuff Size: Adult Regular)   Pulse 72   Wt 103.7 kg (228 lb 9.6 oz)   LMP 05/10/2019 (Exact Date)   BMI 38.09 kg/m    Body mass index is 38.09 kg/m .  See OB flow sheet  EXAM:  NAD        Results for orders placed or performed in visit on 19   CBC with platelets   Result Value Ref Range    WBC 7.1 4.0 - 11.0 10e9/L    RBC Count 4.41 3.8 - 5.2 10e12/L    Hemoglobin 13.0 11.7 - 15.7 g/dL    Hematocrit 38.1 35.0 - 47.0 %    MCV 86 78 - 100 fl    MCH 29.5 26.5 - 33.0 pg    MCHC 34.1 31.5 - 36.5 g/dL    RDW 12.3 10.0 - 15.0 %    Platelet Count 282 150 - 450 10e9/L   Hepatitis B surface antigen   Result Value Ref Range    Hep B Surface Agn Nonreactive NR^Nonreactive   HIV Antigen Antibody Combo   Result Value Ref Range    HIV Antigen Antibody Combo Nonreactive NR^Nonreactive       Rubella Antibody IgG Quantitative   Result Value Ref Range    Rubella Antibody IgG  Quantitative 33 IU/mL   Treponema Abs w Reflex to RPR and Titer   Result Value Ref Range    Treponema Antibodies Nonreactive NR^Nonreactive   *UA reflex to Microscopic   Result Value Ref Range    Color Urine Yellow     Appearance Urine Clear     Glucose Urine Negative NEG^Negative mg/dL    Bilirubin Urine Negative NEG^Negative    Ketones Urine Trace (A) NEG^Negative mg/dL    Specific Gravity Urine >1.030 (H) 1.000 - 1.030    Blood Urine Negative NEG^Negative    pH Urine 5.0 5.0 - 9.0 pH    Protein Albumin Urine Negative NEG^Negative mg/dL    Urobilinogen Urine 0.2 0.2 - 1.0 EU/dL    Nitrite Urine Negative NEG^Negative    Leukocyte Esterase Urine Trace (A) NEG^Negative    Source Midstream Urine    Urine Microscopic   Result Value Ref Range    WBC Urine 0 - 5 OTO5^0 - 5 /HPF    RBC Urine O - 2 OTO2^O - 2 /HPF    Squamous Epithelial /LPF Urine Few FEW^Few /LPF    Bacteria Urine Moderate (A) NEG^Negative /HPF   Cystic Fibrosis 165 Path Variants   Result Value Ref Range    Cystic Fibrosis Allele 2 Negative     Cystic Fibrosis 5T Variant Not Applicable     Cystic Fibrosis 165 Variant Interp 0 variants    [facility] Miscellaneous Test   Result Value Ref Range    Result SEE NOTE     Test Name SPINAL MUSCLE ATROPHY     Send Outs Misc Test Code [id number]     Send Outs Misc Test Specimen Whole blood, EDTA anticoagulant    ABO/Rh type and screen   Result Value Ref Range    ABO A     RH(D) Neg     Antibody Screen Neg     Test Valid Only At [facility]        Specimen Expires 07/11/2019    Urine Culture Aerobic Bacterial   Result Value Ref Range    Specimen Description Midstream Urine     Culture Micro       Urine culture negative (no growth of uropathogens).   GC/Chlamydia by PCR - [place]   Result Value Ref Range    Specimen Source Midstream Urine     Neisseria gonorrhoreae PCR Not Detected NDET^Not Detected    Chlamydia Trachomatis PCR Not Detected NDET^Not Detected       A/P: 15w3d gestation    Recheck in 4 weeks,  MFM US    Problem List:   Pregnancy risk factors include:    APAS with pos LA and RPL- Targeted MFM US at 19-20 weeks.  PCOS  Obesity, Body mass index is 38.89 kg/m .  Start baby aspirin. Start px Lovenox at 12 weeks gesation.  Normal [place] test.      Gerry Kim MD FACOG  9:03 AM 2019

## 2019-09-25 ENCOUNTER — HOSPITAL ENCOUNTER (OUTPATIENT)
Dept: ULTRASOUND IMAGING | Facility: OTHER | Age: 29
Discharge: HOME | End: 2019-09-25
Attending: OBSTETRICS & GYNECOLOGY | Admitting: OBSTETRICS & GYNECOLOGY
Payer: MEDICAID

## 2019-09-25 ENCOUNTER — HOSPITAL ENCOUNTER (OUTPATIENT)
Dept: ULTRASOUND IMAGING | Facility: CLINIC | Age: 29
End: 2019-09-25
Attending: OBSTETRICS & GYNECOLOGY
Payer: MEDICAID

## 2019-09-25 ENCOUNTER — OFFICE VISIT (OUTPATIENT)
Dept: MATERNAL FETAL MEDICINE | Facility: CLINIC | Age: 29
End: 2019-09-25
Attending: OBSTETRICS & GYNECOLOGY
Payer: MEDICAID

## 2019-09-25 DIAGNOSIS — O99.119 ANTIPHOSPHOLIPID ANTIBODY SYNDROME COMPLICATING PREGNANCY (H): Primary | ICD-10-CM

## 2019-09-25 DIAGNOSIS — D68.61 ANTIPHOSPHOLIPID ANTIBODY SYNDROME COMPLICATING PREGNANCY (H): Primary | ICD-10-CM

## 2019-09-25 DIAGNOSIS — O09.92 HRP (HIGH RISK PREGNANCY), SECOND TRIMESTER: ICD-10-CM

## 2019-09-25 DIAGNOSIS — Z36.89 ENCOUNTER FOR FETAL ANATOMIC SURVEY: ICD-10-CM

## 2019-09-25 PROCEDURE — 76811 OB US DETAILED SNGL FETUS: CPT

## 2019-09-25 NOTE — PROGRESS NOTES
Type of service:    Telemedicine Office Visit for fetal ultrasound    Date of service:     Date: 09/25/19     Time service began:    8:00 AM  Time service ended:    9:00 AM    Reason:      .tel: Patient unable to travel    Description of basis or telemedicine appropriateness:     Consultation provided at the request of Dr. Kim for advice regarding the diagnosis and treatment of this patient's pregnancy.  The patient's condition can be safely assessed via telemedicine.    The Mode of Transmission:    Secure interactive audio and visual telecommunication system (Video Guidance)    Location of originating and distant sites:      Originating site:   [place], [place]    Distant site:    [place], [place]    Liana Peck DO

## 2019-09-30 ENCOUNTER — MYC MEDICAL ADVICE (OUTPATIENT)
Dept: OBGYN | Facility: OTHER | Age: 29
End: 2019-09-30

## 2019-09-30 DIAGNOSIS — O99.119 ANTIPHOSPHOLIPID ANTIBODY SYNDROME COMPLICATING PREGNANCY (H): Primary | ICD-10-CM

## 2019-09-30 DIAGNOSIS — D68.61 ANTIPHOSPHOLIPID ANTIBODY SYNDROME COMPLICATING PREGNANCY (H): Primary | ICD-10-CM

## 2019-09-30 DIAGNOSIS — Z36.89 ENCOUNTER FOR FETAL ANATOMIC SURVEY: ICD-10-CM

## 2019-10-03 ENCOUNTER — PRENATAL OFFICE VISIT (OUTPATIENT)
Dept: OBGYN | Facility: OTHER | Age: 29
End: 2019-10-03
Attending: OBSTETRICS & GYNECOLOGY
Payer: COMMERCIAL

## 2019-10-03 VITALS
DIASTOLIC BLOOD PRESSURE: 88 MMHG | BODY MASS INDEX: 38.2 KG/M2 | WEIGHT: 229.3 LBS | SYSTOLIC BLOOD PRESSURE: 124 MMHG | HEART RATE: 78 BPM

## 2019-10-03 DIAGNOSIS — O09.92 HRP (HIGH RISK PREGNANCY), SECOND TRIMESTER: ICD-10-CM

## 2019-10-03 DIAGNOSIS — O99.119 ANTIPHOSPHOLIPID ANTIBODY SYNDROME COMPLICATING PREGNANCY (H): ICD-10-CM

## 2019-10-03 DIAGNOSIS — D68.61 ANTIPHOSPHOLIPID ANTIBODY SYNDROME COMPLICATING PREGNANCY (H): ICD-10-CM

## 2019-10-03 DIAGNOSIS — Z23 NEEDS FLU SHOT: Primary | ICD-10-CM

## 2019-10-03 PROCEDURE — 90471 IMMUNIZATION ADMIN: CPT

## 2019-10-03 PROCEDURE — 99207 ZZC OB VISIT-NO CHARGE - GICH ONLY: CPT | Performed by: OBSTETRICS & GYNECOLOGY

## 2019-10-03 PROCEDURE — 90686 IIV4 VACC NO PRSV 0.5 ML IM: CPT

## 2019-10-03 ASSESSMENT — PAIN SCALES - GENERAL: PAINLEVEL: NO PAIN (0)

## 2019-10-03 NOTE — NURSING NOTE
"Chief Complaint   Patient presents with     Prenatal Care     20w6d       Initial /88 (BP Location: Right arm)   Pulse 78   Wt 104 kg (229 lb 4.8 oz)   LMP 05/10/2019 (Exact Date)   BMI 38.20 kg/m   Estimated body mass index is 38.2 kg/m  as calculated from the following:    Height as of 7/8/19: 1.65 m (5' 4.96\").    Weight as of this encounter: 104 kg (229 lb 4.8 oz).  Medication Reconciliation: Completed    Alyssa Valdez LPN............. 10/3/2019 9:32 AM   Prior to injection, verified patient identity using patient's name and date of birth.  Due to injection administration, patient instructed to remain in clinic for 15 minutes  afterwards, and to report any adverse reaction to me immediately.    Opal Valdez LPN............. 10/3/2019 9:33 AM       "

## 2019-10-03 NOTE — PROGRESS NOTES
CC: Recheck OB visit at 20w6d    HPI: Ana Patrick presents for a routine OB visit now at 20w6d  She has no concerns. Denies cramping, bleeding, normal fetal movement.    OB History    Para Term  AB Living   4 0 0 0 3 0   SAB TAB Ectopic Multiple Live Births   3 0 0 0 0      # Outcome Date GA Lbr Mandeep/2nd Weight Sex Delivery Anes PTL Lv   4 Current            3 SAB 19           2 SAB 18           1 SAB 16             Current Outpatient Medications   Medication     ASPIRIN ADULT LOW STRENGTH PO     enoxaparin (LOVENOX) 40 MG/0.4ML syringe     esomeprazole (NEXIUM) 40 MG DR capsule     metFORMIN modified (GLUMETZA) 1000 MG 24 hr tablet     Prenatal Vit-Fe Fumarate-FA (PRENATAL MULTIVITAMIN PLUS IRON) 27-0.8 MG TABS per tablet     sertraline (ZOLOFT) 100 MG tablet     sucralfate (CARAFATE) 1 GM tablet     No current facility-administered medications for this visit.          O: /88 (BP Location: Right arm)   Pulse 78   Wt 104 kg (229 lb 4.8 oz)   LMP 05/10/2019 (Exact Date)   BMI 38.20 kg/m    Body mass index is 38.2 kg/m .  See OB flow sheet  EXAM:  NAD  FHT:140 bpm    Results for orders placed or performed during the hospital encounter of 19   [facility] Telemedicine  Comprehensive Single    Narrative            Comprehensive  ---------------------------------------------------------------------------------------------------------  Pat. Name: ANA PATRICK       Study Date:  2019 7:53am  Pat. NO:  [id number]        Referring  MD: SUE URRUTIA  Site:  [facility]       Sonographer: Ana Salazar RDMS  :  1990        Age:   28  ---------------------------------------------------------------------------------------------------------    INDICATION  ---------------------------------------------------------------------------------------------------------  Antiphospholipid antibody syndrome, Low risk  NIPT      METHOD  ---------------------------------------------------------------------------------------------------------  TELEMEDICINE ULTRASOUND REPORT. Transabdominal ultrasound examination. View: Sufficient      PREGNANCY  ---------------------------------------------------------------------------------------------------------  Martínez pregnancy. Number of fetuses: 1      DATING  ---------------------------------------------------------------------------------------------------------                                           Date                                Details                                                                                      Gest. age                      GREGORY  LMP                                  5/10/2019                                                                                                                         19 w + 5 d                     2/14/2020  Prior assessment               6/26/2019                         GA: 6 w + 5 d                                                                            19 w + 5 d                     2/14/2020  U/S                                   9/25/2019                         based upon AC, BPD, Femur, HC                                                20 w + 0 d                     2/12/2020  Assigned dating                  Dating performed on 09/25/2019, based on the LMP                                                            19 w + 5 d                     2/14/2020      GENERAL EVALUATION  ---------------------------------------------------------------------------------------------------------  Cardiac activity present.  bpm.  Fetal movements present.  Presentation cephalic.  Placenta Placental site: posterior, no previa.  Umbilical cord 3 vessel cord.  Amniotic fluid Amount of AF: normal. MVP 5.7 cm.      FETAL  BIOMETRY  ---------------------------------------------------------------------------------------------------------  Main Fetal Biometry:  BPD                                        43.8                    mm                         19w 2d                Hadlock  HC                                          170.4                  mm                          19w 5d                Hadlock  Cerebellum tr                            19.4                   mm                          18w 5d                Nicolaides  AC                                          151.7                  mm                          20w 3d                Hadlock  Femur                                      33.3                   mm                          20w 3d                Hadlock  Humerus                                  29.2                    mm                         19w 4d                Yolande  Fetal Weight Calculation:  EFW                                       344                     g  EFW (lb,oz)                             0 lb 12                 oz  EFW by                                        Hadlock (BPD-HC-AC-FL)  Head / Face / Neck Biometry:                                             6.6                     mm  CM                                          5.3                     mm  Nasal bone                               6.9                     mm  Nuchal fold                               4.3                     mm      FETAL ANATOMY  ---------------------------------------------------------------------------------------------------------  The following structures appear normal:  Head / Neck                         Cranium. Head size. Head shape. Lateral ventricles. Choroid plexus. Midline falx. Cavum septi pellucidi. Cerebellum. Cisterna magna.                                             Parenchyma. Thalami. Vermis.                                             Neck. Nuchal fold.  Face                                    Lips. Profile. Nose. Maxilla. Mandible. Orbits. Lens.  Heart / Thorax                      Situs. Aortic arch view. Cardiac position. Cardiac size. Cardiac rhythm.                                             Right lung. Left lung. Diaphragm.  Abdomen                             Abdominal wall. Cord insertion. Stomach. Kidneys. Bladder. Liver. Bowel. Genitals.  Spine                                  Cervical spine. Thoracic spine. Lumbar spine. Sacral spine.  Extremities / Skeleton          Right arm. Right hand. Left arm. Left hand. Right leg. Right foot. Left leg. Left foot.    The following structures could not be adequately visualized:  Heart / Thorax                      4-chamber view. RVOT view. LVOT view. Bicaval view. Ductal arch view. Superior vena cava. Inferior vena cava. 3-vessel view. 3-vessel-trachea                                             view.    Gender: female.      MATERNAL STRUCTURES  ---------------------------------------------------------------------------------------------------------  Cervix                                  Visualized                                             Appearance: Appears Closed                                             Cervical length 38.9 mm  Right Ovary                          Visualized  Left Ovary                            Visualized      RECOMMENDATION  ---------------------------------------------------------------------------------------------------------  We discussed the findings on today's ultrasound with the patient.    The patient had a low risk cell free DNA screen.    Recommend a follow up ultrasound through telemedicine in approx 3 weeks to assess the anatomy that was not well seen today.    Return to primary provider for continued prenatal care.    Thank you for the opportunity to participate in the care of this patient. If you have questions regarding today's evaluation or if we can be of further service, please contact the  Maternal-Fetal  [facility].    **Fetal anomalies may be present but not detected**        Impression    IMPRESSION  ---------------------------------------------------------------------------------------------------------  1) Intrauterine pregnancy at 19 5/7 weeks gestational age.  2) None of the anomalies commonly detected by ultrasound were evident in the detailed fetal anatomic survey described above, however the cardiac anatomy is suboptimal.    3) Growth parameters and estimated fetal weight were consistent with an appropriate for gestation age pattern of growth.  4) The amniotic fluid volume appeared normal.           A/P: 20w6d gestation    Recheck in 4 weeks    Problem List:   Pregnancy risk factors include:    APAS with pos LA and RPL- Targeted MFM US at 19-20 weeks.  PCOS  Obesity, Body mass index is 38.89 kg/m .  Start baby aspirin. Start px Lovenox at 12 weeks gesation.  Normal [place] test.    Gerry Kim MD FACOG  9:13 AM 10/3/2019

## 2019-10-09 ENCOUNTER — HOSPITAL ENCOUNTER (OUTPATIENT)
Dept: ULTRASOUND IMAGING | Facility: CLINIC | Age: 29
End: 2019-10-09
Attending: OBSTETRICS & GYNECOLOGY
Payer: COMMERCIAL

## 2019-10-09 ENCOUNTER — HOSPITAL ENCOUNTER (OUTPATIENT)
Dept: ULTRASOUND IMAGING | Facility: OTHER | Age: 29
Discharge: HOME | End: 2019-10-09
Attending: OBSTETRICS & GYNECOLOGY | Admitting: OBSTETRICS & GYNECOLOGY
Payer: COMMERCIAL

## 2019-10-09 ENCOUNTER — OFFICE VISIT (OUTPATIENT)
Dept: MATERNAL FETAL MEDICINE | Facility: CLINIC | Age: 29
End: 2019-10-09
Attending: OBSTETRICS & GYNECOLOGY
Payer: COMMERCIAL

## 2019-10-09 DIAGNOSIS — O99.119 ANTIPHOSPHOLIPID ANTIBODY SYNDROME COMPLICATING PREGNANCY (H): Primary | ICD-10-CM

## 2019-10-09 DIAGNOSIS — O99.119 ANTIPHOSPHOLIPID ANTIBODY SYNDROME COMPLICATING PREGNANCY (H): ICD-10-CM

## 2019-10-09 DIAGNOSIS — D68.61 ANTIPHOSPHOLIPID ANTIBODY SYNDROME COMPLICATING PREGNANCY (H): ICD-10-CM

## 2019-10-09 DIAGNOSIS — D68.61 ANTIPHOSPHOLIPID ANTIBODY SYNDROME COMPLICATING PREGNANCY (H): Primary | ICD-10-CM

## 2019-10-09 DIAGNOSIS — Z36.89 ENCOUNTER FOR FETAL ANATOMIC SURVEY: ICD-10-CM

## 2019-10-09 PROCEDURE — 76805 OB US >/= 14 WKS SNGL FETUS: CPT

## 2019-10-09 NOTE — PROGRESS NOTES
Type of service:     Follow-up fetal anatomic survey    Date of service:     10/9/19     Time service began:    1:59 PM  Time service ended:    2:58 PM    Reason:      Patient conveience    Description of basis or telemedicine appropriateness:     Consultation provided at the request of Dr. Kim for advice regarding the diagnosis and treatment of this patient's prengnacy.  The patient's condition can be safely assessed via telemedicine.    The Mode of Transmission:    Secure interactive audio and visual telecommunication system (Video Guidance)    Location of originating and distant sites:      Originating site:   [place], [place]    Distant site:   [place], [place]        Francy Crawford MD  , OB/GYN  Maternal-Fetal Medicine  [email or web address]  [phone] (Academic office)  [phone] (Pager)

## 2019-10-29 ENCOUNTER — PRENATAL OFFICE VISIT (OUTPATIENT)
Dept: OBGYN | Facility: OTHER | Age: 29
End: 2019-10-29
Attending: OBSTETRICS & GYNECOLOGY
Payer: COMMERCIAL

## 2019-10-29 VITALS
SYSTOLIC BLOOD PRESSURE: 114 MMHG | HEART RATE: 82 BPM | DIASTOLIC BLOOD PRESSURE: 68 MMHG | BODY MASS INDEX: 39.64 KG/M2 | WEIGHT: 237.9 LBS

## 2019-10-29 DIAGNOSIS — O99.119 ANTIPHOSPHOLIPID SYNDROME IN PREGNANCY (H): ICD-10-CM

## 2019-10-29 DIAGNOSIS — O09.92 HRP (HIGH RISK PREGNANCY), SECOND TRIMESTER: Primary | ICD-10-CM

## 2019-10-29 DIAGNOSIS — D68.61 ANTIPHOSPHOLIPID SYNDROME IN PREGNANCY (H): ICD-10-CM

## 2019-10-29 DIAGNOSIS — E74.39 GLUCOSE INTOLERANCE: ICD-10-CM

## 2019-10-29 LAB
ALBUMIN UR-MCNC: NEGATIVE MG/DL
APPEARANCE UR: CLEAR
BASOPHILS # BLD AUTO: 0 10E9/L (ref 0–0.2)
BASOPHILS NFR BLD AUTO: 0.4 %
BILIRUB UR QL STRIP: NEGATIVE
COLOR UR AUTO: YELLOW
DIFFERENTIAL METHOD BLD: ABNORMAL
EOSINOPHIL # BLD AUTO: 0.1 10E9/L (ref 0–0.7)
EOSINOPHIL NFR BLD AUTO: 0.7 %
ERYTHROCYTE [DISTWIDTH] IN BLOOD BY AUTOMATED COUNT: 12.8 % (ref 10–15)
GLUCOSE 1H P 50 G GLC PO SERPL-MCNC: 179 MG/DL (ref 60–129)
GLUCOSE UR STRIP-MCNC: NEGATIVE MG/DL
HCT VFR BLD AUTO: 34.4 % (ref 35–47)
HGB BLD-MCNC: 11.5 G/DL (ref 11.7–15.7)
HGB UR QL STRIP: NEGATIVE
IMM GRANULOCYTES # BLD: 0.1 10E9/L (ref 0–0.4)
IMM GRANULOCYTES NFR BLD: 0.6 %
KETONES UR STRIP-MCNC: NEGATIVE MG/DL
LEUKOCYTE ESTERASE UR QL STRIP: NEGATIVE
LYMPHOCYTES # BLD AUTO: 1.4 10E9/L (ref 0.8–5.3)
LYMPHOCYTES NFR BLD AUTO: 14.5 %
MCH RBC QN AUTO: 29.3 PG (ref 26.5–33)
MCHC RBC AUTO-ENTMCNC: 33.4 G/DL (ref 31.5–36.5)
MCV RBC AUTO: 88 FL (ref 78–100)
MONOCYTES # BLD AUTO: 0.4 10E9/L (ref 0–1.3)
MONOCYTES NFR BLD AUTO: 4.2 %
NEUTROPHILS # BLD AUTO: 7.8 10E9/L (ref 1.6–8.3)
NEUTROPHILS NFR BLD AUTO: 79.6 %
NITRATE UR QL: NEGATIVE
PH UR STRIP: 8.5 PH (ref 5–9)
PLATELET # BLD AUTO: 248 10E9/L (ref 150–450)
RBC # BLD AUTO: 3.92 10E12/L (ref 3.8–5.2)
SOURCE: NORMAL
SP GR UR STRIP: 1.01 (ref 1–1.03)
UROBILINOGEN UR STRIP-ACNC: 0.2 EU/DL (ref 0.2–1)
WBC # BLD AUTO: 9.8 10E9/L (ref 4–11)

## 2019-10-29 PROCEDURE — 82950 GLUCOSE TEST: CPT | Mod: ZL | Performed by: OBSTETRICS & GYNECOLOGY

## 2019-10-29 PROCEDURE — 86780 TREPONEMA PALLIDUM: CPT | Mod: ZL | Performed by: OBSTETRICS & GYNECOLOGY

## 2019-10-29 PROCEDURE — 36415 COLL VENOUS BLD VENIPUNCTURE: CPT | Mod: ZL | Performed by: OBSTETRICS & GYNECOLOGY

## 2019-10-29 PROCEDURE — 99207 ZZC OB VISIT-NO CHARGE - GICH ONLY: CPT | Performed by: OBSTETRICS & GYNECOLOGY

## 2019-10-29 PROCEDURE — 85025 COMPLETE CBC W/AUTO DIFF WBC: CPT | Mod: ZL | Performed by: OBSTETRICS & GYNECOLOGY

## 2019-10-29 PROCEDURE — 81003 URINALYSIS AUTO W/O SCOPE: CPT | Mod: ZL | Performed by: OBSTETRICS & GYNECOLOGY

## 2019-10-29 ASSESSMENT — PAIN SCALES - GENERAL: PAINLEVEL: NO PAIN (0)

## 2019-10-29 NOTE — PROGRESS NOTES
CC: Recheck OB visit at 24w4d    HPI: Ana Patrick presents for a routine OB visit now at 24w4d  She has no concerns. Denies cramping, bleeding, normal fetal movement.     OB History    Para Term  AB Living   4 0 0 0 3 0   SAB TAB Ectopic Multiple Live Births   3 0 0 0 0      # Outcome Date GA Lbr Mandeep/2nd Weight Sex Delivery Anes PTL Lv   4 Current            3 SAB 19           2 SAB 18           1 SAB 16             Current Outpatient Medications   Medication     ASPIRIN ADULT LOW STRENGTH PO     enoxaparin (LOVENOX) 40 MG/0.4ML syringe     esomeprazole (NEXIUM) 40 MG DR capsule     metFORMIN modified (GLUMETZA) 1000 MG 24 hr tablet     Prenatal Vit-Fe Fumarate-FA (PRENATAL MULTIVITAMIN PLUS IRON) 27-0.8 MG TABS per tablet     sertraline (ZOLOFT) 100 MG tablet     sucralfate (CARAFATE) 1 GM tablet     No current facility-administered medications for this visit.          O: /68 (BP Location: Right arm)   Pulse 82   Wt 107.9 kg (237 lb 14.4 oz)   LMP 05/10/2019 (Exact Date)   BMI 39.64 kg/m    Body mass index is 39.64 kg/m .  See OB flow sheet  EXAM:  NAD  FH:24 cm  FHT:130 bpm      A/P: (O09.92) HRP (high risk pregnancy), second trimester  (primary encounter diagnosis)  Comment:   Plan: CBC and Differential, Glucose tolerance gest         screen 1 hour, Treponema Ab w Reflex to RPR and        Titer, UA reflex to Microscopic and Culture              Recheck in 4 weeks      Problem List:  Pregnancy risk factors include:    APAS with pos LA and RPL- Targeted MFM US at 19-20 weeks-normal  PCOS  Obesity, Body mass index is 38.89 kg/m .  Start baby aspirin. Start px Lovenox at 12 weeks gesation.  Normal [place] test.          Gerry Kim MD FACOG  8:37 AM 10/29/2019

## 2019-10-30 LAB — T PALLIDUM AB SER QL: NONREACTIVE

## 2019-10-31 ENCOUNTER — HOSPITAL ENCOUNTER (OUTPATIENT)
Dept: ULTRASOUND IMAGING | Facility: OTHER | Age: 29
Discharge: HOME | End: 2019-10-31
Attending: OBSTETRICS & GYNECOLOGY | Admitting: OBSTETRICS & GYNECOLOGY
Payer: COMMERCIAL

## 2019-10-31 DIAGNOSIS — O99.119 ANTIPHOSPHOLIPID SYNDROME IN PREGNANCY (H): ICD-10-CM

## 2019-10-31 DIAGNOSIS — D68.61 ANTIPHOSPHOLIPID SYNDROME IN PREGNANCY (H): ICD-10-CM

## 2019-10-31 PROCEDURE — 76816 OB US FOLLOW-UP PER FETUS: CPT

## 2019-11-25 ENCOUNTER — HOSPITAL ENCOUNTER (OUTPATIENT)
Dept: ULTRASOUND IMAGING | Facility: OTHER | Age: 29
Discharge: HOME | End: 2019-11-25
Attending: OBSTETRICS & GYNECOLOGY | Admitting: OBSTETRICS & GYNECOLOGY
Payer: COMMERCIAL

## 2019-11-25 ENCOUNTER — PRENATAL OFFICE VISIT (OUTPATIENT)
Dept: OBGYN | Facility: OTHER | Age: 29
End: 2019-11-25
Attending: OBSTETRICS & GYNECOLOGY
Payer: COMMERCIAL

## 2019-11-25 VITALS
DIASTOLIC BLOOD PRESSURE: 72 MMHG | BODY MASS INDEX: 40.02 KG/M2 | HEART RATE: 86 BPM | WEIGHT: 240.2 LBS | SYSTOLIC BLOOD PRESSURE: 118 MMHG

## 2019-11-25 DIAGNOSIS — O24.415 GESTATIONAL DIABETES MELLITUS (GDM) IN THIRD TRIMESTER CONTROLLED ON ORAL HYPOGLYCEMIC DRUG: ICD-10-CM

## 2019-11-25 DIAGNOSIS — D68.61 ANTIPHOSPHOLIPID SYNDROME IN PREGNANCY (H): ICD-10-CM

## 2019-11-25 DIAGNOSIS — Z23 NEED FOR PROPHYLACTIC VACCINATION AGAINST DIPHTHERIA-TETANUS-PERTUSSIS (DTP): ICD-10-CM

## 2019-11-25 DIAGNOSIS — O99.119 ANTIPHOSPHOLIPID SYNDROME IN PREGNANCY (H): ICD-10-CM

## 2019-11-25 DIAGNOSIS — D68.61 ANTIPHOSPHOLIPID ANTIBODY SYNDROME COMPLICATING PREGNANCY (H): ICD-10-CM

## 2019-11-25 DIAGNOSIS — Z34.92 NORMAL PREGNANCY IN SECOND TRIMESTER: Primary | ICD-10-CM

## 2019-11-25 DIAGNOSIS — O99.119 ANTIPHOSPHOLIPID ANTIBODY SYNDROME COMPLICATING PREGNANCY (H): ICD-10-CM

## 2019-11-25 LAB
ABO + RH BLD: NORMAL
ABO + RH BLD: NORMAL
BLOOD BANK CMNT PATIENT-IMP: NORMAL
BLOOD BANK CMNT PATIENT-IMP: NORMAL
DATE RH IMM GL GVN: NORMAL
FETAL CELL SCN BLD QL ROSETTE: NORMAL
RH IG VIALS RECOM PATIENT: NORMAL

## 2019-11-25 PROCEDURE — 25000128 H RX IP 250 OP 636: Performed by: OBSTETRICS & GYNECOLOGY

## 2019-11-25 PROCEDURE — 85461 HEMOGLOBIN FETAL: CPT | Mod: ZL | Performed by: OBSTETRICS & GYNECOLOGY

## 2019-11-25 PROCEDURE — 86901 BLOOD TYPING SEROLOGIC RH(D): CPT | Mod: ZL | Performed by: OBSTETRICS & GYNECOLOGY

## 2019-11-25 PROCEDURE — 86900 BLOOD TYPING SEROLOGIC ABO: CPT | Mod: ZL | Performed by: OBSTETRICS & GYNECOLOGY

## 2019-11-25 PROCEDURE — 76817 TRANSVAGINAL US OBSTETRIC: CPT

## 2019-11-25 PROCEDURE — 90472 IMMUNIZATION ADMIN EACH ADD: CPT

## 2019-11-25 PROCEDURE — 90715 TDAP VACCINE 7 YRS/> IM: CPT

## 2019-11-25 PROCEDURE — 36415 COLL VENOUS BLD VENIPUNCTURE: CPT | Mod: ZL | Performed by: OBSTETRICS & GYNECOLOGY

## 2019-11-25 PROCEDURE — 96372 THER/PROPH/DIAG INJ SC/IM: CPT | Performed by: OBSTETRICS & GYNECOLOGY

## 2019-11-25 PROCEDURE — 99207 ZZC OB VISIT-NO CHARGE - GICH ONLY: CPT | Performed by: OBSTETRICS & GYNECOLOGY

## 2019-11-25 RX ADMIN — RHO(D) IMMUNE GLOBULIN (HUMAN) 300 MCG: 1500 SOLUTION INTRAMUSCULAR at 10:11

## 2019-11-25 ASSESSMENT — PAIN SCALES - GENERAL: PAINLEVEL: NO PAIN (0)

## 2019-11-25 NOTE — NURSING NOTE
Clinic Administered Medication Documentation    MEDICATION LIST:   Injectable Medication Documentation    Patient was given Rhogam. Prior to medication administration, verified patients identity using patient s name and date of birth. Please see MAR and medication order for additional information. Patient instructed to report any adverse reaction to staff immediately .      Was entire vial of medication used? Yes  Vial/Syringe: Syringe  Expiration Date:  01/17/22  Was this medication supplied by the patient? No   Francisco Javier Castañeda LPN on 11/25/2019 at 10:14 AM

## 2019-11-25 NOTE — PROGRESS NOTES
CC: Recheck OB visit at 28w3d    HPI: Ana Patrick presents for a routine OB visit now at 28w3d  She has no concerns. Denies cramping, bleeding, normal fetal movement.  Glucose levels are all normal.  She is on oral metformin 1500 mg daily.    OB History    Para Term  AB Living   4 0 0 0 3 0   SAB TAB Ectopic Multiple Live Births   3 0 0 0 0      # Outcome Date GA Lbr Mandeep/2nd Weight Sex Delivery Anes PTL Lv   4 Current            3 SAB 19           2 SAB 18           1 SAB 16             Current Outpatient Medications   Medication     ASPIRIN ADULT LOW STRENGTH PO     blood glucose (NO BRAND SPECIFIED) lancets standard     blood glucose (NO BRAND SPECIFIED) test strip     blood glucose monitoring (NO BRAND SPECIFIED) meter device kit     enoxaparin (LOVENOX) 40 MG/0.4ML syringe     esomeprazole (NEXIUM) 40 MG DR capsule     metFORMIN modified (GLUMETZA) 1000 MG 24 hr tablet     Prenatal Vit-Fe Fumarate-FA (PRENATAL MULTIVITAMIN PLUS IRON) 27-0.8 MG TABS per tablet     sertraline (ZOLOFT) 100 MG tablet     sucralfate (CARAFATE) 1 GM tablet     No current facility-administered medications for this visit.          O: /72 (BP Location: Right arm)   Pulse 86   Wt 109 kg (240 lb 3.2 oz)   LMP 05/10/2019 (Exact Date)   BMI 40.02 kg/m    Body mass index is 40.02 kg/m .  See OB flow sheet  EXAM:  NAD  FH:28 cm  FHT:150 bpm    Results for orders placed or performed in visit on 19   RHOGAM ORDER     Status: None   Result Value Ref Range    Rhogam Order Order received    Rh Immune Globulin Study     Status: None   Result Value Ref Range    ABO A     RH(D) Neg     Fetal Blood Screen Canceled, Test credited     Blood Bank Comment Suitable for Rh Immunoglobulin     RhIg Administered 2019     Amount of RHIG Required 300 micrograms Rh Immune Globulin required      Recent Results (from the past 24 hour(s))   US OB >14 Weeks Follow Up    Narrative    OB ULTRASOUND REPORT      Clinical: , growth and fluid, APAS; Antiphospholipid syndrome in  pregnancy (H); Antiphospholipid syndrome in pregnancy (H).  Gestation:  1  Presentation: Breech  Lie:  Oblique  Cardiac Activity:  Regular  BPM:  160  Movement:  Yes  Placenta: Posterior  ndGndrndanddndend:nd nd2nd Previa:  No Previa  Cervix:  Cervix was imaged transabdominally and transvaginally. There  is mild funneling of the internal cervical os. The closed portion of  the cervix measures between 2.2 and 2.5 cm on multiple images.  Amniotic Fluid: Normal  ERIS:  21.6 cm    Measurements:    BPD:  29 weeks 3 days  HC:  30 weeks 4 days  AC:  30 weeks 2 days  FL:  29 weeks 3 days    Estimated Fetal Weight:  1477 grams  HC/AC:  1.07  US age (current):  30 weeks 0 days  Gestational age:  28 weeks 3 days  US EDC (preferred):  /20   %WT for EGA (preferred dating):  77 %      Impression    IMPRESSION: Single viable intrauterine pregnancy demonstrating a  borderline funneled appearance of the internal cervical os, with the  closed portion of the cervix measuring between 2.2 and 2.5 cm on both  transabdominal and transvaginal imaging. Consider short interval  follow-up.    TERESA MARKS MD       A/P: 28w3d gestation      Recheck in 2 weeks       Problem List:  Pregnancy risk factors include:    APAS with pos LA and RPL- Targeted MFM US at 19-20 weeks-normal  PCOS  Obesity, Body mass index is 38.89 kg/m .  Start baby aspirin. Start px Lovenox at 12 weeks gesation.  Normal [place] test.  GDM on metformin- weekly BPP's in two weeks.  Short cervix- recheck at 30 weeks.    Gerry Kim MD FACOG  9:22 AM 2019

## 2019-11-25 NOTE — NURSING NOTE
Immunization Documentation    Prior to Immunization administration, verified patients identity using patient's name and date of birth. Please see IMMUNIZATIONS  and order for additional information.  Patient / Parent instructed to remain in clinic for 15 minutes and report any adverse reaction to staff immediately.    Was the entire amount of vaccines given used? Yes    Francisco Javier Castañeda LPN  11/25/2019   10:12 AM

## 2019-12-02 ENCOUNTER — MYC MEDICAL ADVICE (OUTPATIENT)
Dept: FAMILY MEDICINE | Facility: OTHER | Age: 29
End: 2019-12-02

## 2019-12-09 ENCOUNTER — PRENATAL OFFICE VISIT (OUTPATIENT)
Dept: OBGYN | Facility: OTHER | Age: 29
End: 2019-12-09
Attending: OBSTETRICS & GYNECOLOGY
Payer: COMMERCIAL

## 2019-12-09 ENCOUNTER — HOSPITAL ENCOUNTER (OUTPATIENT)
Dept: ULTRASOUND IMAGING | Facility: OTHER | Age: 29
Discharge: HOME | End: 2019-12-09
Attending: OBSTETRICS & GYNECOLOGY | Admitting: OBSTETRICS & GYNECOLOGY
Payer: COMMERCIAL

## 2019-12-09 VITALS
HEART RATE: 84 BPM | BODY MASS INDEX: 40.7 KG/M2 | SYSTOLIC BLOOD PRESSURE: 124 MMHG | WEIGHT: 244.3 LBS | DIASTOLIC BLOOD PRESSURE: 78 MMHG

## 2019-12-09 DIAGNOSIS — O09.93 HRP (HIGH RISK PREGNANCY), THIRD TRIMESTER: ICD-10-CM

## 2019-12-09 DIAGNOSIS — D68.61 ANTIPHOSPHOLIPID ANTIBODY SYNDROME COMPLICATING PREGNANCY (H): ICD-10-CM

## 2019-12-09 DIAGNOSIS — O24.415 GESTATIONAL DIABETES MELLITUS (GDM) IN THIRD TRIMESTER CONTROLLED ON ORAL HYPOGLYCEMIC DRUG: ICD-10-CM

## 2019-12-09 DIAGNOSIS — O99.119 ANTIPHOSPHOLIPID ANTIBODY SYNDROME COMPLICATING PREGNANCY (H): ICD-10-CM

## 2019-12-09 DIAGNOSIS — O24.415 GESTATIONAL DIABETES MELLITUS (GDM) IN THIRD TRIMESTER CONTROLLED ON ORAL HYPOGLYCEMIC DRUG: Primary | ICD-10-CM

## 2019-12-09 PROCEDURE — 76819 FETAL BIOPHYS PROFIL W/O NST: CPT

## 2019-12-09 PROCEDURE — 99207 ZZC OB VISIT-NO CHARGE - GICH ONLY: CPT | Performed by: OBSTETRICS & GYNECOLOGY

## 2019-12-09 PROCEDURE — 59025 FETAL NON-STRESS TEST: CPT | Mod: XU | Performed by: OBSTETRICS & GYNECOLOGY

## 2019-12-09 ASSESSMENT — PAIN SCALES - GENERAL: PAINLEVEL: NO PAIN (0)

## 2019-12-09 NOTE — PROGRESS NOTES
CC: Recheck OB visit at 30w3d    HPI: Ana Patrick presents for a routine OB visit now at 30w3d  She has no concerns. Denies cramping, bleeding, normal fetal movement. BPP 8/8 today. She reports good glucose control.  ERIS was 21 cm today.    OB History    Para Term  AB Living   4 0 0 0 3 0   SAB TAB Ectopic Multiple Live Births   3 0 0 0 0      # Outcome Date GA Lbr Mandeep/2nd Weight Sex Delivery Anes PTL Lv   4 Current            3 SAB 19           2 SAB 18           1 SAB 16             Current Outpatient Medications   Medication     ASPIRIN ADULT LOW STRENGTH PO     blood glucose (NO BRAND SPECIFIED) lancets standard     blood glucose (NO BRAND SPECIFIED) test strip     blood glucose monitoring (NO BRAND SPECIFIED) meter device kit     enoxaparin (LOVENOX) 40 MG/0.4ML syringe     esomeprazole (NEXIUM) 40 MG DR capsule     metFORMIN modified (GLUMETZA) 1000 MG 24 hr tablet     Prenatal Vit-Fe Fumarate-FA (PRENATAL MULTIVITAMIN PLUS IRON) 27-0.8 MG TABS per tablet     sertraline (ZOLOFT) 100 MG tablet     sucralfate (CARAFATE) 1 GM tablet     No current facility-administered medications for this visit.          O: /78 (BP Location: Right arm)   Pulse 84   Wt 110.8 kg (244 lb 4.8 oz)   LMP 05/10/2019 (Exact Date)   BMI 40.70 kg/m    Body mass index is 40.7 kg/m .  See OB flow sheet  EXAM:  NAD    NST doucmentation:    Indication: GDM, APAS  Duration: 30 min     30w3d  FHR baseline: 140 with moderate variability  Accelerations: y (10x10)  Decelerations: n  Contractions: n    Category 1, reactive for GA    Recent Results (from the past 24 hour(s))   US OB Fetal Biophys Prf wo NonStrs Singls Sgl    Narrative    History: Gestational diabetes mellitus (GDM) in third trimester  controlled on oral hypoglycemic drug; Antiphospholipid antibody  syndrome complicating pregnancy (H); Antiphospholipid antibody  syndrome complicating pregnancy (H)    Fetal Movement:  Score 2: At  least 3 discrete body/limb movements in 30 minutes  Score 0: Up to 2 episodes of limb/body movements in 30 minutes                    FM = 2    Fetal Breathing movements:  Score 2: At least one episode, at least 30 seconds duration in 30  minutes of observation.  Score 0: Absent or no episodes of greater than 30 seconds    duration in 30 minutes observation.                    FBM = 2    Fetal Tone:  Score 2: At least one episode of active extension with return to     flexion of fetal limbs or trunk, opening and closing of     hands considered normal tone.  Score 0: Absent or no episodes in 30 minutes of observation.                    FT = 2    Amniotic Fluid Volume:  Score 2: At least one pocket of amniotic fluid measuring at least    1 cm in two perpendicular planes.  Score 0: Either no amniotic fluid or a pocket less than 1 cm in    two perpendicular planes.                    AF = 2                        TOTAL = 8    ERIS: 21.8 cm    HRT Rate: 161 bpm    Placenta Location: Posterior    Placenta ndGndrndanddndend:nd nd2nd Impression    IMPRESSION: Normal biophysical profile.    SHRAVAN BROWN MD       A/P: 30w3d gestation    Recheck in 1 week    Problem List:  Pregnancy risk factors include:    APAS with pos LA and RPL- Targeted MFM US at 19-20 weeks-normal  PCOS  Body mass index is 40.7 kg/m .  Start baby aspirin. Start px Lovenox at 12 weeks gesation.  Normal [place] test.  GDM on metformin- weekly BPP's in two weeks.         Gerry Kim MD FACOG  11:28 AM 2019

## 2019-12-16 ENCOUNTER — PRENATAL OFFICE VISIT (OUTPATIENT)
Dept: OBGYN | Facility: OTHER | Age: 29
End: 2019-12-16
Attending: OBSTETRICS & GYNECOLOGY
Payer: COMMERCIAL

## 2019-12-16 ENCOUNTER — HOSPITAL ENCOUNTER (OUTPATIENT)
Dept: ULTRASOUND IMAGING | Facility: OTHER | Age: 29
Discharge: HOME | End: 2019-12-16
Attending: OBSTETRICS & GYNECOLOGY | Admitting: OBSTETRICS & GYNECOLOGY
Payer: COMMERCIAL

## 2019-12-16 ENCOUNTER — ANESTHESIA EVENT (OUTPATIENT)
Dept: ANESTHESIOLOGY | Facility: OTHER | Age: 29
End: 2019-12-16

## 2019-12-16 ENCOUNTER — ANESTHESIA (OUTPATIENT)
Dept: ANESTHESIOLOGY | Facility: OTHER | Age: 29
End: 2019-12-16

## 2019-12-16 VITALS
WEIGHT: 247.7 LBS | DIASTOLIC BLOOD PRESSURE: 64 MMHG | HEART RATE: 76 BPM | BODY MASS INDEX: 41.27 KG/M2 | SYSTOLIC BLOOD PRESSURE: 126 MMHG

## 2019-12-16 DIAGNOSIS — O24.415 GESTATIONAL DIABETES MELLITUS (GDM) IN THIRD TRIMESTER CONTROLLED ON ORAL HYPOGLYCEMIC DRUG: ICD-10-CM

## 2019-12-16 DIAGNOSIS — D68.61 ANTIPHOSPHOLIPID ANTIBODY SYNDROME COMPLICATING PREGNANCY (H): ICD-10-CM

## 2019-12-16 DIAGNOSIS — O09.93 HIGH-RISK PREGNANCY IN THIRD TRIMESTER: Primary | ICD-10-CM

## 2019-12-16 DIAGNOSIS — O99.119 ANTIPHOSPHOLIPID ANTIBODY SYNDROME COMPLICATING PREGNANCY (H): ICD-10-CM

## 2019-12-16 LAB
ERYTHROCYTE [DISTWIDTH] IN BLOOD BY AUTOMATED COUNT: 12.4 % (ref 10–15)
HCT VFR BLD AUTO: 33.4 % (ref 35–47)
HGB BLD-MCNC: 11.4 G/DL (ref 11.7–15.7)
MCH RBC QN AUTO: 29.6 PG (ref 26.5–33)
MCHC RBC AUTO-ENTMCNC: 34.1 G/DL (ref 31.5–36.5)
MCV RBC AUTO: 87 FL (ref 78–100)
PLATELET # BLD AUTO: 276 10E9/L (ref 150–450)
RBC # BLD AUTO: 3.85 10E12/L (ref 3.8–5.2)
WBC # BLD AUTO: 10.3 10E9/L (ref 4–11)

## 2019-12-16 PROCEDURE — 76819 FETAL BIOPHYS PROFIL W/O NST: CPT

## 2019-12-16 PROCEDURE — 99207 ZZC OB VISIT-NO CHARGE - GICH ONLY: CPT | Performed by: OBSTETRICS & GYNECOLOGY

## 2019-12-16 PROCEDURE — 59025 FETAL NON-STRESS TEST: CPT | Mod: XU | Performed by: OBSTETRICS & GYNECOLOGY

## 2019-12-16 PROCEDURE — 85027 COMPLETE CBC AUTOMATED: CPT | Mod: ZL | Performed by: OBSTETRICS & GYNECOLOGY

## 2019-12-16 PROCEDURE — 36415 COLL VENOUS BLD VENIPUNCTURE: CPT | Mod: ZL | Performed by: OBSTETRICS & GYNECOLOGY

## 2019-12-16 ASSESSMENT — PAIN SCALES - GENERAL: PAINLEVEL: NO PAIN (0)

## 2019-12-16 NOTE — CONSULTS
Pt seen today for an anesthesia consult and airway evaluation prior to delivery. She denies any history of airway difficulties. Evaluation of the airway today demonstrated no signifigant issues.  Patient was noted to have a Malampati score of 1 with good neck flexion & extension, & adequate mental to thyroid cartilage distance.

## 2019-12-16 NOTE — PROGRESS NOTES
CC: Recheck OB visit at 31w3d    HPI: Ana Patrick presents for a routine OB visit now at 31w3d  She has no concerns. Denies cramping, bleeding, normal fetal movement.  Good sugar control    OB History    Para Term  AB Living   4 0 0 0 3 0   SAB TAB Ectopic Multiple Live Births   3 0 0 0 0      # Outcome Date GA Lbr Mandeep/2nd Weight Sex Delivery Anes PTL Lv   4 Current            3 SAB 19           2 SAB 18           1 SAB 16             Current Outpatient Medications   Medication     ASPIRIN ADULT LOW STRENGTH PO     blood glucose (NO BRAND SPECIFIED) lancets standard     blood glucose (NO BRAND SPECIFIED) test strip     blood glucose monitoring (NO BRAND SPECIFIED) meter device kit     enoxaparin (LOVENOX) 40 MG/0.4ML syringe     esomeprazole (NEXIUM) 40 MG DR capsule     metFORMIN modified (GLUMETZA) 1000 MG 24 hr tablet     Prenatal Vit-Fe Fumarate-FA (PRENATAL MULTIVITAMIN PLUS IRON) 27-0.8 MG TABS per tablet     sertraline (ZOLOFT) 100 MG tablet     sucralfate (CARAFATE) 1 GM tablet     No current facility-administered medications for this visit.          O: /64 (BP Location: Right arm)   Pulse 76   Wt 112.4 kg (247 lb 11.2 oz)   LMP 05/10/2019 (Exact Date)   BMI 41.27 kg/m    Body mass index is 41.27 kg/m .  See OB flow sheet  EXAM:  NAD    NST doucmentation:    Indication: (O09.93) High-risk pregnancy in third trimester  (primary encounter diagnosis)  Comment:   Plan: FETAL NON-STRESS TEST            Duration: 30 min     31w3d  FHR baseline: 120 with moderate variability  Accelerations: y  Decelerations: n  Contractions: n    Category 1      Recent Results (from the past 24 hour(s))   US OB Fetal Biophys Prf wo NonStrs Singls Sgl    Narrative    History: Gestational diabetes mellitus (GDM) in third trimester  controlled on oral hypoglycemic drug; Antiphospholipid antibody  syndrome complicating pregnancy (H); Antiphospholipid antibody  syndrome complicating  pregnancy (H)    Fetal Movement:  Score 2: At least 3 discrete body/limb movements in 30 minutes  Score 0: Up to 2 episodes of limb/body movements in 30 minutes                    FM = 2    Fetal Breathing movements:  Score 2: At least one episode, at least 30 seconds duration in 30  minutes of observation.  Score 0: Absent or no episodes of greater than 30 seconds    duration in 30 minutes observation.                    FBM = 2    Fetal Tone:  Score 2: At least one episode of active extension with return to     flexion of fetal limbs or trunk, opening and closing of     hands considered normal tone.  Score 0: Absent or no episodes in 30 minutes of observation.                    FT = 2    Amniotic Fluid Volume:  Score 2: At least one pocket of amniotic fluid measuring at least    1 cm in two perpendicular planes.  Score 0: Either no amniotic fluid or a pocket less than 1 cm in    two perpendicular planes.                    AF = 2                        TOTAL = 8    ERIS: 21.6 cm    HRT Rate: 140 bpm    Placenta Location: Posterior    Placenta ndGndrndanddndend:nd nd2nd Cephalic presentation      Impression    Impression: Normal biophysical profile    CHARLY RAMOS MD       A/P: 31w3d gestation    Recheck in 1 weeks    Problem List:  Pregnancy risk factors include:    APAS with pos LA and RPL- Targeted MFM US at 19-20 weeks-normal  PCOS  Body mass index is 40.7 kg/m .  Start baby aspirin. Start px Lovenox at 12 weeks gesation.  Normal [place] test.  GDM on metformin- weekly BPP's in two weeks.    Gerry Kim MD FACOG  9:34 AM 2019

## 2019-12-23 ENCOUNTER — HOSPITAL ENCOUNTER (OUTPATIENT)
Dept: ULTRASOUND IMAGING | Facility: OTHER | Age: 29
Discharge: HOME | End: 2019-12-23
Attending: OBSTETRICS & GYNECOLOGY | Admitting: OBSTETRICS & GYNECOLOGY
Payer: COMMERCIAL

## 2019-12-23 ENCOUNTER — PRENATAL OFFICE VISIT (OUTPATIENT)
Dept: OBGYN | Facility: OTHER | Age: 29
End: 2019-12-23
Attending: OBSTETRICS & GYNECOLOGY
Payer: COMMERCIAL

## 2019-12-23 ENCOUNTER — PREP FOR PROCEDURE (OUTPATIENT)
Dept: OBGYN | Facility: OTHER | Age: 29
End: 2019-12-23

## 2019-12-23 VITALS
WEIGHT: 242.4 LBS | SYSTOLIC BLOOD PRESSURE: 128 MMHG | BODY MASS INDEX: 40.39 KG/M2 | DIASTOLIC BLOOD PRESSURE: 76 MMHG | HEART RATE: 84 BPM

## 2019-12-23 DIAGNOSIS — O24.415 GESTATIONAL DIABETES MELLITUS (GDM) IN THIRD TRIMESTER CONTROLLED ON ORAL HYPOGLYCEMIC DRUG: ICD-10-CM

## 2019-12-23 DIAGNOSIS — O09.93 HIGH-RISK PREGNANCY IN THIRD TRIMESTER: ICD-10-CM

## 2019-12-23 DIAGNOSIS — D68.61 ANTIPHOSPHOLIPID ANTIBODY SYNDROME COMPLICATING PREGNANCY (H): ICD-10-CM

## 2019-12-23 DIAGNOSIS — O24.419 GESTATIONAL DIABETES MELLITUS (GDM) IN THIRD TRIMESTER, GESTATIONAL DIABETES METHOD OF CONTROL UNSPECIFIED: Primary | ICD-10-CM

## 2019-12-23 DIAGNOSIS — O99.119 ANTIPHOSPHOLIPID ANTIBODY SYNDROME COMPLICATING PREGNANCY (H): ICD-10-CM

## 2019-12-23 DIAGNOSIS — O09.93 HIGH-RISK PREGNANCY IN THIRD TRIMESTER: Primary | ICD-10-CM

## 2019-12-23 PROCEDURE — 76819 FETAL BIOPHYS PROFIL W/O NST: CPT

## 2019-12-23 PROCEDURE — 59025 FETAL NON-STRESS TEST: CPT | Mod: XU | Performed by: OBSTETRICS & GYNECOLOGY

## 2019-12-23 PROCEDURE — 99207 ZZC OB VISIT-NO CHARGE - GICH ONLY: CPT | Performed by: OBSTETRICS & GYNECOLOGY

## 2019-12-23 RX ORDER — LIDOCAINE 40 MG/G
CREAM TOPICAL
Status: CANCELLED | OUTPATIENT
Start: 2019-12-23

## 2019-12-23 RX ORDER — CEFAZOLIN SODIUM 2 G/100ML
2 INJECTION, SOLUTION INTRAVENOUS
Status: CANCELLED | OUTPATIENT
Start: 2019-12-23

## 2019-12-23 RX ORDER — SODIUM CHLORIDE, SODIUM LACTATE, POTASSIUM CHLORIDE, CALCIUM CHLORIDE 600; 310; 30; 20 MG/100ML; MG/100ML; MG/100ML; MG/100ML
INJECTION, SOLUTION INTRAVENOUS CONTINUOUS
Status: CANCELLED | OUTPATIENT
Start: 2019-12-23

## 2019-12-23 RX ORDER — CEFAZOLIN SODIUM 1 G/50ML
1 INJECTION, SOLUTION INTRAVENOUS SEE ADMIN INSTRUCTIONS
Status: CANCELLED | OUTPATIENT
Start: 2019-12-23

## 2019-12-23 RX ORDER — CITRIC ACID/SODIUM CITRATE 334-500MG
30 SOLUTION, ORAL ORAL
Status: CANCELLED | OUTPATIENT
Start: 2019-12-23

## 2019-12-23 ASSESSMENT — PAIN SCALES - GENERAL: PAINLEVEL: NO PAIN (0)

## 2019-12-23 NOTE — PROGRESS NOTES
CC: Recheck OB visit at 32w3d    HPI: Ana Patrick presents for a routine OB visit now at 32w3d  She has no concerns. Denies cramping, bleeding, normal fetal movement. Good glucose control.    OB History    Para Term  AB Living   4 0 0 0 3 0   SAB TAB Ectopic Multiple Live Births   3 0 0 0 0      # Outcome Date GA Lbr Mandeep/2nd Weight Sex Delivery Anes PTL Lv   4 Current            3 SAB 19           2 SAB 18           1 SAB 16             Current Outpatient Medications   Medication     ASPIRIN ADULT LOW STRENGTH PO     blood glucose (NO BRAND SPECIFIED) lancets standard     blood glucose (NO BRAND SPECIFIED) test strip     blood glucose monitoring (NO BRAND SPECIFIED) meter device kit     enoxaparin (LOVENOX) 40 MG/0.4ML syringe     esomeprazole (NEXIUM) 40 MG DR capsule     metFORMIN modified (GLUMETZA) 1000 MG 24 hr tablet     Prenatal Vit-Fe Fumarate-FA (PRENATAL MULTIVITAMIN PLUS IRON) 27-0.8 MG TABS per tablet     sertraline (ZOLOFT) 100 MG tablet     sucralfate (CARAFATE) 1 GM tablet     No current facility-administered medications for this visit.          O: /76 (BP Location: Right arm)   Pulse 84   Wt 110 kg (242 lb 6.4 oz)   LMP 05/10/2019 (Exact Date)   BMI 40.39 kg/m    Body mass index is 40.39 kg/m .  See OB flow sheet  EXAM:  NAD    NST doucmentation:    Indication: GDM  APAS  Duration: 30 min     32w3d  FHR baseline: 130 with moderate variability  Accelerations: y  Decelerations: n  Contractions: n    Category 1    Recent Results (from the past 24 hour(s))   US OB Fetal Biophys Prf wo NonStrs Singls Sgl    Narrative    OB ULTRASOUND REPORT     Clinical history:  Gestational diabetes mellitus (GDM) in third  trimester controlled on oral hypoglycemic drug; Antiphospholipid  antibody syndrome complicating pregnancy (H); Antiphospholipid  antibody syndrome complicating pregnancy (H)     Comparison: 2019, 2019    Gestation:  1  Presentation:  Cephalic  Lie:  Longitudinal    Cardiac Activity:  Regular  BPM:  146  Movement:  Yes    Placenta: Posterior  ndGndrndanddndend:nd nd2nd Previa:  Not assessed    Cervix:  Not assessed     ERIS:  18.5 cm    Measurements:    BPD:  34 weeks 3 days  HC:  37 weeks 0 days  AC:  35 weeks 1 days  FL:  33 weeks 5 days    Estimated Fetal Weight:  2529 grams  HC/AC:  1.05    US age:  35 weeks 1 days  Gestational Age by LMP:  32 weeks 3 days  US EDC (Current Study):  2020   %WT for EGA  (Based on First Study):  88 %    Biophysical profile score is 8 out of 8.      Impression    Impression:   1. Biophysical profile score is 8 out of 8.  2. Interval fetal growth. Estimated fetal weight is 2529 g which is at  the 88th percentile for gestational age.  3. ERIS is 18.5 cm.      CHARLY RAMOS MD       No results found for any visits on 19.    A/P: 32w3d gestation    Recheck in 1 week  Patient interested in  section without labor. Given the risks of her pregnancy and increased risk for , I feel that this is reasonable. With APAS and GDM will schedule her in the 38th week to allow for her to discontinue her lovenox the day before surgery. Patient is in agreement with the plan. If she develops hypertension or labor prior to that would proceed with  at that point as clinically indicated.      Pregnancy risk factors include:    APAS with pos LA and RPL- Targeted MFM US at 19-20 weeks-normal  PCOS  Body mass index is 40.7 kg/m .  Start baby aspirin. Start px Lovenox at 12 weeks gesation.  Normal [place] test.  GDM on metformin- weekly BPP's in two weeks.    Gerry Kim MD FACOG  8:53 AM 2019

## 2019-12-23 NOTE — PROGRESS NOTES
"Date of Surgery: 20  Type of Surgery:  section  Surgeon: Dr. Kim    Patient's consents were signed and appropriate appointments were scheduled by the [facility] . Patient was given surgical folder which includes pre-operative bathing instructions related to the two packets of Hibiclens surgical prep provided. Surgical forms were copied and kept for informative purposes. Originals were delivered to Day-surgery. Patient denies questions at this time.        STOP BANG    Fever/Chills or other infectious symptoms in past month? No  >10 pound weight loss in the past 2 months? No  Health Care Directive on file? No  History of blood transfusions? No  Td up to date? Yes  History of VRE/MRSA? No      Obstructive Sleep Apnea screening    Preoperative Evaluation: Obstructive Sleep Apnea screening    S: Snore -  Do you snore loudly? (louder than talking or loud enough to be heard through closed doors)No  T: Tired - Do you often feel tired, fatigued, or sleepy during the daytime?No  O: Observed - Has anyone ever observed you stop breathing during your sleep?No  P: Pressure - Do you have or are you being treated for high blood pressure?No  B: BMI - BMI greater than 35kg/m2?Yes  A: Age - Age over 50 years old?No  N: Neck - Neck circumference greater than 40 cm?No  G: Gender - Gender: Male?No    Total number of \"YES\" responses:  1    Scoring: Low risk of JARRET 0-2  At Risk of JARRET: >3 High Risk of JARRET: 5-8      Total yes answers in JARRET section:    Low risk 0-2  At risk 3-4  High risk 5-8    Arianna Valdez RN............. 2019 9:37 AM     "

## 2019-12-27 ENCOUNTER — TELEPHONE (OUTPATIENT)
Dept: FAMILY MEDICINE | Facility: OTHER | Age: 29
End: 2019-12-27

## 2019-12-27 NOTE — TELEPHONE ENCOUNTER
Wondering if patient could try a preferred medication: Nexium suspension, omeprazole, or pantoprazole

## 2019-12-30 NOTE — TELEPHONE ENCOUNTER
Rx esomprazole is not on their formulary list, she provided a list of medications that are. States she is going to withdrawal the request. If you feel Patient needs to be on this medication please resubmit with reasoning why.

## 2019-12-31 ENCOUNTER — PRENATAL OFFICE VISIT (OUTPATIENT)
Dept: FAMILY MEDICINE | Facility: OTHER | Age: 29
End: 2019-12-31
Attending: FAMILY MEDICINE
Payer: COMMERCIAL

## 2019-12-31 ENCOUNTER — HOSPITAL ENCOUNTER (OUTPATIENT)
Dept: ULTRASOUND IMAGING | Facility: OTHER | Age: 29
Discharge: HOME | End: 2019-12-31
Attending: FAMILY MEDICINE | Admitting: FAMILY MEDICINE
Payer: COMMERCIAL

## 2019-12-31 VITALS
HEART RATE: 80 BPM | RESPIRATION RATE: 16 BRPM | TEMPERATURE: 98.9 F | BODY MASS INDEX: 41.34 KG/M2 | DIASTOLIC BLOOD PRESSURE: 82 MMHG | SYSTOLIC BLOOD PRESSURE: 120 MMHG | WEIGHT: 248.13 LBS

## 2019-12-31 DIAGNOSIS — K21.9 GASTROESOPHAGEAL REFLUX DISEASE WITHOUT ESOPHAGITIS: ICD-10-CM

## 2019-12-31 DIAGNOSIS — O09.93 HIGH-RISK PREGNANCY IN THIRD TRIMESTER: Primary | ICD-10-CM

## 2019-12-31 DIAGNOSIS — D68.61 ANTIPHOSPHOLIPID ANTIBODY SYNDROME COMPLICATING PREGNANCY (H): ICD-10-CM

## 2019-12-31 DIAGNOSIS — O99.119 ANTIPHOSPHOLIPID ANTIBODY SYNDROME COMPLICATING PREGNANCY (H): ICD-10-CM

## 2019-12-31 DIAGNOSIS — O24.415 GESTATIONAL DIABETES MELLITUS (GDM) IN THIRD TRIMESTER CONTROLLED ON ORAL HYPOGLYCEMIC DRUG: ICD-10-CM

## 2019-12-31 PROCEDURE — 99207 ZZC OB VISIT-NO CHARGE - GICH ONLY: CPT | Performed by: FAMILY MEDICINE

## 2019-12-31 PROCEDURE — 76819 FETAL BIOPHYS PROFIL W/O NST: CPT

## 2019-12-31 PROCEDURE — 59025 FETAL NON-STRESS TEST: CPT | Performed by: FAMILY MEDICINE

## 2019-12-31 PROCEDURE — G0463 HOSPITAL OUTPT CLINIC VISIT: HCPCS | Mod: 25

## 2019-12-31 RX ORDER — PANTOPRAZOLE SODIUM 40 MG/1
40 TABLET, DELAYED RELEASE ORAL 2 TIMES DAILY
Qty: 180 TABLET | Refills: 1 | Status: SHIPPED | OUTPATIENT
Start: 2019-12-31 | End: 2021-03-11

## 2019-12-31 ASSESSMENT — ANXIETY QUESTIONNAIRES
IF YOU CHECKED OFF ANY PROBLEMS ON THIS QUESTIONNAIRE, HOW DIFFICULT HAVE THESE PROBLEMS MADE IT FOR YOU TO DO YOUR WORK, TAKE CARE OF THINGS AT HOME, OR GET ALONG WITH OTHER PEOPLE: NOT DIFFICULT AT ALL
5. BEING SO RESTLESS THAT IT IS HARD TO SIT STILL: NOT AT ALL
7. FEELING AFRAID AS IF SOMETHING AWFUL MIGHT HAPPEN: NOT AT ALL
6. BECOMING EASILY ANNOYED OR IRRITABLE: NOT AT ALL
1. FEELING NERVOUS, ANXIOUS, OR ON EDGE: NOT AT ALL
2. NOT BEING ABLE TO STOP OR CONTROL WORRYING: NOT AT ALL
3. WORRYING TOO MUCH ABOUT DIFFERENT THINGS: NOT AT ALL
GAD7 TOTAL SCORE: 0

## 2019-12-31 ASSESSMENT — PATIENT HEALTH QUESTIONNAIRE - PHQ9: 5. POOR APPETITE OR OVEREATING: NOT AT ALL

## 2019-12-31 ASSESSMENT — PAIN SCALES - GENERAL: PAINLEVEL: NO PAIN (0)

## 2019-12-31 NOTE — NURSING NOTE
"Chief Complaint   Patient presents with     Prenatal Care     33w4d       Initial /82   Pulse 80   Temp 98.9  F (37.2  C) (Tympanic)   Resp 16   Wt 112.5 kg (248 lb 2 oz)   LMP 05/10/2019 (Exact Date)   BMI 41.34 kg/m   Estimated body mass index is 41.34 kg/m  as calculated from the following:    Height as of 7/8/19: 1.65 m (5' 4.96\").    Weight as of this encounter: 112.5 kg (248 lb 2 oz).  Medication Reconciliation: complete    Anny Lo LPN  "

## 2020-01-01 ASSESSMENT — ANXIETY QUESTIONNAIRES: GAD7 TOTAL SCORE: 0

## 2020-01-02 NOTE — PROGRESS NOTES
Routine OB Visit    S: Feeling well.  + FM.  No bleeding, discharge, fluid leaking.  No concerns/complaints/questions at this time.  Planned for C/S on 20.  GDM: blood sugars are at goal.  On metformin (was previously on for PCOS).  No side effects.    O: /82   Pulse 80   Temp 98.9  F (37.2  C) (Tympanic)   Resp 16   Wt 112.5 kg (248 lb 2 oz)   LMP 05/10/2019 (Exact Date)   BMI 41.34 kg/m    Gen: Well-appearing, NAD  FH: 35  FHR: 135  Ext: No edema  Skin: No rash    NST: for high risk pregnancy complicated by GDM, macrosomia,  recurrent miscarriage  Start: 1113  Stop: 1139  FHR: 135  Variability: moderate  Accelerations: yes  Decelerations: no  Contractions: no    Reviewed BPP completed prior to appointment - .    A/P:  Ana Patrick is a 29 year old  at 33w4d by early US, here for return OB visit.  GDM: stable.  On metformin. No need for medication changes.  Continues on weekly BPP/NSTs.  Antiphospholipid ab syndrome: on Lovenox.  Some bruising but overall no concerns.  Continue until evening before surgery.  Hold morning of - or per OB/GYN recommendation if earlier delivery indicated.  GERD: on Nexium with some benefit.  Insurance formulary changing.  Change to Protonix 40mg daily.    PNC: - Prenatal labs: Rh NEG, Rubella +  Rhogam given 19  Genetics: [place]: 19.  Low probability for risk.  Has CF and SMA testing - normal.  Imaging: today's BPP reviewed.  Normal.   Immunizations: Tdap 19; flu 10/3/19.  RTC weekly as already scheduled with Dr. Gerry Kim.    Abiola Ashton,   Family Practice

## 2020-01-06 ENCOUNTER — PRENATAL OFFICE VISIT (OUTPATIENT)
Dept: OBGYN | Facility: OTHER | Age: 30
End: 2020-01-06
Attending: OBSTETRICS & GYNECOLOGY
Payer: COMMERCIAL

## 2020-01-06 ENCOUNTER — HOSPITAL ENCOUNTER (OUTPATIENT)
Dept: ULTRASOUND IMAGING | Facility: OTHER | Age: 30
Discharge: HOME | End: 2020-01-06
Attending: OBSTETRICS & GYNECOLOGY | Admitting: OBSTETRICS & GYNECOLOGY
Payer: COMMERCIAL

## 2020-01-06 VITALS
BODY MASS INDEX: 41.35 KG/M2 | HEART RATE: 92 BPM | SYSTOLIC BLOOD PRESSURE: 116 MMHG | DIASTOLIC BLOOD PRESSURE: 74 MMHG | WEIGHT: 248.2 LBS

## 2020-01-06 DIAGNOSIS — O99.119 ANTIPHOSPHOLIPID ANTIBODY SYNDROME COMPLICATING PREGNANCY (H): ICD-10-CM

## 2020-01-06 DIAGNOSIS — O24.415 GESTATIONAL DIABETES MELLITUS (GDM) IN THIRD TRIMESTER CONTROLLED ON ORAL HYPOGLYCEMIC DRUG: ICD-10-CM

## 2020-01-06 DIAGNOSIS — D68.61 ANTIPHOSPHOLIPID ANTIBODY SYNDROME COMPLICATING PREGNANCY (H): ICD-10-CM

## 2020-01-06 DIAGNOSIS — O24.410 DIET CONTROLLED GESTATIONAL DIABETES MELLITUS (GDM) IN THIRD TRIMESTER: ICD-10-CM

## 2020-01-06 DIAGNOSIS — O09.93 HIGH-RISK PREGNANCY IN THIRD TRIMESTER: Primary | ICD-10-CM

## 2020-01-06 PROCEDURE — 99207 ZZC OB VISIT-NO CHARGE - GICH ONLY: CPT | Performed by: OBSTETRICS & GYNECOLOGY

## 2020-01-06 PROCEDURE — 76819 FETAL BIOPHYS PROFIL W/O NST: CPT

## 2020-01-06 PROCEDURE — 59025 FETAL NON-STRESS TEST: CPT | Mod: XU | Performed by: OBSTETRICS & GYNECOLOGY

## 2020-01-06 ASSESSMENT — PAIN SCALES - GENERAL: PAINLEVEL: NO PAIN (0)

## 2020-01-06 NOTE — PROGRESS NOTES
CC: Recheck OB visit at 34w3d    HPI: Ana Patrick presents for a routine OB visit now at 34w3d  She has no concerns. Denies cramping, bleeding, normal fetal movement. Some pelvic cramps. GDM is well controlled with diet. All of her sugars appear normal since mid-December.    OB History    Para Term  AB Living   4 0 0 0 3 0   SAB TAB Ectopic Multiple Live Births   3 0 0 0 0      # Outcome Date GA Lbr Mandeep/2nd Weight Sex Delivery Anes PTL Lv   4 Current            3 SAB 19           2 SAB 18           1 SAB 16             Current Outpatient Medications   Medication     ASPIRIN ADULT LOW STRENGTH PO     blood glucose (NO BRAND SPECIFIED) lancets standard     blood glucose (NO BRAND SPECIFIED) test strip     blood glucose monitoring (NO BRAND SPECIFIED) meter device kit     enoxaparin (LOVENOX) 40 MG/0.4ML syringe     metFORMIN modified (GLUMETZA) 1000 MG 24 hr tablet     pantoprazole (PROTONIX) 40 MG EC tablet     Prenatal Vit-Fe Fumarate-FA (PRENATAL MULTIVITAMIN PLUS IRON) 27-0.8 MG TABS per tablet     sertraline (ZOLOFT) 100 MG tablet     sucralfate (CARAFATE) 1 GM tablet     No current facility-administered medications for this visit.      O: /74 (BP Location: Right arm)   Pulse 92   Wt 112.6 kg (248 lb 3.2 oz)   LMP 05/10/2019 (Exact Date)   BMI 41.35 kg/m    Body mass index is 41.35 kg/m .  See OB flow sheet  EXAM:  NAD    NST doucmentation:    Indication:GDM, APAS  Duration: 30 min     34w3d  FHR baseline: 140 with moderate variability  Accelerations: y  Decelerations: n  Contractions: n    Category 1    Recent Results (from the past 24 hour(s))   US OB Fetal Biophys Prf wo NonStrs Singls Sgl    Narrative    US OB FETAL BIOPHY PROFILE W/O NON STRESS SINGLE    History: Gestational diabetes mellitus (GDM) in third trimester  controlled on oral hypoglycemic drug; Antiphospholipid antibody  syndrome complicating pregnancy (H); Antiphospholipid antibody  syndrome  complicating pregnancy (H)    Fetal Movement:  Score 2: At least 3 discrete body/limb movements in 30 minutes  Score 0: Up to 2 episodes of limb/body movements in 30 minutes                    FM = 2    Fetal Breathing movements:  Score 2: At least one episode, at least 30 seconds duration in 30  minutes of observation.  Score 0: Absent or no episodes of greater than 30 seconds    duration in 30 minutes observation.                    FBM = 2    Fetal Tone:  Score 2: At least one episode of active extension with return to     flexion of fetal limbs or trunk, opening and closing of     hands considered normal tone.  Score 0: Absent or no episodes in 30 minutes of observation.                    FT = 2    Amniotic Fluid Volume:  Score 2: At least one pocket of amniotic fluid measuring at least    1 cm in two perpendicular planes.  Score 0: Either no amniotic fluid or a pocket less than 1 cm in    two perpendicular planes.                    AF = 2                        TOTAL = 8    ERIS: 22.7 cm  HRT Rate: 130 bpm  Placenta Location: Posterior  Placenta ndGndrndanddndend:nd nd2nd Position: Cephalic      Impression    IMPRESSION:    Normal biophysical profile score.    TERESA MARKS MD       No results found for any visits on 20.    A/P: 34w3d gestation    Recheck in 1 weeks    Problem List:   Pregnancy risk factors include:    APAS with pos LA and RPL- Targeted MFM US at 19-20 weeks-normal  PCOS  Body mass index is 41.35 kg/m .  Start baby aspirin- px Lovenox   Normal [place] test.  GDM on metformin- weekly BPP's     Gerry Kim MD FACOG  9:35 AM 2020

## 2020-01-13 ENCOUNTER — HOSPITAL ENCOUNTER (OUTPATIENT)
Dept: ULTRASOUND IMAGING | Facility: OTHER | Age: 30
Discharge: HOME | End: 2020-01-13
Attending: OBSTETRICS & GYNECOLOGY | Admitting: OBSTETRICS & GYNECOLOGY
Payer: COMMERCIAL

## 2020-01-13 ENCOUNTER — PRENATAL OFFICE VISIT (OUTPATIENT)
Dept: OBGYN | Facility: OTHER | Age: 30
End: 2020-01-13
Attending: OBSTETRICS & GYNECOLOGY
Payer: COMMERCIAL

## 2020-01-13 VITALS
BODY MASS INDEX: 41.12 KG/M2 | HEART RATE: 72 BPM | SYSTOLIC BLOOD PRESSURE: 110 MMHG | WEIGHT: 246.8 LBS | DIASTOLIC BLOOD PRESSURE: 70 MMHG

## 2020-01-13 DIAGNOSIS — O99.119 ANTIPHOSPHOLIPID ANTIBODY SYNDROME COMPLICATING PREGNANCY (H): ICD-10-CM

## 2020-01-13 DIAGNOSIS — O24.415 GESTATIONAL DIABETES MELLITUS (GDM) IN THIRD TRIMESTER CONTROLLED ON ORAL HYPOGLYCEMIC DRUG: ICD-10-CM

## 2020-01-13 DIAGNOSIS — D68.61 ANTIPHOSPHOLIPID ANTIBODY SYNDROME COMPLICATING PREGNANCY (H): ICD-10-CM

## 2020-01-13 DIAGNOSIS — O09.93 HIGH-RISK PREGNANCY IN THIRD TRIMESTER: Primary | ICD-10-CM

## 2020-01-13 PROCEDURE — 59025 FETAL NON-STRESS TEST: CPT | Performed by: OBSTETRICS & GYNECOLOGY

## 2020-01-13 PROCEDURE — 87081 CULTURE SCREEN ONLY: CPT | Mod: ZL | Performed by: OBSTETRICS & GYNECOLOGY

## 2020-01-13 PROCEDURE — 99207 ZZC OB VISIT-NO CHARGE - GICH ONLY: CPT | Performed by: OBSTETRICS & GYNECOLOGY

## 2020-01-13 PROCEDURE — 76819 FETAL BIOPHYS PROFIL W/O NST: CPT

## 2020-01-13 ASSESSMENT — PAIN SCALES - GENERAL: PAINLEVEL: NO PAIN (0)

## 2020-01-13 NOTE — NURSING NOTE
Patient presents to the clinic for Ob check at 35 weeks  and 3 days. No concerns noted.  Brian Butts RN........................1/13/2020  8:44 AM

## 2020-01-13 NOTE — PROGRESS NOTES
CC: Recheck OB visit at 35w3d    HPI: Ana Patrick presents for a routine OB visit now at 35w3d  She has no concerns. Denies cramping, bleeding, normal fetal movement. Good sugar control.    OB History    Para Term  AB Living   4 0 0 0 3 0   SAB TAB Ectopic Multiple Live Births   3 0 0 0 0      # Outcome Date GA Lbr Mandeep/2nd Weight Sex Delivery Anes PTL Lv   4 Current            3 SAB 19           2 SAB 18           1 SAB 16             Current Outpatient Medications   Medication     ASPIRIN ADULT LOW STRENGTH PO     blood glucose (NO BRAND SPECIFIED) lancets standard     blood glucose (NO BRAND SPECIFIED) test strip     blood glucose monitoring (NO BRAND SPECIFIED) meter device kit     enoxaparin (LOVENOX) 40 MG/0.4ML syringe     metFORMIN modified (GLUMETZA) 1000 MG 24 hr tablet     pantoprazole (PROTONIX) 40 MG EC tablet     Prenatal Vit-Fe Fumarate-FA (PRENATAL MULTIVITAMIN PLUS IRON) 27-0.8 MG TABS per tablet     sertraline (ZOLOFT) 100 MG tablet     sucralfate (CARAFATE) 1 GM tablet     No current facility-administered medications for this visit.          O: /70 (BP Location: Right arm, Patient Position: Sitting, Cuff Size: Adult Large)   Pulse 72   Wt 111.9 kg (246 lb 12.8 oz)   LMP 05/10/2019 (Exact Date)   Breastfeeding No   BMI 41.12 kg/m    Body mass index is 41.12 kg/m .  See OB flow sheet  EXAM:  NAD    NST doucmentation:    Indication: (O09.93) High-risk pregnancy in third trimester  (primary encounter diagnosis)  Comment:   Plan: FETAL NON-STRESS TEST, Rectal/Vag Group B Strep        Culture            (O99.119,  D68.61) Antiphospholipid antibody syndrome complicating pregnancy (H)  Comment:   Plan:     Duration: 30 min     35w3d  FHR baseline: 140 with moderate variability  Accelerations: y  Decelerations: n  Contractions: n    Category 1    Recent Results (from the past 24 hour(s))   US OB Fetal Biophys Prf wo NonStrs Singls Sgl    Narrative    US OB  FETAL BIOPHY PROFILE W/O NON STRESS SINGLE    History: Gestational diabetes mellitus (GDM) in third trimester  controlled on oral hypoglycemic drug; Antiphospholipid antibody  syndrome complicating pregnancy (H); Antiphospholipid antibody  syndrome complicating pregnancy (H)    Fetal Movement:  Score 2: At least 3 discrete body/limb movements in 30 minutes  Score 0: Up to 2 episodes of limb/body movements in 30 minutes                    FM = 2    Fetal Breathing movements:  Score 2: At least one episode, at least 30 seconds duration in 30  minutes of observation.  Score 0: Absent or no episodes of greater than 30 seconds    duration in 30 minutes observation.                    FBM = 2    Fetal Tone:  Score 2: At least one episode of active extension with return to     flexion of fetal limbs or trunk, opening and closing of     hands considered normal tone.  Score 0: Absent or no episodes in 30 minutes of observation.                    FT = 2    Amniotic Fluid Volume:  Score 2: At least one pocket of amniotic fluid measuring at least    1 cm in two perpendicular planes.  Score 0: Either no amniotic fluid or a pocket less than 1 cm in    two perpendicular planes.                    AF = 2                        TOTAL = 8    MVP: 5.3 cm  HRT Rate: 149 bpm  Placenta Location: Posterior  Placenta ndGndrndanddndend:nd nd2nd Position: Cephalic      Impression    IMPRESSION:    Normal biophysical profile score.    TERESA MARKS MD         GBS culture collected.    No results found for any visits on 20.    A/P: (O09.93) High-risk pregnancy in third trimester  (primary encounter diagnosis)  Comment:   Plan: FETAL NON-STRESS TEST, Rectal/Vag Group B Strep        Culture       (O99.119,  D68.61) Antiphospholipid antibody syndrome complicating pregnancy (H)  Comment:   Plan:       Recheck in  weeks      Problem List:   Pregnancy risk factors include:    APAS with pos LA and RPL- Targeted MFM US at 19-20  weeks-normal  PCOS  Body mass index is 41.35 kg/m .  baby aspirin- px Lovenox   Normal [place] test.  GDM on metformin- weekly BPP's     Gerry Kim MD FACOG  9:27 AM 1/13/2020

## 2020-01-15 LAB
BACTERIA SPEC CULT: NORMAL
SPECIMEN SOURCE: NORMAL

## 2020-01-20 ENCOUNTER — HOSPITAL ENCOUNTER (OUTPATIENT)
Dept: ULTRASOUND IMAGING | Facility: OTHER | Age: 30
Discharge: HOME | End: 2020-01-20
Attending: OBSTETRICS & GYNECOLOGY | Admitting: OBSTETRICS & GYNECOLOGY
Payer: COMMERCIAL

## 2020-01-20 ENCOUNTER — PRENATAL OFFICE VISIT (OUTPATIENT)
Dept: OBGYN | Facility: OTHER | Age: 30
End: 2020-01-20
Attending: OBSTETRICS & GYNECOLOGY
Payer: COMMERCIAL

## 2020-01-20 VITALS
SYSTOLIC BLOOD PRESSURE: 114 MMHG | BODY MASS INDEX: 41.72 KG/M2 | WEIGHT: 250.4 LBS | HEART RATE: 82 BPM | DIASTOLIC BLOOD PRESSURE: 72 MMHG

## 2020-01-20 DIAGNOSIS — O24.410 DIET CONTROLLED GESTATIONAL DIABETES MELLITUS (GDM) IN THIRD TRIMESTER: Primary | ICD-10-CM

## 2020-01-20 DIAGNOSIS — D68.61 ANTIPHOSPHOLIPID ANTIBODY SYNDROME COMPLICATING PREGNANCY (H): ICD-10-CM

## 2020-01-20 DIAGNOSIS — O99.119 ANTIPHOSPHOLIPID ANTIBODY SYNDROME COMPLICATING PREGNANCY (H): ICD-10-CM

## 2020-01-20 DIAGNOSIS — O24.415 GESTATIONAL DIABETES MELLITUS (GDM) IN THIRD TRIMESTER CONTROLLED ON ORAL HYPOGLYCEMIC DRUG: ICD-10-CM

## 2020-01-20 DIAGNOSIS — O09.93 HRP (HIGH RISK PREGNANCY), THIRD TRIMESTER: ICD-10-CM

## 2020-01-20 PROCEDURE — 59025 FETAL NON-STRESS TEST: CPT | Performed by: OBSTETRICS & GYNECOLOGY

## 2020-01-20 PROCEDURE — 99207 ZZC OB VISIT-NO CHARGE - GICH ONLY: CPT | Performed by: OBSTETRICS & GYNECOLOGY

## 2020-01-20 PROCEDURE — 76819 FETAL BIOPHYS PROFIL W/O NST: CPT

## 2020-01-20 ASSESSMENT — PAIN SCALES - GENERAL: PAINLEVEL: NO PAIN (0)

## 2020-01-20 NOTE — PROGRESS NOTES
CC: Recheck OB visit at 36w3d    HPI: Ana Patrick presents for a routine OB visit now at 36w3d  She has no concerns. Denies cramping, bleeding, normal fetal movement. Good glucose control on Metformin.    OB History    Para Term  AB Living   4 0 0 0 3 0   SAB TAB Ectopic Multiple Live Births   3 0 0 0 0      # Outcome Date GA Lbr Mandeep/2nd Weight Sex Delivery Anes PTL Lv   4 Current            3 SAB 19           2 SAB 18           1 SAB 16             Current Outpatient Medications   Medication     ASPIRIN ADULT LOW STRENGTH PO     blood glucose (NO BRAND SPECIFIED) lancets standard     blood glucose (NO BRAND SPECIFIED) test strip     blood glucose monitoring (NO BRAND SPECIFIED) meter device kit     enoxaparin (LOVENOX) 40 MG/0.4ML syringe     metFORMIN modified (GLUMETZA) 1000 MG 24 hr tablet     pantoprazole (PROTONIX) 40 MG EC tablet     Prenatal Vit-Fe Fumarate-FA (PRENATAL MULTIVITAMIN PLUS IRON) 27-0.8 MG TABS per tablet     sertraline (ZOLOFT) 100 MG tablet     sucralfate (CARAFATE) 1 GM tablet     No current facility-administered medications for this visit.          O: LMP 05/10/2019 (Exact Date)   There is no height or weight on file to calculate BMI.  See OB flow sheet  EXAM:    NST doucmentation:    Indication: (O24.410) Diet controlled gestational diabetes mellitus (GDM) in third trimester  (primary encounter diagnosis)  Comment:   Plan: FETAL NON-STRESS TEST            Duration: 30 min     36w3d  FHR baseline: 130 with moderate variability  Accelerations: y  Decelerations: n  Contractions: n    Category 1          No results found for any visits on 20.    A/P: 36w3d gestation    Recheck in 1 week    Problem List:   Pregnancy risk factors include:    APAS with pos LA and RPL- Targeted MFM US at 19-20 weeks-normal  PCOS  Body mass index is 41.35 kg/m .  baby aspirin- px Lovenox   Normal [place] test.  GDM on metformin- weekly BPP's     Gerry Kim,  MD FACOG  8:52 AM 1/20/2020

## 2020-01-24 ENCOUNTER — PRENATAL OFFICE VISIT (OUTPATIENT)
Dept: OBGYN | Facility: OTHER | Age: 30
End: 2020-01-24
Attending: OBSTETRICS & GYNECOLOGY
Payer: COMMERCIAL

## 2020-01-24 VITALS
DIASTOLIC BLOOD PRESSURE: 70 MMHG | SYSTOLIC BLOOD PRESSURE: 106 MMHG | HEART RATE: 90 BPM | BODY MASS INDEX: 41.35 KG/M2 | WEIGHT: 248.2 LBS | OXYGEN SATURATION: 98 %

## 2020-01-24 DIAGNOSIS — Z3A.37 37 WEEKS GESTATION OF PREGNANCY: Primary | ICD-10-CM

## 2020-01-24 LAB — A1 MICROGLOB PLACENTAL VAG QL: NEGATIVE

## 2020-01-24 PROCEDURE — 99207 ZZC OB VISIT-NO CHARGE - GICH ONLY: CPT | Performed by: OBSTETRICS & GYNECOLOGY

## 2020-01-24 PROCEDURE — 84112 EVAL AMNIOTIC FLUID PROTEIN: CPT | Mod: ZL | Performed by: OBSTETRICS & GYNECOLOGY

## 2020-01-24 ASSESSMENT — PAIN SCALES - GENERAL: PAINLEVEL: MILD PAIN (3)

## 2020-01-24 NOTE — NURSING NOTE
"Chief Complaint   Patient presents with     Prenatal Care     OB concerns- leaking fluid   Patient here with concerns about clear watery fluid leaking since last night and early this morning.    Initial /70 (BP Location: Right arm, Patient Position: Sitting)   Pulse 90   Wt 112.6 kg (248 lb 3.2 oz)   LMP 05/10/2019 (Exact Date)   SpO2 98%   Breastfeeding No   BMI 41.35 kg/m   Estimated body mass index is 41.35 kg/m  as calculated from the following:    Height as of 7/8/19: 1.65 m (5' 4.96\").    Weight as of this encounter: 112.6 kg (248 lb 3.2 oz).  Medication Reconciliation: complete    Georgia Riddle LPN  "

## 2020-01-27 NOTE — PROGRESS NOTES
Seen as add-on  Question small amount of fluid per vagina, a couple of times after standing  No bleeding or significant contractions    Amnisure is negative  Cx thick  No sign of ROM  Reassured and instructed

## 2020-01-28 ENCOUNTER — HOSPITAL ENCOUNTER (OUTPATIENT)
Dept: ULTRASOUND IMAGING | Facility: OTHER | Age: 30
Discharge: HOME | End: 2020-01-28
Attending: OBSTETRICS & GYNECOLOGY | Admitting: OBSTETRICS & GYNECOLOGY
Payer: COMMERCIAL

## 2020-01-28 ENCOUNTER — PRENATAL OFFICE VISIT (OUTPATIENT)
Dept: OBGYN | Facility: OTHER | Age: 30
End: 2020-01-28
Attending: OBSTETRICS & GYNECOLOGY
Payer: COMMERCIAL

## 2020-01-28 VITALS
DIASTOLIC BLOOD PRESSURE: 78 MMHG | HEART RATE: 78 BPM | SYSTOLIC BLOOD PRESSURE: 106 MMHG | BODY MASS INDEX: 41.85 KG/M2 | WEIGHT: 251.2 LBS

## 2020-01-28 DIAGNOSIS — O09.93 HIGH-RISK PREGNANCY IN THIRD TRIMESTER: Primary | ICD-10-CM

## 2020-01-28 DIAGNOSIS — O99.119 ANTIPHOSPHOLIPID ANTIBODY SYNDROME COMPLICATING PREGNANCY (H): ICD-10-CM

## 2020-01-28 DIAGNOSIS — D68.61 ANTIPHOSPHOLIPID ANTIBODY SYNDROME COMPLICATING PREGNANCY (H): ICD-10-CM

## 2020-01-28 DIAGNOSIS — O24.415 GESTATIONAL DIABETES MELLITUS (GDM) IN THIRD TRIMESTER CONTROLLED ON ORAL HYPOGLYCEMIC DRUG: ICD-10-CM

## 2020-01-28 PROCEDURE — 99207 ZZC OB VISIT-NO CHARGE - GICH ONLY: CPT | Performed by: OBSTETRICS & GYNECOLOGY

## 2020-01-28 PROCEDURE — 76819 FETAL BIOPHYS PROFIL W/O NST: CPT

## 2020-01-28 ASSESSMENT — PAIN SCALES - GENERAL: PAINLEVEL: NO PAIN (0)

## 2020-01-28 NOTE — PROGRESS NOTES
CC: Recheck OB visit at 37w4d    HPI: Ana Patrick presents for a routine OB visit now at 37w4d  She has no concerns. Denies cramping, bleeding, normal fetal movement. Good glucose control on metformin.    OB History    Para Term  AB Living   4 0 0 0 3 0   SAB TAB Ectopic Multiple Live Births   3 0 0 0 0      # Outcome Date GA Lbr Mandeep/2nd Weight Sex Delivery Anes PTL Lv   4 Current            3 SAB 19           2 SAB 18           1 SAB 16             Current Outpatient Medications   Medication     ASPIRIN ADULT LOW STRENGTH PO     blood glucose (NO BRAND SPECIFIED) lancets standard     blood glucose (NO BRAND SPECIFIED) test strip     blood glucose monitoring (NO BRAND SPECIFIED) meter device kit     enoxaparin (LOVENOX) 40 MG/0.4ML syringe     metFORMIN modified (GLUMETZA) 1000 MG 24 hr tablet     pantoprazole (PROTONIX) 40 MG EC tablet     Prenatal Vit-Fe Fumarate-FA (PRENATAL MULTIVITAMIN PLUS IRON) 27-0.8 MG TABS per tablet     sertraline (ZOLOFT) 100 MG tablet     sucralfate (CARAFATE) 1 GM tablet     No current facility-administered medications for this visit.          O: /78 (BP Location: Right arm)   Pulse 78   Wt 113.9 kg (251 lb 3.2 oz)   LMP 05/10/2019 (Exact Date)   BMI 41.85 kg/m    Body mass index is 41.85 kg/m .  See OB flow sheet  EXAM:  NAD      NST doucmentation:    Indication: GDM, APAS  Duration: 30 min     37w4d  FHR baseline: 120 with moderate variability  Accelerations: y  Decelerations: n  Contractions: n    Category 1          Results for orders placed or performed during the hospital encounter of 20    OB Fetal Biophys Prf wo NonStrs Singls Sgl     Status: None    Narrative    History: 29 years  pregnant Female Gestational diabetes mellitus (GDM)  in third trimester controlled on oral hypoglycemic drug;  Antiphospholipid antibody syndrome complicating pregnancy (H);  Antiphospholipid antibody syndrome complicating pregnancy  (H)    Fetal Movement:  Score 2: At least 3 discrete body/limb movements in 30 minutes  Score 0: Up to 2 episodes of limb/body movements in 30 minutes                    FM = 2    Fetal Breathing movements:  Score 2: At least one episode, at least 30 seconds duration in 30  minutes of observation.  Score 0: Absent or no episodes of greater than 30 seconds    duration in 30 minutes observation.                    FBM = 2    Fetal Tone:  Score 2: At least one episode of active extension with return to     flexion of fetal limbs or trunk, opening and closing of     hands considered normal tone.  Score 0: Absent or no episodes in 30 minutes of observation.                    FT = 2    Amniotic Fluid Volume:  Score 2: At least one pocket of amniotic fluid measuring at least    1 cm in two perpendicular planes.  Score 0: Either no amniotic fluid or a pocket less than 1 cm in    two perpendicular planes.                    AF = 2                        TOTAL = 8    MVP: 6.0 cm    HRT Rate: 134 bpm    Placenta Location: Posterior    Fetal position: Cephalic      Impression    Impression: Biophysical profile score is 8 out of 8.    DORA ADAN MD       A/P: 37w4d gestation    Planning 38 week  to be able to stop her lovenox in anticipation of spinal and elective primary .    Problem List:   Pregnancy risk factors include:    APAS with pos LA and RPL- Targeted MFM US at 19-20 weeks-normal  PCOS  Body mass index is 41.35 kg/m .  baby aspirin- px Lovenox   Normal [place] test.  GDM on metformin- weekly BPP's     Gerry Kim MD FACOG  9:26 AM 2020

## 2020-01-30 ENCOUNTER — ANESTHESIA EVENT (OUTPATIENT)
Dept: SURGERY | Facility: OTHER | Age: 30
End: 2020-01-30
Payer: COMMERCIAL

## 2020-01-31 ENCOUNTER — HOSPITAL ENCOUNTER (INPATIENT)
Facility: OTHER | Age: 30
LOS: 3 days | Discharge: HOME | End: 2020-02-03
Attending: OBSTETRICS & GYNECOLOGY | Admitting: OBSTETRICS & GYNECOLOGY
Payer: COMMERCIAL

## 2020-01-31 ENCOUNTER — ANESTHESIA (OUTPATIENT)
Dept: SURGERY | Facility: OTHER | Age: 30
End: 2020-01-31
Payer: COMMERCIAL

## 2020-01-31 DIAGNOSIS — O09.93 HIGH-RISK PREGNANCY IN THIRD TRIMESTER: ICD-10-CM

## 2020-01-31 DIAGNOSIS — Z98.891 S/P CESAREAN SECTION: Primary | ICD-10-CM

## 2020-01-31 LAB
ABO + RH BLD: ABNORMAL
ABO + RH BLD: ABNORMAL
BASOPHILS # BLD AUTO: 0 10E9/L (ref 0–0.2)
BASOPHILS NFR BLD AUTO: 0.4 %
BLD GP AB INVEST PLASRBC-IMP: ABNORMAL
BLD GP AB SCN SERPL QL: ABNORMAL
BLOOD BANK CMNT PATIENT-IMP: ABNORMAL
BLOOD BANK CMNT PATIENT-IMP: NORMAL
BLOOD BANK CMNT PATIENT-IMP: NORMAL
CREAT SERPL-MCNC: 0.51 MG/DL (ref 0.6–1.2)
DIFFERENTIAL METHOD BLD: ABNORMAL
EOSINOPHIL # BLD AUTO: 0.1 10E9/L (ref 0–0.7)
EOSINOPHIL NFR BLD AUTO: 0.7 %
ERYTHROCYTE [DISTWIDTH] IN BLOOD BY AUTOMATED COUNT: 12.2 % (ref 10–15)
GFR SERPL CREATININE-BSD FRML MDRD: >90 ML/MIN/{1.73_M2}
HCT VFR BLD AUTO: 32.1 % (ref 35–47)
HGB BLD-MCNC: 10.7 G/DL (ref 11.7–15.7)
IMM GRANULOCYTES # BLD: 0.1 10E9/L (ref 0–0.4)
IMM GRANULOCYTES NFR BLD: 0.5 %
LYMPHOCYTES # BLD AUTO: 1.7 10E9/L (ref 0.8–5.3)
LYMPHOCYTES NFR BLD AUTO: 18.4 %
MCH RBC QN AUTO: 28.5 PG (ref 26.5–33)
MCHC RBC AUTO-ENTMCNC: 33.3 G/DL (ref 31.5–36.5)
MCV RBC AUTO: 85 FL (ref 78–100)
MONOCYTES # BLD AUTO: 0.6 10E9/L (ref 0–1.3)
MONOCYTES NFR BLD AUTO: 6.2 %
NEUTROPHILS # BLD AUTO: 6.8 10E9/L (ref 1.6–8.3)
NEUTROPHILS NFR BLD AUTO: 73.8 %
PLATELET # BLD AUTO: 247 10E9/L (ref 150–450)
PLATELET # BLD AUTO: 256 10E9/L (ref 150–450)
RBC # BLD AUTO: 3.76 10E12/L (ref 3.8–5.2)
SPECIMEN EXP DATE BLD: ABNORMAL
WBC # BLD AUTO: 9.2 10E9/L (ref 4–11)

## 2020-01-31 PROCEDURE — 25000125 ZZHC RX 250: Performed by: NURSE ANESTHETIST, CERTIFIED REGISTERED

## 2020-01-31 PROCEDURE — 36000056 ZZH SURGERY LEVEL 3 1ST 30 MIN: Performed by: OBSTETRICS & GYNECOLOGY

## 2020-01-31 PROCEDURE — 25000128 H RX IP 250 OP 636: Performed by: NURSE ANESTHETIST, CERTIFIED REGISTERED

## 2020-01-31 PROCEDURE — 82565 ASSAY OF CREATININE: CPT | Performed by: OBSTETRICS & GYNECOLOGY

## 2020-01-31 PROCEDURE — 37000009 ZZH ANESTHESIA TECHNICAL FEE, EACH ADDTL 15 MIN: Performed by: OBSTETRICS & GYNECOLOGY

## 2020-01-31 PROCEDURE — 37000008 ZZH ANESTHESIA TECHNICAL FEE, 1ST 30 MIN: Performed by: OBSTETRICS & GYNECOLOGY

## 2020-01-31 PROCEDURE — 40000275 ZZH STATISTIC RCP TIME EA 10 MIN

## 2020-01-31 PROCEDURE — 86900 BLOOD TYPING SEROLOGIC ABO: CPT | Performed by: OBSTETRICS & GYNECOLOGY

## 2020-01-31 PROCEDURE — 25800030 ZZH RX IP 258 OP 636: Performed by: NURSE ANESTHETIST, CERTIFIED REGISTERED

## 2020-01-31 PROCEDURE — 85025 COMPLETE CBC W/AUTO DIFF WBC: CPT | Performed by: OBSTETRICS & GYNECOLOGY

## 2020-01-31 PROCEDURE — 86870 RBC ANTIBODY IDENTIFICATION: CPT | Performed by: OBSTETRICS & GYNECOLOGY

## 2020-01-31 PROCEDURE — 27210794 ZZH OR GENERAL SUPPLY STERILE: Performed by: OBSTETRICS & GYNECOLOGY

## 2020-01-31 PROCEDURE — 12000000 ZZH R&B MED SURG/OB

## 2020-01-31 PROCEDURE — 86901 BLOOD TYPING SEROLOGIC RH(D): CPT | Performed by: OBSTETRICS & GYNECOLOGY

## 2020-01-31 PROCEDURE — 59510 CESAREAN DELIVERY: CPT | Performed by: OBSTETRICS & GYNECOLOGY

## 2020-01-31 PROCEDURE — 25800030 ZZH RX IP 258 OP 636: Performed by: OBSTETRICS & GYNECOLOGY

## 2020-01-31 PROCEDURE — 25000132 ZZH RX MED GY IP 250 OP 250 PS 637: Performed by: OBSTETRICS & GYNECOLOGY

## 2020-01-31 PROCEDURE — 36000058 ZZH SURGERY LEVEL 3 EA 15 ADDTL MIN: Performed by: OBSTETRICS & GYNECOLOGY

## 2020-01-31 PROCEDURE — 85049 AUTOMATED PLATELET COUNT: CPT | Performed by: OBSTETRICS & GYNECOLOGY

## 2020-01-31 PROCEDURE — 36415 COLL VENOUS BLD VENIPUNCTURE: CPT | Performed by: OBSTETRICS & GYNECOLOGY

## 2020-01-31 PROCEDURE — 59510 CESAREAN DELIVERY: CPT | Performed by: NURSE ANESTHETIST, CERTIFIED REGISTERED

## 2020-01-31 PROCEDURE — 25000128 H RX IP 250 OP 636: Performed by: OBSTETRICS & GYNECOLOGY

## 2020-01-31 PROCEDURE — 86850 RBC ANTIBODY SCREEN: CPT | Performed by: OBSTETRICS & GYNECOLOGY

## 2020-01-31 PROCEDURE — 71000014 ZZH RECOVERY PHASE 1 LEVEL 2 FIRST HR: Performed by: OBSTETRICS & GYNECOLOGY

## 2020-01-31 PROCEDURE — 25000125 ZZHC RX 250: Performed by: OBSTETRICS & GYNECOLOGY

## 2020-01-31 PROCEDURE — 25000566 ZZH SEVOFLURANE, EA 15 MIN: Performed by: OBSTETRICS & GYNECOLOGY

## 2020-01-31 PROCEDURE — 86780 TREPONEMA PALLIDUM: CPT | Performed by: OBSTETRICS & GYNECOLOGY

## 2020-01-31 RX ORDER — HYDROMORPHONE HYDROCHLORIDE 1 MG/ML
.3-.5 INJECTION, SOLUTION INTRAMUSCULAR; INTRAVENOUS; SUBCUTANEOUS EVERY 30 MIN PRN
Status: DISCONTINUED | OUTPATIENT
Start: 2020-01-31 | End: 2020-02-03 | Stop reason: HOSPADM

## 2020-01-31 RX ORDER — SODIUM CHLORIDE, SODIUM LACTATE, POTASSIUM CHLORIDE, CALCIUM CHLORIDE 600; 310; 30; 20 MG/100ML; MG/100ML; MG/100ML; MG/100ML
INJECTION, SOLUTION INTRAVENOUS CONTINUOUS
Status: DISCONTINUED | OUTPATIENT
Start: 2020-01-31 | End: 2020-02-01

## 2020-01-31 RX ORDER — OXYTOCIN 10 [USP'U]/ML
10 INJECTION, SOLUTION INTRAMUSCULAR; INTRAVENOUS
Status: DISCONTINUED | OUTPATIENT
Start: 2020-01-31 | End: 2020-02-03 | Stop reason: HOSPADM

## 2020-01-31 RX ORDER — ONDANSETRON 2 MG/ML
4 INJECTION INTRAMUSCULAR; INTRAVENOUS EVERY 30 MIN PRN
Status: DISCONTINUED | OUTPATIENT
Start: 2020-01-31 | End: 2020-02-03 | Stop reason: HOSPADM

## 2020-01-31 RX ORDER — HYDROMORPHONE HYDROCHLORIDE 1 MG/ML
.3-.5 INJECTION, SOLUTION INTRAMUSCULAR; INTRAVENOUS; SUBCUTANEOUS EVERY 5 MIN PRN
Status: DISCONTINUED | OUTPATIENT
Start: 2020-01-31 | End: 2020-02-03 | Stop reason: HOSPADM

## 2020-01-31 RX ORDER — IBUPROFEN 400 MG/1
800 TABLET, FILM COATED ORAL EVERY 6 HOURS PRN
Status: DISCONTINUED | OUTPATIENT
Start: 2020-01-31 | End: 2020-02-03 | Stop reason: HOSPADM

## 2020-01-31 RX ORDER — LIDOCAINE HYDROCHLORIDE 10 MG/ML
INJECTION, SOLUTION INFILTRATION; PERINEURAL PRN
Status: DISCONTINUED | OUTPATIENT
Start: 2020-01-31 | End: 2020-01-31

## 2020-01-31 RX ORDER — OXYTOCIN 10 [USP'U]/ML
INJECTION, SOLUTION INTRAMUSCULAR; INTRAVENOUS PRN
Status: DISCONTINUED | OUTPATIENT
Start: 2020-01-31 | End: 2020-01-31

## 2020-01-31 RX ORDER — CARBOPROST TROMETHAMINE 250 UG/ML
250 INJECTION, SOLUTION INTRAMUSCULAR
Status: DISCONTINUED | OUTPATIENT
Start: 2020-01-31 | End: 2020-02-03 | Stop reason: HOSPADM

## 2020-01-31 RX ORDER — NALOXONE HYDROCHLORIDE 0.4 MG/ML
.1-.4 INJECTION, SOLUTION INTRAMUSCULAR; INTRAVENOUS; SUBCUTANEOUS
Status: DISCONTINUED | OUTPATIENT
Start: 2020-01-31 | End: 2020-02-03 | Stop reason: HOSPADM

## 2020-01-31 RX ORDER — BISACODYL 10 MG
10 SUPPOSITORY, RECTAL RECTAL DAILY PRN
Status: DISCONTINUED | OUTPATIENT
Start: 2020-02-02 | End: 2020-02-03 | Stop reason: HOSPADM

## 2020-01-31 RX ORDER — KETOROLAC TROMETHAMINE 30 MG/ML
INJECTION, SOLUTION INTRAMUSCULAR; INTRAVENOUS PRN
Status: DISCONTINUED | OUTPATIENT
Start: 2020-01-31 | End: 2020-01-31

## 2020-01-31 RX ORDER — LIDOCAINE 40 MG/G
CREAM TOPICAL
Status: DISCONTINUED | OUTPATIENT
Start: 2020-01-31 | End: 2020-02-03 | Stop reason: HOSPADM

## 2020-01-31 RX ORDER — SODIUM CHLORIDE, SODIUM LACTATE, POTASSIUM CHLORIDE, CALCIUM CHLORIDE 600; 310; 30; 20 MG/100ML; MG/100ML; MG/100ML; MG/100ML
INJECTION, SOLUTION INTRAVENOUS CONTINUOUS
Status: DISCONTINUED | OUTPATIENT
Start: 2020-01-31 | End: 2020-01-31 | Stop reason: HOSPADM

## 2020-01-31 RX ORDER — FENTANYL CITRATE 50 UG/ML
25-50 INJECTION, SOLUTION INTRAMUSCULAR; INTRAVENOUS
Status: DISCONTINUED | OUTPATIENT
Start: 2020-01-31 | End: 2020-02-03 | Stop reason: HOSPADM

## 2020-01-31 RX ORDER — MAGNESIUM HYDROXIDE 1200 MG/15ML
LIQUID ORAL PRN
Status: DISCONTINUED | OUTPATIENT
Start: 2020-01-31 | End: 2020-01-31 | Stop reason: HOSPADM

## 2020-01-31 RX ORDER — HYDROCORTISONE 2.5 %
CREAM (GRAM) TOPICAL 3 TIMES DAILY PRN
Status: DISCONTINUED | OUTPATIENT
Start: 2020-01-31 | End: 2020-02-03 | Stop reason: HOSPADM

## 2020-01-31 RX ORDER — DEXTROSE, SODIUM CHLORIDE, SODIUM LACTATE, POTASSIUM CHLORIDE, AND CALCIUM CHLORIDE 5; .6; .31; .03; .02 G/100ML; G/100ML; G/100ML; G/100ML; G/100ML
INJECTION, SOLUTION INTRAVENOUS CONTINUOUS
Status: DISCONTINUED | OUTPATIENT
Start: 2020-01-31 | End: 2020-02-01

## 2020-01-31 RX ORDER — LANOLIN 100 %
OINTMENT (GRAM) TOPICAL
Status: DISCONTINUED | OUTPATIENT
Start: 2020-01-31 | End: 2020-02-03 | Stop reason: HOSPADM

## 2020-01-31 RX ORDER — ONDANSETRON 2 MG/ML
INJECTION INTRAMUSCULAR; INTRAVENOUS PRN
Status: DISCONTINUED | OUTPATIENT
Start: 2020-01-31 | End: 2020-01-31

## 2020-01-31 RX ORDER — DIPHENHYDRAMINE HYDROCHLORIDE 50 MG/ML
25 INJECTION INTRAMUSCULAR; INTRAVENOUS EVERY 6 HOURS PRN
Status: DISCONTINUED | OUTPATIENT
Start: 2020-01-31 | End: 2020-02-03 | Stop reason: HOSPADM

## 2020-01-31 RX ORDER — CEFAZOLIN SODIUM 2 G/100ML
2 INJECTION, SOLUTION INTRAVENOUS
Status: DISCONTINUED | OUTPATIENT
Start: 2020-01-31 | End: 2020-01-31 | Stop reason: HOSPADM

## 2020-01-31 RX ORDER — DIPHENHYDRAMINE HCL 25 MG
25 CAPSULE ORAL EVERY 6 HOURS PRN
Status: DISCONTINUED | OUTPATIENT
Start: 2020-01-31 | End: 2020-02-03 | Stop reason: HOSPADM

## 2020-01-31 RX ORDER — KETOROLAC TROMETHAMINE 30 MG/ML
30 INJECTION, SOLUTION INTRAMUSCULAR; INTRAVENOUS EVERY 6 HOURS
Status: COMPLETED | OUTPATIENT
Start: 2020-01-31 | End: 2020-02-01

## 2020-01-31 RX ORDER — LIDOCAINE 40 MG/G
CREAM TOPICAL
Status: DISCONTINUED | OUTPATIENT
Start: 2020-01-31 | End: 2020-01-31 | Stop reason: HOSPADM

## 2020-01-31 RX ORDER — CEFAZOLIN SODIUM 1 G/50ML
1 INJECTION, SOLUTION INTRAVENOUS SEE ADMIN INSTRUCTIONS
Status: DISCONTINUED | OUTPATIENT
Start: 2020-01-31 | End: 2020-01-31 | Stop reason: HOSPADM

## 2020-01-31 RX ORDER — CITRIC ACID/SODIUM CITRATE 334-500MG
30 SOLUTION, ORAL ORAL
Status: COMPLETED | OUTPATIENT
Start: 2020-01-31 | End: 2020-01-31

## 2020-01-31 RX ORDER — ONDANSETRON 4 MG/1
4 TABLET, ORALLY DISINTEGRATING ORAL EVERY 30 MIN PRN
Status: DISCONTINUED | OUTPATIENT
Start: 2020-01-31 | End: 2020-02-03 | Stop reason: HOSPADM

## 2020-01-31 RX ORDER — METHYLERGONOVINE MALEATE 0.2 MG/ML
200 INJECTION INTRAVENOUS
Status: DISCONTINUED | OUTPATIENT
Start: 2020-01-31 | End: 2020-02-03 | Stop reason: HOSPADM

## 2020-01-31 RX ORDER — MORPHINE SULFATE 0.5 MG/ML
INJECTION, SOLUTION EPIDURAL; INTRATHECAL; INTRAVENOUS PRN
Status: DISCONTINUED | OUTPATIENT
Start: 2020-01-31 | End: 2020-01-31

## 2020-01-31 RX ORDER — BUPIVACAINE HYDROCHLORIDE 7.5 MG/ML
INJECTION, SOLUTION INTRASPINAL PRN
Status: DISCONTINUED | OUTPATIENT
Start: 2020-01-31 | End: 2020-01-31

## 2020-01-31 RX ORDER — DOCUSATE SODIUM 100 MG/1
100 CAPSULE, LIQUID FILLED ORAL 2 TIMES DAILY
Status: DISCONTINUED | OUTPATIENT
Start: 2020-01-31 | End: 2020-02-03 | Stop reason: HOSPADM

## 2020-01-31 RX ORDER — PRENATAL VIT/IRON FUM/FOLIC AC 27MG-0.8MG
1 TABLET ORAL DAILY
Status: DISCONTINUED | OUTPATIENT
Start: 2020-01-31 | End: 2020-02-03 | Stop reason: HOSPADM

## 2020-01-31 RX ORDER — METOCLOPRAMIDE HYDROCHLORIDE 5 MG/ML
10 INJECTION INTRAMUSCULAR; INTRAVENOUS EVERY 6 HOURS PRN
Status: DISCONTINUED | OUTPATIENT
Start: 2020-01-31 | End: 2020-02-03 | Stop reason: HOSPADM

## 2020-01-31 RX ORDER — NALOXONE HYDROCHLORIDE 0.4 MG/ML
.1-.4 INJECTION, SOLUTION INTRAMUSCULAR; INTRAVENOUS; SUBCUTANEOUS
Status: ACTIVE | OUTPATIENT
Start: 2020-01-31 | End: 2020-02-01

## 2020-01-31 RX ORDER — HYDROCODONE BITARTRATE AND ACETAMINOPHEN 5; 325 MG/1; MG/1
1-2 TABLET ORAL EVERY 4 HOURS PRN
Status: DISCONTINUED | OUTPATIENT
Start: 2020-01-31 | End: 2020-02-03 | Stop reason: HOSPADM

## 2020-01-31 RX ORDER — SIMETHICONE 80 MG
80 TABLET,CHEWABLE ORAL 4 TIMES DAILY PRN
Status: DISCONTINUED | OUTPATIENT
Start: 2020-01-31 | End: 2020-02-03 | Stop reason: HOSPADM

## 2020-01-31 RX ORDER — ONDANSETRON 2 MG/ML
4 INJECTION INTRAMUSCULAR; INTRAVENOUS EVERY 6 HOURS PRN
Status: DISCONTINUED | OUTPATIENT
Start: 2020-01-31 | End: 2020-02-03 | Stop reason: HOSPADM

## 2020-01-31 RX ORDER — CEFAZOLIN SODIUM IN 0.9 % NACL 3 G/100 ML
INTRAVENOUS SOLUTION, PIGGYBACK (ML) INTRAVENOUS PRN
Status: DISCONTINUED | OUTPATIENT
Start: 2020-01-31 | End: 2020-01-31

## 2020-01-31 RX ADMIN — Medication 3 G: at 08:11

## 2020-01-31 RX ADMIN — DOCUSATE SODIUM 100 MG: 100 CAPSULE, LIQUID FILLED ORAL at 11:54

## 2020-01-31 RX ADMIN — Medication 100 ML/HR: at 16:53

## 2020-01-31 RX ADMIN — BUPIVACAINE HYDROCHLORIDE IN DEXTROSE 2 ML: 7.5 INJECTION, SOLUTION SUBARACHNOID at 08:08

## 2020-01-31 RX ADMIN — SERTRALINE HYDROCHLORIDE 100 MG: 50 TABLET ORAL at 21:53

## 2020-01-31 RX ADMIN — HYDROCODONE BITARTRATE AND ACETAMINOPHEN 1 TABLET: 5; 325 TABLET ORAL at 11:54

## 2020-01-31 RX ADMIN — KETOROLAC TROMETHAMINE 30 MG: 30 INJECTION, SOLUTION INTRAMUSCULAR at 14:56

## 2020-01-31 RX ADMIN — SODIUM CITRATE AND CITRIC ACID MONOHYDRATE 30 ML: 500; 334 SOLUTION ORAL at 07:05

## 2020-01-31 RX ADMIN — KETOROLAC TROMETHAMINE 30 MG: 30 INJECTION, SOLUTION INTRAMUSCULAR at 21:23

## 2020-01-31 RX ADMIN — LIDOCAINE HYDROCHLORIDE 1.5 ML: 10 INJECTION, SOLUTION INFILTRATION; PERINEURAL at 08:06

## 2020-01-31 RX ADMIN — DOCUSATE SODIUM 100 MG: 100 CAPSULE, LIQUID FILLED ORAL at 21:54

## 2020-01-31 RX ADMIN — SODIUM CHLORIDE, POTASSIUM CHLORIDE, SODIUM LACTATE AND CALCIUM CHLORIDE 1000 ML: 600; 310; 30; 20 INJECTION, SOLUTION INTRAVENOUS at 07:05

## 2020-01-31 RX ADMIN — KETOROLAC TROMETHAMINE 30 MG: 30 INJECTION, SOLUTION INTRAMUSCULAR at 08:43

## 2020-01-31 RX ADMIN — ONDANSETRON 4 MG: 2 INJECTION INTRAMUSCULAR; INTRAVENOUS at 08:27

## 2020-01-31 RX ADMIN — PRENATAL VIT W/ FE FUMARATE-FA TAB 27-0.8 MG 1 TABLET: 27-0.8 TAB at 21:54

## 2020-01-31 RX ADMIN — PHENYLEPHRINE HYDROCHLORIDE 0.01 MCG/KG/MIN: 10 INJECTION INTRAVENOUS at 08:10

## 2020-01-31 RX ADMIN — SODIUM CHLORIDE, POTASSIUM CHLORIDE, SODIUM LACTATE AND CALCIUM CHLORIDE: 600; 310; 30; 20 INJECTION, SOLUTION INTRAVENOUS at 07:54

## 2020-01-31 RX ADMIN — OXYTOCIN 3 UNITS: 10 INJECTION, SOLUTION INTRAMUSCULAR; INTRAVENOUS at 08:25

## 2020-01-31 RX ADMIN — MORPHINE SULFATE 0.1 MG: 0.5 INJECTION, SOLUTION EPIDURAL; INTRATHECAL; INTRAVENOUS at 08:08

## 2020-01-31 RX ADMIN — Medication 125 ML/HR: at 08:31

## 2020-01-31 RX ADMIN — HYDROCODONE BITARTRATE AND ACETAMINOPHEN 1 TABLET: 5; 325 TABLET ORAL at 18:01

## 2020-01-31 RX ADMIN — SODIUM CHLORIDE, SODIUM LACTATE, POTASSIUM CHLORIDE, CALCIUM CHLORIDE AND DEXTROSE MONOHYDRATE: 5; 600; 310; 30; 20 INJECTION, SOLUTION INTRAVENOUS at 16:54

## 2020-01-31 NOTE — OP NOTE
[facility] Gynecology Operative Note    Pre-operative diagnosis: 38w0d gestation, antiphospholipid antibody syndrome, gestational diabetes on oral meds, requests elective    Post-operative diagnosis: Same   Procedure: Low transverse  section   Surgeon: Gerry Kim MD   Assistant(s): None   Anesthesia: Spinal anesthesia   Estimated blood loss: 500ml   Total IV fluids: (See anesthesia record)   Blood transfusion: No transfusion was given during surgery   Total urine output: (See anesthesia record)   Drains: Kelley catheter   Specimens: None- standard cord blood for rh type   Findings: Vigorous liveborn female infant   Complications: None   Condition: Stable   Procedure details: DESCRIPTION OF PROCEDURE:     The patient was transferred to the OR where spinal anesthesia was accomplished.  A kelley catheter was inserted and clear urine was noted.  The abdomen was scrubbed prepped and draped inthe usual manner.  A hard stop timeout was accomplished.  A transverse Pfannenstiel incision was made and sharp and electrocautery dissection carried down to the fascial layer.  The Fascia was opened in the midline andextended bluntly bilaterally.  The rectus muscles were  in the midline bluntly.  The peritoneum was bluntly divided and the Alanis ring retractor was placed.  A bladder flap was made sharply.  The lower uterinesegment was incised sharply in a low transverse fashion and the amniotic sac ruptured bluntly with a finger.  Clear fluid was noted and the incision extended bluntly.  The fetal head was deliverd in the OT position.  The infant received nasal and oropharyngeal suction with the bulb suction. The shoulders delivered atraumatically followed by the remainder of the baby.   The cord wasclamped and cut and the infant handed to the  nurse.   evaluation was accomplished by Dr. Ashton to evaluate baby with known risks related to .  The placenta was  manually removed and the uterus wiped clear of clots and debris. Pitocin was added to the IVinfusion and the uterus was noted to firm-up nicely. The tubes and ovaries appeared normal. The uterine incision was closed in a double layer of #1 Chromic in a running fashion. Hemostasis was adequate.  The abdomenirrigated clear of clots and debris.  The peritoneum and the rectus  muscles were approximated with  3 0 vicryl.   The fascia was closed with 0 PDS.  The subcutaneous layer was irrigated and made hemastatic withelectrocautery.  It was closed in a single layer of 3 0 vicryl. The skin was closed with Insorb staples.  The wound was dressed with steri-strips and a pressure dressing.  Counts were correct times 2.  The patient andnewborn were transferred to the recovery room in stable condition.    Gerry Kim MD FACOG  8:55 AM 1/31/2020

## 2020-01-31 NOTE — OR NURSING
Baby left operating room at 0850 in baby warmer with PRISCILLA Case respiratory therapy, Dr. Ashton.  Cord blood tubed to lab at 0851.

## 2020-01-31 NOTE — ANESTHESIA PREPROCEDURE EVALUATION
Anesthesia Pre-Procedure Evaluation    Patient: Ana Patrick   MRN: [id number] : 1990          Preoperative Diagnosis: High-risk pregnancy in third trimester [O09.93]    Procedure(s):   SECTION    Past Medical History:   Diagnosis Date     Internal derangement of knee     2016     Personal history of other medical treatment (CODE)          Polycystic ovarian syndrome     3/18/2016     Past Surgical History:   Procedure Laterality Date     ARTHROSCOPY KNEE Right     Dr. Landeros     HYSTEROSCOPY DIAGNOSTIC N/A 2018    Procedure: HYSTEROSCOPY DIAGNOSTIC;  Diagnostic Hysteroscopy with Endometrial Scratching.      .;  Surgeon: Gerry Kim MD;  Location: [facility] OR     MANDIBLE SURGERY Right            Anesthesia Evaluation     . Pt has had prior anesthetic.     No history of anesthetic complications          ROS/MED HX    ENT/Pulmonary:  - neg pulmonary ROS     Neurologic:  - neg neurologic ROS     Cardiovascular:  - neg cardiovascular ROS       METS/Exercise Tolerance:  >4 METS   Hematologic:  - neg hematologic  ROS       Musculoskeletal:  - neg musculoskeletal ROS       GI/Hepatic:     (+) GERD Asymptomatic on medication,       Renal/Genitourinary:  - ROS Renal section negative       Endo:  - neg endo ROS       Psychiatric:  - neg psychiatric ROS       Infectious Disease:  - neg infectious disease ROS       Malignancy:      - no malignancy   Other:    (+) Possibly pregnant                         Physical Exam  Normal systems: cardiovascular, pulmonary and dental    Airway   Mallampati: I  TM distance: >3 FB  Neck ROM: full    Dental     Cardiovascular   Rhythm and rate: regular and normal      Pulmonary    breath sounds clear to auscultation            Lab Results   Component Value Date    WBC 9.2 2020    HGB 10.7 (L) 2020    HCT 32.1 (L) 2020     2020     2019    POTASSIUM 3.9 2019    CHLORIDE 107 2019    CO2 27  "03/01/2019    BUN 7 03/01/2019    CR 0.59 (L) 03/01/2019     03/01/2019    ALEX 9.2 03/01/2019    ALBUMIN 4.0 03/01/2019    PROTTOTAL 7.1 03/01/2019    ALT 24 03/01/2019    AST 19 03/01/2019    ALKPHOS 63 03/01/2019    BILITOTAL 0.4 03/01/2019    BILIDIRECT 0.07 05/04/2017    LIPASE 49 12/21/2018    AMYLASE 34 07/19/2017    PTT 41 (H) 08/09/2018    INR 1.07 08/09/2018    HCG Negative 04/01/2019       Preop Vitals  BP Readings from Last 3 Encounters:   01/28/20 106/78   01/24/20 106/70   01/20/20 114/72    Pulse Readings from Last 3 Encounters:   01/28/20 78   01/24/20 90   01/20/20 82      Resp Readings from Last 3 Encounters:   12/31/19 16   06/26/19 18   05/03/19 18    SpO2 Readings from Last 3 Encounters:   01/24/20 98%   12/21/18 98%   11/01/18 98%      Temp Readings from Last 1 Encounters:   12/31/19 98.9  F (37.2  C) (Tympanic)    Ht Readings from Last 1 Encounters:   07/08/19 1.65 m (5' 4.96\")      Wt Readings from Last 1 Encounters:   01/28/20 113.9 kg (251 lb 3.2 oz)    Estimated body mass index is 41.85 kg/m  as calculated from the following:    Height as of 7/8/19: 1.65 m (5' 4.96\").    Weight as of 1/28/20: 113.9 kg (251 lb 3.2 oz).       Anesthesia Plan      History & Physical Review      ASA Status:  2 .    NPO Status:  > 6 hours    Plan for Spinal (With ITN)   PONV prophylaxis:  Ondansetron (or other 5HT-3)       Postoperative Care      Consents  Anesthetic plan, risks, benefits and alternatives discussed with:  Patient..                 CHELY CRYSTAL CRNA  "

## 2020-01-31 NOTE — ANESTHESIA POSTPROCEDURE EVALUATION
Patient: Ana Patrick    Procedure(s):   SECTION    Diagnosis:High-risk pregnancy in third trimester [O09.93]  Diagnosis Additional Information: No value filed.    Anesthesia Type:  Spinal    Note:  Anesthesia Post Evaluation    Patient location during evaluation: Bedside  Patient participation: Able to fully participate in evaluation  Level of consciousness: awake and alert  Pain management: adequate  Airway patency: patent  Cardiovascular status: acceptable  Respiratory status: acceptable  Hydration status: acceptable  PONV: none     Anesthetic complications: None          Last vitals:  Vitals:    20 0925 20 0930 20 0933   BP: 105/57 108/52 108/52   Pulse: 57 58    Resp: 16 16 19   Temp:      SpO2: 99% 99% 100%         Electronically Signed By: CHELY Mcfarlane CRNA  2020  10:50 AM

## 2020-01-31 NOTE — ANESTHESIA PROCEDURE NOTES
Peripheral nerve/Neuraxial procedure note : intrathecal  Pre-Procedure  Performed by  Deuce Pandey APRN CRNA   Location: OR    Procedure Times:1/31/2020 8:01 AM and 1/31/2020 8:08 AM  Pre-Anesthestic Checklist: patient identified, risks and benefits discussed, informed consent, monitors and equipment checked and pre-op evaluation    Timeout  Correct Patient: Yes   Correct Procedure: Yes   Correct Site: Yes     Correct Position: Yes     .   Procedure Documentation  ASA 2  .    Procedure: intrathecal, .   Patient Position:sitting Insertion Site:L3-4  (midline approach)     Patient Prep/Sterile Barriers; mask, sterile gloves, chlorhexidine gluconate and isopropyl alcohol, patient draped.  .  Needle:  Spinal Needle (gauge): 25  Spinal/LP Needle Length (inches): 3.5 # of attempts: 1 and # of redirects:  Introducer used Introducer: 20 G .        Assessment/Narrative  Paresthesias: No.  .  .  clear CSF fluid removed .

## 2020-01-31 NOTE — ANESTHESIA CARE TRANSFER NOTE
Patient: Ana Patrick    Procedure(s):   SECTION    Diagnosis: High-risk pregnancy in third trimester [O09.93]  Diagnosis Additional Information: No value filed.    Anesthesia Type:   Spinal     Note:  Airway :Room Air  Patient transferred to:Labor and Delivery  Handoff Report: Identifed the Patient, Identified the Reponsible Provider, Reviewed the pertinent medical history, Discussed the surgical course, Reviewed Intra-OP anesthesia mangement and issues during anesthesia, Set expectations for post-procedure period and Allowed opportunity for questions and acknowledgement of understanding      Vitals: (Last set prior to Anesthesia Care Transfer)    CRNA VITALS  2020 0821 - 2020 0859      2020             Resp Rate (set):  10                Electronically Signed By: CHELY CRYSTAL CRNA  2020  8:59 AM

## 2020-01-31 NOTE — PROGRESS NOTES
Baby girl delivered via c/section by Dr. Kim.  Low tone, no respiratory effort at 30 seconds.  Brought to warmer by Dr. Ashton for interventions.  Then to  nursery. To mother in her room at 1000 for skin to skin when baby stable.  Mother and father bonding well with baby at that time.

## 2020-01-31 NOTE — H&P
[facility]    History and Physical  Obstetrics and Gynecology     Date of Admission:  2020    Assessment & Plan   Ana Patrick is a 29 year old female who presents with 38w0d gestation with GDM and APAS  ASSESSMENT:   IUP @ 38w0d.  NST reactive.  Category  I    PLAN:   Admit - see IP orders  Prepare for  section    Gerry Kim    History of Present Illness   Ana Patrick is a 29 year old female  38w0d  Estimated Date of Delivery: 2020 is calculated from Patient's last menstrual period was 05/10/2019 (exact date). [facility]. She is electing . She is on prophylactic lovenox for APAS. She has been on metformin throughout pregnancy for diabetes in pregnancy.    PRENATAL COURSE  Prenatal course was complicated by recurrent pregnancy loss with pos lupus anticoagulant c/w APAS. She has gestational DM on metformin with good control.      Recent Labs   Lab Test 19  0925 19  1128   ABO A A   RH Neg Neg   AS  --  Neg     Rhogam not indicated   Recent Labs   Lab Test 19  1128   HEPBANG Nonreactive   HIAGAB Nonreactive   RUQIGG 33       Past Medical History    I have reviewed this patient's medical history and updated it with pertinent information if needed.   Past Medical History:   Diagnosis Date     Internal derangement of knee     2016     Personal history of other medical treatment (CODE)          Polycystic ovarian syndrome     3/18/2016       Past Surgical History   I have reviewed this patient's surgical history and updated it with pertinent information if needed.  Past Surgical History:   Procedure Laterality Date     ARTHROSCOPY KNEE Right     Dr. Landeros     HYSTEROSCOPY DIAGNOSTIC N/A 2018    Procedure: HYSTEROSCOPY DIAGNOSTIC;  Diagnostic Hysteroscopy with Endometrial Scratching.      .;  Surgeon: Gerry Kim MD;  Location: [facility] OR     MANDIBLE SURGERY Right             Prior to Admission Medications   Prior to Admission Medications   Prescriptions Last Dose Informant Patient Reported? Taking?   ASPIRIN ADULT LOW STRENGTH PO   Yes No   Sig: Take 81 mg by mouth   Prenatal Vit-Fe Fumarate-FA (PRENATAL MULTIVITAMIN PLUS IRON) 27-0.8 MG TABS per tablet   Yes No   Sig: Take 1 tablet by mouth daily   blood glucose (NO BRAND SPECIFIED) lancets standard   No No   Sig: Use to test blood sugar 4 times daily or as directed.   blood glucose (NO BRAND SPECIFIED) test strip   No No   Sig: Use to test blood sugar 4 times daily or as directed.   blood glucose monitoring (NO BRAND SPECIFIED) meter device kit   No No   Sig: Use to test blood sugar 4 times daily or as directed.   enoxaparin (LOVENOX) 40 MG/0.4ML syringe   No No   Sig: Inject 0.4 mLs (40 mg) Subcutaneous daily   metFORMIN modified (GLUMETZA) 1000 MG 24 hr tablet   No No   Sig: Take 1 tablet (1,000 mg) by mouth 2 times daily (with meals)   pantoprazole (PROTONIX) 40 MG EC tablet   No No   Sig: Take 1 tablet (40 mg) by mouth 2 times daily   sertraline (ZOLOFT) 100 MG tablet   No No   Sig: Take 1 tablet PO daily.   sucralfate (CARAFATE) 1 GM tablet   No No   Sig: TAKE 1 TABLET BY MOUTH 3 TIMES DAILY BEFORE MEALS      Facility-Administered Medications: None     Allergies   Allergies   Allergen Reactions     Meloxicam Rash       Social History   I have reviewed this patient's social history and updated it with pertinent information if needed. Ana Patrick  reports that she has never smoked. She has never used smokeless tobacco. She reports previous alcohol use. She reports that she does not use drugs.    Family History   I have reviewed this patient's family history and updated it with pertinent information if needed.   Family History   Problem Relation Age of Onset     Family History Negative Mother         Good Health     Family History Negative Father         Good Health     Cancer Maternal Grandmother         Cancer      Diabetes Other         Diabetes,maternal side     Anxiety Disorder Sister        Immunization History   Immunizations are up to date    Physical Exam                      Vital Signs with Ranges       Abdomen: gravid, single vertex fetus, non-tender, EFW 8 lbs 6    Fetal Heart Tones: 140 baseline, moderate variablility, + accels, no decels and Category I    Constitutional: healthy, alert, active and no distress   Respiratory: No increased work of breathing, good air exchange, clear to auscultation bilaterally, no crackles or wheezing  Cardiovascular: Normal apical impulse, regular rate and rhythm, normal S1 and S2, no S3 or S4, and no murmur noted  Skin/Extremites: no rashes and no lesions  Neurologic: Mental Status Exam:  Level of Alertness:   awake  Orientation:   person, place, time  Cranial Nerves:  cranial nerves II-XII are grossly intact

## 2020-01-31 NOTE — PROGRESS NOTES
PACU Transfer Note    Ana Patrick was transferred to [facility] room 414 via cart.  Equipment used for transport:  none.  Accompanied by:  PRISCILLA Woodard    PACU Respiratory Event Documentation     1) Episodes of Apnea greater than or equal to 10 seconds: no    2) Bradypnea - less than 8 breaths per minute: no    3) Pain score on 0 to 10 scale: 0    4) Pain-sedation mismatch (yes or no): no    5) Repeated 02 desaturation less than 90% (yes or no): no    Anesthesia notified? (yes or no): no    Any of the above events occuring repeatedly in separate 30 minute intervals may be considered recurrent PACU respiratory events.    Patient stable and meets phase 1 discharge criteria for transport from PACU.

## 2020-01-31 NOTE — PROGRESS NOTES
Patient here for scheduled . VSS. BS 78. EFM applied, category 1 tracing with irregular contractions that patient is unaware of. Reporting off to oncoming nurse. Desiree Luke RN on 2020 at 7:11 AM

## 2020-02-01 LAB
HGB BLD-MCNC: 9.5 G/DL (ref 11.7–15.7)
T PALLIDUM AB SER QL: NONREACTIVE

## 2020-02-01 PROCEDURE — 25800030 ZZH RX IP 258 OP 636: Performed by: OBSTETRICS & GYNECOLOGY

## 2020-02-01 PROCEDURE — 25000132 ZZH RX MED GY IP 250 OP 250 PS 637: Performed by: OBSTETRICS & GYNECOLOGY

## 2020-02-01 PROCEDURE — 12000000 ZZH R&B MED SURG/OB

## 2020-02-01 PROCEDURE — 25000128 H RX IP 250 OP 636: Performed by: OBSTETRICS & GYNECOLOGY

## 2020-02-01 PROCEDURE — 99024 POSTOP FOLLOW-UP VISIT: CPT | Performed by: OBSTETRICS & GYNECOLOGY

## 2020-02-01 PROCEDURE — 85018 HEMOGLOBIN: CPT | Performed by: OBSTETRICS & GYNECOLOGY

## 2020-02-01 PROCEDURE — 36415 COLL VENOUS BLD VENIPUNCTURE: CPT | Performed by: OBSTETRICS & GYNECOLOGY

## 2020-02-01 RX ADMIN — IBUPROFEN 800 MG: 400 TABLET, FILM COATED ORAL at 15:28

## 2020-02-01 RX ADMIN — HYDROCODONE BITARTRATE AND ACETAMINOPHEN 2 TABLET: 5; 325 TABLET ORAL at 17:56

## 2020-02-01 RX ADMIN — HYDROCODONE BITARTRATE AND ACETAMINOPHEN 2 TABLET: 5; 325 TABLET ORAL at 12:23

## 2020-02-01 RX ADMIN — SERTRALINE HYDROCHLORIDE 100 MG: 50 TABLET ORAL at 21:56

## 2020-02-01 RX ADMIN — DOCUSATE SODIUM 100 MG: 100 CAPSULE, LIQUID FILLED ORAL at 09:02

## 2020-02-01 RX ADMIN — ENOXAPARIN SODIUM 40 MG: 40 INJECTION SUBCUTANEOUS at 03:20

## 2020-02-01 RX ADMIN — KETOROLAC TROMETHAMINE 30 MG: 30 INJECTION, SOLUTION INTRAMUSCULAR at 09:02

## 2020-02-01 RX ADMIN — PRENATAL VIT W/ FE FUMARATE-FA TAB 27-0.8 MG 1 TABLET: 27-0.8 TAB at 21:56

## 2020-02-01 RX ADMIN — IBUPROFEN 800 MG: 400 TABLET, FILM COATED ORAL at 21:56

## 2020-02-01 RX ADMIN — SODIUM CHLORIDE, POTASSIUM CHLORIDE, SODIUM LACTATE AND CALCIUM CHLORIDE: 600; 310; 30; 20 INJECTION, SOLUTION INTRAVENOUS at 01:08

## 2020-02-01 RX ADMIN — KETOROLAC TROMETHAMINE 30 MG: 30 INJECTION, SOLUTION INTRAMUSCULAR at 03:17

## 2020-02-01 RX ADMIN — DOCUSATE SODIUM 100 MG: 100 CAPSULE, LIQUID FILLED ORAL at 21:56

## 2020-02-01 NOTE — PLAN OF CARE
Problem: Adult Inpatient Plan of Care  Goal: Plan of Care Review  2/1/2020 0551 by Kacy Lennon, RN  Note:   Pt having minimal pain, scheduled Toradol given. Light bleeding noted, no clots. SCDs on, Lovenox given. Pt nursing baby q2-3hr. Pt able to stand and ambulate in room.  No other concerns at this time.   Kacy Lennon, RN on 2/1/2020 at 5:53 AM    Kelley removed, pt educated about voiding after kelley.   Kacy Lennon, RN on 2/1/2020 at 6:44 AM

## 2020-02-01 NOTE — PROGRESS NOTES
OB/GYN Postpartum Note      S:  Patient is feeling pretty good this morning. Prather removed, has not yet voided. Pain is well controlled so far. Tolerating regular diet.   O:   Vitals:    20 1459 20 1600 20 2330 20 0834   BP: 104/69 106/87 124/62 113/45   BP Location:   Right arm    Pulse:  63     Resp: 16  16 18   Temp: 98.5  F (36.9  C) 98.5  F (36.9  C) 98.2  F (36.8  C) 97.4  F (36.3  C)   TempSrc: Oral Oral Tympanic Oral   SpO2:  96% 97% 97%     General: resting in bed, in NAD  Resp: nonlabored  Abdomen: soft, nontender, nondistended  Fundus firm at umbilicus  Incision: covered in dressing  Extremities: nontender    Hemoglobin   Date Value Ref Range Status   2020 9.5 (L) 11.7 - 15.7 g/dL Final   ]    A: Ms. Ana Patrick is a 29 year old  POD #1 s/p PLTCS    P:  Antiphospholipid antibody syndrome: on ppx lovenox, will continue until 6 weeks PP  Heme 10.7 >  > 9.5  GI: tolerating regular diet  Feeding: breast  Contraception: will discuss with primary at PP visit  Rubella Immune  Rh negative, Infant Rh negative- Rhogam not indicated  Disposition: routine PP cares    Jenny Waldron MD  OB/GYN  2020 9:01 AM

## 2020-02-01 NOTE — PLAN OF CARE
VSS. Fundus firm and below the U. Did pass moderate clot this afternoon but bleeding normal for timing of postpartum.  Able to sit, stand and ambulate to bathroom for pericares at 1600.  Pain has been well controlled between 2-5 by alternating [place] and Toradol.  Ice pack to incision as well.  SCDs in remain in place.  Catheter is draining clear dexter urine. Started additional fluids of D5LR per MAR to increase output some as patient does not drink much liquid.  Tolerating regular diet. BS active. Denies passing flatus.  Gaining more self confidence with breastfeeding.  Gradually more independent. Mother and father are attentive to baby and bonding well.

## 2020-02-01 NOTE — PROGRESS NOTES
Incentive Spirometry education completed.  Pt goal 3000 mls.  Pt achieved 2250 mls.  Pt instructed to perform 10/hr while awake with at least one deep breath and cough per hour until able to perform baseline activity.  RT will follow and re-assess as need.      Joann Daniels on 1/31/2020 at 6:14 PM

## 2020-02-02 PROCEDURE — 12000000 ZZH R&B MED SURG/OB

## 2020-02-02 PROCEDURE — 25000128 H RX IP 250 OP 636: Performed by: OBSTETRICS & GYNECOLOGY

## 2020-02-02 PROCEDURE — 25000132 ZZH RX MED GY IP 250 OP 250 PS 637: Performed by: OBSTETRICS & GYNECOLOGY

## 2020-02-02 PROCEDURE — 99024 POSTOP FOLLOW-UP VISIT: CPT | Performed by: OBSTETRICS & GYNECOLOGY

## 2020-02-02 RX ADMIN — HYDROCODONE BITARTRATE AND ACETAMINOPHEN 1 TABLET: 5; 325 TABLET ORAL at 11:23

## 2020-02-02 RX ADMIN — HYDROCODONE BITARTRATE AND ACETAMINOPHEN 2 TABLET: 5; 325 TABLET ORAL at 07:05

## 2020-02-02 RX ADMIN — IBUPROFEN 800 MG: 400 TABLET, FILM COATED ORAL at 02:35

## 2020-02-02 RX ADMIN — DOCUSATE SODIUM 100 MG: 100 CAPSULE, LIQUID FILLED ORAL at 21:07

## 2020-02-02 RX ADMIN — IBUPROFEN 800 MG: 400 TABLET, FILM COATED ORAL at 14:14

## 2020-02-02 RX ADMIN — ENOXAPARIN SODIUM 40 MG: 40 INJECTION SUBCUTANEOUS at 05:03

## 2020-02-02 RX ADMIN — SERTRALINE HYDROCHLORIDE 100 MG: 50 TABLET ORAL at 21:07

## 2020-02-02 RX ADMIN — HYDROCODONE BITARTRATE AND ACETAMINOPHEN 2 TABLET: 5; 325 TABLET ORAL at 00:14

## 2020-02-02 RX ADMIN — HYDROCODONE BITARTRATE AND ACETAMINOPHEN 2 TABLET: 5; 325 TABLET ORAL at 15:38

## 2020-02-02 RX ADMIN — PRENATAL VIT W/ FE FUMARATE-FA TAB 27-0.8 MG 1 TABLET: 27-0.8 TAB at 21:07

## 2020-02-02 RX ADMIN — HYDROCODONE BITARTRATE AND ACETAMINOPHEN 2 TABLET: 5; 325 TABLET ORAL at 21:06

## 2020-02-02 RX ADMIN — DOCUSATE SODIUM 100 MG: 100 CAPSULE, LIQUID FILLED ORAL at 11:23

## 2020-02-02 NOTE — PLAN OF CARE
VSS. Afebrile. Denies N/V. Passing flatus. BS active.  Voiding without difficulty. Pain has been well controlled with Ibuprofen and Norco. Incision is open to air with steristrips. Fundus firm and below the U.  Bleeding scant.  Breastfeeding well every 2-3 hours and on demand. Independent with positioning.  Bonding well with baby.

## 2020-02-02 NOTE — ACP (ADVANCE CARE PLANNING)
VSS. Fundus is firm. Bleeding scant. Incision clean and dry without discharge.  Voiding without difficulty. Denies N/V. Feels like she may have a BM today.  Independently latches and positions baby.

## 2020-02-02 NOTE — PROGRESS NOTES
OB/GYN Postpartum Note      S:  Patient is feeling well this morning.  Lochia = menses.  Eating and drinking without nausea.  Ambulating without difficulty.  +Flatus.  Pain is well controlled.  Still working on breastfeeding.     O:   Vitals:    20 0834 20 1800 20 0016 20 0735   BP: 113/45 112/58 124/76 117/55   Pulse:       Resp: 18  16    Temp: 97.4  F (36.3  C) 97.3  F (36.3  C) 96.4  F (35.8  C)    TempSrc: Oral Oral Temporal    SpO2: 97% 100% 100%      General: resting in bed, in NAD  Resp: nonlabored  Abdomen: soft, nontender, nondistended  Fundus firm at umbilicus  Incision: c/d/i  Extremities: nontender, 1+ edema in BLE    Hemoglobin   Date Value Ref Range Status   2020 9.5 (L) 11.7 - 15.7 g/dL Final   ]    A: Ms. Ana Patrick is a 29 year old  POD #2 s/p PLTCS    P:  Antiphospholipid antibody syndrome: on ppx lovenox, will continue until 6 weeks PP  Heme 10.7 >  > 9.5, asymptomatic  GI: tolerating regular diet  Feeding: breast  Contraception: will discuss with primary at PP visit  Rubella Immune  Rh negative, Infant Rh negative- Rhogam not indicated  Disposition: routine PP cares    Jenny Waldron MD  OB/GYN  2020 8:20 AM

## 2020-02-03 VITALS
DIASTOLIC BLOOD PRESSURE: 59 MMHG | OXYGEN SATURATION: 99 % | SYSTOLIC BLOOD PRESSURE: 116 MMHG | RESPIRATION RATE: 17 BRPM | HEART RATE: 63 BPM | TEMPERATURE: 98.1 F

## 2020-02-03 LAB — PLATELET # BLD AUTO: 215 10E9/L (ref 150–450)

## 2020-02-03 PROCEDURE — 25000128 H RX IP 250 OP 636: Performed by: OBSTETRICS & GYNECOLOGY

## 2020-02-03 PROCEDURE — 25000132 ZZH RX MED GY IP 250 OP 250 PS 637: Performed by: OBSTETRICS & GYNECOLOGY

## 2020-02-03 PROCEDURE — 85049 AUTOMATED PLATELET COUNT: CPT | Performed by: OBSTETRICS & GYNECOLOGY

## 2020-02-03 PROCEDURE — 99024 POSTOP FOLLOW-UP VISIT: CPT | Performed by: OBSTETRICS & GYNECOLOGY

## 2020-02-03 PROCEDURE — 36415 COLL VENOUS BLD VENIPUNCTURE: CPT | Performed by: OBSTETRICS & GYNECOLOGY

## 2020-02-03 RX ORDER — IBUPROFEN 800 MG/1
800 TABLET, FILM COATED ORAL EVERY 8 HOURS PRN
Qty: 50 TABLET | Refills: 1 | Status: SHIPPED | OUTPATIENT
Start: 2020-02-03 | End: 2020-03-24

## 2020-02-03 RX ORDER — HYDROCODONE BITARTRATE AND ACETAMINOPHEN 5; 325 MG/1; MG/1
1-2 TABLET ORAL EVERY 6 HOURS PRN
Qty: 10 TABLET | Refills: 0 | Status: SHIPPED | OUTPATIENT
Start: 2020-02-03 | End: 2020-03-24

## 2020-02-03 RX ADMIN — IBUPROFEN 800 MG: 400 TABLET, FILM COATED ORAL at 00:47

## 2020-02-03 RX ADMIN — HYDROCODONE BITARTRATE AND ACETAMINOPHEN 2 TABLET: 5; 325 TABLET ORAL at 04:53

## 2020-02-03 RX ADMIN — DOCUSATE SODIUM 100 MG: 100 CAPSULE, LIQUID FILLED ORAL at 09:15

## 2020-02-03 RX ADMIN — HYDROCODONE BITARTRATE AND ACETAMINOPHEN 1 TABLET: 5; 325 TABLET ORAL at 09:15

## 2020-02-03 RX ADMIN — ENOXAPARIN SODIUM 40 MG: 40 INJECTION SUBCUTANEOUS at 06:22

## 2020-02-03 RX ADMIN — IBUPROFEN 800 MG: 400 TABLET, FILM COATED ORAL at 06:22

## 2020-02-03 NOTE — DISCHARGE SUMMARY
DELIVERY DISCHARGE SUMMARY    Admit date: 2020  Discharge date: 2/3/20    Admit Dx:   - 29 year old y/o  at 38 weeks    - GDM, hx of anti-phospholipid antibody syndrome    Discharge Dx:  - Same as above, s/p primary low transverse  section  - viable infant female    Procedures:  - Primary low transverse  section with 2 layer closure via Pfannenstiel incision  - Spinal analgesia  -     Admit HPI:  Ms. Ana Patrick is a 29 year old  at 38 weeks who was admitted on 2020 for elective C/Section.  Please see her admit H&P for full details of her PMH, PSH, Meds, Allergies and exam on admit.    Hospital course:  After discussion of risk and and benefits and signing informed consent the patient was taken to the operating room for   section.    EBL from the delivery was normal. Please see her  Section Operative Note for full details regarding her delivery.    Her postoperative course was uncomplicated . On POD#3, she was meeting all of her postpartum goals and deemed stable for discharge. She was voiding without difficulty, tolerating a regular diet without nausea and vomiting, her pain was well controlled on oral pain medicines and her lochia was appropriate. Her hemoglobin prior to delivery was 10.7 and after delivery was 9.5. Her Rh status was negative and Rhogam was not indicated.  Staples were removed prior to discharge    Discharge Medications:  Current Facility-Administered Medications   Medication     bisacodyl (DULCOLAX) Suppository 10 mg     Blood Bank will determine if patient is eligible for and the proper dosage of rho (D) immune globulin     carboprost (HEMABATE) injection 250 mcg     diphenhydrAMINE (BENADRYL) capsule 25 mg    Or     diphenhydrAMINE (BENADRYL) injection 25 mg     docusate sodium (COLACE) capsule 100 mg     enoxaparin ANTICOAGULANT (LOVENOX) injection 40 mg     fentaNYL (PF) (SUBLIMAZE) injection 25-50 mcg      HYDROcodone-acetaminophen (NORCO) 5-325 MG per tablet 1-2 tablet     hydrocortisone 2.5 % cream     HYDROmorphone (PF) (DILAUDID) injection 0.3-0.5 mg     HYDROmorphone (PF) (DILAUDID) injection 0.3-0.5 mg     ibuprofen (ADVIL/MOTRIN) tablet 800 mg     lactated ringers BOLUS 1,000 mL     lanolin ointment     lidocaine (LMX4) cream     lidocaine 1 % 0.1-1 mL     magnesium hydroxide (MILK OF MAGNESIA) suspension 30 mL     methylergonovine (METHERGINE) injection 200 mcg     metoclopramide (REGLAN) injection 10 mg     misoprostol (CYTOTEC) suppository 800 mcg     naloxone (NARCAN) injection 0.1-0.4 mg     No MMR Needed -  Assessment: Patient does not need MMR vaccine     NO Rho (D)  immune globulin (RhoGam) needed  - mother INELIGIBLE     No Tdap Needed - Assessment: Patient does not need Tdap vaccine     ondansetron (ZOFRAN-ODT) ODT tab 4 mg    Or     ondansetron (ZOFRAN) injection 4 mg     ondansetron (ZOFRAN) injection 4 mg     oxytocin (PITOCIN) 20 units in 1000 mL 0.9% NaCl infusion     oxytocin (PITOCIN) injection 10 Units     prenatal multivitamin w/iron per tablet 1 tablet     prochlorperazine (COMPAZINE) injection 10 mg     sertraline (ZOLOFT) tablet 100 mg     simethicone (MYLICON) chewable tablet 80 mg     sodium chloride (PF) 0.9% PF flush 3 mL     sodium phosphate (FLEET ENEMA) 1 enema     tranexamic acid (CYKLOKAPRON) infusion 1 g         Discharge/Disposition:  Ana Patrick was discharged to home in stable condition with the following instructions/medications:  1) Call for temperature > 100.4, bright red vaginal bleeding >1 pad an hour x 2 hours, foul smelling vaginal discharge, pain not controlled by usual oral pain meds, persistent nausea and vomiting not controlled on medications, drainage or redness from incision site  3) For feeding she decided to breast.  4) She was instructed to follow-up withDAB in 6 weeks for a routine postpartum visit.  5) Discharge activity:  No heavy lifting >15 lbs or  strenuous activity for 6 weeks, pelvic rest for 6 weeks, no driving or operating machinery while on narcotics.      Praneeth Waldron MD  OBGYN   2/3/2020 8:06 AM

## 2020-02-03 NOTE — LACTATION NOTE
This note was copied from a baby's chart.  INPATIENT LACTATION CONSULT      Consult with Ana and edgar regarding breastfeeding.  Obvious rooting with a strong latch observed this feeding session.  Rhythmic and aggressive suckling also noted.  Instructed Ana on correct positioning and technique when latching babe on.  Ana is independent with latching babe onto breast.  Minimal assistance required.  Encouraged Ana on the importance of frequent feedings throughout the day (at least 8-12 feedings in a 24 hour period) and skin to skin contact.  Ana demonstrated and states she understands all information given.    Kita Orozco RN, IBCLC  Lactation Consultant  [facility] and [facility]

## 2020-02-03 NOTE — PROGRESS NOTES
Activity: Go back to your normal activities, except abstain from sexual intercourse x 6 weeks    Diet: You may eat a regular diet. Drink plenty of fluids.      Call your Doctor  if you have any of these symptoms:    * You soak a sanitary pad with blood within 1 hour, or you see clots larger than a golf ball.    * Bleeding that lasts more than 6 weeks.     *Bad-smelling fluid that comes out of your vagina.    *A fever above 100.4 degrees Fahrenheit (38.4 degrees Celsius) with or without chills.    * Severe pain, cramping, or tenderness in your lower belly area.    * Increased pain, swelling, redness or fluid around your stitches.    * A need to urinate more frequently (use the toilet more often), more urgently (use the toilet very quickly), or it burns when you urinate.    * Redness, swelling, or pain around a vein in your leg.    * Problems coping with sadness, anxiety, or depression.    * Problems breastfeeding, or a red or painful area on your breast.    * You have questions or concerns after you return home.      [facility]: [phone]    RX sent with pt, all discharge instructions gone over with no further questions from pt. Band numbers compared upon discharge. All follow up appts made for mother and infant.

## 2020-02-07 ENCOUNTER — LACTATION ENCOUNTER (OUTPATIENT)
Age: 30
End: 2020-02-07

## 2020-02-07 ENCOUNTER — HOSPITAL ENCOUNTER (OUTPATIENT)
Dept: OBGYN | Facility: OTHER | Age: 30
End: 2020-02-07
Attending: FAMILY MEDICINE
Payer: COMMERCIAL

## 2020-02-07 PROCEDURE — S9443 LACTATION CLASS: HCPCS | Performed by: REGISTERED NURSE

## 2020-02-07 NOTE — LACTATION NOTE
This note was copied from a baby's chart.  Outpatient Lactation Visit    Lauryn Patrick  [id number]    Consultation Date: 2020     Reason for Lactation Referral: Initial Lactation Consult    Baby's : 2020    Baby's Current Age: 7 day old  Baby's Gestational Age: Gestational Age: 38w0d    Primary Care Provider: No Ref-Primary, Physician    Presenting Problem (concerns as stated by parent): no concerns - is strictly pumping and bottle feeding breast milk and formula    MATERNAL HISTORY   History of Breast Surgery: no  Breast Changes During Pregnancy: no  Breast Feeding History: primigravida  Maternal Meds: daily prenatal vitamin  Pregnancy Complications: GDM  Anesthesia during labor: spinal    MATERNAL ASSESSMENT    Breast Size: average, symmetrical, soft after feeding and filling prior to feeding  Nipple Appearance - Left: no cracks, with signs of healing, education on further healing techniques provided  Nipple Appearance - Right: no cracks, with signs of healing, education on further healing techniques provided  Nipple Erectility - Left: erect with stimulation  Nipple Erectility - Right: erect with stimulation  Areolas Compressibility: soft  Nipple Size: average  Special Equipment Used: breast pump  Day mother reports milk came in:  Day 4    INFANT ASSESSMENT    Oral Anatomy  Mouth: normal  Palate: normal  Jaw: normal  Tongue: normal  Frenulum: normal   Digital Suck Exam: root    FEEDING   Is strictly pumping and bottle feeding breast milk and formula    Weights:    Birth Weight: 7 lb 15.6 oz    Hospital discharge weight: 7 lb 8.3 oz    Last weight: 7 lb 3 oz (2020 - public health nurse)    Today's Weight 7 lb 6 oz    Output: 5-6 soil diapers in last 24 hours, 5-6 wet diapers in last 24 hours      FEEDING PLAN    Home Feeding Plan: Continue to feed on demand when  elicits feeding cues with deep latch.  Babe should be eating 8-12 times in a 24 hour period.  Exclusivity explained  and encouraged in the early weeks to establish breastfeeding and order in milk supply.  Rooming-in encouraged with explanation of the benefits.  Continue to apply expressed breast milk and Lanolin cream to nipples after puming for healing and comfort.  Postpartum breastfeeding assessment completed and education provided.  Items included in the education are:    manual expression    handling and storing breastmilk    maintenance of breastfeeding for the first 6 months    sign/symptoms of infant feeding issues requiring referral to qualified health care provider    LACTATION COMMENTS     Reassurance and encouragement provided in regard to mom's concerns about milk supply.  Follow-up support information provided.  Parents plan to keep [place] Well-Child Check with Dr. Ashton as scheduled for 2 week well child check.      Face-to-face Time: 60 minutes with assessment and education.    Kita Orozco RN  2020  9:25 AM

## 2020-02-18 ENCOUNTER — OFFICE VISIT (OUTPATIENT)
Dept: OBGYN | Facility: OTHER | Age: 30
End: 2020-02-18
Attending: OBSTETRICS & GYNECOLOGY
Payer: COMMERCIAL

## 2020-02-18 VITALS
RESPIRATION RATE: 12 BRPM | WEIGHT: 228.6 LBS | BODY MASS INDEX: 38.09 KG/M2 | HEART RATE: 80 BPM | DIASTOLIC BLOOD PRESSURE: 78 MMHG | SYSTOLIC BLOOD PRESSURE: 112 MMHG

## 2020-02-18 DIAGNOSIS — G89.18 POSTOPERATIVE PAIN: Primary | ICD-10-CM

## 2020-02-18 PROCEDURE — 99024 POSTOP FOLLOW-UP VISIT: CPT | Performed by: OBSTETRICS & GYNECOLOGY

## 2020-02-18 PROCEDURE — G0463 HOSPITAL OUTPT CLINIC VISIT: HCPCS

## 2020-02-18 RX ORDER — HYDROCODONE BITARTRATE AND ACETAMINOPHEN 5; 325 MG/1; MG/1
1 TABLET ORAL EVERY 6 HOURS PRN
Qty: 10 TABLET | Refills: 0 | Status: SHIPPED | OUTPATIENT
Start: 2020-02-18 | End: 2020-03-24

## 2020-02-18 ASSESSMENT — ANXIETY QUESTIONNAIRES
2. NOT BEING ABLE TO STOP OR CONTROL WORRYING: NOT AT ALL
7. FEELING AFRAID AS IF SOMETHING AWFUL MIGHT HAPPEN: NOT AT ALL
6. BECOMING EASILY ANNOYED OR IRRITABLE: NOT AT ALL
5. BEING SO RESTLESS THAT IT IS HARD TO SIT STILL: NOT AT ALL
GAD7 TOTAL SCORE: 0
3. WORRYING TOO MUCH ABOUT DIFFERENT THINGS: NOT AT ALL
1. FEELING NERVOUS, ANXIOUS, OR ON EDGE: NOT AT ALL

## 2020-02-18 ASSESSMENT — PAIN SCALES - GENERAL: PAINLEVEL: MILD PAIN (2)

## 2020-02-18 ASSESSMENT — PATIENT HEALTH QUESTIONNAIRE - PHQ9: 5. POOR APPETITE OR OVEREATING: NOT AT ALL

## 2020-02-18 NOTE — PROGRESS NOTES
Ana Patrick presents todayfor a postop checkup at 2 weeks after  section with APAS    S: Bowels and bladder are functioning normally, no abnormal bleeding or pain. No concerns about the incision(s). She continues on prophylactic lovenox.    Current Outpatient Medications   Medication     HYDROcodone-acetaminophen (NORCO) 5-325 MG tablet     enoxaparin (LOVENOX) 40 MG/0.4ML syringe     HYDROcodone-acetaminophen (NORCO) 5-325 MG tablet     ibuprofen (ADVIL/MOTRIN) 800 MG tablet     metFORMIN modified (GLUMETZA) 1000 MG 24 hr tablet     pantoprazole (PROTONIX) 40 MG EC tablet     Prenatal Vit-Fe Fumarate-FA (PRENATAL MULTIVITAMIN PLUS IRON) 27-0.8 MG TABS per tablet     sertraline (ZOLOFT) 100 MG tablet     sucralfate (CARAFATE) 1 GM tablet     No current facility-administered medications for this visit.      Allergies   Allergen Reactions     Meloxicam Rash     Past Medical History:   Diagnosis Date     Internal derangement of knee     2016     Personal history of other medical treatment (CODE)          Polycystic ovarian syndrome     3/18/2016     Past Surgical History:   Procedure Laterality Date     ARTHROSCOPY KNEE Right     Dr. Landeros      SECTION N/A 2020    Procedure:  SECTION;  Surgeon: Gerry Kim MD;  Location:  OR     HYSTEROSCOPY DIAGNOSTIC N/A 2018    Procedure: HYSTEROSCOPY DIAGNOSTIC;  Diagnostic Hysteroscopy with Endometrial Scratching.      .;  Surgeon: Gerry Kim MD;  Location:  OR     MANDIBLE SURGERY Right            COMPLETE REVIEW OF SYSTEMS: neg except as above        O: /78 (BP Location: Right arm, Patient Position: Sitting, Cuff Size: Adult Large)   Pulse 80   Resp 12   Wt 103.7 kg (228 lb 9.6 oz)   LMP 05/10/2019 (Exact Date)   BMI 38.09 kg/m   Body mass index is 38.09 kg/m .    EXAM:  Abdomen soft NT, ND, incision CDI  Ext NT lower extremities      I/P:    (G89.18) Postoperative pain  (primary encounter  diagnosis)  Comment:   Plan: HYDROcodone-acetaminophen (NORCO) 5-325 MG         tablet          Recheck in four weeks  Continue lovenox  Discussed contraception  Reiterated restrictions.    Gerry Kim MD FACOG  11:51 AM 2/18/2020

## 2020-02-18 NOTE — LETTER
[facility]  [address]  [place] [address]  [phone]          February 18, 2020    RE:  Ana Patrick                                                                                                                                                       [address]   [facility] [place] [address]            To whom it may concern:    Ana Patrick is under my professional care and may resume work up to four hours per shift as of 2/18/2020. She is restricted from lifting over 20 lbs. Until 3/13/2020. Then no restrictions.    Sincerely,        Gerry Kim MD FACOG  11:53 AM 2/18/2020

## 2020-02-19 ASSESSMENT — ANXIETY QUESTIONNAIRES: GAD7 TOTAL SCORE: 0

## 2020-02-20 ENCOUNTER — MYC MEDICAL ADVICE (OUTPATIENT)
Dept: OBGYN | Facility: OTHER | Age: 30
End: 2020-02-20

## 2020-03-04 ENCOUNTER — HOSPITAL ENCOUNTER (OUTPATIENT)
Dept: OBGYN | Facility: OTHER | Age: 30
End: 2020-03-04
Attending: FAMILY MEDICINE
Payer: COMMERCIAL

## 2020-03-04 ENCOUNTER — LACTATION ENCOUNTER (OUTPATIENT)
Age: 30
End: 2020-03-04

## 2020-03-04 PROCEDURE — S9443 LACTATION CLASS: HCPCS | Performed by: REGISTERED NURSE

## 2020-03-04 NOTE — LACTATION NOTE
"This note was copied from a baby's chart.  Laurny is here with mom Ana. Ana has some basic questions regarding pumping and milk supply.  Lauryn is strictly fed breast milk and some formula via a bottle. Lauyrn is gaining plenty of weight and there are no concerns with her intake.     Ana is getting only 3 oz when she pumps and is concerned her milk supply is going down.  Ana states she is only pumping 4 times per day, but has now gotten a hands free pump and feels like she would be able to pump more frequently if needed.    Instructed Ana that in order to maintain an adequate milk supply, she needs to stimulate her breasts at least 8-10 times per day. She can try \"power pumping\" (method explained to Ana) to try and boost her supply. Ana should also pump once during the night when Lauryn awakens to eat.  Ana states she understands the information given. Lauryn has her next appointment scheduled with Dr. Ashton on March 31.  "

## 2020-03-12 ENCOUNTER — PRENATAL OFFICE VISIT (OUTPATIENT)
Dept: OBGYN | Facility: OTHER | Age: 30
End: 2020-03-12
Attending: OBSTETRICS & GYNECOLOGY
Payer: COMMERCIAL

## 2020-03-12 VITALS
TEMPERATURE: 98.6 F | WEIGHT: 230.7 LBS | HEIGHT: 64 IN | HEART RATE: 76 BPM | BODY MASS INDEX: 39.38 KG/M2 | OXYGEN SATURATION: 98 % | RESPIRATION RATE: 18 BRPM | SYSTOLIC BLOOD PRESSURE: 102 MMHG | DIASTOLIC BLOOD PRESSURE: 80 MMHG

## 2020-03-12 DIAGNOSIS — N87.0 DYSPLASIA OF CERVIX, LOW GRADE (CIN 1): Primary | ICD-10-CM

## 2020-03-12 PROCEDURE — G0463 HOSPITAL OUTPT CLINIC VISIT: HCPCS

## 2020-03-12 PROCEDURE — G0123 SCREEN CERV/VAG THIN LAYER: HCPCS | Performed by: OBSTETRICS & GYNECOLOGY

## 2020-03-12 PROCEDURE — 99207 ZZC POST-PARTUM 6 WK VISIT - GICH ONLY: CPT | Performed by: OBSTETRICS & GYNECOLOGY

## 2020-03-12 PROCEDURE — 87624 HPV HI-RISK TYP POOLED RSLT: CPT | Mod: ZL | Performed by: OBSTETRICS & GYNECOLOGY

## 2020-03-12 PROCEDURE — 88142 CYTOPATH C/V THIN LAYER: CPT | Performed by: OBSTETRICS & GYNECOLOGY

## 2020-03-12 PROCEDURE — 40001026 ZZHCL STATISTICAL PAP TEST QC: Performed by: OBSTETRICS & GYNECOLOGY

## 2020-03-12 ASSESSMENT — PAIN SCALES - GENERAL: PAINLEVEL: NO PAIN (0)

## 2020-03-12 ASSESSMENT — MIFFLIN-ST. JEOR: SCORE: 1760.42

## 2020-03-12 NOTE — LETTER
[facility]  [address]  [place] [address]  [phone]          March 12, 2020    RE:  Aan Patrick                                                                                                                                                          [facility] [address]            To whom it may concern:    Ana Patrick is under my professional care and may return to work without restrictions.    Sincerely,        Gerry Kim MD FACOG  11:47 AM 3/12/2020

## 2020-03-12 NOTE — PROGRESS NOTES
"CC: postpartum exam at 6 weeks from delivery    HPI: Ana Patrick presents for postpartum exam. Baby was born by  delivery. Complications included none. She is on lovenox for LA pos with recurrent miscarriage. No calf pain or swelling, no CP or SOB.  Mood:OK    Breastfeeding:pumping  Incision(s): OK  Bleeding: no  Birthcontrol: Nexplanon    Past Medical History:   Diagnosis Date     Internal derangement of knee     2016     Personal history of other medical treatment (CODE)     2013     Polycystic ovarian syndrome     3/18/2016     Past Surgical History:   Procedure Laterality Date     ARTHROSCOPY KNEE Right     Dr. Landeros      SECTION N/A 2020    Procedure:  SECTION;  Surgeon: Gerry Kim MD;  Location:  OR     HYSTEROSCOPY DIAGNOSTIC N/A 2018    Procedure: HYSTEROSCOPY DIAGNOSTIC;  Diagnostic Hysteroscopy with Endometrial Scratching.      .;  Surgeon: Gerry Kim MD;  Location:  OR     MANDIBLE SURGERY Right          Current Outpatient Medications   Medication     enoxaparin (LOVENOX) 40 MG/0.4ML syringe     HYDROcodone-acetaminophen (NORCO) 5-325 MG tablet     ibuprofen (ADVIL/MOTRIN) 800 MG tablet     metFORMIN modified (GLUMETZA) 1000 MG 24 hr tablet     pantoprazole (PROTONIX) 40 MG EC tablet     Prenatal Vit-Fe Fumarate-FA (PRENATAL MULTIVITAMIN PLUS IRON) 27-0.8 MG TABS per tablet     sertraline (ZOLOFT) 100 MG tablet     sucralfate (CARAFATE) 1 GM tablet     No current facility-administered medications for this visit.       Allergies   Allergen Reactions     Meloxicam Rash       REVIEW OF SYSTEMS:  Neg for bowel, bladder, and breast    O: /80 (BP Location: Right arm, Patient Position: Sitting, Cuff Size: Adult Large)   Pulse 76   Temp 98.6  F (37  C)   Resp 18   Ht 1.632 m (5' 4.25\")   Wt 104.6 kg (230 lb 11.2 oz)   LMP 05/10/2019 (Exact Date)   SpO2 98%   Breastfeeding Yes   BMI 39.29 kg/m    Body mass index is 39.29 " kg/m .    Exam:  Constitutional: healthy, alert, active and no distress  Pelvic exam: normal vagina and vulva, normal cervix without lesions or tenderness, uterus normal size anteverted, adenxa normal in size without tenderness, pap smear done, exam chaperoned by nurse.  Abdomen: incision CDI,obese and NT    PHQ-9 score:    PHQ-9 SCORE 6/26/2019   PHQ-9 Total Score 0       No results found for any visits on 03/12/20.      I/P:  Postpartum visit.    Resume annual preventive healthcare. Continue PNV's until done with childbearing.    F/u for Nexplanon insertion.    RTC prn    Gerry Kim MD FACOG  11:15 AM 3/12/2020

## 2020-03-12 NOTE — LETTER
March 23, 2020      Ana Patrick  [address]   [facility] [address]    Dear ,      I am happy to inform you that your recent cervical cancer screening test (PAP smear) was normal.      Preventative screenings such as this help to ensure your health for years to come. You should repeat a pap smear in {YEAR:864876:o}, unless otherwise directed.      You will still need to return to the clinic every year for your annual exam and other preventive tests.     If you have additional questions regarding this result, please call our registered nurse, {PAPNURSE:[id number]}.      Sincerely,      Gerry Kim MD

## 2020-03-12 NOTE — NURSING NOTE
Patient presents to the clinic for her Post partum check.  Medication Reconciliation Completed.    Zhen Whiting LPN  3/12/2020 11:15 AM

## 2020-03-20 LAB
COPATH REPORT: NORMAL
FINAL DIAGNOSIS: NORMAL
HPV HR 12 DNA CVX QL NAA+PROBE: NEGATIVE
HPV16 DNA SPEC QL NAA+PROBE: NEGATIVE
HPV18 DNA SPEC QL NAA+PROBE: NEGATIVE
PAP: NORMAL
SPECIMEN DESCRIPTION: NORMAL
SPECIMEN SOURCE CVX/VAG CYTO: NORMAL

## 2020-03-24 ENCOUNTER — OFFICE VISIT (OUTPATIENT)
Dept: OBGYN | Facility: OTHER | Age: 30
End: 2020-03-24
Attending: OBSTETRICS & GYNECOLOGY
Payer: COMMERCIAL

## 2020-03-24 VITALS — DIASTOLIC BLOOD PRESSURE: 62 MMHG | SYSTOLIC BLOOD PRESSURE: 110 MMHG | HEART RATE: 72 BPM

## 2020-03-24 DIAGNOSIS — Z01.812 PRE-PROCEDURE LAB EXAM: Primary | ICD-10-CM

## 2020-03-24 DIAGNOSIS — Z30.017 ENCOUNTER FOR INITIAL PRESCRIPTION OF IMPLANTABLE SUBDERMAL CONTRACEPTIVE: ICD-10-CM

## 2020-03-24 LAB — HCG UR QL: NEGATIVE

## 2020-03-24 PROCEDURE — 11981 INSERTION DRUG DLVR IMPLANT: CPT | Performed by: OBSTETRICS & GYNECOLOGY

## 2020-03-24 PROCEDURE — 81025 URINE PREGNANCY TEST: CPT | Mod: ZL | Performed by: OBSTETRICS & GYNECOLOGY

## 2020-03-24 PROCEDURE — G0463 HOSPITAL OUTPT CLINIC VISIT: HCPCS | Mod: 25

## 2020-03-24 PROCEDURE — 25000128 H RX IP 250 OP 636: Performed by: OBSTETRICS & GYNECOLOGY

## 2020-03-24 RX ADMIN — ETONOGESTREL 68 MG: 68 IMPLANT SUBCUTANEOUS at 11:23

## 2020-03-24 ASSESSMENT — PAIN SCALES - GENERAL: PAINLEVEL: NO PAIN (0)

## 2020-03-24 NOTE — PROGRESS NOTES
S: Patient presents for Nexplanon insertion. Risks and benefits discussed.  O: /62 (BP Location: Right arm, Patient Position: Sitting, Cuff Size: Adult Large)   Pulse 72   LMP 05/10/2019 (Exact Date)   Breastfeeding Yes     Results for orders placed or performed in visit on 03/24/20   Pregnancy, Urine (HCG)     Status: None   Result Value Ref Range    HCG Qual Urine Negative NEG^Negative     After consent was performed and a timeout observed patient was placed in supine position.  The skin was prepped with alcohol and 1% lidocaine was used to anesthetize the Nexplanon insertion site.  Sterile drape and prep were performed with ChloraPrep. A  Nexplanon was then inserted  and deployed without difficulty.  Immediately after deployment the device was palpable in the subcutaneous upper left forearm.  The incision was then dressed with Steri-Strips with benzoin adhesive and a pressure dressing.  Patient tolerated the procedure well and was dismissed in stable condition.    I/P  (Z30.017) Encounter for initial prescription of implantable subdermal contraceptive  Comment:   Plan: etonogestrel (NEXPLANON) subdermal implant 68         mg, etonogestrel (NEXPLANON) 68 MG IMPL              Return as needed. Instructed regarding signs of infection or migration, common side effects, and efficacy.  Replacement or removal recommended in three years.

## 2020-03-24 NOTE — NURSING NOTE
Chief Complaint   Patient presents with     Procedure     Nexplanon placement      Prior to the start of the procedure and with procedural staff participation, I verbally confirmed the patient s identity using two indicators, relevant allergies, that the procedure was appropriate and matched the consent or emergent situation, and that the correct equipment/implants were available. Immediately prior to starting the procedure I conducted the Time Out with the procedural staff and re-confirmed the patient s name, procedure, and site/side. (The Joint Commission universal protocol was followed.)  Yes    Sedation (Moderate or Deep): None    Medication Reconciliation: completed   Davina Mortensen LPN  3/24/2020 11:03 AM

## 2020-05-08 ENCOUNTER — MYC MEDICAL ADVICE (OUTPATIENT)
Dept: FAMILY MEDICINE | Facility: OTHER | Age: 30
End: 2020-05-08

## 2020-05-08 DIAGNOSIS — R21 RASH: Primary | ICD-10-CM

## 2020-05-08 RX ORDER — NYSTATIN 100000 U/G
CREAM TOPICAL 2 TIMES DAILY
Qty: 30 G | Refills: 1 | Status: SHIPPED | OUTPATIENT
Start: 2020-05-08 | End: 2020-06-02

## 2020-06-02 ENCOUNTER — OFFICE VISIT (OUTPATIENT)
Dept: FAMILY MEDICINE | Facility: OTHER | Age: 30
End: 2020-06-02
Attending: FAMILY MEDICINE
Payer: COMMERCIAL

## 2020-06-02 VITALS
SYSTOLIC BLOOD PRESSURE: 112 MMHG | BODY MASS INDEX: 41.26 KG/M2 | HEART RATE: 88 BPM | RESPIRATION RATE: 12 BRPM | WEIGHT: 242.25 LBS | TEMPERATURE: 99.1 F | DIASTOLIC BLOOD PRESSURE: 72 MMHG

## 2020-06-02 DIAGNOSIS — Z30.46 ENCOUNTER FOR NEXPLANON REMOVAL: Primary | ICD-10-CM

## 2020-06-02 DIAGNOSIS — Z02.0 SCHOOL PHYSICAL EXAM: ICD-10-CM

## 2020-06-02 PROCEDURE — 36415 COLL VENOUS BLD VENIPUNCTURE: CPT | Mod: ZL | Performed by: FAMILY MEDICINE

## 2020-06-02 PROCEDURE — 86787 VARICELLA-ZOSTER ANTIBODY: CPT | Mod: ZL | Performed by: FAMILY MEDICINE

## 2020-06-02 PROCEDURE — 86481 TB AG RESPONSE T-CELL SUSP: CPT | Mod: ZL | Performed by: FAMILY MEDICINE

## 2020-06-02 PROCEDURE — G0463 HOSPITAL OUTPT CLINIC VISIT: HCPCS | Mod: 25

## 2020-06-02 PROCEDURE — 11982 REMOVE DRUG IMPLANT DEVICE: CPT | Performed by: FAMILY MEDICINE

## 2020-06-02 ASSESSMENT — ANXIETY QUESTIONNAIRES
3. WORRYING TOO MUCH ABOUT DIFFERENT THINGS: NOT AT ALL
GAD7 TOTAL SCORE: 1
6. BECOMING EASILY ANNOYED OR IRRITABLE: NOT AT ALL
5. BEING SO RESTLESS THAT IT IS HARD TO SIT STILL: NOT AT ALL
1. FEELING NERVOUS, ANXIOUS, OR ON EDGE: NOT AT ALL
7. FEELING AFRAID AS IF SOMETHING AWFUL MIGHT HAPPEN: NOT AT ALL
2. NOT BEING ABLE TO STOP OR CONTROL WORRYING: NOT AT ALL
IF YOU CHECKED OFF ANY PROBLEMS ON THIS QUESTIONNAIRE, HOW DIFFICULT HAVE THESE PROBLEMS MADE IT FOR YOU TO DO YOUR WORK, TAKE CARE OF THINGS AT HOME, OR GET ALONG WITH OTHER PEOPLE: NOT DIFFICULT AT ALL

## 2020-06-02 ASSESSMENT — PAIN SCALES - GENERAL: PAINLEVEL: NO PAIN (0)

## 2020-06-02 ASSESSMENT — PATIENT HEALTH QUESTIONNAIRE - PHQ9: 5. POOR APPETITE OR OVEREATING: SEVERAL DAYS

## 2020-06-02 NOTE — NURSING NOTE
"Chief Complaint   Patient presents with     Procedure     nexplanon removal       Initial /72   Pulse 88   Temp 99.1  F (37.3  C) (Tympanic)   Resp 12   Wt 109.9 kg (242 lb 4 oz)   LMP 04/14/2020 (Approximate)   Breastfeeding No   BMI 41.26 kg/m   Estimated body mass index is 41.26 kg/m  as calculated from the following:    Height as of 3/12/20: 1.632 m (5' 4.25\").    Weight as of this encounter: 109.9 kg (242 lb 4 oz).  Medication Reconciliation: omari Lo LPN     Prior to the start of the procedure and with procedural staff participation, I verbally confirmed the patient s identity using two indicators, relevant allergies, that the procedure was appropriate and matched the consent or emergent situation, and that the correct equipment/implants were available. Immediately prior to starting the procedure I conducted the Time Out with the procedural staff and re-confirmed the patient s name, procedure, and site/side. (The Joint Commission universal protocol was followed.)  Yes    Sedation (Moderate or Deep): None    "

## 2020-06-02 NOTE — PROGRESS NOTES
Nexplanon Removal:     Is a pregnancy test required: No.  Was a consent obtained?  Yes    Ana Patrick is here for removal of etonogestrel implant Nexplanon/Implanon    Indication: side effects (persistent vaginal bleeding and increased appetite/weight gain)      Preoperative Diagnosis: etonogestrel implant  Postoperative Diagnosis: etonogestrel implant removed    Technique: On the left arm  Skin prep Chloraprep  Anesthesia 1% lidocaine  Procedure: Small incision (<5mm) was made at distal end of palpable implant, curved hemostat or mosquito forceps was used to isolate the implant and bring it to the incision, the fibrous capsule containing the implant  was incised and the Implant was removed intact.    EBL: minimal  Complications:  No  Tolerance:  Pt tolerated procedure well and was in stable condition.   Dressing:    A pressure bandage was placed for the next 12-24 hours.    Contraception was discussed and patient chose the following method condoms      Follow up: Pt was instructed to call if bleeding, severe pain or foul smell.     Labs were ordered today for school/employment needs.  Varicella titre and quantiferon gold TB test which has been positive in the past; will need CXR if returns positive again.    Abiola Ashton, DO

## 2020-06-03 ASSESSMENT — ANXIETY QUESTIONNAIRES: GAD7 TOTAL SCORE: 1

## 2020-06-04 LAB — VZV IGG SER QL IA: 4.5 AI (ref 0–0.8)

## 2020-06-05 LAB
GAMMA INTERFERON BACKGROUND BLD IA-ACNC: 0.02 IU/ML
M TB IFN-G BLD-IMP: NEGATIVE
M TB IFN-G CD4+ BCKGRND COR BLD-ACNC: >10 IU/ML
MITOGEN IGNF BCKGRD COR BLD-ACNC: 0.06 IU/ML
MITOGEN IGNF BCKGRD COR BLD-ACNC: 0.09 IU/ML

## 2020-09-17 ENCOUNTER — OFFICE VISIT (OUTPATIENT)
Dept: FAMILY MEDICINE | Facility: OTHER | Age: 30
End: 2020-09-17
Attending: FAMILY MEDICINE
Payer: COMMERCIAL

## 2020-09-17 VITALS
DIASTOLIC BLOOD PRESSURE: 78 MMHG | OXYGEN SATURATION: 98 % | WEIGHT: 243 LBS | TEMPERATURE: 99.4 F | HEART RATE: 80 BPM | RESPIRATION RATE: 18 BRPM | SYSTOLIC BLOOD PRESSURE: 118 MMHG | BODY MASS INDEX: 41.39 KG/M2

## 2020-09-17 DIAGNOSIS — R41.840 CONCENTRATION DEFICIT: Primary | ICD-10-CM

## 2020-09-17 DIAGNOSIS — R23.2 FLUSHING REACTION: ICD-10-CM

## 2020-09-17 LAB
ALBUMIN SERPL-MCNC: 4.1 G/DL (ref 3.5–5.7)
ALP SERPL-CCNC: 76 U/L (ref 34–104)
ALT SERPL W P-5'-P-CCNC: 23 U/L (ref 7–52)
ANION GAP SERPL CALCULATED.3IONS-SCNC: 9 MMOL/L (ref 3–14)
AST SERPL W P-5'-P-CCNC: 16 U/L (ref 13–39)
BASOPHILS # BLD AUTO: 0.1 10E9/L (ref 0–0.2)
BASOPHILS NFR BLD AUTO: 0.5 %
BILIRUB SERPL-MCNC: 0.4 MG/DL (ref 0.3–1)
BUN SERPL-MCNC: 10 MG/DL (ref 7–25)
CALCIUM SERPL-MCNC: 9 MG/DL (ref 8.6–10.3)
CHLORIDE SERPL-SCNC: 106 MMOL/L (ref 98–107)
CO2 SERPL-SCNC: 23 MMOL/L (ref 21–31)
CREAT SERPL-MCNC: 0.66 MG/DL (ref 0.6–1.2)
DIFFERENTIAL METHOD BLD: NORMAL
EOSINOPHIL # BLD AUTO: 0.1 10E9/L (ref 0–0.7)
EOSINOPHIL NFR BLD AUTO: 0.6 %
ERYTHROCYTE [DISTWIDTH] IN BLOOD BY AUTOMATED COUNT: 13 % (ref 10–15)
GFR SERPL CREATININE-BSD FRML MDRD: >90 ML/MIN/{1.73_M2}
GLUCOSE SERPL-MCNC: 138 MG/DL (ref 70–105)
HCT VFR BLD AUTO: 41.1 % (ref 35–47)
HGB BLD-MCNC: 13.8 G/DL (ref 11.7–15.7)
IMM GRANULOCYTES # BLD: 0 10E9/L (ref 0–0.4)
IMM GRANULOCYTES NFR BLD: 0.2 %
LYMPHOCYTES # BLD AUTO: 2.3 10E9/L (ref 0.8–5.3)
LYMPHOCYTES NFR BLD AUTO: 23.2 %
MCH RBC QN AUTO: 27.9 PG (ref 26.5–33)
MCHC RBC AUTO-ENTMCNC: 33.6 G/DL (ref 31.5–36.5)
MCV RBC AUTO: 83 FL (ref 78–100)
MONOCYTES # BLD AUTO: 0.6 10E9/L (ref 0–1.3)
MONOCYTES NFR BLD AUTO: 6.2 %
NEUTROPHILS # BLD AUTO: 6.8 10E9/L (ref 1.6–8.3)
NEUTROPHILS NFR BLD AUTO: 69.3 %
PLATELET # BLD AUTO: 278 10E9/L (ref 150–450)
POTASSIUM SERPL-SCNC: 3.6 MMOL/L (ref 3.5–5.1)
PROT SERPL-MCNC: 7.2 G/DL (ref 6.4–8.9)
RBC # BLD AUTO: 4.94 10E12/L (ref 3.8–5.2)
SODIUM SERPL-SCNC: 138 MMOL/L (ref 134–144)
TSH SERPL DL<=0.05 MIU/L-ACNC: 1.92 IU/ML (ref 0.34–5.6)
WBC # BLD AUTO: 9.8 10E9/L (ref 4–11)

## 2020-09-17 PROCEDURE — 84443 ASSAY THYROID STIM HORMONE: CPT | Mod: ZL | Performed by: FAMILY MEDICINE

## 2020-09-17 PROCEDURE — 80053 COMPREHEN METABOLIC PANEL: CPT | Mod: ZL | Performed by: FAMILY MEDICINE

## 2020-09-17 PROCEDURE — 99214 OFFICE O/P EST MOD 30 MIN: CPT | Performed by: FAMILY MEDICINE

## 2020-09-17 PROCEDURE — G0463 HOSPITAL OUTPT CLINIC VISIT: HCPCS

## 2020-09-17 PROCEDURE — 36415 COLL VENOUS BLD VENIPUNCTURE: CPT | Mod: ZL | Performed by: FAMILY MEDICINE

## 2020-09-17 PROCEDURE — 85025 COMPLETE CBC W/AUTO DIFF WBC: CPT | Mod: ZL | Performed by: FAMILY MEDICINE

## 2020-09-17 RX ORDER — LISDEXAMFETAMINE DIMESYLATE 30 MG/1
30 CAPSULE ORAL DAILY PRN
Qty: 30 CAPSULE | Refills: 0 | Status: SHIPPED | OUTPATIENT
Start: 2020-09-17 | End: 2021-01-28

## 2020-09-17 ASSESSMENT — ENCOUNTER SYMPTOMS
APPETITE CHANGE: 0
SLEEP DISTURBANCE: 1
CHEST TIGHTNESS: 0
APNEA: 0
DIZZINESS: 0
SHORTNESS OF BREATH: 0
COUGH: 0
EYE PAIN: 0
ABDOMINAL PAIN: 0
ACTIVITY CHANGE: 0
NERVOUS/ANXIOUS: 0
HEADACHES: 0
EYE REDNESS: 0

## 2020-09-17 ASSESSMENT — PAIN SCALES - GENERAL: PAINLEVEL: NO PAIN (0)

## 2020-09-17 ASSESSMENT — ANXIETY QUESTIONNAIRES
GAD7 TOTAL SCORE: 0
5. BEING SO RESTLESS THAT IT IS HARD TO SIT STILL: NOT AT ALL
6. BECOMING EASILY ANNOYED OR IRRITABLE: NOT AT ALL
3. WORRYING TOO MUCH ABOUT DIFFERENT THINGS: NOT AT ALL
7. FEELING AFRAID AS IF SOMETHING AWFUL MIGHT HAPPEN: NOT AT ALL
2. NOT BEING ABLE TO STOP OR CONTROL WORRYING: NOT AT ALL
IF YOU CHECKED OFF ANY PROBLEMS ON THIS QUESTIONNAIRE, HOW DIFFICULT HAVE THESE PROBLEMS MADE IT FOR YOU TO DO YOUR WORK, TAKE CARE OF THINGS AT HOME, OR GET ALONG WITH OTHER PEOPLE: NOT DIFFICULT AT ALL
1. FEELING NERVOUS, ANXIOUS, OR ON EDGE: NOT AT ALL

## 2020-09-17 ASSESSMENT — PATIENT HEALTH QUESTIONNAIRE - PHQ9: 5. POOR APPETITE OR OVEREATING: NOT AT ALL

## 2020-09-17 NOTE — PROGRESS NOTES
SUBJECTIVE:   Ana Patrick is a 29 year old female who presents to clinic today for the following health issues:    RAEANN Cesar is here for a rash.   Started this morning just after 8am.  Was at work and noticing that she was not feeling like herself.  Feel shaky and like she was getting flushed.  Co-workers noticed that she was very red.  Checked a set of vitals; which were all normal.  T 97, SpO2 was normal, /83, HR 68.  After feeling this way, she noticed a red blotchy type rash covering her entire body.  Started to resolve after about one hour.  No diaphoresis, SOB, CP.  Actually felt cold.  Has not had any recent medication changes, no supplements.  No change in soaps/lotions/etc; did change laundry detergent a while ago - but without effects up to today.  Does tell me toward the end of the visit that she has had a few nights in a row that her daughter has not slept well at night, therefore she has gotten very little sleep.    She also would like to discuss test anxiety/difficulty focusing.  Seems to study okay; but when it comes to testing - it is hard for her to finish reading the clinical vignette; and her mind/eyes want to jump to the answers.  Can't seem to control it.  Has tried breathing techniques, envisioning line by line, etc.  No help.  Had talked to another physician who recommended stimulant use prn before testing.  She would like to try this.  Has never required stimulant use in the past.    Patient Active Problem List    Diagnosis Date Noted     S/P  section 2020     Priority: Medium     High-risk pregnancy in third trimester 2019     Priority: Medium     Added automatically from request for surgery [id number]       Antiphospholipid antibody syndrome complicating pregnancy (H) 2019     Priority: Medium     Lupus anticoagulant positive 10/08/2018     Priority: Medium     History of recurrent miscarriages, not currently pregnant 10/08/2018     Priority: Medium      Gastroesophageal reflux disease without esophagitis 2018     Priority: Medium     Generalized anxiety disorder 2017     Priority: Medium     Patellar malalignment syndrome 2016     Priority: Medium     Abnormal pap 2016     Priority: Medium     Overview:   LGSIL with + HPV (2013)  Colposcopy with CIN1 (2014)  ASCUS with - HPV (2015)       PCOS (polycystic ovarian syndrome) 2016     Priority: Medium     Past Medical History:   Diagnosis Date     Internal derangement of knee     2016     Personal history of other medical treatment (CODE)          Polycystic ovarian syndrome     3/18/2016      Past Surgical History:   Procedure Laterality Date     ARTHROSCOPY KNEE Right     Dr. Landeros      SECTION N/A 2020    Procedure:  SECTION;  Surgeon: Gerry Kim MD;  Location: [facility] OR     HYSTEROSCOPY DIAGNOSTIC N/A 2018    Procedure: HYSTEROSCOPY DIAGNOSTIC;  Diagnostic Hysteroscopy with Endometrial Scratching.      .;  Surgeon: Gerry Kim MD;  Location: [facility] OR     MANDIBLE SURGERY Right          Family History   Problem Relation Age of Onset     Family History Negative Mother         Good Health     Family History Negative Father         Good Health     Cancer Maternal Grandmother         Cancer     Diabetes Other         Diabetes,maternal side     Anxiety Disorder Sister      Social History     Tobacco Use     Smoking status: Never Smoker     Smokeless tobacco: Never Used   Substance Use Topics     Alcohol use: Not Currently     Alcohol/week: 0.0 standard drinks     Comment: Alcoholic Drinks/day: rare     Social History     Social History Narrative     Not on file     Current Outpatient Medications   Medication Sig Dispense Refill     pantoprazole (PROTONIX) 40 MG EC tablet Take 1 tablet (40 mg) by mouth 2 times daily 180 tablet 1     sucralfate (CARAFATE) 1 GM tablet TAKE 1 TABLET BY MOUTH 3 TIMES DAILY BEFORE MEALS 270 tablet 4      Allergies   Allergen Reactions     Meloxicam Rash         Review of Systems   Constitutional: Negative for activity change and appetite change.   Eyes: Negative for pain and redness.   Respiratory: Negative for apnea, cough, chest tightness and shortness of breath.    Gastrointestinal: Negative for abdominal pain.   Neurological: Negative for dizziness, syncope and headaches.   Psychiatric/Behavioral: Positive for sleep disturbance. Negative for self-injury. The patient is not nervous/anxious.         OBJECTIVE:     /78   Pulse 80   Temp 99.4  F (37.4  C) (Tympanic)   Resp 18   Wt 110.2 kg (243 lb)   LMP 07/20/2020 (Exact Date)   SpO2 98%   Breastfeeding No   BMI 41.39 kg/m    Body mass index is 41.39 kg/m .  Physical Exam  Vitals signs and nursing note reviewed.   Constitutional:       Appearance: Normal appearance.   HENT:      Head: Normocephalic and atraumatic.      Right Ear: Tympanic membrane and ear canal normal.      Left Ear: Tympanic membrane and ear canal normal.      Nose: Nose normal.      Mouth/Throat:      Mouth: Mucous membranes are moist.   Eyes:      Extraocular Movements: Extraocular movements intact.      Pupils: Pupils are equal, round, and reactive to light.   Neck:      Musculoskeletal: Normal range of motion and neck supple.   Cardiovascular:      Rate and Rhythm: Normal rate and regular rhythm.   Pulmonary:      Effort: Pulmonary effort is normal.      Breath sounds: Normal breath sounds.   Skin:     Capillary Refill: Capillary refill takes less than 2 seconds.   Neurological:      General: No focal deficit present.      Mental Status: She is alert.   Psychiatric:         Mood and Affect: Mood normal.         Behavior: Behavior normal.       Diagnostic Test Results:  Results for orders placed or performed in visit on 09/17/20   TSH     Status: None   Result Value Ref Range    Thyrotropin 1.92 0.34 - 5.60 IU/mL   Comprehensive Metabolic Panel     Status: Abnormal   Result  Value Ref Range    Sodium 138 134 - 144 mmol/L    Potassium 3.6 3.5 - 5.1 mmol/L    Chloride 106 98 - 107 mmol/L    Carbon Dioxide 23 21 - 31 mmol/L    Anion Gap 9 3 - 14 mmol/L    Glucose 138 (H) 70 - 105 mg/dL    Urea Nitrogen 10 7 - 25 mg/dL    Creatinine 0.66 0.60 - 1.20 mg/dL    GFR Estimate >90 >60 mL/min/[1.73_m2]    GFR Estimate If Black >90 >60 mL/min/[1.73_m2]    Calcium 9.0 8.6 - 10.3 mg/dL    Bilirubin Total 0.4 0.3 - 1.0 mg/dL    Albumin 4.1 3.5 - 5.7 g/dL    Protein Total 7.2 6.4 - 8.9 g/dL    Alkaline Phosphatase 76 34 - 104 U/L    ALT 23 7 - 52 U/L    AST 16 13 - 39 U/L   CBC and Differential     Status: None   Result Value Ref Range    WBC 9.8 4.0 - 11.0 10e9/L    RBC Count 4.94 3.8 - 5.2 10e12/L    Hemoglobin 13.8 11.7 - 15.7 g/dL    Hematocrit 41.1 35.0 - 47.0 %    MCV 83 78 - 100 fl    MCH 27.9 26.5 - 33.0 pg    MCHC 33.6 31.5 - 36.5 g/dL    RDW 13.0 10.0 - 15.0 %    Platelet Count 278 150 - 450 10e9/L    Diff Method Automated Method     % Neutrophils 69.3 %    % Lymphocytes 23.2 %    % Monocytes 6.2 %    % Eosinophils 0.6 %    % Basophils 0.5 %    % Immature Granulocytes 0.2 %    Absolute Neutrophil 6.8 1.6 - 8.3 10e9/L    Absolute Lymphocytes 2.3 0.8 - 5.3 10e9/L    Absolute Monocytes 0.6 0.0 - 1.3 10e9/L    Absolute Eosinophils 0.1 0.0 - 0.7 10e9/L    Absolute Basophils 0.1 0.0 - 0.2 10e9/L    Abs Immature Granulocytes 0.0 0 - 0.4 10e9/L     ASSESSMENT/PLAN:     1. Flushing reaction  Will obtain CBC, CMP, TSH to monitor for any anemia, electrolyte abnormality, thyroid pathology.  However I believe this to be a stress reaction.  Other possibilities to include: medication, allergic response, metabolic, infectious.   For now; discussed keeping track of what went on last night/today around event and monitor for recurrence.  - CBC and Differential; Future  - Comprehensive Metabolic Panel; Future  - TSH; Future  - TSH  - Comprehensive Metabolic Panel  - CBC and Differential    2. Concentration  deficit  Transient with testing scenarios.   Trial of Vyvanse to be taken prn prior to test taking.  Discussed R/B/O of medication.  Elects to try.  Monitor for side effects.  - lisdexamfetamine (VYVANSE) 30 MG capsule; Take 1 capsule (30 mg) by mouth daily as needed (concentration deficit)  Dispense: 30 capsule; Refill: 0    Follow up prn.    Abiola Ashton, [facility]

## 2020-09-17 NOTE — NURSING NOTE
"Chief Complaint   Patient presents with     Derm Problem       Initial /78   Pulse 80   Temp 99.4  F (37.4  C) (Tympanic)   Resp 18   Wt 110.2 kg (243 lb)   LMP 07/20/2020 (Exact Date)   SpO2 98%   Breastfeeding No   BMI 41.39 kg/m   Estimated body mass index is 41.39 kg/m  as calculated from the following:    Height as of 3/12/20: 1.632 m (5' 4.25\").    Weight as of this encounter: 110.2 kg (243 lb).  Medication Reconciliation: complete    Anny oL LPN  "

## 2020-09-18 ASSESSMENT — ANXIETY QUESTIONNAIRES: GAD7 TOTAL SCORE: 0

## 2020-11-03 ENCOUNTER — OFFICE VISIT (OUTPATIENT)
Dept: OBGYN | Facility: OTHER | Age: 30
End: 2020-11-03
Attending: OBSTETRICS & GYNECOLOGY
Payer: COMMERCIAL

## 2020-11-03 VITALS
SYSTOLIC BLOOD PRESSURE: 110 MMHG | DIASTOLIC BLOOD PRESSURE: 78 MMHG | BODY MASS INDEX: 40.77 KG/M2 | HEART RATE: 80 BPM | WEIGHT: 239.4 LBS

## 2020-11-03 DIAGNOSIS — N91.2 AMENORRHEA: Primary | ICD-10-CM

## 2020-11-03 DIAGNOSIS — R76.0 LUPUS ANTICOAGULANT POSITIVE: ICD-10-CM

## 2020-11-03 DIAGNOSIS — D68.61 ANTIPHOSPHOLIPID ANTIBODY SYNDROME (H): ICD-10-CM

## 2020-11-03 DIAGNOSIS — Z31.69 ENCOUNTER FOR PRECONCEPTION CONSULTATION: ICD-10-CM

## 2020-11-03 DIAGNOSIS — K21.9 GASTROESOPHAGEAL REFLUX DISEASE WITHOUT ESOPHAGITIS: ICD-10-CM

## 2020-11-03 DIAGNOSIS — N97.0 ANOVULATION: ICD-10-CM

## 2020-11-03 DIAGNOSIS — E66.01 MORBID OBESITY (H): ICD-10-CM

## 2020-11-03 PROCEDURE — 99213 OFFICE O/P EST LOW 20 MIN: CPT | Performed by: OBSTETRICS & GYNECOLOGY

## 2020-11-03 PROCEDURE — G0463 HOSPITAL OUTPT CLINIC VISIT: HCPCS

## 2020-11-03 RX ORDER — MEDROXYPROGESTERONE ACETATE 10 MG
10 TABLET ORAL DAILY
Qty: 10 TABLET | Refills: 0 | Status: SHIPPED | OUTPATIENT
Start: 2020-11-03 | End: 2021-03-23

## 2020-11-03 RX ORDER — SUCRALFATE 1 G/1
TABLET ORAL
Qty: 270 TABLET | Refills: 0 | Status: SHIPPED | OUTPATIENT
Start: 2020-11-03 | End: 2021-03-11

## 2020-11-03 RX ORDER — LETROZOLE 2.5 MG/1
5 TABLET, FILM COATED ORAL DAILY
Qty: 10 TABLET | Refills: 0 | Status: SHIPPED | OUTPATIENT
Start: 2020-11-03 | End: 2020-11-25

## 2020-11-03 ASSESSMENT — PAIN SCALES - GENERAL: PAINLEVEL: NO PAIN (0)

## 2020-11-03 NOTE — TELEPHONE ENCOUNTER
[facility] sent Rx request for the following:   sucralfate (CARAFATE) 1 GM tablet  Sig:TAKE 1 TABLET BY MOUTH 3 TIMES DAILY BEFORE MEALS    Last Prescription Date:   03/01/2019  Last Fill Qty/Refills:         270, R-4    Last Office Visit:              09/17/2020 (Poli)   Future Office visit:           None noted   Miscellaneous Gastrointestinal Agents Passed - 11/3/2020 10:43 AM     Prescription approved per [facility] Refill Protocol.  Charley Salazar RN ....................  11/3/2020   1:33 PM

## 2020-11-03 NOTE — NURSING NOTE
Patient here to discuss fertility.     Medication Reconciliation: complete    Zhao Guzman LPN  11/3/2020 8:21 AM

## 2020-11-03 NOTE — PROGRESS NOTES
CC: preconception consult  HPI:  Ana is a 29 year old female who presents for discussion of preconception medical discussion.  Medical issues include history of recurrent SAB with APAS. She has a Body mass index is 40.77 kg/m .  She has PCOS. She was delivered by  at 38 weeks last pregnancy. She was on baby asa and px lovenox with her successful pregnancy. She is chronically annouvlatory. She had diet controlled GDM last pregnancy with good control. Periods continue to be irregular. She would like to conceive this year. She had a neg pregnancy test yesterday.  No LMP recorded.    OB History    Para Term  AB Living   4 1 1 0 3 1   SAB TAB Ectopic Multiple Live Births   3 0 0 0 1      # Outcome Date GA Lbr Mandeep/2nd Weight Sex Delivery Anes PTL Lv   4 Term 20 38w0d  3.617 kg (7 lb 15.6 oz) F    PAUL      Name: ROMEL NOGUERA-ANA      Apgar1: 6  Apgar5: 7   3 SAB 19           2 SAB 18           1 SAB 16             Past Medical History:   Diagnosis Date     Internal derangement of knee     2016     Personal history of other medical treatment (CODE)          Polycystic ovarian syndrome     3/18/2016     Past Surgical History:   Procedure Laterality Date     ARTHROSCOPY KNEE Right     Dr. Landeros      SECTION N/A 2020    Procedure:  SECTION;  Surgeon: Gerry Kim MD;  Location:  OR     HYSTEROSCOPY DIAGNOSTIC N/A 2018    Procedure: HYSTEROSCOPY DIAGNOSTIC;  Diagnostic Hysteroscopy with Endometrial Scratching.      .;  Surgeon: Gerry Kim MD;  Location:  OR     MANDIBLE SURGERY Right          Social History     Socioeconomic History     Marital status:      Spouse name: Not on file     Number of children: Not on file     Years of education: Not on file     Highest education level: Not on file   Occupational History     Not on file   Social Needs     Financial resource strain: Not on file     Food insecurity      Worry: Not on file     Inability: Not on file     Transportation needs     Medical: Not on file     Non-medical: Not on file   Tobacco Use     Smoking status: Never Smoker     Smokeless tobacco: Never Used   Substance and Sexual Activity     Alcohol use: Not Currently     Alcohol/week: 0.0 standard drinks     Comment: Alcoholic Drinks/day: rare     Drug use: No     Sexual activity: Yes     Partners: Male   Lifestyle     Physical activity     Days per week: Not on file     Minutes per session: Not on file     Stress: Not on file   Relationships     Social connections     Talks on phone: Not on file     Gets together: Not on file     Attends Rastafarian service: Not on file     Active member of club or organization: Not on file     Attends meetings of clubs or organizations: Not on file     Relationship status: Not on file     Intimate partner violence     Fear of current or ex partner: Not on file     Emotionally abused: Not on file     Physically abused: Not on file     Forced sexual activity: Not on file   Other Topics Concern     Parent/sibling w/ CABG, MI or angioplasty before 65F 55M? Not Asked   Social History Narrative     Not on file     Family History   Problem Relation Age of Onset     Family History Negative Mother         Good Health     Family History Negative Father         Good Health     Cancer Maternal Grandmother         Cancer     Diabetes Other         Diabetes,maternal side     Anxiety Disorder Sister      Current Outpatient Medications   Medication     letrozole (FEMARA) 2.5 MG tablet     medroxyPROGESTERone (PROVERA) 10 MG tablet     pantoprazole (PROTONIX) 40 MG EC tablet     sucralfate (CARAFATE) 1 GM tablet     lisdexamfetamine (VYVANSE) 30 MG capsule     No current facility-administered medications for this visit.      Allergies   Allergen Reactions     Meloxicam Rash     /78 (BP Location: Right arm, Patient Position: Sitting, Cuff Size: Adult Large)   Pulse 80   Wt 108.6 kg (239 lb  6.4 oz)   BMI 40.77 kg/m      REVIEW OF SYSTEMS  General: negative  Resp: No shortness of breath, dyspnea on exertion, cough, or hemoptysis  CV: negative  GI: negative  : negative  Neurologic: negative  Psychiatric: negative  Hematologic: negative  Endocrine: negative    Exam:  Constitutional: healthy, alert, active and no distress      Lab: No results found for any visits on 20.    ASSESSMENT/PLAN :  1. Amenorrhea    2. Morbid obesity (H)    3. Anovulation    4. Encounter for preconception consultation    5. Lupus anticoagulant positive    6. Antiphospholipid antibody syndrome (H)      (N91.2) Amenorrhea  (primary encounter diagnosis)  Comment:   Plan: medroxyPROGESTERone (PROVERA) 10 MG tablet          (E66.01) Morbid obesity (H)  Comment:   Plan: Weight loss is advisable if possible    (N97.0) Anovulation  Comment:   Plan: letrozole (FEMARA) 2.5 MG tablet        Starting day 3-5,     (Z31.69) Encounter for preconception consultation  Comment:   Plan: Promote healthy lifestyle with good diet and exercise.    (R76.0) Lupus anticoagulant positive  Comment:   Plan:     (D68.61) Antiphospholipid antibody syndrome (H)  Comment:   Plan:   If she conceives would recommend baby aspirin, and px lovenox, repeat .  Start PNV's now      Gerry Kim MD FACOG  8:48 AM 11/3/2020

## 2020-11-06 ENCOUNTER — TELEPHONE (OUTPATIENT)
Dept: OBGYN | Facility: OTHER | Age: 30
End: 2020-11-06

## 2020-11-06 NOTE — TELEPHONE ENCOUNTER
Call from [facility] to notify that fertility medication is a never covered service for this insurance plan and therefore would not be covered by insurance. Patient is notified of this and has no further questions.     Trudy Ryan RN on 11/6/2020 at 10:12 AM

## 2020-11-16 ENCOUNTER — MYC MEDICAL ADVICE (OUTPATIENT)
Dept: OBGYN | Facility: OTHER | Age: 30
End: 2020-11-16

## 2020-11-16 DIAGNOSIS — N91.2 AMENORRHEA: Primary | ICD-10-CM

## 2020-11-16 NOTE — TELEPHONE ENCOUNTER
Patient called regarding mychart message. Patient advised per Dr. Kim to start Letrozole today and have a US in radiology a week from this Wednesday. Patient voiced understanding and transferred to scheduling. No further questions or concerns at this time.  Jane Waldron RN on 11/16/2020 at 9:42 AM

## 2020-11-24 ENCOUNTER — MYC MEDICAL ADVICE (OUTPATIENT)
Dept: OBGYN | Facility: OTHER | Age: 30
End: 2020-11-24

## 2020-11-24 DIAGNOSIS — N97.0 ANOVULATION: Primary | ICD-10-CM

## 2020-11-24 RX ORDER — CHORIOGONADOTROPIN ALFA 250 UG/.5ML
250 INJECTION, SOLUTION SUBCUTANEOUS ONCE
Qty: 0.5 ML | Refills: 0 | Status: SHIPPED | OUTPATIENT
Start: 2020-11-24 | End: 2021-01-13

## 2020-11-25 ENCOUNTER — HOSPITAL ENCOUNTER (OUTPATIENT)
Dept: ULTRASOUND IMAGING | Facility: OTHER | Age: 30
Discharge: HOME | End: 2020-11-25
Attending: OBSTETRICS & GYNECOLOGY | Admitting: OBSTETRICS & GYNECOLOGY
Payer: COMMERCIAL

## 2020-11-25 DIAGNOSIS — N91.2 AMENORRHEA: ICD-10-CM

## 2020-11-25 DIAGNOSIS — N97.0 ANOVULATION: ICD-10-CM

## 2020-11-25 PROCEDURE — 76857 US EXAM PELVIC LIMITED: CPT

## 2020-11-25 RX ORDER — LETROZOLE 2.5 MG/1
7.5 TABLET, FILM COATED ORAL DAILY
Qty: 15 TABLET | Refills: 0 | Status: SHIPPED | OUTPATIENT
Start: 2020-11-25 | End: 2021-01-13

## 2020-11-25 NOTE — PROGRESS NOTES
Reviewed follicle study with Dr. Gerry Kim MD, FACOG and patient was advised to increase to 7.5 mg of Letrozole daily x 5 days starting today. She will have a follicle study on 12/1 with DAB (may reschedule to 12/3 if work schedule allows). Patient verbalized understanding and agreement.    Awa Parry RN...................11/25/2020 2:31 PM

## 2020-12-18 ENCOUNTER — MYC MEDICAL ADVICE (OUTPATIENT)
Dept: OBGYN | Facility: OTHER | Age: 30
End: 2020-12-18

## 2020-12-18 DIAGNOSIS — N97.0 ANOVULATION: Primary | ICD-10-CM

## 2020-12-18 RX ORDER — LETROZOLE 2.5 MG/1
7.5 TABLET, FILM COATED ORAL DAILY
Qty: 15 TABLET | Refills: 0 | Status: SHIPPED | OUTPATIENT
Start: 2020-12-18 | End: 2021-03-15

## 2020-12-27 ENCOUNTER — HEALTH MAINTENANCE LETTER (OUTPATIENT)
Age: 30
End: 2020-12-27

## 2020-12-28 ENCOUNTER — MYC MEDICAL ADVICE (OUTPATIENT)
Dept: OBGYN | Facility: OTHER | Age: 30
End: 2020-12-28

## 2020-12-28 ENCOUNTER — HOSPITAL ENCOUNTER (OUTPATIENT)
Dept: ULTRASOUND IMAGING | Facility: HOSPITAL | Age: 30
Discharge: HOME | End: 2020-12-28
Attending: OBSTETRICS & GYNECOLOGY | Admitting: OBSTETRICS & GYNECOLOGY
Payer: COMMERCIAL

## 2020-12-28 DIAGNOSIS — N97.0 ANOVULATION: Primary | ICD-10-CM

## 2020-12-28 DIAGNOSIS — N97.0 ANOVULATION: ICD-10-CM

## 2020-12-28 PROCEDURE — 76830 TRANSVAGINAL US NON-OB: CPT

## 2020-12-28 NOTE — TELEPHONE ENCOUNTER
Dr. Praneeth Waldron would like patient to trigger today with ovidrel. Rx sent for his signing.   Trudy Ryan RN on 12/28/2020 at 8:43 AM

## 2021-01-11 DIAGNOSIS — R41.840 CONCENTRATION DEFICIT: ICD-10-CM

## 2021-01-12 ENCOUNTER — MYC MEDICAL ADVICE (OUTPATIENT)
Dept: FAMILY MEDICINE | Facility: OTHER | Age: 31
End: 2021-01-12

## 2021-01-12 RX ORDER — LISDEXAMFETAMINE DIMESYLATE 30 MG/1
CAPSULE ORAL
Qty: 30 CAPSULE | Refills: 0 | OUTPATIENT
Start: 2021-01-12

## 2021-01-12 NOTE — TELEPHONE ENCOUNTER
Disp Refills Start End JEY   lisdexamfetamine (VYVANSE) 30 MG capsule 30 capsule 0 9/17/2020  --   Sig - Route: Take 1 capsule (30 mg) by mouth daily as needed (concentration deficit) - Oral       LOV: 9/17/2020  Future Office visit: No future appointment scheduled at this time.      Routing refill request to provider for review/approval because:  Drug not on the [facility] [place] [place], [email or web address]] or [facility] refill protocol or controlled substance    Requested Prescriptions   Pending Prescriptions Disp Refills     VYVANSE 30 MG capsule [Pharmacy Med Name: Vyvanse 30 mg capsule] 30 capsule 0     Sig: Take 1 capsule (30 mg) by mouth daily as needed (concentration deficit)       There is no refill protocol information for this order        Unable to complete prescription refill per RN Medication Refill Policy.................... Juliann Alamo RN ....................  1/12/2021   8:48 AM

## 2021-01-12 NOTE — TELEPHONE ENCOUNTER
OMsignal message sent to patient stating to make an appointment for this refill.  Anny Lo, JESUS, LPN  1/12/2021  1:01 PM

## 2021-01-13 ENCOUNTER — MYC MEDICAL ADVICE (OUTPATIENT)
Dept: OBGYN | Facility: OTHER | Age: 31
End: 2021-01-13

## 2021-01-13 DIAGNOSIS — N97.0 ANOVULATION: ICD-10-CM

## 2021-01-13 RX ORDER — CHORIOGONADOTROPIN ALFA 250 UG/.5ML
250 INJECTION, SOLUTION SUBCUTANEOUS ONCE
Qty: 0.5 ML | Refills: 0 | Status: SHIPPED | OUTPATIENT
Start: 2021-01-13 | End: 2021-03-23

## 2021-01-13 RX ORDER — LETROZOLE 2.5 MG/1
7.5 TABLET, FILM COATED ORAL DAILY
Qty: 15 TABLET | Refills: 0 | Status: SHIPPED | OUTPATIENT
Start: 2021-01-13 | End: 2021-02-12

## 2021-01-14 ENCOUNTER — MYC MEDICAL ADVICE (OUTPATIENT)
Dept: OBGYN | Facility: OTHER | Age: 31
End: 2021-01-14

## 2021-01-14 DIAGNOSIS — N97.0 ANOVULATION: Primary | ICD-10-CM

## 2021-01-14 DIAGNOSIS — Z79.811 USE OF LETROZOLE (FEMARA): ICD-10-CM

## 2021-01-15 DIAGNOSIS — N97.0 ANOVULATION: ICD-10-CM

## 2021-01-15 RX ORDER — CHORIOGONADOTROPIN ALFA 250 UG/.5ML
INJECTION, SOLUTION SUBCUTANEOUS
Qty: 0.5 ML | Refills: 0 | OUTPATIENT
Start: 2021-01-15

## 2021-01-15 NOTE — TELEPHONE ENCOUNTER
Medication was prescribed 1/13/21 by Dr. Gerry Kim MD, FACOG. Pharmacy states they did not receive the prescription. Verbal order given to pharmacist Mehran Parry RN...................1/15/2021 11:04 AM

## 2021-01-21 ENCOUNTER — MYC MEDICAL ADVICE (OUTPATIENT)
Dept: OBGYN | Facility: OTHER | Age: 31
End: 2021-01-21

## 2021-01-25 NOTE — TELEPHONE ENCOUNTER
Called patient to notify her Dr. Julio urbano with US day 14 tomorrow. Allie Jo RN ....................  1/25/2021   2:47 PM

## 2021-01-26 ENCOUNTER — ANCILLARY PROCEDURE (OUTPATIENT)
Dept: ULTRASOUND IMAGING | Facility: OTHER | Age: 31
End: 2021-01-26
Attending: OBSTETRICS & GYNECOLOGY
Payer: COMMERCIAL

## 2021-01-26 DIAGNOSIS — N97.0 ANOVULATION: ICD-10-CM

## 2021-01-26 DIAGNOSIS — N97.0 ANOVULATION: Primary | ICD-10-CM

## 2021-01-26 PROCEDURE — 76830 TRANSVAGINAL US NON-OB: CPT | Performed by: OBSTETRICS & GYNECOLOGY

## 2021-01-28 ENCOUNTER — OFFICE VISIT (OUTPATIENT)
Dept: FAMILY MEDICINE | Facility: OTHER | Age: 31
End: 2021-01-28
Attending: FAMILY MEDICINE
Payer: COMMERCIAL

## 2021-01-28 VITALS
DIASTOLIC BLOOD PRESSURE: 80 MMHG | BODY MASS INDEX: 40.54 KG/M2 | SYSTOLIC BLOOD PRESSURE: 114 MMHG | OXYGEN SATURATION: 97 % | RESPIRATION RATE: 18 BRPM | TEMPERATURE: 97.6 F | HEART RATE: 100 BPM | WEIGHT: 238 LBS

## 2021-01-28 DIAGNOSIS — R41.840 CONCENTRATION DEFICIT: ICD-10-CM

## 2021-01-28 PROCEDURE — G0463 HOSPITAL OUTPT CLINIC VISIT: HCPCS

## 2021-01-28 PROCEDURE — 99214 OFFICE O/P EST MOD 30 MIN: CPT | Performed by: FAMILY MEDICINE

## 2021-01-28 RX ORDER — LISDEXAMFETAMINE DIMESYLATE 50 MG/1
50 CAPSULE ORAL DAILY
Qty: 30 CAPSULE | Refills: 0 | Status: SHIPPED | OUTPATIENT
Start: 2021-02-26 | End: 2021-03-28

## 2021-01-28 RX ORDER — LISDEXAMFETAMINE DIMESYLATE 40 MG/1
40 CAPSULE ORAL DAILY PRN
Qty: 30 CAPSULE | Refills: 0 | Status: ON HOLD | OUTPATIENT
Start: 2021-01-28 | End: 2021-12-02

## 2021-01-28 ASSESSMENT — ANXIETY QUESTIONNAIRES
IF YOU CHECKED OFF ANY PROBLEMS ON THIS QUESTIONNAIRE, HOW DIFFICULT HAVE THESE PROBLEMS MADE IT FOR YOU TO DO YOUR WORK, TAKE CARE OF THINGS AT HOME, OR GET ALONG WITH OTHER PEOPLE: NOT DIFFICULT AT ALL
3. WORRYING TOO MUCH ABOUT DIFFERENT THINGS: MORE THAN HALF THE DAYS
GAD7 TOTAL SCORE: 8
2. NOT BEING ABLE TO STOP OR CONTROL WORRYING: SEVERAL DAYS
7. FEELING AFRAID AS IF SOMETHING AWFUL MIGHT HAPPEN: NOT AT ALL
1. FEELING NERVOUS, ANXIOUS, OR ON EDGE: SEVERAL DAYS
5. BEING SO RESTLESS THAT IT IS HARD TO SIT STILL: SEVERAL DAYS
6. BECOMING EASILY ANNOYED OR IRRITABLE: SEVERAL DAYS

## 2021-01-28 ASSESSMENT — PAIN SCALES - GENERAL: PAINLEVEL: NO PAIN (0)

## 2021-01-28 ASSESSMENT — PATIENT HEALTH QUESTIONNAIRE - PHQ9: 5. POOR APPETITE OR OVEREATING: MORE THAN HALF THE DAYS

## 2021-01-28 NOTE — PROGRESS NOTES
"  SUBJECTIVE:   Ana Patrick is a 30 year old female who presents to clinic today for the following health issues:    RAEANN  Tali is here for discussion re: Vyvanse.  She was started on this in 2020 for concentration difficulty after it was recommended to her for use \"as needed.\"  She has never required stimulant use previously, through high school, etc.  However, she does remember having a more difficult time studying, testing, finishing assignments - it seemed to take her longer than siblings.  Had different reading level tests that were in general easier than the rest of the classroom.  Didn't think much of it back then, as believed it to be related to learning/comprehension.  Now back in school and noticing that testing is difficult again.  Reading clinical vignettes are a struggle and focusing to read each of the answers is near impossible; her eyes want to jump around.  Has tried breathing techniques, line by line reading, reduced distractions (studying in different areas).  Since trying the Vyvanse; she has noticed significant improvement.  Denies side effects of: abdominal pain, chest pain, weight loss, insomnia.   Did try doubling the dose one day and felt like it was too much medication - causing some fuzzy vision.  Thinking of using it more frequently as it seemed to work better for her; than when she was using it only for test days.  Doesn't want to take it forever.  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    Patient Active Problem List    Diagnosis Date Noted     Morbid obesity (H) 2020     Priority: Medium     S/P  section 2020     Priority: Medium     High-risk pregnancy in third trimester 2019     Priority: Medium     Added automatically from request for surgery [id number]       Antiphospholipid antibody syndrome complicating pregnancy (H) 2019     Priority: Medium     Lupus anticoagulant positive 10/08/2018     Priority: Medium     " History of recurrent miscarriages, not currently pregnant 10/08/2018     Priority: Medium     Gastroesophageal reflux disease without esophagitis 2018     Priority: Medium     Generalized anxiety disorder 2017     Priority: Medium     Patellar malalignment syndrome 2016     Priority: Medium     Abnormal pap 2016     Priority: Medium     Overview:   LGSIL with + HPV (2013)  Colposcopy with CIN1 (2014)  ASCUS with - HPV (2015)       PCOS (polycystic ovarian syndrome) 2016     Priority: Medium     Past Medical History:   Diagnosis Date     Internal derangement of knee     2016     Personal history of other medical treatment (CODE)          Polycystic ovarian syndrome     3/18/2016      Past Surgical History:   Procedure Laterality Date     ARTHROSCOPY KNEE Right     Dr. Landeros      SECTION N/A 2020    Procedure:  SECTION;  Surgeon: Gerry Kim MD;  Location: [facility] OR     HYSTEROSCOPY DIAGNOSTIC N/A 2018    Procedure: HYSTEROSCOPY DIAGNOSTIC;  Diagnostic Hysteroscopy with Endometrial Scratching.      .;  Surgeon: Gerry Kim MD;  Location:  OR     MANDIBLE SURGERY Right          Family History   Problem Relation Age of Onset     Family History Negative Mother         Good Health     Family History Negative Father         Good Health     Cancer Maternal Grandmother         Cancer     Diabetes Other         Diabetes,maternal side     Anxiety Disorder Sister      Social History     Tobacco Use     Smoking status: Never Smoker     Smokeless tobacco: Never Used   Substance Use Topics     Alcohol use: Not Currently     Alcohol/week: 0.0 standard drinks     Comment: Alcoholic Drinks/day: rare     Social History     Social History Narrative     Not on file     Current Outpatient Medications   Medication Sig Dispense Refill     lisdexamfetamine (VYVANSE) 40 MG capsule Take 1 capsule (40 mg) by mouth daily as needed (concentration deficit) 30  capsule 0     [START ON 2/26/2021] lisdexamfetamine (VYVANSE) 50 MG capsule Take 1 capsule (50 mg) by mouth daily 30 capsule 0     choriogonadotropin ethan (OVIDREL) 250 MCG/0.5ML injection Inject 0.5 mLs (250 mcg) Subcutaneous once for 1 dose 0.5 mL 0     choriogonadotropin ethan (OVIDREL) 250 MCG/0.5ML injection Inject 0.5 mLs (250 mcg) Subcutaneous once for 1 dose 0.5 mL 0     letrozole (FEMARA) 2.5 MG tablet Take 3 tablets (7.5 mg) by mouth daily Start day 3 15 tablet 0     letrozole (FEMARA) 2.5 MG tablet Take 3 tablets (7.5 mg) by mouth daily for 5 days 15 tablet 0     medroxyPROGESTERone (PROVERA) 10 MG tablet Take 1 tablet (10 mg) by mouth daily 10 tablet 0     pantoprazole (PROTONIX) 40 MG EC tablet Take 1 tablet (40 mg) by mouth 2 times daily 180 tablet 1     sucralfate (CARAFATE) 1 GM tablet TAKE 1 TABLET BY MOUTH 3 TIMES DAILY BEFORE MEALS 270 tablet 0     Allergies   Allergen Reactions     Meloxicam Rash       OBJECTIVE:     /80   Pulse 100   Temp 97.6  F (36.4  C) (Tympanic)   Resp 18   Wt 108 kg (238 lb)   LMP 01/13/2021 (Approximate)   SpO2 97%   Breastfeeding No   BMI 40.54 kg/m    Body mass index is 40.54 kg/m .  Physical Exam  Vitals signs and nursing note reviewed.   Constitutional:       Appearance: Normal appearance. She is obese.   HENT:      Head: Normocephalic and atraumatic.      Nose: Nose normal.   Neck:      Musculoskeletal: Normal range of motion.   Cardiovascular:      Rate and Rhythm: Normal rate and regular rhythm.      Pulses: Normal pulses.      Heart sounds: Normal heart sounds.   Pulmonary:      Effort: Pulmonary effort is normal.   Skin:     General: Skin is warm.      Capillary Refill: Capillary refill takes less than 2 seconds.   Neurological:      Mental Status: She is alert.      Cranial Nerves: No cranial nerve deficit.      Gait: Gait normal.   Psychiatric:         Mood and Affect: Mood normal.         Behavior: Behavior normal.       Diagnostic Test  Results:  No results found for any visits on 01/28/21.    ASSESSMENT/PLAN:     1. Concentration deficit  Although no formal adult testing for ADHD has been performed; response to stimulant has been beneficial.  Therefore risks of medications are outweighed by her benefits on the medication.  She will proceed with stimulant therapy for ADD (inattentive); which stems back as early as school age.  Without the hyperactivity component, was not as apparent/tested for at that time.  One month filled x 40mg #30; one month filled x 50mg #30.  Will follow up at that time to determine best dosing regimen.  OK to use daily or prn.  Will try daily more frequently now that will have enough for the month.  Aware to stop if becomes pregnant.  Contract reviewed/signed.  UDS at next in person visit.  PDMP Review       Value Time User    State PDMP site checked  Yes 1/28/2021  8:47 AM Abiola Ashton DO      - lisdexamfetamine (VYVANSE) 40 MG capsule; Take 1 capsule (40 mg) by mouth daily as needed (concentration deficit)  Dispense: 30 capsule; Refill: 0  - lisdexamfetamine (VYVANSE) 50 MG capsule; Take 1 capsule (50 mg) by mouth daily  Dispense: 30 capsule; Refill: 0      Abiola Ashton DO  [facility] [place]

## 2021-01-28 NOTE — NURSING NOTE
"Chief Complaint   Patient presents with     Recheck Medication       Initial /80   Pulse 100   Temp 97.6  F (36.4  C) (Tympanic)   Resp 18   Wt 108 kg (238 lb)   LMP 01/13/2021 (Approximate)   SpO2 97%   Breastfeeding No   BMI 40.54 kg/m   Estimated body mass index is 40.54 kg/m  as calculated from the following:    Height as of 3/12/20: 1.632 m (5' 4.25\").    Weight as of this encounter: 108 kg (238 lb).  Medication Reconciliation: complete    Anny Lo LPN  "

## 2021-01-29 ASSESSMENT — ANXIETY QUESTIONNAIRES: GAD7 TOTAL SCORE: 8

## 2021-02-12 ENCOUNTER — MYC MEDICAL ADVICE (OUTPATIENT)
Dept: OBGYN | Facility: OTHER | Age: 31
End: 2021-02-12

## 2021-02-12 DIAGNOSIS — N97.0 ANOVULATION: ICD-10-CM

## 2021-02-12 RX ORDER — LETROZOLE 2.5 MG/1
7.5 TABLET, FILM COATED ORAL DAILY
Qty: 15 TABLET | Refills: 0 | Status: SHIPPED | OUTPATIENT
Start: 2021-02-12 | End: 2021-03-24

## 2021-02-12 NOTE — TELEPHONE ENCOUNTER
Per [facility] patient to start letrozole 7.5mg on day 3 US on day 11 or 12 per previous plan.   Trudy Ryan RN on 2/12/2021 at 1:58 PM

## 2021-02-15 NOTE — TELEPHONE ENCOUNTER
Patient has decided to forgo this cycle due to timing issues. She will follow up next cycle.     Trudy Ryan RN on 2/15/2021 at 9:18 AM

## 2021-03-11 DIAGNOSIS — K21.9 GASTROESOPHAGEAL REFLUX DISEASE WITHOUT ESOPHAGITIS: ICD-10-CM

## 2021-03-11 RX ORDER — SUCRALFATE 1 G/1
TABLET ORAL
Qty: 270 TABLET | Refills: 2 | Status: ON HOLD | OUTPATIENT
Start: 2021-03-11 | End: 2021-12-02

## 2021-03-11 RX ORDER — PANTOPRAZOLE SODIUM 40 MG/1
40 TABLET, DELAYED RELEASE ORAL 2 TIMES DAILY
Qty: 180 TABLET | Refills: 1 | Status: ON HOLD | OUTPATIENT
Start: 2021-03-11 | End: 2021-12-02

## 2021-03-11 NOTE — TELEPHONE ENCOUNTER
"[facility] sent Rx request for the following:   pantoprazole (PROTONIX) 40 MG EC tablet  Sig: Take 1 tablet (40 mg) by mouth 2 times daily    Last Prescription Date:   12/31/2019  Last Fill Qty/Refills:         180, R-1    PPI Protocol Passed - 3/11/2021  7:52 AM     Gap in medication.    sucralfate (CARAFATE) 1 GM tablet  Sig:TAKE 1 TABLET BY MOUTH 3 TIMES DAILY BEFORE MEALS   Last Prescription Date:   11/03/2020  Last Fill Qty/Refills:         270, R-0    Miscellaneous Gastrointestinal Agents Passed - 3/11/2021  7:52 AM       Passed - Recent (12 mo) or future (30 days) visit within the authorizing provider's specialty    Patient has had an office visit with the authorizing provider or a provider within the authorizing providers department within the previous 12 mos or has a future within next 30 days. See \"Patient Info\" tab in inbasket, or \"Choose Columns\" in Meds & Orders section of the refill encounter.             Passed - Medication is active on med list       Passed - Patient is 18 years of age or older     Last Office Visit:              01/28/2021 ([facility])   Future Office visit:           None noted    Sucralfate approved per protocol. Routing protonix Rx for PCP review due to gap in medication. Unable to complete prescription refill per RN Medication Refill Policy.................... Charley Salazar RN ....................  3/11/2021   10:53 AM        "

## 2021-03-15 ENCOUNTER — MYC MEDICAL ADVICE (OUTPATIENT)
Dept: OBGYN | Facility: OTHER | Age: 31
End: 2021-03-15

## 2021-03-15 DIAGNOSIS — Z79.811 USE OF LETROZOLE (FEMARA): Primary | ICD-10-CM

## 2021-03-15 DIAGNOSIS — N97.0 ANOVULATION: ICD-10-CM

## 2021-03-15 RX ORDER — LETROZOLE 2.5 MG/1
7.5 TABLET, FILM COATED ORAL DAILY
Qty: 15 TABLET | Refills: 0 | Status: SHIPPED | OUTPATIENT
Start: 2021-03-15 | End: 2021-03-23

## 2021-03-23 ENCOUNTER — MYC MEDICAL ADVICE (OUTPATIENT)
Dept: OBGYN | Facility: OTHER | Age: 31
End: 2021-03-23

## 2021-03-23 DIAGNOSIS — N97.0 ANOVULATION: ICD-10-CM

## 2021-03-24 ENCOUNTER — HOSPITAL ENCOUNTER (OUTPATIENT)
Dept: ULTRASOUND IMAGING | Facility: OTHER | Age: 31
Discharge: HOME | End: 2021-03-24
Attending: OBSTETRICS & GYNECOLOGY | Admitting: OBSTETRICS & GYNECOLOGY
Payer: COMMERCIAL

## 2021-03-24 DIAGNOSIS — Z79.811 USE OF LETROZOLE (FEMARA): ICD-10-CM

## 2021-03-24 DIAGNOSIS — N97.0 ANOVULATION: Primary | ICD-10-CM

## 2021-03-24 PROCEDURE — 76857 US EXAM PELVIC LIMITED: CPT

## 2021-03-24 RX ORDER — DEXAMETHASONE 1 MG
0.5 TABLET ORAL 2 TIMES DAILY WITH MEALS
Qty: 10 TABLET | Refills: 0 | Status: SHIPPED | OUTPATIENT
Start: 2021-03-24 | End: 2021-04-23

## 2021-03-24 RX ORDER — LETROZOLE 2.5 MG/1
5 TABLET, FILM COATED ORAL DAILY
Qty: 10 TABLET | Refills: 0 | Status: SHIPPED | OUTPATIENT
Start: 2021-03-24 | End: 2021-04-19

## 2021-03-24 NOTE — PROGRESS NOTES
Patient's largest follicle was 9.5 mm. Patient will plan on repeat US for Friday. To Dr. Kim to determine if patient should extend her medication.    Awa Parry RN...................3/24/2021 11:39 AM

## 2021-03-24 NOTE — PROGRESS NOTES
Per Dr. Gerry Kim MD, FACOG, restart on 5 mg letrozole daily x 5 days, and 0.5 mg dexamethasone orally bid x 10 days. Patient notified and she will  medication today.    Awa Parry RN...................3/24/2021 2:37 PM

## 2021-04-01 ENCOUNTER — ANCILLARY PROCEDURE (OUTPATIENT)
Dept: ULTRASOUND IMAGING | Facility: OTHER | Age: 31
End: 2021-04-01
Attending: OBSTETRICS & GYNECOLOGY
Payer: COMMERCIAL

## 2021-04-01 DIAGNOSIS — N97.0 ANOVULATION: ICD-10-CM

## 2021-04-12 ENCOUNTER — MYC MEDICAL ADVICE (OUTPATIENT)
Dept: OBGYN | Facility: OTHER | Age: 31
End: 2021-04-12

## 2021-04-12 DIAGNOSIS — Z32.01 PREGNANCY TEST POSITIVE: Primary | ICD-10-CM

## 2021-04-13 ENCOUNTER — TELEPHONE (OUTPATIENT)
Dept: OBGYN | Facility: OTHER | Age: 31
End: 2021-04-13

## 2021-04-13 DIAGNOSIS — Z32.01 PREGNANCY TEST POSITIVE: Primary | ICD-10-CM

## 2021-04-13 DIAGNOSIS — Z32.01 PREGNANCY TEST POSITIVE: ICD-10-CM

## 2021-04-13 LAB
B-HCG SERPL-ACNC: 66 IU/L
PROGEST SERPL-MCNC: 38.2 NG/ML

## 2021-04-13 PROCEDURE — 36415 COLL VENOUS BLD VENIPUNCTURE: CPT | Mod: ZL | Performed by: OBSTETRICS & GYNECOLOGY

## 2021-04-13 PROCEDURE — 84702 CHORIONIC GONADOTROPIN TEST: CPT | Mod: ZL | Performed by: OBSTETRICS & GYNECOLOGY

## 2021-04-13 PROCEDURE — 84144 ASSAY OF PROGESTERONE: CPT | Mod: ZL | Performed by: OBSTETRICS & GYNECOLOGY

## 2021-04-13 RX ORDER — PROGESTERONE 200 MG/1
CAPSULE ORAL
Qty: 120 CAPSULE | Refills: 1 | Status: SHIPPED | OUTPATIENT
Start: 2021-04-13 | End: 2021-07-05

## 2021-04-13 NOTE — TELEPHONE ENCOUNTER
Patient called and informed of lab results and Dr. Kim would like her to repeat the HCG on Thursday and also wants her to start Prometrium 400 mg BID vaginally. Patient advised to schedule intake visit and appointment with Dr. Kim for US when she is 6 weeks. Patient voiced understanding with no further questions or concerns at this time.   Jane Waldron RN on 4/13/2021 at 10:52 AM

## 2021-04-15 ENCOUNTER — VIRTUAL VISIT (OUTPATIENT)
Dept: FAMILY MEDICINE | Facility: OTHER | Age: 31
End: 2021-04-15
Attending: OBSTETRICS & GYNECOLOGY
Payer: COMMERCIAL

## 2021-04-15 DIAGNOSIS — R41.840 CONCENTRATION DEFICIT: ICD-10-CM

## 2021-04-15 DIAGNOSIS — Z32.01 PREGNANCY TEST POSITIVE: ICD-10-CM

## 2021-04-15 LAB — B-HCG SERPL-ACNC: 237 IU/L

## 2021-04-15 PROCEDURE — 36415 COLL VENOUS BLD VENIPUNCTURE: CPT | Mod: ZL | Performed by: OBSTETRICS & GYNECOLOGY

## 2021-04-15 PROCEDURE — 99441 PR PHYSICIAN TELEPHONE EVALUATION 5-10 MIN: CPT | Performed by: FAMILY MEDICINE

## 2021-04-15 PROCEDURE — 84702 CHORIONIC GONADOTROPIN TEST: CPT | Mod: ZL | Performed by: OBSTETRICS & GYNECOLOGY

## 2021-04-15 RX ORDER — LISDEXAMFETAMINE DIMESYLATE 40 MG/1
40 CAPSULE ORAL DAILY PRN
Qty: 30 CAPSULE | Refills: 0 | Status: CANCELLED | OUTPATIENT
Start: 2021-04-15

## 2021-04-15 ASSESSMENT — PAIN SCALES - GENERAL: PAINLEVEL: NO PAIN (0)

## 2021-04-15 ASSESSMENT — ANXIETY QUESTIONNAIRES
2. NOT BEING ABLE TO STOP OR CONTROL WORRYING: NOT AT ALL
IF YOU CHECKED OFF ANY PROBLEMS ON THIS QUESTIONNAIRE, HOW DIFFICULT HAVE THESE PROBLEMS MADE IT FOR YOU TO DO YOUR WORK, TAKE CARE OF THINGS AT HOME, OR GET ALONG WITH OTHER PEOPLE: NOT DIFFICULT AT ALL
1. FEELING NERVOUS, ANXIOUS, OR ON EDGE: NOT AT ALL
6. BECOMING EASILY ANNOYED OR IRRITABLE: NOT AT ALL
GAD7 TOTAL SCORE: 0
5. BEING SO RESTLESS THAT IT IS HARD TO SIT STILL: NOT AT ALL
7. FEELING AFRAID AS IF SOMETHING AWFUL MIGHT HAPPEN: NOT AT ALL
3. WORRYING TOO MUCH ABOUT DIFFERENT THINGS: NOT AT ALL

## 2021-04-15 ASSESSMENT — PATIENT HEALTH QUESTIONNAIRE - PHQ9: 5. POOR APPETITE OR OVEREATING: NOT AT ALL

## 2021-04-15 NOTE — PROGRESS NOTES
Ana is a 30 year old who is being evaluated via a billable video visit.      How would you like to obtain your AVS? 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  Benefits of passing her boards vs risk to baby - and agree a one time dose of the medication would be acceptable.  Congratulated and planning to continue following up with Dr. Kim for prenatal care due to high risk pregnancy.    Abiola Ashton, [facility]      Subjective   Ana is a 30 year old who presents for the following health issues:    HPI   Ana has questions about her Vyvanse.  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  She has a few exams left and planning on boards by ~mid-June.   Vyvanse has been helpful; more so at the 50mg dose.  Recently had + Hcg level at home and undergoing early testing with Dr. Kim as she has required pro-ovulatory treatment and progesterone to help maintain pregnancies (history of recurrent miscarriages).  There was initially question as to if this + result could be related to the treatment vs pregnancy.  Repeat level was done today that returned 3x higher than 2 days ago.  Primarily wondering if she could use Vyvanse at least for the day of her board exam and maybe one other day for a test; even despite pregnancy.      Review of Systems   CONSTITUTIONAL: NEGATIVE for fever, chills, change in weight  ENT/MOUTH: NEGATIVE for ear, mouth and throat problems  RESP: NEGATIVE for significant cough or SOB  CV: NEGATIVE for chest pain, palpitations or peripheral edema      Objective    Vitals - Patient Reported  Pain Score: No Pain (0)    Physical Exam   GENERAL: Healthy, alert and no distress  RESP: No audible wheeze, cough, or visible cyanosis.  No audible  increased work of breathing.    PSYCH: Mentation appears normal, affect normal/bright, judgement and insight intact, normal speech.    Recent Hcg/progesterone levels reviewed and increase noted.          TELEPHONE Visit Details - transitioned from video  Type of service:  Telephone visit  Video Start Time: 3:35 PM  Video End Time: 3:45 PM  Duration 9 minutes 9 seconds  Originating Location (pt. Location): Home  Distant Location (provider location):  [facility] AND \[facility] [place] [facility]\""   Platform used for Video Visit: Unable to complete video visit

## 2021-04-15 NOTE — PROGRESS NOTES
Ana is a 30 year old who is being evaluated via a billable video visit.      How would you like to obtain your AVS? MyChart  If the video visit is dropped, the invitation should be resent by: Text to cell phone: [phone]  Will anyone else be joining your video visit? No  {If patient encounters technical issues they should call [phone] :[id number]}      {PROVIDER CHARTING PREFERENCE:212492}    Subjective   Ana is a 30 year old who presents for the following health issues:    HPI   Ana is being called to discuss her Vyvanse.  She recently hs had a + Hcg test at home; including a serum Hcg 4/13/2021 of 66.  ***    Review of Systems   CONSTITUTIONAL: NEGATIVE for fever, chills, change in weight  ENT/MOUTH: NEGATIVE for ear, mouth and throat problems  RESP: NEGATIVE for significant cough or SOB  CV: NEGATIVE for chest pain, palpitations or peripheral edema      Objective    There were no vitals taken for this visit.    Physical Exam   GENERAL: Healthy, alert and no distress  EYES: Eyes grossly normal to inspection.  No discharge or erythema, or obvious scleral/conjunctival abnormalities.  RESP: No audible wheeze, cough, or visible cyanosis.  No visible retractions or increased work of breathing.    SKIN: Visible skin clear. No significant rash, abnormal pigmentation or lesions.  NEURO: Cranial nerves grossly intact.  Mentation and speech appropriate for age.  PSYCH: Mentation appears normal, affect normal/bright, judgement and insight intact, normal speech and appearance well-groomed.    No new diagnostics        Video-Visit Details  Type of service:  Video Visit  Video Start Time: {video visit start/end time for provider to select:650597}  Video End Time:{video visit start/end time for provider to select:022368}  Originating Location (pt. Location): Home  Distant Location (provider location):  [facility] AND [facility]   Platform used for Video Visit: [employer]

## 2021-04-16 ASSESSMENT — ANXIETY QUESTIONNAIRES: GAD7 TOTAL SCORE: 0

## 2021-04-19 ENCOUNTER — VIRTUAL VISIT (OUTPATIENT)
Dept: OBGYN | Facility: OTHER | Age: 31
End: 2021-04-19
Attending: OBSTETRICS & GYNECOLOGY
Payer: COMMERCIAL

## 2021-04-19 VITALS — HEIGHT: 65 IN | BODY MASS INDEX: 40.82 KG/M2 | WEIGHT: 245 LBS

## 2021-04-19 DIAGNOSIS — Z32.01 PREGNANCY TEST POSITIVE: Primary | ICD-10-CM

## 2021-04-19 PROCEDURE — G0463 HOSPITAL OUTPT CLINIC VISIT: HCPCS | Mod: TEL

## 2021-04-19 PROCEDURE — 99207 PR OB VISIT-NO CHARGE - GICH ONLY: CPT

## 2021-04-19 ASSESSMENT — PATIENT HEALTH QUESTIONNAIRE - PHQ9: SUM OF ALL RESPONSES TO PHQ QUESTIONS 1-9: 4

## 2021-04-19 ASSESSMENT — MIFFLIN-ST. JEOR: SCORE: 1832.19

## 2021-04-19 NOTE — PROGRESS NOTES
HPI:    This is a 30 year old female patient,  who was called today for OB Intake visit. Patient reports positive pregnancy test at home.     Obstetrical history and OB Demographics updated to the best of this nurse's ability based on patient report. PHQ-9 depression screening and routine Domestic Abuse screening completed. All immediate questions and concerns answered.    Last menstrual period is reported as Patient's last menstrual period was 2021 (exact date). GREGORY based on LMP is 21.  Her cycles are irregular.  Her last menstrual period was normal.   Since her LMP, she has experienced  nausea, abdominal pain, fatigue, lightheadedness and hemorrhoids.       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   Family and father of baby are supportive of current pregnancy.  Past Medical History of Father of Baby:No significant medical history    Past History:  Her past medical history is non-contributory.      She has a history of  prior  section    Since her last LMP she denies use of alcohol, tobacco and street drugs.    Pap smear history: YES - updated in Problem List and Health Maintenance accordingly    STD/STI history: HPV    STD/STI symptoms: no noticeable symptoms     Past medical, surgical, social and family history were reviewed and updated in EPIC.    Medications reviewed by this nurse. Current medication list:  Current Outpatient  Medications   Medication Sig Dispense Refill     pantoprazole (PROTONIX) 40 MG EC tablet Take 1 tablet (40 mg) by mouth 2 times daily 180 tablet 1     Prenatal Vit-Fe Fumarate-FA (PRENATAL MULTIVITAMIN  PLUS IRON) 27-1 MG TABS Take 1 tablet by mouth daily       progesterone (PROMETRIUM) 200 MG capsule 400 mg (take (2) 200 mg tabs to = 400mg) vaginally twice daily 120 capsule 1     sucralfate (CARAFATE) 1 GM tablet TAKE 1 TABLET BY MOUTH 3 TIMES DAILY BEFORE MEALS 270 tablet 2     dexamethasone (DECADRON) 1 MG tablet Take 0.5 tablets (0.5 mg) by mouth 2 times daily (with meals) for 10 days 10 tablet 0     lisdexamfetamine (VYVANSE) 40 MG capsule Take 1 capsule (40 mg) by mouth daily as needed (concentration deficit) 30 capsule 0     The following medications were recommended to be discontinued due to Pregnancy Category D status: NA  Patient informed to contact her primary care provider as soon as possible to discuss a safer alternative.    Risk factors:  Moderate and moderately severe risks (consult with OB/Gyn)  Previous fetal or  demise: Yes  History of  delivery: No  History of heart disease Class I: No  Severe anemia, unresponsive to iron therapy: No  Pelvic mass or neoplasm: No  Previous : Yes  Hyper/hypothyroidism: No  History of postpartum hemorrhage requiring transfusion:No  History of Placenta Accreta: No    High Risk (Pregnancy managed by OB/Gyn)  Multiple pregnancy: No  Pre-gestational diabetes: No  Chronic Hypertension: No  Renal Failure: No  Heart disease, class II or greater: No  Rh Isoimmunization: No  Chronic active hepatitis: No  Convulsive disorder, poorly controlled: No  Isoimmune thrombocytopenia: No  Pre-term premature rupture of membranes: No  Lupus or other autoimmune disorder: Yes  Human Immunodeficiency Virus: No      ASSESSMENT/PLAN:       ICD-10-CM    1. Pregnancy test positive  Z32.01        30 year old , 5w2d of pregnancy with GREGORY of 2021, Alternate GREGORY  Entry    Per standing orders and scope of practice of this nurse, patient will have the following orders placed and completed prior to initial OB visit with the appropriate provider:    --early ultrasound for dating and viability ordered for 6+ weeks gestation based on LMP    --Quantitative Beta HCG and progesterone monitoring if indicated    Counseling given:     - Recommended weight gain for pregnancy: < 15 lbs.   BMI < 18.5  28-40 lbs   18.5 - 24.9 25-35   25 - 29.9 15-25   > 30  < 15       PLAN/PATIENT INSTRUCTIONS:    Normal exercise.  Normal sexual activity.  Prenatal vitamins.  Anticipated weight gain.    follow-up appointment with Dr. Kim for pre-bora care and take multivitamin or pre-bora vitamins    Jane Waldron RN.................................................. 2021 8:54 AM

## 2021-04-21 NOTE — TELEPHONE ENCOUNTER
Patient is coming 6-18-18 for lab appointment. Please place orders.   Patient out to car via wheelchair and NA.  picked up. No issues or concerns noted.

## 2021-04-23 ENCOUNTER — OFFICE VISIT (OUTPATIENT)
Dept: FAMILY MEDICINE | Facility: OTHER | Age: 31
End: 2021-04-23
Attending: PHYSICIAN ASSISTANT
Payer: COMMERCIAL

## 2021-04-23 VITALS
SYSTOLIC BLOOD PRESSURE: 132 MMHG | TEMPERATURE: 98.1 F | HEIGHT: 65 IN | WEIGHT: 241.4 LBS | BODY MASS INDEX: 40.22 KG/M2 | DIASTOLIC BLOOD PRESSURE: 84 MMHG | HEART RATE: 80 BPM

## 2021-04-23 DIAGNOSIS — Z3A.01 LESS THAN 8 WEEKS GESTATION OF PREGNANCY: ICD-10-CM

## 2021-04-23 DIAGNOSIS — R42 DIZZINESS: Primary | ICD-10-CM

## 2021-04-23 LAB
ALBUMIN SERPL-MCNC: 3.8 G/DL (ref 3.5–5.7)
ALP SERPL-CCNC: 62 U/L (ref 34–104)
ALT SERPL W P-5'-P-CCNC: 35 U/L (ref 7–52)
ANION GAP SERPL CALCULATED.3IONS-SCNC: 7 MMOL/L (ref 3–14)
AST SERPL W P-5'-P-CCNC: 34 U/L (ref 13–39)
B-HCG SERPL-ACNC: 7105 IU/L
BILIRUB SERPL-MCNC: 0.3 MG/DL (ref 0.3–1)
BUN SERPL-MCNC: 8 MG/DL (ref 7–25)
CALCIUM SERPL-MCNC: 9.1 MG/DL (ref 8.6–10.3)
CHLORIDE SERPL-SCNC: 107 MMOL/L (ref 98–107)
CO2 SERPL-SCNC: 24 MMOL/L (ref 21–31)
CREAT SERPL-MCNC: 0.75 MG/DL (ref 0.6–1.2)
ERYTHROCYTE [DISTWIDTH] IN BLOOD BY AUTOMATED COUNT: 12.9 % (ref 10–15)
FERRITIN SERPL-MCNC: 15 NG/ML (ref 23.9–336.2)
GFR SERPL CREATININE-BSD FRML MDRD: >90 ML/MIN/{1.73_M2}
GLUCOSE SERPL-MCNC: 126 MG/DL (ref 70–105)
HCT VFR BLD AUTO: 36.7 % (ref 35–47)
HGB BLD-MCNC: 12.7 G/DL (ref 11.7–15.7)
IRON SATN MFR SERPL: 24 % (ref 20–55)
IRON SERPL-MCNC: 80 UG/DL (ref 50–212)
MAGNESIUM SERPL-MCNC: 2.1 MG/DL (ref 1.9–2.7)
MCH RBC QN AUTO: 29.5 PG (ref 26.5–33)
MCHC RBC AUTO-ENTMCNC: 34.6 G/DL (ref 31.5–36.5)
MCV RBC AUTO: 85 FL (ref 78–100)
PLATELET # BLD AUTO: 284 10E9/L (ref 150–450)
POTASSIUM SERPL-SCNC: 3.4 MMOL/L (ref 3.5–5.1)
PROT SERPL-MCNC: 6.8 G/DL (ref 6.4–8.9)
RBC # BLD AUTO: 4.3 10E12/L (ref 3.8–5.2)
SODIUM SERPL-SCNC: 138 MMOL/L (ref 134–144)
TIBC SERPL-MCNC: 336 UG/DL (ref 245–400)
UIBC (UNSATURATED): 256 MG/DL
WBC # BLD AUTO: 7.7 10E9/L (ref 4–11)

## 2021-04-23 PROCEDURE — 83550 IRON BINDING TEST: CPT | Mod: ZL | Performed by: PHYSICIAN ASSISTANT

## 2021-04-23 PROCEDURE — 83735 ASSAY OF MAGNESIUM: CPT | Mod: ZL | Performed by: PHYSICIAN ASSISTANT

## 2021-04-23 PROCEDURE — 36415 COLL VENOUS BLD VENIPUNCTURE: CPT | Mod: ZL | Performed by: PHYSICIAN ASSISTANT

## 2021-04-23 PROCEDURE — 99213 OFFICE O/P EST LOW 20 MIN: CPT | Performed by: PHYSICIAN ASSISTANT

## 2021-04-23 PROCEDURE — G0463 HOSPITAL OUTPT CLINIC VISIT: HCPCS

## 2021-04-23 PROCEDURE — 93010 ELECTROCARDIOGRAM REPORT: CPT | Performed by: INTERNAL MEDICINE

## 2021-04-23 PROCEDURE — 85027 COMPLETE CBC AUTOMATED: CPT | Mod: ZL | Performed by: PHYSICIAN ASSISTANT

## 2021-04-23 PROCEDURE — 82728 ASSAY OF FERRITIN: CPT | Mod: ZL | Performed by: PHYSICIAN ASSISTANT

## 2021-04-23 PROCEDURE — 84702 CHORIONIC GONADOTROPIN TEST: CPT | Mod: ZL | Performed by: PHYSICIAN ASSISTANT

## 2021-04-23 PROCEDURE — 83540 ASSAY OF IRON: CPT | Mod: ZL | Performed by: PHYSICIAN ASSISTANT

## 2021-04-23 PROCEDURE — 80053 COMPREHEN METABOLIC PANEL: CPT | Mod: ZL | Performed by: PHYSICIAN ASSISTANT

## 2021-04-23 PROCEDURE — 93005 ELECTROCARDIOGRAM TRACING: CPT

## 2021-04-23 ASSESSMENT — MIFFLIN-ST. JEOR: SCORE: 1815.86

## 2021-04-23 ASSESSMENT — PAIN SCALES - GENERAL: PAINLEVEL: NO PAIN (0)

## 2021-04-23 NOTE — PROGRESS NOTES
Assessment & Plan     1. Dizziness  Differential includes dehydration, hypo-/hyperglycemia, hyper or hypotension, anemia, iron, vitamin, electrolyte abnormalities, renal dysfunction, cardiac cause, infection, pregnancy related symptoms, etc.  EKG unremarkable today.  Vitals and exam reassuring.  Labs unremarkable other than a low ferritin level.  Iron binding capacity is within normal limits.  Consider adding possible daily supplement of iron however recommend consulting with her OB/GYN prior to starting this.  Iron deficiency may be the cause of her symptoms however symptoms may also be related to her known pregnancy.  Quantitative hCG levels near where expected levels would be at approaching 6 weeks pregnant. If symptoms persist, consider pelvic ultrasound prior to meeting with OB. Follow-up with OB/GYN at previously scheduled appointment next week.  Gave warning signs and symptoms for need to follow-up in clinic or ED prior to that time.  - CBC W PLT No Diff; Future  - Comprehensive Metabolic Panel; Future  - Magnesium; Future  - Iron Binding Panel; Future  - Ferritin; Future  - EKG 12-lead, tracing only  - HCG quantitative pregnancy; Future  - HCG quantitative pregnancy  - Ferritin  - Iron Binding Panel  - Magnesium  - Comprehensive Metabolic Panel  - CBC W PLT No Diff    2. Less than 8 weeks gestation of pregnancy  See #1.   - HCG quantitative pregnancy; Future  - HCG quantitative pregnancy      Return if symptoms worsen or fail to improve.    Yamilet Somers PA-C  [facility] AND [facility]    Isha Perez is a  currently pregnancy 30 year old who presents for the following health issues     HPI   Here for evaluation of dizziness, presyncopal episode.  Notes earlier this afternoon she developed significant fatigue, dizziness, feeling like she may pass out.  She did have some associated nausea with this.  Episode lasted for approximately 45 minutes and resolved on its own.  She has had  "some changes in her vision over the past few days describing it as a \"kaleidoscope vision\".  She has not had any vision difficulties today.  She did check her blood pressure at the time of the episode and it was 125/80 with a pulse of 94.  Patient does have a history of high risk pregnancy with positive antiphospholipid antibody syndrome, lupus anticoagulant positive, PCOS, history of miscarriages.  She has not had her first OB physical with this pregnancy as of ye, scheduled for . Has not had significant pelvic pain or cramping, no spotting or bleeding. Denies associated fever, chills, chest pain or pressure, syncope, headaches, shortness of breath, numbness, tingling, weakness.  No known sick contacts.        PAST MEDICAL HISTORY:   Past Medical History:   Diagnosis Date     Internal derangement of knee     2016     Personal history of other medical treatment (CODE)          Polycystic ovarian syndrome     3/18/2016       PAST SURGICAL HISTORY:   Past Surgical History:   Procedure Laterality Date     ARTHROSCOPY KNEE Right     Dr. Landeros      SECTION N/A 2020    Procedure:  SECTION;  Surgeon: Gerry Kim MD;  Location:  OR     HYSTEROSCOPY DIAGNOSTIC N/A 2018    Procedure: HYSTEROSCOPY DIAGNOSTIC;  Diagnostic Hysteroscopy with Endometrial Scratching.      .;  Surgeon: Gerry Kim MD;  Location:  OR     MANDIBLE SURGERY Right            FAMILY HISTORY:   Family History   Problem Relation Age of Onset     Family History Negative Mother         Good Health     Family History Negative Father         Good Health     Cancer Maternal Grandmother         Cancer     Diabetes Other         Diabetes,maternal side     Anxiety Disorder Sister        SOCIAL HISTORY:   Social History     Tobacco Use     Smoking status: Never Smoker     Smokeless tobacco: Never Used   Substance Use Topics     Alcohol use: Not Currently     Alcohol/week: 0.0 standard drinks     Comment: " "Alcoholic Drinks/day: rare        Allergies   Allergen Reactions     Meloxicam Rash     Current Outpatient Medications   Medication     lisdexamfetamine (VYVANSE) 40 MG capsule     pantoprazole (PROTONIX) 40 MG EC tablet     Prenatal Vit-Fe Fumarate-FA (PRENATAL MULTIVITAMIN  PLUS IRON) 27-1 MG TABS     progesterone (PROMETRIUM) 200 MG capsule     sucralfate (CARAFATE) 1 GM tablet     No current facility-administered medications for this visit.          Review of Systems   Per HPI.           Objective    /84   Pulse 80   Temp 98.1  F (36.7  C)   Ht 1.651 m (5' 5\")   Wt 109.5 kg (241 lb 6.4 oz)   LMP 03/13/2021 (Exact Date)   Breastfeeding No   BMI 40.17 kg/m    Body mass index is 40.17 kg/m .  Physical Exam   General: Pleasant, in no apparent distress.  Eyes: Sclera are white and conjunctiva are clear bilaterally. Lacrimal apparatus free of erythema, edema, and discharge bilaterally. PERRLA, EOM intact bilaterally.   Ears: External ears without erythema or edema. Tympanic membranes are pearly white and without erythema, scarring or perforations bilaterally. External auditory canals are free of foreign bodies, erythema, ulcers, and masses.  Nose: External nose is symmetrical and free of lesions and deformities.  Oropharynx: Oral mucosa is pink and without ulcers, nodules, and white patches. Tongue is symmetrical, pink, and without masses or lesions. Pharynx is pink, symmetrical, and without lesions. Uvula is midline. Tonsils are pink, symmetrical, and without edema, ulcers, or exudates, and 1+ bilaterally.  Neck: No cervical lymphadenopathy on inspection and palpation.  Cardiovascular: Regular rate and rhythm with S1 equal to S2. No murmurs, friction rubs, or gallops.   Respiratory: Lungs are resonant and clear to auscultation bilaterally. No wheezes, crackles, or rhonchi.  Neurologic Exam: , normal gross motor, tone coordination and no visible tremor.  Skin: No jaundice, pallor, rashes, or " lesions.  Psych: Appropriate mood and affect.    EKG: Normal sinus rhythm with sinus arrhythmia, rate of 84. No ST or T wave changes, normal intervals per my interpretation.       Results for orders placed or performed in visit on 04/23/21   HCG quantitative pregnancy     Status: None   Result Value Ref Range    HCG Quantitative Serum 7,105 IU/L   Ferritin     Status: Abnormal   Result Value Ref Range    Ferritin 15 (L) 23.9 - 336.2 ng/mL   Iron Binding Panel     Status: None   Result Value Ref Range    Iron 80 50 - 212 ug/dL    UIBC (Unsaturated) 256.00 mg/dL    Iron Binding Capacity 336.00 245.00 - 400.00 ug/dL    Iron Saturation 24 20 - 55 %   Magnesium     Status: None   Result Value Ref Range    Magnesium 2.1 1.9 - 2.7 mg/dL   Comprehensive Metabolic Panel     Status: Abnormal   Result Value Ref Range    Sodium 138 134 - 144 mmol/L    Potassium 3.4 (L) 3.5 - 5.1 mmol/L    Chloride 107 98 - 107 mmol/L    Carbon Dioxide 24 21 - 31 mmol/L    Anion Gap 7 3 - 14 mmol/L    Glucose 126 (H) 70 - 105 mg/dL    Urea Nitrogen 8 7 - 25 mg/dL    Creatinine 0.75 0.60 - 1.20 mg/dL    GFR Estimate >90 >60 mL/min/[1.73_m2]    GFR Estimate If Black >90 >60 mL/min/[1.73_m2]    Calcium 9.1 8.6 - 10.3 mg/dL    Bilirubin Total 0.3 0.3 - 1.0 mg/dL    Albumin 3.8 3.5 - 5.7 g/dL    Protein Total 6.8 6.4 - 8.9 g/dL    Alkaline Phosphatase 62 34 - 104 U/L    ALT 35 7 - 52 U/L    AST 34 13 - 39 U/L   CBC W PLT No Diff     Status: None   Result Value Ref Range    WBC 7.7 4.0 - 11.0 10e9/L    RBC Count 4.30 3.8 - 5.2 10e12/L    Hemoglobin 12.7 11.7 - 15.7 g/dL    Hematocrit 36.7 35.0 - 47.0 %    MCV 85 78 - 100 fl    MCH 29.5 26.5 - 33.0 pg    MCHC 34.6 31.5 - 36.5 g/dL    RDW 12.9 10.0 - 15.0 %    Platelet Count 284 150 - 450 10e9/L   EKG 12-lead, tracing only     Status: None (Preliminary result)   Result Value Ref Range    Interpretation ECG Click View Image link to view waveform and result

## 2021-04-23 NOTE — NURSING NOTE
Patient presents to clinic with dizziness.  Lora Sanon LPN ....................  4/23/2021   2:48 PM

## 2021-04-26 LAB — INTERPRETATION ECG - MUSE: NORMAL

## 2021-04-27 ENCOUNTER — PRENATAL OFFICE VISIT (OUTPATIENT)
Dept: OBGYN | Facility: OTHER | Age: 31
End: 2021-04-27
Attending: OBSTETRICS & GYNECOLOGY
Payer: COMMERCIAL

## 2021-04-27 VITALS
WEIGHT: 240.3 LBS | SYSTOLIC BLOOD PRESSURE: 100 MMHG | BODY MASS INDEX: 39.99 KG/M2 | DIASTOLIC BLOOD PRESSURE: 68 MMHG | HEART RATE: 93 BPM | OXYGEN SATURATION: 98 %

## 2021-04-27 DIAGNOSIS — Z34.81 ENCOUNTER FOR SUPERVISION OF OTHER NORMAL PREGNANCY IN FIRST TRIMESTER: Primary | ICD-10-CM

## 2021-04-27 DIAGNOSIS — O30.041 DICHORIONIC DIAMNIOTIC TWIN PREGNANCY IN FIRST TRIMESTER: ICD-10-CM

## 2021-04-27 LAB
ABO + RH BLD: NORMAL
ABO + RH BLD: NORMAL
ALBUMIN UR-MCNC: NEGATIVE MG/DL
APPEARANCE UR: CLEAR
BASOPHILS # BLD AUTO: 0.1 10E9/L (ref 0–0.2)
BASOPHILS NFR BLD AUTO: 0.5 %
BILIRUB UR QL STRIP: NEGATIVE
BLD GP AB SCN SERPL QL: NORMAL
BLOOD BANK CMNT PATIENT-IMP: NORMAL
C TRACH DNA SPEC QL NAA+PROBE: NOT DETECTED
COLOR UR AUTO: NORMAL
DIFFERENTIAL METHOD BLD: NORMAL
EOSINOPHIL # BLD AUTO: 0.1 10E9/L (ref 0–0.7)
EOSINOPHIL NFR BLD AUTO: 1.2 %
ERYTHROCYTE [DISTWIDTH] IN BLOOD BY AUTOMATED COUNT: 12.9 % (ref 10–15)
GLUCOSE UR STRIP-MCNC: NEGATIVE MG/DL
HCT VFR BLD AUTO: 38.2 % (ref 35–47)
HGB BLD-MCNC: 13.3 G/DL (ref 11.7–15.7)
HGB UR QL STRIP: NEGATIVE
IMM GRANULOCYTES # BLD: 0 10E9/L (ref 0–0.4)
IMM GRANULOCYTES NFR BLD: 0.4 %
KETONES UR STRIP-MCNC: NEGATIVE MG/DL
LEUKOCYTE ESTERASE UR QL STRIP: NEGATIVE
LYMPHOCYTES # BLD AUTO: 2.2 10E9/L (ref 0.8–5.3)
LYMPHOCYTES NFR BLD AUTO: 22.3 %
MCH RBC QN AUTO: 29.4 PG (ref 26.5–33)
MCHC RBC AUTO-ENTMCNC: 34.8 G/DL (ref 31.5–36.5)
MCV RBC AUTO: 85 FL (ref 78–100)
MONOCYTES # BLD AUTO: 0.7 10E9/L (ref 0–1.3)
MONOCYTES NFR BLD AUTO: 7.1 %
N GONORRHOEA DNA SPEC QL NAA+PROBE: NOT DETECTED
NEUTROPHILS # BLD AUTO: 6.8 10E9/L (ref 1.6–8.3)
NEUTROPHILS NFR BLD AUTO: 68.5 %
NITRATE UR QL: NEGATIVE
PH UR STRIP: 7 PH (ref 5–7)
PLATELET # BLD AUTO: 298 10E9/L (ref 150–450)
RBC # BLD AUTO: 4.52 10E12/L (ref 3.8–5.2)
SOURCE: NORMAL
SP GR UR STRIP: 1.02 (ref 1–1.03)
SPECIMEN EXP DATE BLD: NORMAL
SPECIMEN SOURCE: NORMAL
UROBILINOGEN UR STRIP-MCNC: NORMAL MG/DL (ref 0–2)
WBC # BLD AUTO: 10 10E9/L (ref 4–11)

## 2021-04-27 PROCEDURE — 87340 HEPATITIS B SURFACE AG IA: CPT | Mod: ZL | Performed by: OBSTETRICS & GYNECOLOGY

## 2021-04-27 PROCEDURE — 85025 COMPLETE CBC W/AUTO DIFF WBC: CPT | Mod: ZL | Performed by: OBSTETRICS & GYNECOLOGY

## 2021-04-27 PROCEDURE — 87389 HIV-1 AG W/HIV-1&-2 AB AG IA: CPT | Mod: ZL | Performed by: OBSTETRICS & GYNECOLOGY

## 2021-04-27 PROCEDURE — 87086 URINE CULTURE/COLONY COUNT: CPT | Mod: ZL | Performed by: OBSTETRICS & GYNECOLOGY

## 2021-04-27 PROCEDURE — 76801 OB US < 14 WKS SINGLE FETUS: CPT | Performed by: OBSTETRICS & GYNECOLOGY

## 2021-04-27 PROCEDURE — 87491 CHLMYD TRACH DNA AMP PROBE: CPT | Mod: ZL | Performed by: OBSTETRICS & GYNECOLOGY

## 2021-04-27 PROCEDURE — 86900 BLOOD TYPING SEROLOGIC ABO: CPT | Mod: ZL | Performed by: OBSTETRICS & GYNECOLOGY

## 2021-04-27 PROCEDURE — 87591 N.GONORRHOEAE DNA AMP PROB: CPT | Mod: ZL | Performed by: OBSTETRICS & GYNECOLOGY

## 2021-04-27 PROCEDURE — 86850 RBC ANTIBODY SCREEN: CPT | Mod: ZL | Performed by: OBSTETRICS & GYNECOLOGY

## 2021-04-27 PROCEDURE — 86780 TREPONEMA PALLIDUM: CPT | Mod: ZL | Performed by: OBSTETRICS & GYNECOLOGY

## 2021-04-27 PROCEDURE — 86901 BLOOD TYPING SEROLOGIC RH(D): CPT | Mod: ZL | Performed by: OBSTETRICS & GYNECOLOGY

## 2021-04-27 PROCEDURE — 99207 PR OB VISIT-NO CHARGE - GICH ONLY: CPT | Performed by: OBSTETRICS & GYNECOLOGY

## 2021-04-27 PROCEDURE — 81003 URINALYSIS AUTO W/O SCOPE: CPT | Mod: ZL | Performed by: OBSTETRICS & GYNECOLOGY

## 2021-04-27 PROCEDURE — 86762 RUBELLA ANTIBODY: CPT | Mod: ZL | Performed by: OBSTETRICS & GYNECOLOGY

## 2021-04-27 PROCEDURE — 36415 COLL VENOUS BLD VENIPUNCTURE: CPT | Mod: ZL | Performed by: OBSTETRICS & GYNECOLOGY

## 2021-04-27 RX ORDER — QUINIDINE GLUCONATE 324 MG
TABLET, EXTENDED RELEASE ORAL
COMMUNITY
End: 2022-01-28

## 2021-04-27 RX ORDER — DOCUSATE SODIUM 100 MG/1
100 CAPSULE, LIQUID FILLED ORAL 2 TIMES DAILY
COMMUNITY
End: 2022-01-28

## 2021-04-27 ASSESSMENT — PAIN SCALES - GENERAL: PAINLEVEL: NO PAIN (0)

## 2021-04-27 NOTE — PROGRESS NOTES
CC: New OB visit  HPI:  Ana Patrick is  at 6w3d based on Patient's last menstrual period was 2021 (exact date)./first trimester US today.  She notes issues of nausea, fatigue, dizzy spells.    OB History    Para Term  AB Living   5 1 1 0 3 1   SAB TAB Ectopic Multiple Live Births   3 0 0 0 1      # Outcome Date GA Lbr Mandeep/2nd Weight Sex Delivery Anes PTL Lv   5 Current            4 Term 20 38w0d  3.617 kg (7 lb 15.6 oz) F CS-Unspec   PAUL      Name: CHUCKIE,FEMALE-ANA      Apgar1: 6  Apgar5: 7   3 SAB 19           2 SAB 18           1 SAB 16             STI: (denies HSV, Hep C, Hep B, HIV, Syphilis, Chlamydia, Gonorrhea)  Last pap smear:   Lab Results   Component Value Date    PAP NIL 2020     Past Medical History:   Diagnosis Date     Internal derangement of knee     2016     Personal history of other medical treatment (CODE)          Polycystic ovarian syndrome     3/18/2016      has a past surgical history that includes Mandible surgery (Right); Arthroscopy knee (Right, ); Hysteroscopy Diagnostic (N/A, 2018); and  section (N/A, 2020).    Social History     Tobacco Use     Smoking status: Never Smoker     Smokeless tobacco: Never Used   Substance Use Topics     Alcohol use: Not Currently     Alcohol/week: 0.0 standard drinks     Comment: Alcoholic Drinks/day: rare     Drug use: No     Family History   Problem Relation Age of Onset     Family History Negative Mother         Good Health     Family History Negative Father         Good Health     Cancer Maternal Grandmother         Cancer     Diabetes Other         Diabetes,maternal side     Anxiety Disorder Sister        Current Outpatient Medications   Medication     docusate sodium (COLACE) 100 MG capsule     Ferrous Gluconate 240 (27 Fe) MG TABS     pantoprazole (PROTONIX) 40 MG EC tablet     Prenatal Vit-Fe Fumarate-FA (PRENATAL MULTIVITAMIN  PLUS IRON) 27-1 MG TABS      progesterone (PROMETRIUM) 200 MG capsule     sucralfate (CARAFATE) 1 GM tablet     lisdexamfetamine (VYVANSE) 40 MG capsule     No current facility-administered medications for this visit.      Allergies   Allergen Reactions     Meloxicam Rash     Immunization History   Administered Date(s) Administered     DTAP (<7y) 03/01/1991, 05/08/1991, 07/10/1991, 07/17/1992, 05/30/1996, 08/08/2003     N2m9-91 Novel Flu 02/19/2010     HepB, Unspecified 08/08/2003, 10/18/2003, 04/12/2004     Hib, Unspecified 03/01/1991, 05/08/1991, 07/10/1991, 04/28/1992     Historical DTP/aP 03/01/1991, 05/08/1991, 07/10/1991, 07/17/1992     Influenza (IIV3) PF 02/19/2010, 10/18/2015     Influenza Vaccine IM > 6 months Valent IIV4 10/17/2016, 10/04/2017, 10/03/2019     MMR 04/28/1992, 08/08/2003     Meningococcal (Menactra ) 10/07/2014     OPV, trivalent, live 03/01/1991, 05/08/1991, 07/10/1991, 07/17/1992     Polio, Unspecified  03/01/1991, 05/08/1991, 07/10/1991, 07/17/1992     Rhogam 06/18/2018, 07/30/2019     TDAP Vaccine (Adacel) 02/27/2010, 11/25/2019     TDAP Vaccine (Boostrix) 11/25/2019     REVIEW OF SYSTEMS  General: negative  Skin: negative  Resp: No shortness of breath, dyspnea on exertion, cough, or hemoptysis  CV: negative  GI: negative  : negative  Musculoskeletal: negative  Psychiatric: negative  Hematologic: negative  Endocrine: negative    EXAM: /68 (BP Location: Right arm, Patient Position: Sitting, Cuff Size: Adult Large)   Pulse 93   Wt 109 kg (240 lb 4.8 oz)   LMP 03/13/2021 (Exact Date)   SpO2 98%   BMI 39.99 kg/m    Gen: NAD  CV: RRR with normal S1, S2, no GRM  Resp: CTA Bilaterally  Neck: supple without thyromegaly mass or noduels  Extremities: No TTP, no deformity  Neuro: CN II-XII intact grossly, moves all extremities  Psych: normal affect and mentation.      I/P  (Z34.81) Encounter for supervision of other normal pregnancy in first trimester  (primary encounter diagnosis)  Comment:   Plan: ABO/Rh  type and screen, Hepatitis B surface         antigen, HIV Antigen Antibody Combo, Rubella         Antibody IgG Quantitative, Treponema Abs w         Reflex to RPR and Titer, Urine Culture Aerobic         Bacterial, CBC with platelets differential,         GC/Chlamydia by PCR - [facility], UA reflex to         Microscopic, US OB < 14 Weeks ([facility] ONLY),        C US OB 1ST TRIMESTER MAT/FETAL, SINGLE         GESTATION - GICH, CANCELED: CBC with platelets              Discussed safety, nutrition, screening for cystic fibrosis, spina bifida, spinal muscular atrophy, quad screen, cffDNA screening as appropriate.  F/U scheduled, discussed call schedule rotation.  Return visit in 2 weeks    Pregnancy risk factors include:  APAS with lupus anticoagulant- Lovenox after viability established  History of recurrent SAB  Prior  section-plans repeat  Dichorionic Twins- ART- repeat  37-38 weeks    Gerry Kim MD FACOG  1:30 PM 2021

## 2021-04-27 NOTE — NURSING NOTE
Patient here for prenatal care and new OB physical, states she is doing well, came in on Friday because she almost passed out... found out she had low iron. She said the nausea has been tough.        Medication Reconciliation: omari Guzman, JESUS  4/27/2021 1:13 PM

## 2021-04-28 LAB
BACTERIA SPEC CULT: NORMAL
HBV SURFACE AG SERPL QL IA: NONREACTIVE
HIV 1+2 AB+HIV1 P24 AG SERPL QL IA: NONREACTIVE
SPECIMEN SOURCE: NORMAL
T PALLIDUM AB SER QL: NONREACTIVE

## 2021-04-29 LAB — RUBV IGG SERPL IA-ACNC: 32 IU/ML

## 2021-05-11 ENCOUNTER — ANCILLARY PROCEDURE (OUTPATIENT)
Dept: ULTRASOUND IMAGING | Facility: OTHER | Age: 31
End: 2021-05-11
Attending: OBSTETRICS & GYNECOLOGY
Payer: COMMERCIAL

## 2021-05-11 ENCOUNTER — PRENATAL OFFICE VISIT (OUTPATIENT)
Dept: OBGYN | Facility: OTHER | Age: 31
End: 2021-05-11
Attending: OBSTETRICS & GYNECOLOGY
Payer: COMMERCIAL

## 2021-05-11 ENCOUNTER — MYC MEDICAL ADVICE (OUTPATIENT)
Dept: OBGYN | Facility: OTHER | Age: 31
End: 2021-05-11

## 2021-05-11 VITALS
DIASTOLIC BLOOD PRESSURE: 74 MMHG | SYSTOLIC BLOOD PRESSURE: 110 MMHG | HEART RATE: 88 BPM | OXYGEN SATURATION: 99 % | WEIGHT: 241.6 LBS | BODY MASS INDEX: 40.2 KG/M2

## 2021-05-11 DIAGNOSIS — O30.041 DICHORIONIC DIAMNIOTIC TWIN PREGNANCY IN FIRST TRIMESTER: Primary | ICD-10-CM

## 2021-05-11 DIAGNOSIS — O30.041 DICHORIONIC DIAMNIOTIC TWIN PREGNANCY IN FIRST TRIMESTER: ICD-10-CM

## 2021-05-11 DIAGNOSIS — Z34.81 ENCOUNTER FOR SUPERVISION OF OTHER NORMAL PREGNANCY IN FIRST TRIMESTER: ICD-10-CM

## 2021-05-11 DIAGNOSIS — R76.0 LUPUS ANTICOAGULANT POSITIVE: ICD-10-CM

## 2021-05-11 PROCEDURE — 99207 PR OB VISIT-NO CHARGE - GICH ONLY: CPT | Performed by: OBSTETRICS & GYNECOLOGY

## 2021-05-11 PROCEDURE — 76801 OB US < 14 WKS SINGLE FETUS: CPT | Performed by: OBSTETRICS & GYNECOLOGY

## 2021-05-11 ASSESSMENT — PAIN SCALES - GENERAL: PAINLEVEL: NO PAIN (0)

## 2021-05-11 NOTE — NURSING NOTE
Patient here for prenatal care and viability check. Pt states she has been dizzy and slightly nauseous. Other than this she has been doing well.     Medication Reconciliation: complete    Zhao Guzman LPN  5/11/2021 10:32 AM

## 2021-06-11 ENCOUNTER — PRENATAL OFFICE VISIT (OUTPATIENT)
Dept: OBGYN | Facility: OTHER | Age: 31
End: 2021-06-11
Attending: OBSTETRICS & GYNECOLOGY
Payer: COMMERCIAL

## 2021-06-11 VITALS
BODY MASS INDEX: 39.82 KG/M2 | RESPIRATION RATE: 18 BRPM | DIASTOLIC BLOOD PRESSURE: 68 MMHG | HEART RATE: 71 BPM | SYSTOLIC BLOOD PRESSURE: 110 MMHG | WEIGHT: 239.3 LBS

## 2021-06-11 DIAGNOSIS — O09.92 HRP (HIGH RISK PREGNANCY), SECOND TRIMESTER: Primary | ICD-10-CM

## 2021-06-11 PROCEDURE — 99207 PR OB VISIT-NO CHARGE - GICH ONLY: CPT | Performed by: OBSTETRICS & GYNECOLOGY

## 2021-06-11 PROCEDURE — G0463 HOSPITAL OUTPT CLINIC VISIT: HCPCS

## 2021-06-11 RX ORDER — CALCIUM CARBONATE 500 MG/1
2 TABLET, CHEWABLE ORAL 2 TIMES DAILY PRN
Status: ON HOLD | COMMUNITY
End: 2022-05-12

## 2021-06-11 RX ORDER — FOLIC ACID 0.8 MG
800 TABLET ORAL DAILY
COMMUNITY
End: 2022-01-28

## 2021-06-11 ASSESSMENT — PAIN SCALES - GENERAL: PAINLEVEL: NO PAIN (0)

## 2021-06-11 NOTE — NURSING NOTE
"Chief Complaint   Patient presents with     Prenatal Care     12w 6d   Patient presents to clinic for prenatal care. Wondering if should still be on progesterone. Also having some episodes of mild dizzy/lightheadedness.    Initial /68 (BP Location: Right arm, Patient Position: Sitting, Cuff Size: Adult Large)   Pulse 71   Resp 18   Wt 108.5 kg (239 lb 4.8 oz)   LMP 03/13/2021 (Exact Date)   BMI 39.82 kg/m   Estimated body mass index is 39.82 kg/m  as calculated from the following:    Height as of 4/23/21: 1.651 m (5' 5\").    Weight as of this encounter: 108.5 kg (239 lb 4.8 oz).  Medication Reconciliation: complete    KEELEY SHERIDAN, HANNAN  "

## 2021-06-11 NOTE — PROGRESS NOTES
CC: Recheck OB visit at 12w6d    HPI: Ana Patrick presents for a routine OB visit now at 12w6d  Concerns:  none  Patient notices normal fetal movement, denies contractions, vaginal bleeding or leaking of fluid.    OB History    Para Term  AB Living   5 1 1 0 3 1   SAB TAB Ectopic Multiple Live Births   3 0 0 0 1      # Outcome Date GA Lbr Mandeep/2nd Weight Sex Delivery Anes PTL Lv   5 Current            4 Term 20 38w0d  3.617 kg (7 lb 15.6 oz) F CS-Unspec   PAUL      Name: CHUCKIE,FEMALE-ANA      Apgar1: 6  Apgar5: 7   3 SAB 19           2 SAB 18           1 SAB 16             Current Outpatient Medications   Medication     calcium carbonate (TUMS) 500 MG chewable tablet     docusate sodium (COLACE) 100 MG capsule     Ferrous Gluconate 240 (27 Fe) MG TABS     folic acid 800 MCG tablet     pantoprazole (PROTONIX) 40 MG EC tablet     Prenatal Vit-Fe Fumarate-FA (PRENATAL MULTIVITAMIN  PLUS IRON) 27-1 MG TABS     progesterone (PROMETRIUM) 200 MG capsule     sucralfate (CARAFATE) 1 GM tablet     lisdexamfetamine (VYVANSE) 40 MG capsule     No current facility-administered medications for this visit.          O: /68 (BP Location: Right arm, Patient Position: Sitting, Cuff Size: Adult Large)   Pulse 71   Resp 18   Wt 108.5 kg (239 lb 4.8 oz)   LMP 2021 (Exact Date)   BMI 39.82 kg/m    Body mass index is 39.82 kg/m .  See OB flow sheet  EXAM:  NAD  Beside US confirms viable dichorion    No results found for any visits on 21.    A/P: 12w6d gestation    Recheck in 4 weeks  MFM telemed US in 8 weeks      Problem List:   Pregnancy risk factors include:  APAS with lupus anticoagulant- Lovenox after viability established, baby asa  History of recurrent SAB  Prior  section-plans repeat  Dichorionic Twins- ART- repeat  37-38 weeks    Gerry Kim MD FACOG  10:13 AM 2021

## 2021-06-21 ENCOUNTER — TELEPHONE (OUTPATIENT)
Dept: OBGYN | Facility: OTHER | Age: 31
End: 2021-06-21

## 2021-06-21 NOTE — TELEPHONE ENCOUNTER
Patient was called with result of [place]. It was unable to be processed d/t insufficient fetal cfDNA. Per Dr. Kim, she was given the option of a redraw or having quad screen drawn at her next visit. She will discuss with her  and let us know their decision.    Awa Parry RN...................6/21/2021 2:14 PM

## 2021-07-05 ENCOUNTER — PRENATAL OFFICE VISIT (OUTPATIENT)
Dept: OBGYN | Facility: OTHER | Age: 31
End: 2021-07-05
Attending: OBSTETRICS & GYNECOLOGY
Payer: COMMERCIAL

## 2021-07-05 VITALS
WEIGHT: 237.6 LBS | BODY MASS INDEX: 39.54 KG/M2 | HEART RATE: 100 BPM | DIASTOLIC BLOOD PRESSURE: 68 MMHG | RESPIRATION RATE: 12 BRPM | SYSTOLIC BLOOD PRESSURE: 102 MMHG

## 2021-07-05 DIAGNOSIS — D68.61 ANTIPHOSPHOLIPID ANTIBODY SYNDROME (H): ICD-10-CM

## 2021-07-05 DIAGNOSIS — R76.0 LUPUS ANTICOAGULANT POSITIVE: ICD-10-CM

## 2021-07-05 DIAGNOSIS — O09.92 HRP (HIGH RISK PREGNANCY), SECOND TRIMESTER: Primary | ICD-10-CM

## 2021-07-05 DIAGNOSIS — O30.041 DICHORIONIC DIAMNIOTIC TWIN PREGNANCY IN FIRST TRIMESTER: ICD-10-CM

## 2021-07-05 PROCEDURE — 99207 PR OB VISIT-NO CHARGE - GICH ONLY: CPT | Performed by: OBSTETRICS & GYNECOLOGY

## 2021-07-05 ASSESSMENT — PAIN SCALES - GENERAL: PAINLEVEL: NO PAIN (0)

## 2021-07-05 NOTE — PROGRESS NOTES
CC: Recheck OB visit at 16w2d    HPI: Ana Patrick presents for a routine OB visit now at 16w2d  Concerns: None  Patient notices normal fetal movement, denies contractions, vaginal bleeding or leaking of fluid.    OB History    Para Term  AB Living   5 1 1 0 3 1   SAB TAB Ectopic Multiple Live Births   3 0 0 0 1      # Outcome Date GA Lbr Mandeep/2nd Weight Sex Delivery Anes PTL Lv   5 Current            4 Term 20 38w0d  3.617 kg (7 lb 15.6 oz) F CS-Unspec   PAUL      Name: CHUCKIE,FEMALE-ANA      Apgar1: 6  Apgar5: 7   3 SAB 19           2 SAB 18           1 SAB 16             Current Outpatient Medications   Medication     aspirin (ASA) 81 MG EC tablet     calcium carbonate (TUMS) 500 MG chewable tablet     docusate sodium (COLACE) 100 MG capsule     Ferrous Gluconate 240 (27 Fe) MG TABS     folic acid 800 MCG tablet     lisdexamfetamine (VYVANSE) 40 MG capsule     pantoprazole (PROTONIX) 40 MG EC tablet     Prenatal Vit-Fe Fumarate-FA (PRENATAL MULTIVITAMIN  PLUS IRON) 27-1 MG TABS     sucralfate (CARAFATE) 1 GM tablet     No current facility-administered medications for this visit.          O: /68 (BP Location: Right arm, Patient Position: Sitting, Cuff Size: Adult Large)   Pulse 100   Resp 12   Wt 107.8 kg (237 lb 9.6 oz)   LMP 2021 (Exact Date)   Breastfeeding No   BMI 39.54 kg/m    Body mass index is 39.54 kg/m .  See OB flow sheet  EXAM:  NAD  Bedside US shows a viable dichorionic twin gestation with cardiac activity in both twins, one anterior and one posterior placenta without previa, normal AFV in both twins.    No results found for any visits on 21.    A/P: (O09.92) HRP (high risk pregnancy), second trimester  (primary encounter diagnosis)  Comment:   Plan: [facility] Telemedicine US Comprehensive Twins            (O30.041) Dichorionic diamniotic twin pregnancy in first trimester  Comment:   Plan: [facility] Telemedicine US Comprehensive Twins             (R76.0) Lupus anticoagulant positive  Comment:   Plan: [facility] Telemedicine US Comprehensive Twins            (D68.61) Antiphospholipid antibody syndrome (H)  Comment:   Plan: [facility] Telemedicine US Comprehensive Twins          Recheck in 4 weeks    Problem List:   Pregnancy risk factors include:  APAS with lupus anticoagulant- Lovenox after viability established, baby asa  History of recurrent SAB  Prior  section-plans repeat  Dichorionic Twins- ART- repeat  37-38 weeks    Gerry Kim MD FACOG  12:28 PM 2021

## 2021-07-05 NOTE — NURSING NOTE
"Chief Complaint   Patient presents with     Prenatal Care     16w 2d     Patient stated she has been having \"more headaches, terrible acid reflux, but less dizziness.\" Also stated she can \"sometimes feel baby move.\"    Initial /68 (BP Location: Right arm, Patient Position: Sitting, Cuff Size: Adult Large)   Pulse 100   Resp 12   Wt 107.8 kg (237 lb 9.6 oz)   LMP 03/13/2021 (Exact Date)   Breastfeeding No   BMI 39.54 kg/m   Estimated body mass index is 39.54 kg/m  as calculated from the following:    Height as of 4/23/21: 1.651 m (5' 5\").    Weight as of this encounter: 107.8 kg (237 lb 9.6 oz).  Medication Reconciliation: Completed     Zhen Blunt LPN  "

## 2021-07-13 ENCOUNTER — PRENATAL OFFICE VISIT (OUTPATIENT)
Dept: OBGYN | Facility: OTHER | Age: 31
End: 2021-07-13
Attending: OBSTETRICS & GYNECOLOGY
Payer: COMMERCIAL

## 2021-07-13 VITALS
WEIGHT: 237.3 LBS | DIASTOLIC BLOOD PRESSURE: 70 MMHG | SYSTOLIC BLOOD PRESSURE: 118 MMHG | RESPIRATION RATE: 18 BRPM | BODY MASS INDEX: 39.49 KG/M2 | HEART RATE: 83 BPM

## 2021-07-13 DIAGNOSIS — O46.92 VAGINAL BLEEDING IN PREGNANCY, SECOND TRIMESTER: Primary | ICD-10-CM

## 2021-07-13 LAB
ABO/RH(D): NORMAL
FBST KIT EXP: NORMAL
FBST LOT #: NORMAL
FETAL BLEED SCREEN: NORMAL
SPECIMEN EXPIRATION DATE: NORMAL

## 2021-07-13 PROCEDURE — 99207 PR OB VISIT-NO CHARGE - GICH ONLY: CPT | Performed by: OBSTETRICS & GYNECOLOGY

## 2021-07-13 PROCEDURE — 85461 HEMOGLOBIN FETAL: CPT | Mod: ZL | Performed by: OBSTETRICS & GYNECOLOGY

## 2021-07-13 PROCEDURE — 250N000011 HC RX IP 250 OP 636: Performed by: OBSTETRICS & GYNECOLOGY

## 2021-07-13 PROCEDURE — 76805 OB US >/= 14 WKS SNGL FETUS: CPT | Performed by: OBSTETRICS & GYNECOLOGY

## 2021-07-13 PROCEDURE — 36415 COLL VENOUS BLD VENIPUNCTURE: CPT | Mod: ZL | Performed by: OBSTETRICS & GYNECOLOGY

## 2021-07-13 PROCEDURE — 96372 THER/PROPH/DIAG INJ SC/IM: CPT | Performed by: OBSTETRICS & GYNECOLOGY

## 2021-07-13 RX ADMIN — HUMAN RHO(D) IMMUNE GLOBULIN 300 MCG: 1500 SOLUTION INTRAMUSCULAR; INTRAVENOUS at 11:32

## 2021-07-13 ASSESSMENT — PAIN SCALES - GENERAL: PAINLEVEL: NO PAIN (1)

## 2021-07-13 NOTE — PROGRESS NOTES
CC: Recheck OB visit at 17w3d    HPI: Ana Patrick presents for a routine OB visit now at 17w3d  Concerns:  Vaginal spotting. Twin HRP with APAS on lovenox and aspirin  Patient notices normal fetal movement, denies contractions, vaginal bleeding or leaking of fluid.    OB History    Para Term  AB Living   5 1 1 0 3 1   SAB TAB Ectopic Multiple Live Births   3 0 0 0 1      # Outcome Date GA Lbr Mandeep/2nd Weight Sex Delivery Anes PTL Lv   5 Current            4 Term 20 38w0d  3.617 kg (7 lb 15.6 oz) F CS-Unspec   PAUL      Name: CHUCKIE,FEMALE-ANA      Apgar1: 6  Apgar5: 7   3 SAB 19           2 SAB 18           1 SAB 16             Current Outpatient Medications   Medication     aspirin (ASA) 81 MG EC tablet     calcium carbonate (TUMS) 500 MG chewable tablet     docusate sodium (COLACE) 100 MG capsule     Ferrous Gluconate 240 (27 Fe) MG TABS     folic acid 800 MCG tablet     lisdexamfetamine (VYVANSE) 40 MG capsule     pantoprazole (PROTONIX) 40 MG EC tablet     Prenatal Vit-Fe Fumarate-FA (PRENATAL MULTIVITAMIN  PLUS IRON) 27-1 MG TABS     sucralfate (CARAFATE) 1 GM tablet     No current facility-administered medications for this visit.         O: LMP 2021 (Exact Date)   There is no height or weight on file to calculate BMI.  See OB flow sheet  EXAM:  Cx is long and closed on SSE.    No results found for any visits on 21.    A/P: 17w3d gestation with spotting    Recheck in 3 weeks  Rhogam given    Problem List:   Pregnancy risk factors include:  APAS with lupus anticoagulant- Lovenox after viability established, baby asa  History of recurrent SAB  Prior  section-plans repeat  Dichorionic Twins- ART- repeat  37-38 weeks    Gerry Kim MD FACOG  9:46 AM 2021

## 2021-07-13 NOTE — NURSING NOTE
"Chief Complaint   Patient presents with     Prenatal Care     17w3d   Patient presents to clinic for prenatal care. Had spotting yesterday, pinkish red, just when wiping after using bathroom.   Ngozi Hernandez LPN.....7/13/2021 9:50 AM     Initial /70 (BP Location: Right arm, Patient Position: Sitting, Cuff Size: Adult Large)   Pulse 83   Resp 18   Wt 107.6 kg (237 lb 4.8 oz)   LMP 03/13/2021 (Exact Date)   BMI 39.49 kg/m   Estimated body mass index is 39.49 kg/m  as calculated from the following:    Height as of 4/23/21: 1.651 m (5' 5\").    Weight as of this encounter: 107.6 kg (237 lb 4.8 oz).  Medication Reconciliation: complete    KEELEY SHERIDAN, HANNAN  "

## 2021-08-03 ENCOUNTER — HOSPITAL ENCOUNTER (OUTPATIENT)
Dept: ULTRASOUND IMAGING | Facility: OTHER | Age: 31
End: 2021-08-03
Attending: OBSTETRICS & GYNECOLOGY
Payer: COMMERCIAL

## 2021-08-03 ENCOUNTER — OFFICE VISIT (OUTPATIENT)
Dept: MATERNAL FETAL MEDICINE | Facility: CLINIC | Age: 31
End: 2021-08-03
Attending: OBSTETRICS & GYNECOLOGY
Payer: COMMERCIAL

## 2021-08-03 ENCOUNTER — PRENATAL OFFICE VISIT (OUTPATIENT)
Dept: OBGYN | Facility: OTHER | Age: 31
End: 2021-08-03
Attending: OBSTETRICS & GYNECOLOGY
Payer: COMMERCIAL

## 2021-08-03 ENCOUNTER — HOSPITAL ENCOUNTER (OUTPATIENT)
Dept: ULTRASOUND IMAGING | Facility: CLINIC | Age: 31
End: 2021-08-03
Attending: OBSTETRICS & GYNECOLOGY
Payer: COMMERCIAL

## 2021-08-03 VITALS
DIASTOLIC BLOOD PRESSURE: 82 MMHG | WEIGHT: 239.2 LBS | RESPIRATION RATE: 20 BRPM | HEART RATE: 73 BPM | BODY MASS INDEX: 39.8 KG/M2 | SYSTOLIC BLOOD PRESSURE: 124 MMHG

## 2021-08-03 DIAGNOSIS — D68.61 ANTIPHOSPHOLIPID ANTIBODY SYNDROME (H): ICD-10-CM

## 2021-08-03 DIAGNOSIS — R76.0 LUPUS ANTICOAGULANT POSITIVE: ICD-10-CM

## 2021-08-03 DIAGNOSIS — O09.92 HRP (HIGH RISK PREGNANCY), SECOND TRIMESTER: ICD-10-CM

## 2021-08-03 DIAGNOSIS — O30.041 DICHORIONIC DIAMNIOTIC TWIN PREGNANCY IN FIRST TRIMESTER: ICD-10-CM

## 2021-08-03 DIAGNOSIS — O09.92 HRP (HIGH RISK PREGNANCY), SECOND TRIMESTER: Primary | ICD-10-CM

## 2021-08-03 DIAGNOSIS — O30.042 DICHORIONIC DIAMNIOTIC TWIN PREGNANCY IN SECOND TRIMESTER: Primary | ICD-10-CM

## 2021-08-03 PROCEDURE — 99207 PR OB VISIT-NO CHARGE - GICH ONLY: CPT | Performed by: OBSTETRICS & GYNECOLOGY

## 2021-08-03 ASSESSMENT — PAIN SCALES - GENERAL: PAINLEVEL: NO PAIN (0)

## 2021-08-03 NOTE — NURSING NOTE
"Chief Complaint   Patient presents with     Prenatal Care     20w 3d   Patient presents to clinic for prenatal care. No concerns noted.     Initial /82 (BP Location: Right arm, Patient Position: Sitting, Cuff Size: Adult Large)   Pulse 73   Resp 20   Wt 108.5 kg (239 lb 3.2 oz)   LMP 03/13/2021 (Exact Date)   BMI 39.80 kg/m   Estimated body mass index is 39.8 kg/m  as calculated from the following:    Height as of 4/23/21: 1.651 m (5' 5\").    Weight as of this encounter: 108.5 kg (239 lb 3.2 oz).  Medication Reconciliation: complete    KEELEY SHERIDAN, LPN  "

## 2021-08-03 NOTE — PROGRESS NOTES
Type of service:    Telemedicine Office Visit for an ultrasound    Date of service:     Date: 08/03/21     Time service began:    8:00 AM  Time service ended:    10:00 AM    Reason:      .tel: Lack of available service in patient area    Description of basis or telemedicine appropriateness:     Consultation provided at the request of Dr. Kim for advice regarding the diagnosis and treatment of this patient's with a twin pregnancy.  The patient's condition can be safely assessed via telemedicine.    The Mode of Transmission:    Secure interactive audio and visual telecommunication system (Video Guidance)    Location of originating and distant sites:      Originating site:   [facility]   Distant site:    [place], [place]    Daniel Vazquez MD

## 2021-08-03 NOTE — PROGRESS NOTES
CC: Recheck OB visit at 20w3d    HPI: Ana Patrick presents for a routine OB visit now at 20w3d  Concerns: Doing OK, US report pending, having occasional BH, ctx's.    Patient notices normal fetal movement, denies contractions, vaginal bleeding or leaking of fluid.    OB History    Para Term  AB Living   5 1 1 0 3 1   SAB TAB Ectopic Multiple Live Births   3 0 0 0 1      # Outcome Date GA Lbr Mandeep/2nd Weight Sex Delivery Anes PTL Lv   5 Current            4 Term 20 38w0d  3.617 kg (7 lb 15.6 oz) F CS-Unspec   PAUL      Name: CHUCKIE,FEMALE-ANA      Apgar1: 6  Apgar5: 7   3 SAB 19           2 SAB 18           1 SAB 16             Current Outpatient Medications   Medication     aspirin (ASA) 81 MG EC tablet     calcium carbonate (TUMS) 500 MG chewable tablet     docusate sodium (COLACE) 100 MG capsule     enoxaparin ANTICOAGULANT (LOVENOX) 40 MG/0.4ML syringe     Ferrous Gluconate 240 (27 Fe) MG TABS     folic acid 800 MCG tablet     pantoprazole (PROTONIX) 40 MG EC tablet     Prenatal Vit-Fe Fumarate-FA (PRENATAL MULTIVITAMIN  PLUS IRON) 27-1 MG TABS     sucralfate (CARAFATE) 1 GM tablet     lisdexamfetamine (VYVANSE) 40 MG capsule     No current facility-administered medications for this visit.     O: /82 (BP Location: Right arm, Patient Position: Sitting, Cuff Size: Adult Large)   Pulse 73   Resp 20   Wt 108.5 kg (239 lb 3.2 oz)   LMP 2021 (Exact Date)   BMI 39.80 kg/m    Body mass index is 39.8 kg/m .  See OB flow sheet  EXAM:  NAD    Results for orders placed or performed during the hospital encounter of 21   [facility] Tele[facility]  Comprehensive Twins     Status: None    Narrative            Comprehensive  ---------------------------------------------------------------------------------------------------------  Pat. Name: THOMJANNETTEMYLA ANA       Study Date:  2021 8:07am  Pat. NO:  [id number]        Referring  MD: SUE  GHADA  Site:  [facility]       Sonographer: Monica Wynne RDMS  :  1990        Age:   30  ---------------------------------------------------------------------------------------------------------    INDICATION  ---------------------------------------------------------------------------------------------------------  Dichorionic, Diamniotic Twin gestation. Antiphospholipid Syndrome, Class III Obesity ( BMI 40)      METHOD  ---------------------------------------------------------------------------------------------------------  TELEMEDICINE ULTRASOUND REPORT. Transabdominal ultrasound examination,. View: Sufficient      PREGNANCY  ---------------------------------------------------------------------------------------------------------  Twin pregnancy. Dichorionic-diamniotic. Number of fetuses: 2  Membrane Description: thick dividing membrane visualized between fetuses 1 and 2      DATING  ---------------------------------------------------------------------------------------------------------                                           Date                                Details                                                                                      Gest. age                      GREGORY  LMP                                  3/13/2021                        Cycle: regular cycle                                                                    20 w + 3 d                     2021  Prior assessment               2021                         CRL, GA: 5 w + 6 d                                                                    19 w + 6 d                     2021  U/S Fetus 1                       8/3/2021                          based upon AC, BPD, Femur, HC                                                 20 w + 0 d                     2021  U/S Fetus 2                                                              based upon AC, BPD, Femur, HC                                                 20 w + 3 d                     12/18/2021  Assigned dating                  Dating performed on 08/3/2021, based on the LMP                                                              20 w + 3 d                     12/18/2021      Fetus 1: GENERAL EVALUATION  ---------------------------------------------------------------------------------------------------------  Cardiac activity present.  bpm.  Fetal movements present.  Presentation cephalic, presenting, maternal left .  Placenta Posterior, thick dividing membrane, no previa > 2 cm from internal os .  Umbilical cord 3 vessel cord.  Amniotic fluid Amount of AF: normal. MVP 4.0 cm.      Fetus 2: GENERAL EVALUATION  ---------------------------------------------------------------------------------------------------------  Cardiac activity present.  bpm.  Fetal movements present.  Presentation breech, maternal right .  Placenta anterior, thick dividing membrane.  Umbilical cord 3 vessel cord.  Amniotic fluid Amount of AF: normal. MVP 5.1 cm.      Fetus 1: FETAL BIOMETRY  ---------------------------------------------------------------------------------------------------------  Main Fetal Biometry:  BPD                                        45.6                    mm                         19w 5d                Hadlock  HC                                          172.3                  mm                          19w 6d                Hadlock  Cerebellum tr                            19.4                   mm                          18w 5d                Nicolaides  AC                                          151.5                  mm                          20w 3d        42%        Hadlock  Femur                                      31.6                   mm                          19w 6d                Hadlock  Humerus                                  33.5                    mm                         21w 3d                Yolande  Fetal Weight  Calculation:  EFW                                       331                     g                                     27%        Hadlock  EFW (lb,oz)                             0 lb 12                 oz  EFW by                                   Hadlock (BPD-HC-AC-FL)  EFW discordance                     2.7                     %  Head / Face / Neck Biometry:                                             7.1                     mm  CM                                          4.3                     mm  Nasal bone                               6.8                     mm                         present  Nuchal fold                               3.8                     mm      Fetus 2: FETAL BIOMETRY  ---------------------------------------------------------------------------------------------------------  Main Fetal Biometry:  BPD                                        49.1                    mm                         20w 6d                Hadlock  HC                                          177.5                  mm                          20w 2d                Hadlock  Cerebellum tr                            21.1                   mm                          20w 1d                Nicolaides  AC                                          149.6                  mm                          20w 1d        36%        Hadlock  Femur                                      32.6                   mm                          20w 1d                Hadlock  Humerus                                  31.6                    mm                         20w 4d                Yolande  Fetal Weight Calculation:  EFW                                       340                     g                                     34%        Hadlock  EFW (lb,oz)                             0 lb 12                 oz  EFW by                                   Hadlock (BPD-HC-AC-FL)  EFW discordance                     2.7                     %  Head / Face  / Neck Biometry:                                             8.2                     mm  CM                                          4.9                     mm  Nasal bone                               7.2                     mm                         present  Nuchal fold                               4.5                     mm      Fetus 1: FETAL ANATOMY  ---------------------------------------------------------------------------------------------------------  The following structures appear normal:  Head / Neck                         Cranium. Head size. Head shape. Lateral ventricles. Choroid plexus. Midline falx. Cavum septi pellucidi. Cerebellum. Cisterna magna.                                             Parenchyma. Thalami. Vermis.                                             Neck. Nuchal fold.  Face                                   Lips. Profile. Nose. Maxilla. Mandible. Orbits. Lens.  Heart / Thorax                      4-chamber view. RVOT view. LVOT view. Situs. Aortic arch view. Bicaval view. Ductal arch view. Superior vena cava. Inferior vena cava. 3-vessel                                             view. 3-vessel-trachea view. Cardiac rhythm.                                             Right lung. Left lung. Diaphragm.  Abdomen                             Abdominal wall. Cord insertion. Stomach. Kidneys. Bladder. Liver. Bowel. Genitals.  Spine                                  Cervical spine. Thoracic spine. Lumbar spine. Sacral spine.  Extremities / Skeleton          Right hand. Right foot. Left foot.    The following structures could not be adequately visualized:  Extremities / Skeleton          Left hand.    Gender: male.      Fetus 2: FETAL ANATOMY  ---------------------------------------------------------------------------------------------------------  The following structures appear normal:  Head / Neck                         Cranium. Head size. Head shape. Lateral ventricles. Choroid  plexus. Midline falx. Cavum septi pellucidi. Cerebellum. Cisterna magna.                                             Parenchyma. Thalami. Vermis.                                             Neck. Nuchal fold.  Face                                   Lips. Profile. Nose. Maxilla. Mandible. Orbits. Lens.  Heart / Thorax                      4-chamber view. RVOT view. LVOT view. Situs. Aortic arch view. Bicaval view. Superior vena cava. Inferior vena cava. 3-vessel view.                                             3-vessel-trachea view. Cardiac rhythm.                                             Right lung. Left lung. Diaphragm.  Abdomen                             Abdominal wall. Cord insertion. Stomach. Kidneys. Bladder. Liver. Bowel. Genitals.  Spine                                  Cervical spine. Thoracic spine. Lumbar spine. Sacral spine.  Extremities / Skeleton          Right foot. Left foot.    The following structures could not be adequately visualized:  Heart / Thorax                      Ductal arch view.  Extremities / Skeleton          Right hand. Left hand.    Gender: male.      MATERNAL STRUCTURES  ---------------------------------------------------------------------------------------------------------  Cervix                                  Visualized                                             Appearance: Appears Closed                                             Approach - Transabdominal: Cervical length 40.8 mm  Right Ovary                          Not visualized  Left Ovary                            Not visualized      RECOMMENDATION  ---------------------------------------------------------------------------------------------------------  We discussed the findings on today's ultrasound with the patient.    A repeat ultrasound is recommended in 4 weeks to reevaluate fetal growth and anatomy. Return to primary provider for continued prenatal care.    Thank-you for the opportunity to participate in  the care of this patient. If you have questions regarding today's evaluation or if we can be of further service, please contact the  [facility].    **Fetal anomalies may be present but not detected**        Impression    IMPRESSION  ---------------------------------------------------------------------------------------------------------  For fetus 1: Sonographic biometry agrees with gestational age predicted by LMP. The left hand was not adequately visualized due to fetal position. The fetal anatomy was  otherwise adequately visualized and appeared normal. None of the anomalies commonly detected by ultrasound were evident in those structures that were visualized.    For fetus 2: Sonographic biometry agrees with gestational age predicted by LMP. The hands and ductal arch were not adequately visualized due to fetal position. The fetal  anatomy was otherwise adequately visualized and appeared normal. None of the anomalies commonly detected by ultrasound were evident in those structures that were  visualized.    There is a 3% discordance between the twins.           A/P: (O09.92) HRP (high risk pregnancy), second trimester  (primary encounter diagnosis)  Comment:   Plan: Glucose tolerance, gest screen, 1 hour, CBC W         PLT No Diff, Treponema Ab w Reflex to RPR and         Titer, MFM Telemedicine US 2/3 Tri Complete         Twins            (O30.041) Dichorionic diamniotic twin pregnancy in first trimester  Comment:   Plan: Glucose tolerance, gest screen, 1 hour, CBC W         PLT No Diff, Treponema Ab w Reflex to RPR and         Titer, MFM Telemedicine US 2/3 Tri Complete         Twins          Recheck in 4 weeks    Problem List:   Pregnancy risk factors include:  APAS with lupus anticoagulant- Lovenox after viability established, baby asa  History of recurrent SAB  Prior  section-plans repeat  Dichorionic Twins- ART- repeat  37-38 weeks    Gerry Kim MD FACOG  1:02 PM  8/3/2021

## 2021-08-31 ENCOUNTER — PRENATAL OFFICE VISIT (OUTPATIENT)
Dept: OBGYN | Facility: OTHER | Age: 31
End: 2021-08-31
Attending: OBSTETRICS & GYNECOLOGY
Payer: COMMERCIAL

## 2021-08-31 VITALS
HEART RATE: 74 BPM | RESPIRATION RATE: 18 BRPM | DIASTOLIC BLOOD PRESSURE: 70 MMHG | WEIGHT: 243.2 LBS | SYSTOLIC BLOOD PRESSURE: 120 MMHG | BODY MASS INDEX: 40.47 KG/M2

## 2021-08-31 DIAGNOSIS — O30.041 DICHORIONIC DIAMNIOTIC TWIN PREGNANCY IN FIRST TRIMESTER: ICD-10-CM

## 2021-08-31 DIAGNOSIS — D68.61 ANTIPHOSPHOLIPID ANTIBODY SYNDROME (H): ICD-10-CM

## 2021-08-31 DIAGNOSIS — O09.92 HRP (HIGH RISK PREGNANCY), SECOND TRIMESTER: Primary | ICD-10-CM

## 2021-08-31 LAB
ERYTHROCYTE [DISTWIDTH] IN BLOOD BY AUTOMATED COUNT: 13.3 % (ref 10–15)
GLUCOSE 1H P 50 G GLC PO SERPL-MCNC: 131 MG/DL (ref 70–129)
HCT VFR BLD AUTO: 33.8 % (ref 35–47)
HGB BLD-MCNC: 11.6 G/DL (ref 11.7–15.7)
MCH RBC QN AUTO: 29.8 PG (ref 26.5–33)
MCHC RBC AUTO-ENTMCNC: 34.3 G/DL (ref 31.5–36.5)
MCV RBC AUTO: 87 FL (ref 78–100)
PLATELET # BLD AUTO: 243 10E3/UL (ref 150–450)
RBC # BLD AUTO: 3.89 10E6/UL (ref 3.8–5.2)
WBC # BLD AUTO: 10.6 10E3/UL (ref 4–11)

## 2021-08-31 PROCEDURE — 99207 PR OB VISIT-NO CHARGE - GICH ONLY: CPT | Performed by: OBSTETRICS & GYNECOLOGY

## 2021-08-31 PROCEDURE — 85027 COMPLETE CBC AUTOMATED: CPT | Mod: ZL | Performed by: OBSTETRICS & GYNECOLOGY

## 2021-08-31 PROCEDURE — 86780 TREPONEMA PALLIDUM: CPT | Mod: ZL | Performed by: OBSTETRICS & GYNECOLOGY

## 2021-08-31 PROCEDURE — 82952 GTT-ADDED SAMPLES: CPT | Mod: ZL | Performed by: OBSTETRICS & GYNECOLOGY

## 2021-08-31 PROCEDURE — 36415 COLL VENOUS BLD VENIPUNCTURE: CPT | Mod: ZL | Performed by: OBSTETRICS & GYNECOLOGY

## 2021-08-31 ASSESSMENT — PAIN SCALES - GENERAL: PAINLEVEL: NO PAIN (0)

## 2021-08-31 NOTE — PROGRESS NOTES
CC: Recheck OB visit at 24w3d    HPI: Ana Patrick presents for a routine OB visit now at 24w3d  Concerns: None  Patient notices normal fetal movement, denies contractions, vaginal bleeding or leaking of fluid.    OB History    Para Term  AB Living   5 1 1 0 3 1   SAB TAB Ectopic Multiple Live Births   3 0 0 0 1      # Outcome Date GA Lbr Mandeep/2nd Weight Sex Delivery Anes PTL Lv   5 Current            4 Term 20 38w0d  3.617 kg (7 lb 15.6 oz) F CS-Unspec   PAUL      Name: CHUCKIE,FEMALE-ANA      Apgar1: 6  Apgar5: 7   3 SAB 19           2 SAB 18           1 SAB 16             Current Outpatient Medications   Medication     aspirin (ASA) 81 MG EC tablet     calcium carbonate (TUMS) 500 MG chewable tablet     docusate sodium (COLACE) 100 MG capsule     enoxaparin ANTICOAGULANT (LOVENOX) 40 MG/0.4ML syringe     Ferrous Gluconate 240 (27 Fe) MG TABS     folic acid 800 MCG tablet     pantoprazole (PROTONIX) 40 MG EC tablet     Prenatal Vit-Fe Fumarate-FA (PRENATAL MULTIVITAMIN  PLUS IRON) 27-1 MG TABS     sucralfate (CARAFATE) 1 GM tablet     lisdexamfetamine (VYVANSE) 40 MG capsule     No current facility-administered medications for this visit.         O: /70 (BP Location: Right arm, Patient Position: Sitting, Cuff Size: Adult Large)   Pulse 74   Resp 18   Wt 110.3 kg (243 lb 3.2 oz)   LMP 2021 (Exact Date)   BMI 40.47 kg/m    Body mass index is 40.47 kg/m .  See OB flow sheet  EXAM:  NAD    FHT: 150/140 bpm    No results found for any visits on 21.    A/P: 24w3d    Recheck in 4 weeks    Problem List:   Pregnancy risk factors include:  APAS with lupus anticoagulant- Lovenox after viability established, baby asa  History of recurrent SAB  Prior  section-plans repeat  Dichorionic Twins- ART- repeat  37-38 weeks    Gerry Kim MD FACOG  10:58 AM 2021

## 2021-08-31 NOTE — NURSING NOTE
"Chief Complaint   Patient presents with     Prenatal Care     24w3d   Patient presents to clinic for prenatal care. Congestion/cough due to air quality lately. Otherwise feeling well.    Initial /70 (BP Location: Right arm, Patient Position: Sitting, Cuff Size: Adult Large)   Pulse 74   Resp 18   Wt 110.3 kg (243 lb 3.2 oz)   LMP 03/13/2021 (Exact Date)   BMI 40.47 kg/m   Estimated body mass index is 40.47 kg/m  as calculated from the following:    Height as of 4/23/21: 1.651 m (5' 5\").    Weight as of this encounter: 110.3 kg (243 lb 3.2 oz).  Medication Reconciliation: complete    KEELEY SHERIDAN, HANNAN  "

## 2021-09-01 LAB — T PALLIDUM AB SER QL: NONREACTIVE

## 2021-09-07 ENCOUNTER — HOSPITAL ENCOUNTER (OUTPATIENT)
Dept: ULTRASOUND IMAGING | Facility: CLINIC | Age: 31
End: 2021-09-07
Attending: OBSTETRICS & GYNECOLOGY
Payer: COMMERCIAL

## 2021-09-07 ENCOUNTER — OFFICE VISIT (OUTPATIENT)
Dept: MATERNAL FETAL MEDICINE | Facility: CLINIC | Age: 31
End: 2021-09-07
Attending: OBSTETRICS & GYNECOLOGY
Payer: COMMERCIAL

## 2021-09-07 ENCOUNTER — HOSPITAL ENCOUNTER (OUTPATIENT)
Dept: ULTRASOUND IMAGING | Facility: OTHER | Age: 31
Discharge: HOME | End: 2021-09-07
Attending: OBSTETRICS & GYNECOLOGY | Admitting: OBSTETRICS & GYNECOLOGY
Payer: COMMERCIAL

## 2021-09-07 DIAGNOSIS — O09.92 HRP (HIGH RISK PREGNANCY), SECOND TRIMESTER: ICD-10-CM

## 2021-09-07 DIAGNOSIS — O30.042 DICHORIONIC DIAMNIOTIC TWIN PREGNANCY IN SECOND TRIMESTER: Primary | ICD-10-CM

## 2021-09-07 DIAGNOSIS — O30.041 DICHORIONIC DIAMNIOTIC TWIN PREGNANCY IN FIRST TRIMESTER: ICD-10-CM

## 2021-09-07 PROCEDURE — 76810 OB US >/= 14 WKS ADDL FETUS: CPT

## 2021-09-07 NOTE — PROGRESS NOTES
Type of service:    Telemedicine Office Visit for fetal ultrasound    Date of service:     Date: 09/07/21     Time service began:    10:00 AM  Time service ended:    11:00 AM    Reason:      .tel: Patient unable to travel    Description of basis or telemedicine appropriateness:     Consultation provided at the request of Dr. Kim for advice regarding the diagnosis and treatment of this patient's pregnancy.  The patient's condition can be safely assessed via telemedicine.    The Mode of Transmission:    Secure interactive audio and visual telecommunication system (Video Guidance)    Location of originating and distant sites:      Originating site:   [place], [place]    Distant site:    [place], [place]    Liana Peck DO

## 2021-09-28 ENCOUNTER — PRENATAL OFFICE VISIT (OUTPATIENT)
Dept: OBGYN | Facility: OTHER | Age: 31
End: 2021-09-28
Attending: OBSTETRICS & GYNECOLOGY
Payer: COMMERCIAL

## 2021-09-28 VITALS
SYSTOLIC BLOOD PRESSURE: 110 MMHG | BODY MASS INDEX: 41.04 KG/M2 | RESPIRATION RATE: 18 BRPM | HEART RATE: 76 BPM | DIASTOLIC BLOOD PRESSURE: 60 MMHG | WEIGHT: 246.6 LBS

## 2021-09-28 DIAGNOSIS — Z34.92 NORMAL PREGNANCY IN SECOND TRIMESTER: Primary | ICD-10-CM

## 2021-09-28 DIAGNOSIS — O30.041 DICHORIONIC DIAMNIOTIC TWIN PREGNANCY IN FIRST TRIMESTER: ICD-10-CM

## 2021-09-28 PROCEDURE — 90715 TDAP VACCINE 7 YRS/> IM: CPT

## 2021-09-28 PROCEDURE — 250N000011 HC RX IP 250 OP 636: Performed by: OBSTETRICS & GYNECOLOGY

## 2021-09-28 PROCEDURE — 96372 THER/PROPH/DIAG INJ SC/IM: CPT | Performed by: OBSTETRICS & GYNECOLOGY

## 2021-09-28 PROCEDURE — 99207 PR OB VISIT-NO CHARGE - GICH ONLY: CPT | Performed by: OBSTETRICS & GYNECOLOGY

## 2021-09-28 RX ADMIN — HUMAN RHO(D) IMMUNE GLOBULIN 300 MCG: 1500 SOLUTION INTRAMUSCULAR; INTRAVENOUS at 11:20

## 2021-09-28 ASSESSMENT — PAIN SCALES - GENERAL: PAINLEVEL: NO PAIN (0)

## 2021-09-28 NOTE — PROGRESS NOTES
CC: Recheck OB visit at 28w3d    HPI: Ana Patrick presents for a routine OB visit now at 28w3d  Concerns: None, feels well, continues on lovenox  Patient notices normal fetal movement, denies contractions, vaginal bleeding or leaking of fluid.    OB History    Para Term  AB Living   5 1 1 0 3 1   SAB TAB Ectopic Multiple Live Births   3 0 0 0 1      # Outcome Date GA Lbr Mandeep/2nd Weight Sex Delivery Anes PTL Lv   5 Current            4 Term 20 38w0d  3.617 kg (7 lb 15.6 oz) F CS-Unspec   PAUL      Name: CHUCKIE,FEMALE-ANA      Apgar1: 6  Apgar5: 7   3 SAB 19           2 SAB 18           1 SAB 16             Current Outpatient Medications   Medication     aspirin (ASA) 81 MG EC tablet     calcium carbonate (TUMS) 500 MG chewable tablet     docusate sodium (COLACE) 100 MG capsule     enoxaparin ANTICOAGULANT (LOVENOX) 40 MG/0.4ML syringe     Ferrous Gluconate 240 (27 Fe) MG TABS     folic acid 800 MCG tablet     pantoprazole (PROTONIX) 40 MG EC tablet     Prenatal Vit-Fe Fumarate-FA (PRENATAL MULTIVITAMIN  PLUS IRON) 27-1 MG TABS     sucralfate (CARAFATE) 1 GM tablet     lisdexamfetamine (VYVANSE) 40 MG capsule     No current facility-administered medications for this visit.         O: /60 (BP Location: Right arm, Patient Position: Sitting, Cuff Size: Adult Large)   Pulse 76   Resp 18   Wt 111.9 kg (246 lb 9.6 oz)   LMP 2021 (Exact Date)   BMI 41.04 kg/m    Body mass index is 41.04 kg/m .  See OB flow sheet  EXAM:  NAD  FH:36 cm  FHT: 145/150 bpm    No results found for any visits on 21.    A/P: 28w3d    (Z34.92) Normal pregnancy in second trimester  (primary encounter diagnosis)  Comment:   Plan: rho(D) immune globulin (RHOPHYLAC) injection         300 mcg, TDAP VACCINE (Adacel, Boostrix)          [[id number]]            (O30.041) Dichorionic diamniotic twin pregnancy   Comment:   Plan: US OB Twins Follow Up Repeat        Monthly growth and fluid  checks    Weekly visits at 30 weeks, NST weekly 32 weeks until delivery  Repeat  at 37-38 weeks.    Recheck in 2 weeks      Problem List:   Pregnancy risk factors include:  APAS with lupus anticoagulant- Lovenox after viability established, baby asa  History of recurrent SAB  Prior  section-plans repeat  Dichorionic Twins- ART- repeat  37-38 weeks    Gerry Kim MD FACOG  10:57 AM 2021

## 2021-09-28 NOTE — NURSING NOTE
"Chief Complaint   Patient presents with     Prenatal Care     28w3d   Patient presents to clinic for prenatal care. No concerns noted.     Initial /60 (BP Location: Right arm, Patient Position: Sitting, Cuff Size: Adult Large)   Pulse 76   Resp 18   Wt 111.9 kg (246 lb 9.6 oz)   LMP 03/13/2021 (Exact Date)   BMI 41.04 kg/m   Estimated body mass index is 41.04 kg/m  as calculated from the following:    Height as of 4/23/21: 1.651 m (5' 5\").    Weight as of this encounter: 111.9 kg (246 lb 9.6 oz).  Medication Reconciliation: complete    KEELEY SHERIDAN, LPN  "

## 2021-10-09 ENCOUNTER — HEALTH MAINTENANCE LETTER (OUTPATIENT)
Age: 31
End: 2021-10-09

## 2021-10-12 ENCOUNTER — OFFICE VISIT (OUTPATIENT)
Dept: FAMILY MEDICINE | Facility: OTHER | Age: 31
End: 2021-10-12
Attending: NURSE PRACTITIONER
Payer: COMMERCIAL

## 2021-10-12 VITALS
DIASTOLIC BLOOD PRESSURE: 78 MMHG | BODY MASS INDEX: 41.34 KG/M2 | OXYGEN SATURATION: 97 % | RESPIRATION RATE: 18 BRPM | TEMPERATURE: 98.9 F | HEART RATE: 85 BPM | SYSTOLIC BLOOD PRESSURE: 124 MMHG | WEIGHT: 248.4 LBS

## 2021-10-12 DIAGNOSIS — J40 BRONCHITIS: Primary | ICD-10-CM

## 2021-10-12 PROCEDURE — 99213 OFFICE O/P EST LOW 20 MIN: CPT | Performed by: PHYSICIAN ASSISTANT

## 2021-10-12 PROCEDURE — G0463 HOSPITAL OUTPT CLINIC VISIT: HCPCS

## 2021-10-12 RX ORDER — AZITHROMYCIN 250 MG/1
TABLET, FILM COATED ORAL
Qty: 6 TABLET | Refills: 0 | Status: SHIPPED | OUTPATIENT
Start: 2021-10-12 | End: 2021-10-17

## 2021-10-12 ASSESSMENT — PAIN SCALES - GENERAL: PAINLEVEL: MODERATE PAIN (4)

## 2021-10-12 NOTE — PROGRESS NOTES
ASSESSMENT/PLAN:    I have reviewed the nursing notes.  I have reviewed the findings, diagnosis, plan and need for follow up with the patient.    1. Bronchitis  - azithromycin (ZITHROMAX) 250 MG tablet; Take 2 tablets (500 mg) by mouth daily for 1 day, THEN 1 tablet (250 mg) daily for 4 days.  Dispense: 6 tablet; Refill: 0  - Vital signs stable. PE consistent with URI and bronchitis in pregnancy. High risk category and since progressive decline in symptoms will treat with ABX. Recommend: increased fluids, rest, use of humidifier, antitussives (cough medication), alternating tylenol and ibuprofen every 4-6 hours as needed (if able to take these medications), salt water gargles for sore throats or throat lozenges, honey, netti pots/saline rinses for sinus/congestion, as well as other home remedies. If worsening fevers, chills, uncontrollable nausea/vomiting, dehydration,etc., or other worsening signs occur, patient is agreeable to follow up for reevaluation.     Patient is in agreement and understanding of the above treatment plan. All questions and concerns were addressed and answered to patient's satisfaction. AVS reviewed with patient.     Discussed warning signs/symptoms indicative of need to f/u    Follow up if symptoms persist or worsen or concerns    I explained my diagnostic considerations and recommendations to the patient, who voiced understanding and agreement with the treatment plan. All questions were answered. We discussed potential side effects of any prescribed or recommended therapies, as well as expectations for response to treatments.    Nettie Herring PA-C  10/12/2021  11:53 AM    HPI:    Ana Patrick is a 30 year old female  who presents [facility] today for concerns of URI, x 5 days    Symptoms:  No fevers or chills.   No sore throat/pharyngitis/tonsillitis.   Yes allergy/URI Symptoms  No Muffled Sounds/Change in Hearing  No Sensation of Fullness in Ear(s)  No Ringing in  Ears/Tinnitus  No Balance Changes  No Dizziness  Yes Congestion (head/nasal/chest)  Yes Cough/Productive Cough  Yes Post Nasal Drip  No Headache  No Sinus Pain/Pressure  Yes Myalgias  Yes Otalgia  Activity Level Changes: Yes: fatigue  Appetite/Liquid Intake Changes: No  Changes to Bowel Habits: No  Changes to Bladder Habits: No  Additional Symptoms to Report: No  History of similar symptoms: No  Prior workup: No    Treatments tried: Fluids, Rest and cough drop    Site of exposure: not known.  Type of exposure: not known    PCP: DO Poli  OB: MD Julio    Past Medical History:   Diagnosis Date     Internal derangement of knee     2016     Personal history of other medical treatment (CODE)          Polycystic ovarian syndrome     3/18/2016     Past Surgical History:   Procedure Laterality Date     ARTHROSCOPY KNEE Right     Dr. Landeros      SECTION N/A 2020    Procedure:  SECTION;  Surgeon: Gerry Kim MD;  Location:  OR     HYSTEROSCOPY DIAGNOSTIC N/A 2018    Procedure: HYSTEROSCOPY DIAGNOSTIC;  Diagnostic Hysteroscopy with Endometrial Scratching.      .;  Surgeon: Gerry Kim MD;  Location:  OR     MANDIBLE SURGERY Right          Social History     Tobacco Use     Smoking status: Never Smoker     Smokeless tobacco: Never Used   Substance Use Topics     Alcohol use: Not Currently     Alcohol/week: 0.0 standard drinks     Comment: Alcoholic Drinks/day: rare     Current Outpatient Medications   Medication Sig Dispense Refill     aspirin (ASA) 81 MG EC tablet Take 1 tablet (81 mg) by mouth daily       calcium carbonate (TUMS) 500 MG chewable tablet Take 2 chew tab by mouth 2 times daily       docusate sodium (COLACE) 100 MG capsule Take 100 mg by mouth 2 times daily       enoxaparin ANTICOAGULANT (LOVENOX) 40 MG/0.4ML syringe Inject 0.4 mLs (40 mg) Subcutaneous daily       Ferrous Gluconate 240 (27 Fe) MG TABS        folic acid 800 MCG tablet Take 800 mcg by mouth  daily       lisdexamfetamine (VYVANSE) 40 MG capsule Take 1 capsule (40 mg) by mouth daily as needed (concentration deficit) 30 capsule 0     pantoprazole (PROTONIX) 40 MG EC tablet Take 1 tablet (40 mg) by mouth 2 times daily 180 tablet 1     Prenatal Vit-Fe Fumarate-FA (PRENATAL MULTIVITAMIN  PLUS IRON) 27-1 MG TABS Take 1 tablet by mouth daily       sucralfate (CARAFATE) 1 GM tablet TAKE 1 TABLET BY MOUTH 3 TIMES DAILY BEFORE MEALS 270 tablet 2     Allergies   Allergen Reactions     Meloxicam Rash     Past medical history, past surgical history, current medications and allergies reviewed and accurate to the best of my knowledge.      ROS:  Refer to HPI    /78   Pulse 85   Temp 98.9  F (37.2  C) (Tympanic)   Resp 18   Wt 112.7 kg (248 lb 6.4 oz)   LMP 03/13/2021 (Exact Date)   SpO2 97%   BMI 41.34 kg/m      EXAM:  General Appearance: Well appearing 30-year old female, appropriate appearance for age. No acute distress  Ears: Left TM intact, translucent with bony landmarks appreciated, no erythema, no effusion, no bulging, no purulence.  Right TM intact, translucent with bony landmarks appreciated, no erythema, no effusion, no bulging, no purulence.  Left auditory canal clear.  Right auditory canal clear.  Normal external ears, non tender.  Eyes: conjunctivae normal without erythema or irritation, corneas clear, no drainage or crusting, no eyelid swelling, pupils equal   Orophayrnx: moist mucous membranes, posterior pharynx without erythema, tonsils without hypertrophy, no erythema, no exudates or petechiae, no post nasal drip seen, no trismus, voice clear.    Sinuses:  No sinus tenderness upon palpation of the frontal or maxillary sinuses  Nose:  Bilateral nares: no erythema, no edema, no drainage or congestion   Neck: supple without adenopathy  Respiratory: normal chest wall and respirations.  Normal effort.  Clear to auscultation bilaterally crackles or rhonchi.  No increased work of breathing.  Dry  cough and very mild wheezing.   Cardiac: RRR with no murmurs  Psychological: normal affect, alert, oriented, and pleasant.     Labs:  COVID negative x 3 per report    Xray:  None

## 2021-10-12 NOTE — PATIENT INSTRUCTIONS

## 2021-10-12 NOTE — NURSING NOTE
"Chief Complaint   Patient presents with     Cough     Cough and congestion has been going on for 5 days. Has ha d3 negative covids in the last week    Initial LMP 03/13/2021 (Exact Date)  Estimated body mass index is 41.04 kg/m  as calculated from the following:    Height as of 4/23/21: 1.651 m (5' 5\").    Weight as of 9/28/21: 111.9 kg (246 lb 9.6 oz).     FOOD SECURITY SCREENING QUESTIONS  Hunger Vital Signs:  Within the past 12 months we worried whether our food would run out before we got money to buy more. Never  Within the past 12 months the food we bought just didn't last and we didn't have money to get more. Never      Medication Reconciliation: Complete      Tiburcio Boyd LPN   "

## 2021-10-14 ENCOUNTER — PREP FOR PROCEDURE (OUTPATIENT)
Dept: OBGYN | Facility: OTHER | Age: 31
End: 2021-10-14

## 2021-10-14 ENCOUNTER — PRENATAL OFFICE VISIT (OUTPATIENT)
Dept: OBGYN | Facility: OTHER | Age: 31
End: 2021-10-14
Attending: OBSTETRICS & GYNECOLOGY
Payer: COMMERCIAL

## 2021-10-14 ENCOUNTER — HOSPITAL ENCOUNTER (OUTPATIENT)
Dept: ULTRASOUND IMAGING | Facility: OTHER | Age: 31
End: 2021-10-14
Attending: OBSTETRICS & GYNECOLOGY
Payer: COMMERCIAL

## 2021-10-14 VITALS
DIASTOLIC BLOOD PRESSURE: 78 MMHG | SYSTOLIC BLOOD PRESSURE: 126 MMHG | RESPIRATION RATE: 16 BRPM | BODY MASS INDEX: 41.34 KG/M2 | OXYGEN SATURATION: 98 % | WEIGHT: 248.4 LBS | HEART RATE: 81 BPM

## 2021-10-14 DIAGNOSIS — Z98.891 HISTORY OF C-SECTION: Primary | ICD-10-CM

## 2021-10-14 DIAGNOSIS — O30.041 DICHORIONIC DIAMNIOTIC TWIN PREGNANCY IN FIRST TRIMESTER: ICD-10-CM

## 2021-10-14 DIAGNOSIS — Z23 NEED FOR PROPHYLACTIC VACCINATION AND INOCULATION AGAINST INFLUENZA: Primary | ICD-10-CM

## 2021-10-14 PROCEDURE — 90686 IIV4 VACC NO PRSV 0.5 ML IM: CPT

## 2021-10-14 PROCEDURE — 99207 PR OB VISIT-NO CHARGE - GICH ONLY: CPT | Performed by: OBSTETRICS & GYNECOLOGY

## 2021-10-14 PROCEDURE — G0008 ADMIN INFLUENZA VIRUS VAC: HCPCS

## 2021-10-14 PROCEDURE — 76816 OB US FOLLOW-UP PER FETUS: CPT | Mod: 59

## 2021-10-14 RX ORDER — CARBOPROST TROMETHAMINE 250 UG/ML
250 INJECTION, SOLUTION INTRAMUSCULAR
Status: CANCELLED | OUTPATIENT
Start: 2021-10-14

## 2021-10-14 RX ORDER — CEFAZOLIN SODIUM 2 G/100ML
2 INJECTION, SOLUTION INTRAVENOUS SEE ADMIN INSTRUCTIONS
Status: CANCELLED | OUTPATIENT
Start: 2021-10-14

## 2021-10-14 RX ORDER — SODIUM CHLORIDE, SODIUM LACTATE, POTASSIUM CHLORIDE, CALCIUM CHLORIDE 600; 310; 30; 20 MG/100ML; MG/100ML; MG/100ML; MG/100ML
INJECTION, SOLUTION INTRAVENOUS CONTINUOUS
Status: CANCELLED | OUTPATIENT
Start: 2021-10-14

## 2021-10-14 RX ORDER — LIDOCAINE 40 MG/G
CREAM TOPICAL
Status: CANCELLED | OUTPATIENT
Start: 2021-10-14

## 2021-10-14 RX ORDER — METHYLERGONOVINE MALEATE 0.2 MG/ML
200 INJECTION INTRAVENOUS
Status: CANCELLED | OUTPATIENT
Start: 2021-10-14

## 2021-10-14 RX ORDER — OXYTOCIN/0.9 % SODIUM CHLORIDE 30/500 ML
100-340 PLASTIC BAG, INJECTION (ML) INTRAVENOUS CONTINUOUS PRN
Status: CANCELLED | OUTPATIENT
Start: 2021-10-14

## 2021-10-14 RX ORDER — OXYTOCIN/0.9 % SODIUM CHLORIDE 30/500 ML
340 PLASTIC BAG, INJECTION (ML) INTRAVENOUS CONTINUOUS PRN
Status: CANCELLED | OUTPATIENT
Start: 2021-10-14

## 2021-10-14 RX ORDER — ACETAMINOPHEN 325 MG/1
975 TABLET ORAL ONCE
Status: CANCELLED | OUTPATIENT
Start: 2021-10-14 | End: 2021-10-14

## 2021-10-14 RX ORDER — MISOPROSTOL 100 UG/1
400 TABLET ORAL
Status: CANCELLED | OUTPATIENT
Start: 2021-10-14

## 2021-10-14 RX ORDER — CEFAZOLIN SODIUM 2 G/100ML
2 INJECTION, SOLUTION INTRAVENOUS
Status: CANCELLED | OUTPATIENT
Start: 2021-10-14

## 2021-10-14 RX ORDER — TRANEXAMIC ACID 10 MG/ML
1 INJECTION, SOLUTION INTRAVENOUS EVERY 30 MIN PRN
Status: CANCELLED | OUTPATIENT
Start: 2021-10-14

## 2021-10-14 RX ORDER — CITRIC ACID/SODIUM CITRATE 334-500MG
30 SOLUTION, ORAL ORAL
Status: CANCELLED | OUTPATIENT
Start: 2021-10-14

## 2021-10-14 RX ORDER — OXYTOCIN 10 [USP'U]/ML
10 INJECTION, SOLUTION INTRAMUSCULAR; INTRAVENOUS
Status: CANCELLED | OUTPATIENT
Start: 2021-10-14

## 2021-10-14 ASSESSMENT — PAIN SCALES - GENERAL: PAINLEVEL: MILD PAIN (2)

## 2021-10-14 NOTE — NURSING NOTE
"FOOD SECURITY SCREENING QUESTIONS  Hunger Vital Signs:  Within the past 12 months we worried whether our food would run out before we got money to buy more. Never  Within the past 12 months the food we bought just didn't last and we didn't have money to get more. Never    Chief Complaint   Patient presents with     Prenatal Care     30w5d       Initial /78 (BP Location: Right arm, Patient Position: Sitting, Cuff Size: Adult Regular)   Pulse 81   Resp 16   Wt 112.7 kg (248 lb 6.4 oz)   LMP 03/13/2021 (Exact Date)   SpO2 98%   BMI 41.34 kg/m   Estimated body mass index is 41.34 kg/m  as calculated from the following:    Height as of 4/23/21: 1.651 m (5' 5\").    Weight as of this encounter: 112.7 kg (248 lb 6.4 oz).  Medication Reconciliation: complete    Trudy Ryan RN    "

## 2021-10-14 NOTE — PROGRESS NOTES
CC: Recheck OB visit at 30w5d    HPI: Ana Patrick presents for a routine OB visit now at 30w5d  Concerns: some contractions, but otherwise doing well.  Patient notices normal fetal movement, denies contractions, vaginal bleeding or leaking of fluid.    OB History    Para Term  AB Living   5 1 1 0 3 1   SAB TAB Ectopic Multiple Live Births   3 0 0 0 1      # Outcome Date GA Lbr Mandeep/2nd Weight Sex Delivery Anes PTL Lv   5 Current            4 Term 20 38w0d  3.617 kg (7 lb 15.6 oz) F CS-Unspec   PAUL      Name: CHUCKIE,FEMALE-ANA      Apgar1: 6  Apgar5: 7   3 SAB 19           2 SAB 18           1 SAB 16             Current Outpatient Medications   Medication     aspirin (ASA) 81 MG EC tablet     azithromycin (ZITHROMAX) 250 MG tablet     calcium carbonate (TUMS) 500 MG chewable tablet     docusate sodium (COLACE) 100 MG capsule     enoxaparin ANTICOAGULANT (LOVENOX) 40 MG/0.4ML syringe     Ferrous Gluconate 240 (27 Fe) MG TABS     folic acid 800 MCG tablet     pantoprazole (PROTONIX) 40 MG EC tablet     Prenatal Vit-Fe Fumarate-FA (PRENATAL MULTIVITAMIN  PLUS IRON) 27-1 MG TABS     sucralfate (CARAFATE) 1 GM tablet     lisdexamfetamine (VYVANSE) 40 MG capsule     No current facility-administered medications for this visit.         O: /78 (BP Location: Right arm, Patient Position: Sitting, Cuff Size: Adult Regular)   Pulse 81   Resp 16   Wt 112.7 kg (248 lb 6.4 oz)   LMP 2021 (Exact Date)   SpO2 98%   BMI 41.34 kg/m    Body mass index is 41.34 kg/m .  See OB flow sheet  EXAM:  NAD      Results for orders placed or performed during the hospital encounter of 10/14/21   US OB Twins Follow Up Repeat     Status: None    Narrative    US OB >14 WEEKS FOLLOW UP MULTIPLE    HISTORY: twins, dichorionic, growth and fluid; Dichorionic diamniotic  twin pregnancy in first trimester .    COMPARISON: 2021.    TECHNIQUE: Transabdominal ultrasound of the  pelvis.    FINDINGS:    There is a twin dichorionic diamniotic intrauterine pregnancy. No  adnexal mass is identified. The cervix is not visualized.    Fetus A is in cephalic maternal superior left position, with a  posterior grade 1 placenta. Fetal movement is seen. There is a regular  fetal heart rate of 135 bpm. Amniotic fluid is normal with a MDP of  5.3. Measurements of fetal growth include a biparietal diameter 28%,  head circumference 20%, abdominal circumference 23% and femoral length  21%. The estimated fetal weight is 1512 g, which is at the 20th  percentile for EGA.     Fetus B is in cephalic maternal right inferior position, with an  anterior grade 1 placenta. Fetal movement is seen. There is a regular  fetal heart of 139 bpm. Amniotic fluid is normal with an BPD of 6.3.  Measurements of fetal growth include biparietal diameter 69%, head  circumference 41%, abdominal circumference 36% and femoral length 39%.  The estimated fetal weight is 1632 g, which is at the 39th percentile  for EGA.      Impression    IMPRESSION:     Bilateral diamniotic dichorionic twin pregnancy. Fetus A demonstrates  estimated weight at the 20th percentile for EGA, fetus B 39th  percentile.       TERESA MARKS MD         SYSTEM ID:  [id number]       A/P: 30w5d gestation    Recheck in 1 week   and tubal for 21      Problem List:   Pregnancy risk factors include:  APAS with lupus anticoagulant- Lovenox after viability established, baby asa  History of recurrent SAB  Prior  section-plans repeat  Dichorionic Twins- ART- repeat  37-38 weeks        Gerry Kim MD FACOG  2:47 PM 10/14/2021

## 2021-10-14 NOTE — PROGRESS NOTES
"Date of Surgery: 11/29  Type of Surgery: csection  Surgeon: Dr. Gerry Kim     appropriate appointments were scheduled by the [facility] . Surgical forms were copied and kept for informative purposes. Originals were delivered to Day-surgery. Questions were answered to the best of this nurse's ability.        STOP BANG    Fever/Chills or other infectious symptoms in past month? no  >10 pound weight loss in the past 2 months? no  Health Care Directive on file? no  History of blood transfusions? no  Td up to date? yes  History of VRE/MRSA? no      Obstructive Sleep Apnea screening    Preoperative Evaluation: Obstructive Sleep Apnea screening    S: Snore -  Do you snore loudly? (louder than talking or loud enough to be heard through closed doors) No  T: Tired - Do you often feel tired, fatigued, or sleepy during the daytime?No  O: Observed - Has anyone ever observed you stop breathing during your sleep?No  P: Pressure - Do you have or are you being treated for high blood pressure?No  B: BMI - BMI greater than 35kg/m2?Yes  A: Age - Age over 50 years old?No  N: Neck - Neck circumference greater than 40 cm?No  G: Gender - Gender: Male?No    Total number of \"YES\" responses:  1    Scoring: Low risk of JARRET 0-2  At Risk of JARRET: >3 High Risk of JARRET: 5-8      Total yes answers in JARRET section:    Low risk 0-2  At risk 3-4  High risk 5-8    Trudy Ryan RN............. 10/14/2021 3:02 PM     "

## 2021-10-22 ENCOUNTER — PRENATAL OFFICE VISIT (OUTPATIENT)
Dept: OBGYN | Facility: OTHER | Age: 31
End: 2021-10-22
Attending: OBSTETRICS & GYNECOLOGY
Payer: COMMERCIAL

## 2021-10-22 VITALS
RESPIRATION RATE: 16 BRPM | OXYGEN SATURATION: 97 % | BODY MASS INDEX: 41.25 KG/M2 | DIASTOLIC BLOOD PRESSURE: 74 MMHG | WEIGHT: 247.9 LBS | HEART RATE: 84 BPM | SYSTOLIC BLOOD PRESSURE: 122 MMHG

## 2021-10-22 DIAGNOSIS — O09.93 HIGH-RISK PREGNANCY IN THIRD TRIMESTER: Primary | ICD-10-CM

## 2021-10-22 PROCEDURE — G0463 HOSPITAL OUTPT CLINIC VISIT: HCPCS

## 2021-10-22 PROCEDURE — 59025 FETAL NON-STRESS TEST: CPT | Performed by: OBSTETRICS & GYNECOLOGY

## 2021-10-22 PROCEDURE — 59025 FETAL NON-STRESS TEST: CPT | Mod: 26 | Performed by: OBSTETRICS & GYNECOLOGY

## 2021-10-22 PROCEDURE — 99207 PR OB VISIT-NO CHARGE - GICH ONLY: CPT | Performed by: OBSTETRICS & GYNECOLOGY

## 2021-10-22 ASSESSMENT — PAIN SCALES - GENERAL: PAINLEVEL: NO PAIN (0)

## 2021-10-22 NOTE — NURSING NOTE
"Patient presents to the clinic today for prenatal care 31w6d.  Che Mclean LPN 10/22/2021   2:54 PM    Chief Complaint   Patient presents with     Prenatal Care     31w6d       Initial /74 (BP Location: Right arm, Patient Position: Sitting, Cuff Size: Adult Regular)   Pulse 84   Resp 16   Wt 112.4 kg (247 lb 14.4 oz)   LMP 03/13/2021 (Exact Date)   SpO2 97%   Breastfeeding No   BMI 41.25 kg/m   Estimated body mass index is 41.25 kg/m  as calculated from the following:    Height as of 4/23/21: 1.651 m (5' 5\").    Weight as of this encounter: 112.4 kg (247 lb 14.4 oz).  Medication Reconciliation: complete  Che Mclean LPN    FOOD SECURITY SCREENING QUESTIONS  Hunger Vital Signs:  Within the past 12 months we worried whether our food would run out before we got money to buy more. Never  Within the past 12 months the food we bought just didn't last and we didn't have money to get more. Never  Che Mclean LPN 10/22/2021 2:55 PM    "

## 2021-10-22 NOTE — PROGRESS NOTES
CC: Recheck OB visit at 31w6d    HPI: Ana Patrick presents for a routine OB visit now at 31w6d  Concerns: tired, back pain  Patient notices normal fetal movement, denies contractions, vaginal bleeding or leaking of fluid.    OB History    Para Term  AB Living   5 1 1 0 3 1   SAB TAB Ectopic Multiple Live Births   3 0 0 0 1      # Outcome Date GA Lbr Mandeep/2nd Weight Sex Delivery Anes PTL Lv   5 Current            4 Term 20 38w0d  3.617 kg (7 lb 15.6 oz) F CS-Unspec   PAUL      Name: CHUCKIE,FEMALE-ANA      Apgar1: 6  Apgar5: 7   3 SAB 19           2 SAB 18           1 SAB 16             Current Outpatient Medications   Medication     aspirin (ASA) 81 MG EC tablet     calcium carbonate (TUMS) 500 MG chewable tablet     docusate sodium (COLACE) 100 MG capsule     enoxaparin ANTICOAGULANT (LOVENOX) 40 MG/0.4ML syringe     Ferrous Gluconate 240 (27 Fe) MG TABS     folic acid 800 MCG tablet     lisdexamfetamine (VYVANSE) 40 MG capsule     pantoprazole (PROTONIX) 40 MG EC tablet     Prenatal Vit-Fe Fumarate-FA (PRENATAL MULTIVITAMIN  PLUS IRON) 27-1 MG TABS     sucralfate (CARAFATE) 1 GM tablet     No current facility-administered medications for this visit.         O: /74 (BP Location: Right arm, Patient Position: Sitting, Cuff Size: Adult Regular)   Pulse 84   Resp 16   Wt 112.4 kg (247 lb 14.4 oz)   LMP 2021 (Exact Date)   SpO2 97%   Breastfeeding No   BMI 41.25 kg/m    Body mass index is 41.25 kg/m .  See OB flow sheet  EXAM:  NAD    NST doucmentation:    Indication: (O09.93) High-risk pregnancy in third trimester  (primary encounter diagnosis)  Comment:   Plan: US OB >14 Weeks Multiple            Duration: 30 min     31w6d  FHR baseline: 130/140 with moderate variability  Accelerations: y  Decelerations: n  Contractions: n    Category 1 for GA    No results found for any visits on 10/22/21.    A/P: (O09.93) High-risk pregnancy in third trimester   (primary encounter diagnosis)  Comment:   Plan: US OB >14 Weeks Multiple          Recheck in 1 week  Weekly NST's      Problem List:   Pregnancy risk factors include:  APAS with lupus anticoagulant- Lovenox after viability established, baby asa  History of recurrent SAB  Prior  section-plans repeat  Dichorionic Twins- ART- repeat  37-38 weeks    Gerry Kim MD FACOG  3:29 PM 10/22/2021

## 2021-10-26 ENCOUNTER — PRENATAL OFFICE VISIT (OUTPATIENT)
Dept: OBGYN | Facility: OTHER | Age: 31
End: 2021-10-26
Attending: OBSTETRICS & GYNECOLOGY
Payer: COMMERCIAL

## 2021-10-26 VITALS
WEIGHT: 249.6 LBS | BODY MASS INDEX: 41.54 KG/M2 | DIASTOLIC BLOOD PRESSURE: 70 MMHG | HEART RATE: 88 BPM | SYSTOLIC BLOOD PRESSURE: 118 MMHG

## 2021-10-26 DIAGNOSIS — O30.043 DICHORIONIC DIAMNIOTIC TWIN PREGNANCY IN THIRD TRIMESTER: Primary | ICD-10-CM

## 2021-10-26 PROCEDURE — 99207 PR OB VISIT-NO CHARGE - GICH ONLY: CPT | Performed by: OBSTETRICS & GYNECOLOGY

## 2021-10-26 PROCEDURE — 59025 FETAL NON-STRESS TEST: CPT | Performed by: OBSTETRICS & GYNECOLOGY

## 2021-10-26 NOTE — PROGRESS NOTES
Return OB Visit    S: Patient is overall doing well. Getting uncomfortable. No VB or LOF. +FM    O: /70 (BP Location: Right arm, Patient Position: Sitting, Cuff Size: Adult Large)   Pulse 88   Wt 113.2 kg (249 lb 9.6 oz)   LMP 2021 (Exact Date)   Breastfeeding No   BMI 41.54 kg/m    Gen: Well-appearing, NAD  See OB Flowsheet    NST:   A- 130, mod variability, + accels, no decels  B- 140, mod variability, + accels, no decels  Kaplan: quiet    A/P:  Ana Patrick is a 30 year old  at 32w3d here for return OB visit.  Di/Di twin pregnancy: last growth US 10/14/21- A EFW 20%ile, B EFW 39%ile. Getting weekly NSTs  APAS with lupus anticoagulant, on lovenox, ASA  Hx of recurrent SAB  Hx of c/s: Plans repeat with tubal on 21    PNC: Rh negative s/p Rhogam, Rubella immune,   Genetics: none  Imaging: dating US at 6w0d, normal level 2 US  Immunizations: s/p flu, Tdap  RTC weekly with DAB    Jenny Waldron MD FACOG  OB/GYN  10/26/2021 4:06 PM

## 2021-10-26 NOTE — NURSING NOTE
"  Chief Complaint   Patient presents with     Prenatal Care     32w3d       Initial LMP 03/13/2021 (Exact Date)  Estimated body mass index is 41.25 kg/m  as calculated from the following:    Height as of 4/23/21: 1.651 m (5' 5\").    Weight as of 10/22/21: 112.4 kg (247 lb 14.4 oz).  Medication Reconciliation: complete    Awa Parry RN  "

## 2021-11-02 ENCOUNTER — PRENATAL OFFICE VISIT (OUTPATIENT)
Dept: OBGYN | Facility: OTHER | Age: 31
End: 2021-11-02
Attending: OBSTETRICS & GYNECOLOGY
Payer: COMMERCIAL

## 2021-11-02 VITALS
BODY MASS INDEX: 41.4 KG/M2 | DIASTOLIC BLOOD PRESSURE: 72 MMHG | WEIGHT: 248.8 LBS | HEART RATE: 80 BPM | SYSTOLIC BLOOD PRESSURE: 110 MMHG

## 2021-11-02 DIAGNOSIS — O30.043 DICHORIONIC DIAMNIOTIC TWIN PREGNANCY IN THIRD TRIMESTER: Primary | ICD-10-CM

## 2021-11-02 DIAGNOSIS — O09.93 HIGH-RISK PREGNANCY IN THIRD TRIMESTER: ICD-10-CM

## 2021-11-02 DIAGNOSIS — O21.9 NAUSEA AND VOMITING DURING PREGNANCY: ICD-10-CM

## 2021-11-02 PROCEDURE — 59025 FETAL NON-STRESS TEST: CPT | Performed by: OBSTETRICS & GYNECOLOGY

## 2021-11-02 PROCEDURE — 99207 PR OB VISIT-NO CHARGE - GICH ONLY: CPT | Performed by: OBSTETRICS & GYNECOLOGY

## 2021-11-02 RX ORDER — ONDANSETRON 4 MG/1
4 TABLET, ORALLY DISINTEGRATING ORAL EVERY 8 HOURS PRN
Qty: 20 TABLET | Refills: 0 | Status: ON HOLD | OUTPATIENT
Start: 2021-11-02 | End: 2021-12-02

## 2021-11-02 ASSESSMENT — PAIN SCALES - GENERAL: PAINLEVEL: MILD PAIN (2)

## 2021-11-02 NOTE — NURSING NOTE
"  Chief Complaint   Patient presents with     Prenatal Care     33w3d       Initial LMP 03/13/2021 (Exact Date)  Estimated body mass index is 41.54 kg/m  as calculated from the following:    Height as of 4/23/21: 1.651 m (5' 5\").    Weight as of 10/26/21: 113.2 kg (249 lb 9.6 oz).  Medication Reconciliation: complete    Awa Parry RN  "

## 2021-11-02 NOTE — PROGRESS NOTES
CC: Recheck OB visit at 33w3d    HPI: Ana Patrick presents for a routine OB visit now at 33w3d  Concerns: aches and pains, GERD  Patient notices normal fetal movement, denies contractions, vaginal bleeding or leaking of fluid.    OB History    Para Term  AB Living   5 1 1 0 3 1   SAB TAB Ectopic Multiple Live Births   3 0 0 0 1      # Outcome Date GA Lbr Mandeep/2nd Weight Sex Delivery Anes PTL Lv   5 Current            4 Term 20 38w0d  3.617 kg (7 lb 15.6 oz) F CS-Unspec   PAUL      Name: CHUCKIE,FEMALE-ANA      Apgar1: 6  Apgar5: 7   3 SAB 19           2 SAB 18           1 SAB 16             Current Outpatient Medications   Medication     aspirin (ASA) 81 MG EC tablet     calcium carbonate (TUMS) 500 MG chewable tablet     docusate sodium (COLACE) 100 MG capsule     enoxaparin ANTICOAGULANT (LOVENOX) 40 MG/0.4ML syringe     Ferrous Gluconate 240 (27 Fe) MG TABS     folic acid 800 MCG tablet     pantoprazole (PROTONIX) 40 MG EC tablet     Prenatal Vit-Fe Fumarate-FA (PRENATAL MULTIVITAMIN  PLUS IRON) 27-1 MG TABS     sucralfate (CARAFATE) 1 GM tablet     lisdexamfetamine (VYVANSE) 40 MG capsule     No current facility-administered medications for this visit.         O: /72 (BP Location: Right arm, Patient Position: Sitting, Cuff Size: Adult Large)   Pulse 80   Wt 112.9 kg (248 lb 12.8 oz)   LMP 2021 (Exact Date)   Breastfeeding No   BMI 41.40 kg/m    Body mass index is 41.4 kg/m .  See OB flow sheet  EXAM:  NAD    NST doucmentation:    Indication: (O30.043) Dichorionic diamniotic twin pregnancy in third trimester  (primary encounter diagnosis)  Comment:   Plan: FETAL NON-STRESS TEST            Duration: 30 min     33w3d  FHR baseline: 140/120 with moderate variability x 2  Accelerations: y  Decelerations: n  Contractions: n    Category 1 for both twins    Bedside US shows vtx, vtx, normal afv, normal movement        No results found for any visits on  21.    A/P: (O30.043) Dichorionic diamniotic twin pregnancy in third trimester  (primary encounter diagnosis)  Comment:   Plan: FETAL NON-STRESS TEST              Recheck in 1 weeks      Problem List:   Pregnancy risk factors include:  APAS with lupus anticoagulant- Lovenox after viability established, baby asa  History of recurrent SAB  Prior  section-plans repeat  Dichorionic Twins- ART- repeat  37-38 weeks      Gerry Kim MD FACOG  9:12 AM 2021

## 2021-11-05 ENCOUNTER — ALLIED HEALTH/NURSE VISIT (OUTPATIENT)
Dept: FAMILY MEDICINE | Facility: OTHER | Age: 31
End: 2021-11-05
Attending: FAMILY MEDICINE
Payer: COMMERCIAL

## 2021-11-05 ENCOUNTER — TELEPHONE (OUTPATIENT)
Dept: OBGYN | Facility: OTHER | Age: 31
End: 2021-11-05

## 2021-11-05 DIAGNOSIS — Z20.822 COVID-19 RULED OUT: ICD-10-CM

## 2021-11-05 DIAGNOSIS — Z20.822 EXPOSURE TO 2019 NOVEL CORONAVIRUS: Primary | ICD-10-CM

## 2021-11-05 PROCEDURE — C9803 HOPD COVID-19 SPEC COLLECT: HCPCS

## 2021-11-05 PROCEDURE — U0003 INFECTIOUS AGENT DETECTION BY NUCLEIC ACID (DNA OR RNA); SEVERE ACUTE RESPIRATORY SYNDROME CORONAVIRUS 2 (SARS-COV-2) (CORONAVIRUS DISEASE [COVID-19]), AMPLIFIED PROBE TECHNIQUE, MAKING USE OF HIGH THROUGHPUT TECHNOLOGIES AS DESCRIBED BY CMS-2020-01-R: HCPCS | Mod: ZL

## 2021-11-05 NOTE — TELEPHONE ENCOUNTER
Call from patient who states that she tested positive for covid today with one of the rapid test. She is concerned on the accuracy of these tests and would like a lab confirmed test. Discussed contacting covid line to see is she be screened which she will do.     Trudy Ryan RN on 11/5/2021 at 8:45 AM

## 2021-11-07 ENCOUNTER — HOSPITAL ENCOUNTER (EMERGENCY)
Facility: OTHER | Age: 31
Discharge: HOME | End: 2021-11-07
Attending: PHYSICIAN ASSISTANT | Admitting: PHYSICIAN ASSISTANT
Payer: COMMERCIAL

## 2021-11-07 ENCOUNTER — APPOINTMENT (OUTPATIENT)
Dept: ULTRASOUND IMAGING | Facility: OTHER | Age: 31
End: 2021-11-07
Attending: PHYSICIAN ASSISTANT
Payer: COMMERCIAL

## 2021-11-07 VITALS
HEIGHT: 65 IN | BODY MASS INDEX: 40.98 KG/M2 | SYSTOLIC BLOOD PRESSURE: 102 MMHG | OXYGEN SATURATION: 97 % | WEIGHT: 246 LBS | TEMPERATURE: 97.2 F | RESPIRATION RATE: 22 BRPM | HEART RATE: 94 BPM | DIASTOLIC BLOOD PRESSURE: 62 MMHG

## 2021-11-07 DIAGNOSIS — U07.1 INFECTION DUE TO 2019 NOVEL CORONAVIRUS: ICD-10-CM

## 2021-11-07 DIAGNOSIS — R50.9 FEVER: ICD-10-CM

## 2021-11-07 LAB
ANION GAP SERPL CALCULATED.3IONS-SCNC: 10 MMOL/L (ref 3–14)
BASOPHILS # BLD AUTO: 0 10E3/UL (ref 0–0.2)
BASOPHILS NFR BLD AUTO: 0 %
BUN SERPL-MCNC: 4 MG/DL (ref 7–25)
CALCIUM SERPL-MCNC: 8.6 MG/DL (ref 8.6–10.3)
CHLORIDE BLD-SCNC: 107 MMOL/L (ref 98–107)
CO2 SERPL-SCNC: 19 MMOL/L (ref 21–31)
CREAT SERPL-MCNC: 0.61 MG/DL (ref 0.6–1.2)
EOSINOPHIL # BLD AUTO: 0 10E3/UL (ref 0–0.7)
EOSINOPHIL NFR BLD AUTO: 1 %
ERYTHROCYTE [DISTWIDTH] IN BLOOD BY AUTOMATED COUNT: 12.6 % (ref 10–15)
FLUAV RNA SPEC QL NAA+PROBE: NEGATIVE
FLUBV RNA RESP QL NAA+PROBE: NEGATIVE
GFR SERPL CREATININE-BSD FRML MDRD: >90 ML/MIN/1.73M2
GLUCOSE BLD-MCNC: 107 MG/DL (ref 70–105)
HCT VFR BLD AUTO: 33.3 % (ref 35–47)
HGB BLD-MCNC: 11.6 G/DL (ref 11.7–15.7)
IMM GRANULOCYTES # BLD: 0 10E3/UL
IMM GRANULOCYTES NFR BLD: 1 %
LYMPHOCYTES # BLD AUTO: 0.9 10E3/UL (ref 0.8–5.3)
LYMPHOCYTES NFR BLD AUTO: 13 %
MCH RBC QN AUTO: 29.8 PG (ref 26.5–33)
MCHC RBC AUTO-ENTMCNC: 34.8 G/DL (ref 31.5–36.5)
MCV RBC AUTO: 86 FL (ref 78–100)
MONOCYTES # BLD AUTO: 0.5 10E3/UL (ref 0–1.3)
MONOCYTES NFR BLD AUTO: 7 %
NEUTROPHILS # BLD AUTO: 5 10E3/UL (ref 1.6–8.3)
NEUTROPHILS NFR BLD AUTO: 78 %
NRBC # BLD AUTO: 0 10E3/UL
NRBC BLD AUTO-RTO: 0 /100
PLATELET # BLD AUTO: 191 10E3/UL (ref 150–450)
POTASSIUM BLD-SCNC: 3.1 MMOL/L (ref 3.5–5.1)
RBC # BLD AUTO: 3.89 10E6/UL (ref 3.8–5.2)
RSV RNA SPEC NAA+PROBE: NEGATIVE
SARS-COV-2 RNA RESP QL NAA+PROBE: POSITIVE
SARS-COV-2 RNA RESP QL NAA+PROBE: POSITIVE
SODIUM SERPL-SCNC: 136 MMOL/L (ref 134–144)
TROPONIN I SERPL-MCNC: 4.1 PG/ML (ref 0–34)
WBC # BLD AUTO: 6.3 10E3/UL (ref 4–11)

## 2021-11-07 PROCEDURE — 84484 ASSAY OF TROPONIN QUANT: CPT | Performed by: PHYSICIAN ASSISTANT

## 2021-11-07 PROCEDURE — 36415 COLL VENOUS BLD VENIPUNCTURE: CPT | Performed by: PHYSICIAN ASSISTANT

## 2021-11-07 PROCEDURE — 99283 EMERGENCY DEPT VISIT LOW MDM: CPT | Performed by: PHYSICIAN ASSISTANT

## 2021-11-07 PROCEDURE — 87637 SARSCOV2&INF A&B&RSV AMP PRB: CPT | Performed by: PHYSICIAN ASSISTANT

## 2021-11-07 PROCEDURE — C9803 HOPD COVID-19 SPEC COLLECT: HCPCS | Performed by: PHYSICIAN ASSISTANT

## 2021-11-07 PROCEDURE — 99285 EMERGENCY DEPT VISIT HI MDM: CPT | Mod: 25 | Performed by: PHYSICIAN ASSISTANT

## 2021-11-07 PROCEDURE — 93970 EXTREMITY STUDY: CPT

## 2021-11-07 PROCEDURE — 250N000013 HC RX MED GY IP 250 OP 250 PS 637: Performed by: PHYSICIAN ASSISTANT

## 2021-11-07 PROCEDURE — 85025 COMPLETE CBC W/AUTO DIFF WBC: CPT | Performed by: PHYSICIAN ASSISTANT

## 2021-11-07 PROCEDURE — 84295 ASSAY OF SERUM SODIUM: CPT | Performed by: PHYSICIAN ASSISTANT

## 2021-11-07 PROCEDURE — 96360 HYDRATION IV INFUSION INIT: CPT | Performed by: PHYSICIAN ASSISTANT

## 2021-11-07 PROCEDURE — 93005 ELECTROCARDIOGRAM TRACING: CPT | Performed by: PHYSICIAN ASSISTANT

## 2021-11-07 PROCEDURE — 258N000003 HC RX IP 258 OP 636: Performed by: PHYSICIAN ASSISTANT

## 2021-11-07 PROCEDURE — 93010 ELECTROCARDIOGRAM REPORT: CPT | Performed by: INTERNAL MEDICINE

## 2021-11-07 PROCEDURE — 96361 HYDRATE IV INFUSION ADD-ON: CPT | Performed by: PHYSICIAN ASSISTANT

## 2021-11-07 RX ORDER — ACETAMINOPHEN 325 MG/1
650 TABLET ORAL ONCE
Status: COMPLETED | OUTPATIENT
Start: 2021-11-07 | End: 2021-11-07

## 2021-11-07 RX ORDER — ACETAMINOPHEN 500 MG
500 TABLET ORAL ONCE
Status: DISCONTINUED | OUTPATIENT
Start: 2021-11-07 | End: 2021-11-07

## 2021-11-07 RX ADMIN — ACETAMINOPHEN 650 MG: 325 TABLET, FILM COATED ORAL at 20:21

## 2021-11-07 RX ADMIN — SODIUM CHLORIDE 1000 ML: 9 INJECTION, SOLUTION INTRAVENOUS at 20:21

## 2021-11-07 ASSESSMENT — ENCOUNTER SYMPTOMS
CHILLS: 0
HEMATURIA: 0
FATIGUE: 1
FEVER: 0
BRUISES/BLEEDS EASILY: 0
WOUND: 0
SHORTNESS OF BREATH: 1
CHEST TIGHTNESS: 0
ABDOMINAL PAIN: 0
CONFUSION: 0
BACK PAIN: 0

## 2021-11-07 ASSESSMENT — MIFFLIN-ST. JEOR: SCORE: 1836.73

## 2021-11-08 ENCOUNTER — PRENATAL OFFICE VISIT (OUTPATIENT)
Dept: OBGYN | Facility: OTHER | Age: 31
End: 2021-11-08
Attending: OBSTETRICS & GYNECOLOGY
Payer: COMMERCIAL

## 2021-11-08 VITALS
OXYGEN SATURATION: 98 % | DIASTOLIC BLOOD PRESSURE: 72 MMHG | WEIGHT: 244.2 LBS | BODY MASS INDEX: 40.64 KG/M2 | HEART RATE: 100 BPM | SYSTOLIC BLOOD PRESSURE: 110 MMHG

## 2021-11-08 DIAGNOSIS — D68.61 ANTIPHOSPHOLIPID ANTIBODY SYNDROME (H): Primary | ICD-10-CM

## 2021-11-08 DIAGNOSIS — O98.513 COVID-19 AFFECTING PREGNANCY IN THIRD TRIMESTER: ICD-10-CM

## 2021-11-08 DIAGNOSIS — U07.1 COVID-19 AFFECTING PREGNANCY IN THIRD TRIMESTER: ICD-10-CM

## 2021-11-08 LAB
ATRIAL RATE - MUSE: 115 BPM
DIASTOLIC BLOOD PRESSURE - MUSE: NORMAL MMHG
INTERPRETATION ECG - MUSE: NORMAL
P AXIS - MUSE: 35 DEGREES
PR INTERVAL - MUSE: 138 MS
QRS DURATION - MUSE: 86 MS
QT - MUSE: 318 MS
QTC - MUSE: 439 MS
R AXIS - MUSE: 26 DEGREES
SYSTOLIC BLOOD PRESSURE - MUSE: NORMAL MMHG
T AXIS - MUSE: 14 DEGREES
VENTRICULAR RATE- MUSE: 115 BPM

## 2021-11-08 PROCEDURE — 99207 PR OB VISIT-NO CHARGE - GICH ONLY: CPT | Performed by: OBSTETRICS & GYNECOLOGY

## 2021-11-08 ASSESSMENT — PAIN SCALES - GENERAL: PAINLEVEL: NO PAIN (0)

## 2021-11-08 NOTE — ED TRIAGE NOTES
"ED Nursing Triage Note (General)   ________________________________    Ana MI Patrick is a 30 year old Female that presents to triage private car  With history of  covid symptoms Friday and had a positive rapid test at work ([employer]). She is short of breath, c/o intermittent left shoulder blade pain, non now, and tachycardic. She reports that she is also 34 weeks pregnant w twins, high risk pregnancy, but has been feeling them move.  reported by patient   Significant symptoms had onset friday   /69   Pulse (!) 123   Temp (!) 101  F (38.3  C) (Tympanic)   Ht 1.651 m (5' 5\")   Wt 111.6 kg (246 lb)   LMP 03/13/2021 (Exact Date)   SpO2 97%   BMI 40.94 kg/m  t  Patient appears alert  and oriented, in no acute distress., and calm behavior.    GCS Total = 15  Airway: intact  Breathing noted as Normal  Circulation Normal  Skin:  Normal  Action taken:  Triage to critical care immediately      PRE [facility] PRIOR LIVING SITUATION home  "

## 2021-11-08 NOTE — PROGRESS NOTES
CC: Recheck OB visit at 34w2d    HPI: Ana Patrick presents for a routine OB visit now at 34w2d  Concerns: COVID pos last Friday, mild fever, less than 101, normal sats at home.  Patient notices normal fetal movement, denies contractions, vaginal bleeding or leaking of fluid.    OB History    Para Term  AB Living   5 1 1 0 3 1   SAB IAB Ectopic Multiple Live Births   3 0 0 0 1      # Outcome Date GA Lbr Amndeep/2nd Weight Sex Delivery Anes PTL Lv   5 Current            4 Term 20 38w0d  3.617 kg (7 lb 15.6 oz) F CS-Unspec   PAUL      Name: CHUCKIE,FEMALE-ANA      Apgar1: 6  Apgar5: 7   3 SAB 19           2 SAB 18           1 SAB 16             Current Outpatient Medications   Medication     aspirin (ASA) 81 MG EC tablet     calcium carbonate (TUMS) 500 MG chewable tablet     docusate sodium (COLACE) 100 MG capsule     enoxaparin ANTICOAGULANT (LOVENOX) 40 MG/0.4ML syringe     Ferrous Gluconate 240 (27 Fe) MG TABS     folic acid 800 MCG tablet     lisdexamfetamine (VYVANSE) 40 MG capsule     ondansetron (ZOFRAN-ODT) 4 MG ODT tab     pantoprazole (PROTONIX) 40 MG EC tablet     Prenatal Vit-Fe Fumarate-FA (PRENATAL MULTIVITAMIN  PLUS IRON) 27-1 MG TABS     sucralfate (CARAFATE) 1 GM tablet     No current facility-administered medications for this visit.         O: LMP 2021 (Exact Date)   There is no height or weight on file to calculate BMI.  See OB flow sheet  EXAM:  NAD    FHT:130/150 bpm    No results found for any visits on 21.    A/P: 34w2d with HRP with twins and APAS  Continue lovenox  Monitor home O2 sats and signs of tachypnea, chest pain  Quarantine for the next 7 days to continue 10 days total.    Recheck in 1 week    Problem List:   Pregnancy risk factors include:  APAS with lupus anticoagulant- Lovenox after viability established, baby asa  History of recurrent SAB  Prior  section-plans repeat  Dichorionic Twins- ART- repeat  37-38  alexandria HAGER pos    Gerry Kim MD FACOG  3:07 PM 11/8/2021

## 2021-11-08 NOTE — NURSING NOTE
"FOOD SECURITY SCREENING QUESTIONS  Hunger Vital Signs:  Within the past 12 months we worried whether our food would run out before we got money to buy more. Never  Within the past 12 months the food we bought just didn't last and we didn't have money to get more. Never    Chief Complaint   Patient presents with     Prenatal Care   Patient doing better with breathing today, pulse has been  \"all over 130s\" Babies have been moving okay. Having intermittent contractions but unable to time.     Initial /72 (BP Location: Right arm, Patient Position: Sitting, Cuff Size: Adult Regular)   Pulse 100   Wt 110.8 kg (244 lb 3.2 oz)   LMP 03/13/2021 (Exact Date)   SpO2 98%   BMI 40.64 kg/m   Estimated body mass index is 40.64 kg/m  as calculated from the following:    Height as of 11/7/21: 1.651 m (5' 5\").    Weight as of this encounter: 110.8 kg (244 lb 3.2 oz).  Medication Reconciliation: complete    Trudy Ryan RN    "

## 2021-11-08 NOTE — PROGRESS NOTES
"Ana Patrick is a  expectant mother of twins at 34.1 weeks. NST completed x 1 hour.    Baby A: FHT baseline 150 with accelerations present and no decelerations noted. Category 1 tracing.    Baby B: FHT baseline 140 with accelerations present and no decelerations noted. Category 1 tracing.     [place]: Initially noted few 40-50 second \"irritable\" contractions. Once fluid bolus started, toco became more quiet with occasional contractions.    Patient denies pain of abdomen, leaking of fluids, or bleeding. Reports active babies. Unable to palpate any contractions.    Nguyễn Gaviria of this category 1 tracing NST.     Deejay Murray RN 21 10:21 PM    "

## 2021-11-08 NOTE — DISCHARGE INSTRUCTIONS
Get plenty of fluids and rest.  You are positive for Covid.  Take Tylenol on a regular basis to keep your fever down.  You can take up to 4 g/day.  We discussed other processes such as blood clots.  Her studies tonight look very good you have no blood clots in the legs and this seems less likely to occur given that you are on Lovenox daily.  I spoke with the on-call OB/GYN who agrees that you seem very stable at this time.  Continue to follow-up with OB/GYN tomorrow as already scheduled however please give them a call to see how they would like you to follow-up due to your Covid illness.  Return if there are greatly worse or concerning symptoms including intractable fevers, contractions, increased shortness of breath.

## 2021-11-08 NOTE — ED PROVIDER NOTES
History     Chief Complaint   Patient presents with     Shortness of Breath     Tachycardia     HPI  Ana Patrick is a 30 year old female who presents to the ED for evaluation of sob, tachycardia. She is , 3 past miscarriages. She is currently ~34 weeks pregnant with twins. She also has lupus anticoagulant on daily lovenox shots. history of  covid symptoms Friday and had a positive rapid test at work ([employer]). She is short of breath, c/o intermittent left shoulder blade pain, non now, and tachycardic. She is feeling babies move.     Allergies:  Allergies   Allergen Reactions     Meloxicam Rash       Problem List:    Patient Active Problem List    Diagnosis Date Noted     Morbid obesity (H) 2020     Priority: Medium     S/P  section 2020     Priority: Medium     High-risk pregnancy in third trimester 2019     Priority: Medium     Added automatically from request for surgery [id number]       Antiphospholipid antibody syndrome complicating pregnancy (H) 2019     Priority: Medium     Lupus anticoagulant positive 10/08/2018     Priority: Medium     History of recurrent miscarriages, not currently pregnant 10/08/2018     Priority: Medium     Gastroesophageal reflux disease without esophagitis 2018     Priority: Medium     Generalized anxiety disorder 2017     Priority: Medium     Patellar malalignment syndrome 2016     Priority: Medium     Abnormal pap 2016     Priority: Medium     Overview:   LGSIL with + HPV (2013)  Colposcopy with CIN1 (2014)  ASCUS with - HPV (2015)       PCOS (polycystic ovarian syndrome) 2016     Priority: Medium        Past Medical History:    Past Medical History:   Diagnosis Date     Internal derangement of knee      Personal history of other medical treatment (CODE)      Polycystic ovarian syndrome        Past Surgical History:    Past Surgical History:   Procedure Laterality Date     ARTHROSCOPY KNEE Right      Dr. Landeros      SECTION N/A 2020    Procedure:  SECTION;  Surgeon: Gerry Kim MD;  Location:  OR     HYSTEROSCOPY DIAGNOSTIC N/A 2018    Procedure: HYSTEROSCOPY DIAGNOSTIC;  Diagnostic Hysteroscopy with Endometrial Scratching.      .;  Surgeon: Gerry Kim MD;  Location:  OR     MANDIBLE SURGERY Right            Family History:    Family History   Problem Relation Age of Onset     Family History Negative Mother         Good Health     Family History Negative Father         Good Health     Cancer Maternal Grandmother         Cancer     Diabetes Other         Diabetes,maternal side     Anxiety Disorder Sister        Social History:  Marital Status:   [2]  Social History     Tobacco Use     Smoking status: Never Smoker     Smokeless tobacco: Never Used   Vaping Use     Vaping Use: Never used   Substance Use Topics     Alcohol use: Not Currently     Alcohol/week: 0.0 standard drinks     Comment: Alcoholic Drinks/day: rare     Drug use: No        Medications:    aspirin (ASA) 81 MG EC tablet  calcium carbonate (TUMS) 500 MG chewable tablet  docusate sodium (COLACE) 100 MG capsule  enoxaparin ANTICOAGULANT (LOVENOX) 40 MG/0.4ML syringe  Ferrous Gluconate 240 (27 Fe) MG TABS  folic acid 800 MCG tablet  lisdexamfetamine (VYVANSE) 40 MG capsule  ondansetron (ZOFRAN-ODT) 4 MG ODT tab  pantoprazole (PROTONIX) 40 MG EC tablet  Prenatal Vit-Fe Fumarate-FA (PRENATAL MULTIVITAMIN  PLUS IRON) 27-1 MG TABS  sucralfate (CARAFATE) 1 GM tablet          Review of Systems   Constitutional: Positive for fatigue. Negative for chills and fever.   HENT: Negative for congestion.    Eyes: Negative for visual disturbance.   Respiratory: Positive for shortness of breath. Negative for chest tightness.    Cardiovascular: Negative for chest pain.   Gastrointestinal: Negative for abdominal pain.   Genitourinary: Negative for hematuria.   Musculoskeletal: Negative for back pain.   Skin: Negative for  "rash and wound.   Neurological: Negative for syncope.   Hematological: Does not bruise/bleed easily.   Psychiatric/Behavioral: Negative for confusion.       Physical Exam   BP: 135/69  Pulse: (!) 123  Temp: (!) 101  F (38.3  C)  Resp: 20  Height: 165.1 cm (5' 5\")  Weight: 111.6 kg (246 lb)  SpO2: 97 %      Physical Exam  Constitutional:       General: She is not in acute distress.     Appearance: She is well-developed. She is not diaphoretic.   HENT:      Head: Normocephalic and atraumatic.   Eyes:      General: No scleral icterus.     Conjunctiva/sclera: Conjunctivae normal.   Cardiovascular:      Rate and Rhythm: Regular rhythm. Tachycardia present.   Pulmonary:      Effort: Pulmonary effort is normal.      Breath sounds: Normal breath sounds.   Abdominal:      Palpations: Abdomen is soft.      Tenderness: There is no abdominal tenderness.      Comments: Gravid abdomen   Musculoskeletal:         General: No deformity.      Cervical back: Neck supple.      Right lower leg: Edema present.      Left lower leg: Edema present.   Lymphadenopathy:      Cervical: No cervical adenopathy.   Skin:     General: Skin is warm and dry.      Coloration: Skin is not jaundiced.      Findings: No rash.   Neurological:      Mental Status: She is alert and oriented to person, place, and time. Mental status is at baseline.   Psychiatric:         Mood and Affect: Mood normal.         Behavior: Behavior normal.         ED Course        Procedures         EKG read at 2006. , sinus tachycardia, no ST changes.     Critical Care time:  none               Results for orders placed or performed during the hospital encounter of 11/07/21 (from the past 24 hour(s))   CBC with platelets differential    Narrative    The following orders were created for panel order CBC with platelets differential.  Procedure                               Abnormality         Status                     ---------                               -----------         " ------                     CBC with platelets and d...[[id number]]  Abnormal            Final result                 Please view results for these tests on the individual orders.   Basic metabolic panel   Result Value Ref Range    Sodium 136 134 - 144 mmol/L    Potassium 3.1 (L) 3.5 - 5.1 mmol/L    Chloride 107 98 - 107 mmol/L    Carbon Dioxide (CO2) 19 (L) 21 - 31 mmol/L    Anion Gap 10 3 - 14 mmol/L    Urea Nitrogen 4 (L) 7 - 25 mg/dL    Creatinine 0.61 0.60 - 1.20 mg/dL    Calcium 8.6 8.6 - 10.3 mg/dL    Glucose 107 (H) 70 - 105 mg/dL    GFR Estimate >90 >60 mL/min/1.73m2   Troponin I   Result Value Ref Range    Troponin I 4.1 0.0 - 34.0 pg/mL   CBC with platelets and differential   Result Value Ref Range    WBC Count 6.3 4.0 - 11.0 10e3/uL    RBC Count 3.89 3.80 - 5.20 10e6/uL    Hemoglobin 11.6 (L) 11.7 - 15.7 g/dL    Hematocrit 33.3 (L) 35.0 - 47.0 %    MCV 86 78 - 100 fL    MCH 29.8 26.5 - 33.0 pg    MCHC 34.8 31.5 - 36.5 g/dL    RDW 12.6 10.0 - 15.0 %    Platelet Count 191 150 - 450 10e3/uL    % Neutrophils 78 %    % Lymphocytes 13 %    % Monocytes 7 %    % Eosinophils 1 %    % Basophils 0 %    % Immature Granulocytes 1 %    NRBCs per 100 WBC 0 <1 /100    Absolute Neutrophils 5.0 1.6 - 8.3 10e3/uL    Absolute Lymphocytes 0.9 0.8 - 5.3 10e3/uL    Absolute Monocytes 0.5 0.0 - 1.3 10e3/uL    Absolute Eosinophils 0.0 0.0 - 0.7 10e3/uL    Absolute Basophils 0.0 0.0 - 0.2 10e3/uL    Absolute Immature Granulocytes 0.0 <=0.0 10e3/uL    Absolute NRBCs 0.0 10e3/uL   US Lower Extremity Venous Duplex Bilateral    Narrative    Exam:US LOWER EXTREMITY VENOUS DUPLEX BILATERAL    History:  30 years Female   : Covid and 34 weeks pregnant .    Comparisons:    Technique: Venous duplex ultrasonography of the bilateral lower  extremities was performed.     Findings: The common femoral veins, superficial femoral veins and  popliteal veins are fully compressible with spontaneous and  augmentable venous flow.           Impression     Impression: No evidence of deep venous thrombosis within the lower  extremities.    DORA ADAN MD         SYSTEM ID:  [id number]   Symptomatic Influenza A/B & SARS-CoV2 (COVID-19) Virus PCR Multiplex Nose    Specimen: Nose; Swab   Result Value Ref Range    Influenza A target Negative Negative    Influenza B target Negative Negative    RSV target Negative Negative    SARS CoV2 PCR Positive (A) Negative    Narrative    Testing was performed using the Xpert Xpress CoV2/Flu/RSV Assay on the [employer] GeneXpert Instrument. This test should be ordered for the detection of SARS-CoV-2 and influenza viruses in individuals who meet clinical and/or epidemiological criteria. Test performance is unknown in asymptomatic patients. This test is for in vitro diagnostic use under the FDA EUA for laboratories certified under CLIA to perform high or moderate complexity testing. This test has not been FDA cleared or approved. A negative result does not rule out the presence of PCR inhibitors in the specimen or target RNA in concentration below the limit of detection for the assay. If only one viral target is positive but coinfection with multiple targets is suspected, the sample should be re-tested with another FDA cleared, approved, or authorized test, if coinfection would change clinical management. This test was validated by the [facility]. These laboratories are certified under the Clinical  Laboratory Improvement Amendments of 1988 (CLIA-88) as qualified to perform high complexity laboratory testing.       Medications   0.9% sodium chloride BOLUS (0 mLs Intravenous Stopped 11/7/21 2254)   acetaminophen (TYLENOL) tablet 650 mg (650 mg Oral Given 11/7/21 2021)       Assessments & Plan (with Medical Decision Making)   Nontoxic in NAD. She is febrile, tachycardic, gravid abdomen. Bilateral swelling in lower legs. Normal O2 sats on RA and normotensive.    She is positive for Covid, is normal troponin, hemoglobin  11.6, same as 2 months ago.  Mild hypokalemia.    Bilateral lower leg ultrasound read as:  No evidence of deep venous thrombosis within the lower  Extremities.    She is given small amount of fluids and tylenol.     She is monitored by OB RN. At first it seemed she may have been having some contractions but as the pt's HR normalized, all of that activity subsided. It was reported that from OB standpoint that she appears very well.     Upon reassessment, she no longer has a fever, she is no longer tachycardic, she continues to maintain oxygen saturations greater than 98%.    While she does have multiple risk factors for having a PE this seems less likely given her very well appearance, stable vital signs, negative DVT studies and the fact that she is already taking daily Lovenox.    More than likely, the symptoms she was feeling earlier probably due to just to her Covid illness and lack of antipyretic use.    I do discuss obtaining further chest imaging with her but shared decision-making was utilized and is felt to hold off on that for now.    I also spoke with Dr. Weston, the on-call OB/GYN agreed [place].  He also agrees the patient seems to be doing very well that she seems safe to discharge with close follow-up.  She actually already has a follow-up appointment with her OB/GYN doctor tomorrow and I encouraged her to give them a call due to her Covid status to inquire the best way to follow-up but Dr. Kim should be made aware that the patient was in the ED today.     She will continue with regular administration of Tylenol to help with fever and comfort control.    Strict return precautions are given to the pt, they will return if symptoms are worsening or concerning. The pt understands and agrees with the plan and they are discharged.     Ananth Gaviria PA-C    I have reviewed the nursing notes.    I have reviewed the findings, diagnosis, plan and need for follow up with the patient.       Discharge  Medication List as of 11/7/2021 10:54 PM          Final diagnoses:   Infection due to 2019 novel coronavirus   Fever       11/7/2021   [facility]     Ananth Gaviria PA  11/07/21 [id number]

## 2021-11-08 NOTE — PROGRESS NOTES
Called to complete NST for 1 hour per MD. At patient's bedside and baby A and B placed on monitor.    Deejay Murray RN 11/07/21 9:59 PM

## 2021-11-11 ENCOUNTER — HOSPITAL ENCOUNTER (EMERGENCY)
Facility: OTHER | Age: 31
Discharge: STILL ADMITTED OR ADMINISTRATIVE | End: 2021-11-11
Payer: COMMERCIAL

## 2021-11-11 ENCOUNTER — HOSPITAL ENCOUNTER (OUTPATIENT)
Facility: OTHER | Age: 31
Discharge: ANOTHER HOSPITAL | End: 2021-11-11
Attending: STUDENT IN AN ORGANIZED HEALTH CARE EDUCATION/TRAINING PROGRAM | Admitting: STUDENT IN AN ORGANIZED HEALTH CARE EDUCATION/TRAINING PROGRAM
Payer: COMMERCIAL

## 2021-11-11 ENCOUNTER — TRANSFERRED RECORDS (OUTPATIENT)
Dept: HEALTH INFORMATION MANAGEMENT | Facility: OTHER | Age: 31
End: 2021-11-11

## 2021-11-11 VITALS
WEIGHT: 241 LBS | DIASTOLIC BLOOD PRESSURE: 61 MMHG | SYSTOLIC BLOOD PRESSURE: 114 MMHG | OXYGEN SATURATION: 97 % | BODY MASS INDEX: 40.1 KG/M2 | HEART RATE: 129 BPM | RESPIRATION RATE: 18 BRPM | TEMPERATURE: 98 F

## 2021-11-11 VITALS
SYSTOLIC BLOOD PRESSURE: 125 MMHG | DIASTOLIC BLOOD PRESSURE: 75 MMHG | TEMPERATURE: 97.7 F | HEART RATE: 110 BPM | OXYGEN SATURATION: 95 %

## 2021-11-11 DIAGNOSIS — O09.93 HIGH-RISK PREGNANCY IN THIRD TRIMESTER: ICD-10-CM

## 2021-11-11 DIAGNOSIS — Z98.891 S/P CESAREAN SECTION: Primary | ICD-10-CM

## 2021-11-11 PROBLEM — Z36.89 ENCOUNTER FOR TRIAGE IN PREGNANT PATIENT: Status: ACTIVE | Noted: 2021-11-11

## 2021-11-11 PROCEDURE — 258N000003 HC RX IP 258 OP 636: Performed by: STUDENT IN AN ORGANIZED HEALTH CARE EDUCATION/TRAINING PROGRAM

## 2021-11-11 PROCEDURE — 99215 OFFICE O/P EST HI 40 MIN: CPT | Performed by: STUDENT IN AN ORGANIZED HEALTH CARE EDUCATION/TRAINING PROGRAM

## 2021-11-11 PROCEDURE — 87081 CULTURE SCREEN ONLY: CPT | Performed by: STUDENT IN AN ORGANIZED HEALTH CARE EDUCATION/TRAINING PROGRAM

## 2021-11-11 PROCEDURE — 96372 THER/PROPH/DIAG INJ SC/IM: CPT | Performed by: STUDENT IN AN ORGANIZED HEALTH CARE EDUCATION/TRAINING PROGRAM

## 2021-11-11 PROCEDURE — 96375 TX/PRO/DX INJ NEW DRUG ADDON: CPT

## 2021-11-11 PROCEDURE — 250N000011 HC RX IP 250 OP 636: Performed by: STUDENT IN AN ORGANIZED HEALTH CARE EDUCATION/TRAINING PROGRAM

## 2021-11-11 PROCEDURE — 96374 THER/PROPH/DIAG INJ IV PUSH: CPT

## 2021-11-11 PROCEDURE — G0463 HOSPITAL OUTPT CLINIC VISIT: HCPCS | Mod: 25

## 2021-11-11 PROCEDURE — 96376 TX/PRO/DX INJ SAME DRUG ADON: CPT

## 2021-11-11 PROCEDURE — 250N000013 HC RX MED GY IP 250 OP 250 PS 637: Performed by: STUDENT IN AN ORGANIZED HEALTH CARE EDUCATION/TRAINING PROGRAM

## 2021-11-11 RX ORDER — NIFEDIPINE 10 MG/1
30 CAPSULE ORAL ONCE
Status: COMPLETED | OUTPATIENT
Start: 2021-11-11 | End: 2021-11-11

## 2021-11-11 RX ORDER — PENICILLIN G 3000000 [IU]/50ML
3 INJECTION, SOLUTION INTRAVENOUS EVERY 4 HOURS
Status: DISCONTINUED | OUTPATIENT
Start: 2021-11-11 | End: 2021-11-11 | Stop reason: HOSPADM

## 2021-11-11 RX ORDER — LIDOCAINE 40 MG/G
CREAM TOPICAL
Status: DISCONTINUED | OUTPATIENT
Start: 2021-11-11 | End: 2021-11-11 | Stop reason: HOSPADM

## 2021-11-11 RX ORDER — ONDANSETRON 2 MG/ML
4 INJECTION INTRAMUSCULAR; INTRAVENOUS ONCE
Status: COMPLETED | OUTPATIENT
Start: 2021-11-11 | End: 2021-11-11

## 2021-11-11 RX ORDER — BETAMETHASONE SODIUM PHOSPHATE AND BETAMETHASONE ACETATE 3; 3 MG/ML; MG/ML
12 INJECTION, SUSPENSION INTRA-ARTICULAR; INTRALESIONAL; INTRAMUSCULAR; SOFT TISSUE ONCE
Status: COMPLETED | OUTPATIENT
Start: 2021-11-11 | End: 2021-11-11

## 2021-11-11 RX ORDER — NIFEDIPINE 10 MG/1
20 CAPSULE ORAL ONCE
Status: COMPLETED | OUTPATIENT
Start: 2021-11-11 | End: 2021-11-11

## 2021-11-11 RX ADMIN — PENICILLIN G POTASSIUM 5 MILLION UNITS: 5000000 POWDER, FOR SOLUTION INTRAMUSCULAR; INTRAPLEURAL; INTRATHECAL; INTRAVENOUS at 13:33

## 2021-11-11 RX ADMIN — SODIUM CHLORIDE, POTASSIUM CHLORIDE, SODIUM LACTATE AND CALCIUM CHLORIDE 1000 ML: 600; 310; 30; 20 INJECTION, SOLUTION INTRAVENOUS at 14:38

## 2021-11-11 RX ADMIN — PENICILLIN G 3 MILLION UNITS: 3000000 INJECTION, SOLUTION INTRAVENOUS at 17:29

## 2021-11-11 RX ADMIN — SODIUM CHLORIDE, POTASSIUM CHLORIDE, SODIUM LACTATE AND CALCIUM CHLORIDE 1000 ML: 600; 310; 30; 20 INJECTION, SOLUTION INTRAVENOUS at 13:31

## 2021-11-11 RX ADMIN — BETAMETHASONE SODIUM PHOSPHATE AND BETAMETHASONE ACETATE 12 MG: 3; 3 INJECTION, SUSPENSION INTRA-ARTICULAR; INTRALESIONAL; INTRAMUSCULAR at 13:36

## 2021-11-11 RX ADMIN — NIFEDIPINE 20 MG: 10 CAPSULE ORAL at 17:28

## 2021-11-11 RX ADMIN — ONDANSETRON 4 MG: 2 INJECTION INTRAMUSCULAR; INTRAVENOUS at 13:42

## 2021-11-11 RX ADMIN — NIFEDIPINE 30 MG: 10 CAPSULE ORAL at 13:33

## 2021-11-11 NOTE — PROGRESS NOTES
MD notified of SVE recheck of 2-3cm. Dilation unchanged, but cervix feels softer per RN. MD will be on unit to evaluate patient soon and plan. Pt is saline locked after 2 Liters of LR. First dose of penicillin was given at 1330. 1st dose of betamethasone also given at 1330. Pt is still having intermittent contractions lasting  seconds. Pt states she is feeling still feeling them, but states they are more uncomfortable than painful. Good fetal movement. FHT Category 1 for both fetus A and fetus B.

## 2021-11-11 NOTE — PROGRESS NOTES
SVE 2-3cm 50%. Dr. Holloway notied and will plan to obtain IV access, collect GBS swab, IM Betamethasone, and start IV abx.

## 2021-11-11 NOTE — PROGRESS NOTES
Pt accepted from ER triage with complaints of decreased fetal movement. Pt is also SOB, but stable. Pt has been recently diagnosed with COVID in past week. Pt states she has had difficulty keeping food and drink down due to her cough. Last emesis being this morning. Pt HR ranging from 110-130. Dr. Holloway notified of this and her recent EKG in ER earlier this week.       Plan: LR bolus, IV zofran. Cervical Exam.

## 2021-11-11 NOTE — PROGRESS NOTES
3 million units Penicillin and 20 mg oral nifedipine given at 1725. FHT A and B continue category 1. Contractions 2-7 min part, more uncomfortable than a few hours ago. Pt is agreeable to transferring to [place] for continued monitoring.

## 2021-11-11 NOTE — ED TRIAGE NOTES
ED Nursing Triage Note (General)   ________________________________    Ana MI Patrick is a 30 year old Female that presents to triage private car  With history of  34 weeks pregnant with twins, covid positive, decreased fetal movement in the past 16 hours, reported by patient   Significant symptoms had onset 16 hour(s) ago.  LMP 03/13/2021 (Exact Date) t  Patient appears alert , in no acute distress., and cooperative behavior.    GCS Total = 15  Airway: intact  Breathing noted as Normal  Circulation Normal  Skin:  Normal  Action taken:  Triage to critical care immediately      PRE HOSPITAL PRIOR LIVING SITUATION Spouse

## 2021-11-11 NOTE — H&P
[facility] Labor [facility] Delivery Admission H&P    Ana Patrick MRN# [id number]   Age: 30 year old YOB: 1990     Date of Admission:  2021    Primary care provider: Abiola Ashton           Chief Complaint:   Ana Patrick is a 30 year old  at 34w5d transfer to [place] for  contractions and labor- She is COVID positive          Pregnancy history:   This pregnancy has been complicated by APAS and history of recurrent SABs. She notes she was recently diagnosed with COVID and has not been feeling well. She had a positive test on . She has had a cough and originally presented to our facility with decreased fetal movement. When she arrived she started gomez and has been progressively starting to feel her contractions even more. Her cervix has progressed to 3 cm.     She has taken her last lovenox dose yesterday morning.      OBSTETRIC HISTORY:    OB History    Para Term  AB Living   5 1 1 0 3 1   SAB IAB Ectopic Multiple Live Births   3 0 0 0 1      # Outcome Date GA Lbr Mandeep/2nd Weight Sex Delivery Anes PTL Lv   5 Current            4 Term 20 38w0d  3.617 kg (7 lb 15.6 oz) F CS-Unspec   PAUL      Name: CHUCKIEFEMALE-ANA      Apgar1: 6  Apgar5: 7   3 SAB 19           2 SAB 18           1 SAB 16               PRENATAL LABS:   Lab Results   Component Value Date    ABO A 2021    RH Neg 2021    AS Neg 2021    HEPBANG Nonreactive 2021    HGB 11.6 (L) 2021         MEDICATIONS:  Medications Prior to Admission   Medication Sig Dispense Refill Last Dose     aspirin (ASA) 81 MG EC tablet Take 1 tablet (81 mg) by mouth daily   11/10/2021 at Unknown time     calcium carbonate (TUMS) 500 MG chewable tablet Take 2 chew tab by mouth 2 times daily   Past Week at Unknown time     docusate sodium (COLACE) 100 MG capsule Take 100 mg by mouth 2 times daily   Past Week at Unknown time     enoxaparin  ANTICOAGULANT (LOVENOX) 40 MG/0.4ML syringe Inject 0.4 mLs (40 mg) Subcutaneous daily   11/10/2021 at Unknown time     Ferrous Gluconate 240 (27 Fe) MG TABS    11/10/2021 at Unknown time     folic acid 800 MCG tablet Take 800 mcg by mouth daily   Past Week at Unknown time     ondansetron (ZOFRAN-ODT) 4 MG ODT tab Take 1 tablet (4 mg) by mouth every 8 hours as needed for nausea or vomiting 20 tablet 0 Unknown at Unknown time     pantoprazole (PROTONIX) 40 MG EC tablet Take 1 tablet (40 mg) by mouth 2 times daily 180 tablet 1 11/10/2021 at Unknown time     Prenatal Vit-Fe Fumarate-FA (PRENATAL MULTIVITAMIN  PLUS IRON) 27-1 MG TABS Take 1 tablet by mouth daily   11/10/2021 at Unknown time     sucralfate (CARAFATE) 1 GM tablet TAKE 1 TABLET BY MOUTH 3 TIMES DAILY BEFORE MEALS 270 tablet 2 11/10/2021 at Unknown time     lisdexamfetamine (VYVANSE) 40 MG capsule Take 1 capsule (40 mg) by mouth daily as needed (concentration deficit) (Patient not taking: Reported on 10/26/2021) 30 capsule 0    .        Maternal Past Medical History:     Past Medical History:   Diagnosis Date     Internal derangement of knee     2016     Personal history of other medical treatment (CODE)          Polycystic ovarian syndrome     3/18/2016   Antiphospholipid antibody syndrome  She denies any acute or chronic medical conditions  She specifically denies asthma, HTN, DM or history of VTE    SURGICAL HISTORY:  Past Surgical History:   Procedure Laterality Date     ARTHROSCOPY KNEE Right     Dr. Landeros      SECTION N/A 2020    Procedure:  SECTION;  Surgeon: Gerry Kim MD;  Location:  OR     HYSTEROSCOPY DIAGNOSTIC N/A 2018    Procedure: HYSTEROSCOPY DIAGNOSTIC;  Diagnostic Hysteroscopy with Endometrial Scratching.      .;  Surgeon: Gerry Kim MD;  Location:  OR     MANDIBLE SURGERY Right                        GYN HISTORY:  -No history of any prior STIs    ALLERGIES:  No known medication  allergies      Family History:     Family History   Problem Relation Age of Onset     Family History Negative Mother         Good Health     Family History Negative Father         Good Health     Cancer Maternal Grandmother         Cancer     Diabetes Other         Diabetes,maternal side     Anxiety Disorder Sister                Social History:     Social History     Tobacco Use     Smoking status: Never Smoker     Smokeless tobacco: Never Used   Vaping Use     Vaping Use: Never used   Substance Use Topics     Alcohol use: Not Currently     Alcohol/week: 0.0 standard drinks     Comment: Alcoholic Drinks/day: rare     Drug use: No              Review of Systems:   ROS:   Skin: negative for rash, bruising  Eyes: negative for visual blurring, double vision  Ears/Nose/Throat: negative for nasal congestion, vertigo  Respiratory: No shortness of breath, dyspnea on exertion, cough, or hemoptysis  Cardiovascular: negative for palpitations, chest pain, lower extremity edema and syncope or near-syncope  Gastrointestinal: negative for, nausea, vomiting and hematemesis  Genitourinary: negative for, dysuria, frequency and urgency  Musculoskeletal: negative for, back pain and muscular weakness  Neurologic: negative for, headaches, syncope, seizures and local weakness  Psychiatric: negative for, anxiety, depression and hallucinations  Hematologic/Lymphatic/Immunologic: negative for, anemia, chills and fever           Physical Exam:     Patient Vitals for the past 8 hrs:   BP Temp Temp src SpO2   11/11/21 1412 -- -- -- 95 %   11/11/21 1407 -- -- -- 97 %   11/11/21 1402 -- -- -- (!) 88 %   11/11/21 1357 -- -- -- 97 %   11/11/21 1352 -- -- -- 95 %   11/11/21 1348 117/59 -- -- --   11/11/21 1310 -- -- -- 96 %   11/11/21 1305 -- -- -- 97 %   11/11/21 1300 -- -- -- 96 %   11/11/21 1255 -- 98.7  F (37.1  C) Temporal 96 %   11/11/21 1250 -- -- -- 97 %   11/11/21 1246 -- -- -- 93 %   11/11/21 1245 -- -- -- 97 %   11/11/21 1240 -- -- -- 95  %   21 1235 -- -- -- 96 %     Gen: Alert and oriented, No acute distress, well-appearing  CV: Mild tachycardia  Resp: Room air saturations 96-99%  Abd: Soft, gravid, intermittent contractions palpated, no point tenderness  Ext: No sign of asymmetric edema, erythema, or asymmetric size. No pain with movement, Negative Bob's sign    Cervix: 3/60/-2  Membranes: intact    FHT:    Baby A- 135 bpm baseline, moderate  variability, + accelerations, no decelerations (Cat 1)   Baby B- 135 bpm baseline, moderate  variability, + accelerations, no decelerations (Cat 1)  Mills: 3-5 per 10 minutes, feeling stronger     S/p 30mg nifedipine at 1330- another dose of 20 mg at 1730        Assessment:   Ana Patrick is a 30 year old  at 34w5d admitted for  labor at 34 weeks gestation. This pregnancy is complicated by APAS        Plan:       # Term IUP: Labor progress  -- SVE: 360/-2  -- Mills: 3-5  q10 min  -- Membranes: intact    #  COVID +  -- Sats 96-99% on room air  -- afebrile  -- endorses fatigue    # Dichorionic/diamniotic twins  -- ART pregnancy    # APAS  -- On Lovenox 40mg daily and ASA: last dose was 11/10 AM    #  History of recurrent  SAB    # Prior   -- Plans for repeat     # FWB  -- CEFM: Baby A- 135 bpm baseline, moderate  variability, + accelerations, no decelerations (Cat 1)        Baby B- 135 bpm baseline, moderate  variability, + accelerations, no decelerations (Cat 1)    # PNL  -- A neg  --  Absc neg, RI, HIV neg, RPRnr, HbAgs neg  -- GBS pending  -- 1hr GTT: 131    # PMH/PSH  -- COVID    # Obstetric history  --    Recurrent SABs   1 previous CS    # Plan  -- Transfer to [facility]: Dr. Alamo accepting physician  -- S/p BMZ 1 dose at 1330  -- s/p Nifedipine 30mg x1 at 1330 and 20mg at 1730  -- PCN 5 million units given 1330, 3 million units given 1730  -- GBS swab collected and pending      CAROLINA ANAYA MD on 2021 at 5:01 PM

## 2021-11-12 ENCOUNTER — MYC MEDICAL ADVICE (OUTPATIENT)
Dept: OBGYN | Facility: OTHER | Age: 31
End: 2021-11-12
Payer: COMMERCIAL

## 2021-11-12 ENCOUNTER — ALLIED HEALTH/NURSE VISIT (OUTPATIENT)
Dept: OBGYN | Facility: OTHER | Age: 31
End: 2021-11-12
Attending: OBSTETRICS & GYNECOLOGY
Payer: COMMERCIAL

## 2021-11-12 DIAGNOSIS — O09.93 HIGH-RISK PREGNANCY IN THIRD TRIMESTER: Primary | ICD-10-CM

## 2021-11-12 PROCEDURE — 250N000011 HC RX IP 250 OP 636: Performed by: OBSTETRICS & GYNECOLOGY

## 2021-11-12 PROCEDURE — 96372 THER/PROPH/DIAG INJ SC/IM: CPT | Performed by: OBSTETRICS & GYNECOLOGY

## 2021-11-12 RX ORDER — BETAMETHASONE SODIUM PHOSPHATE AND BETAMETHASONE ACETATE 3; 3 MG/ML; MG/ML
12 INJECTION, SUSPENSION INTRA-ARTICULAR; INTRALESIONAL; INTRAMUSCULAR; SOFT TISSUE ONCE
Status: COMPLETED | OUTPATIENT
Start: 2021-11-12 | End: 2021-11-12

## 2021-11-12 RX ORDER — ACETAMINOPHEN 325 MG/1
650 TABLET ORAL
COMMUNITY
End: 2022-01-10

## 2021-11-12 RX ADMIN — BETAMETHASONE SODIUM PHOSPHATE AND BETAMETHASONE ACETATE 12 MG: 3; 3 INJECTION, SUSPENSION INTRA-ARTICULAR; INTRALESIONAL; INTRAMUSCULAR at 14:07

## 2021-11-12 NOTE — PROGRESS NOTES
Pt. Seen today for betamethasone injection her second of 2. Pt. Tolerated well with no complaints of pain/discomfort. See MAR for more information regarding injection.  Jane Waldron RN on 11/12/2021 at 2:12 PM

## 2021-11-12 NOTE — PROGRESS NOTES
Pt transferred to [facility] via EMS. VSS. Doppler sent with EMS. Nurse to nurse report called to  [phone].

## 2021-11-13 LAB — BACTERIA SPEC CULT: NORMAL

## 2021-11-16 ENCOUNTER — HOSPITAL ENCOUNTER (OUTPATIENT)
Dept: ULTRASOUND IMAGING | Facility: OTHER | Age: 31
End: 2021-11-16
Attending: OBSTETRICS & GYNECOLOGY
Payer: COMMERCIAL

## 2021-11-16 ENCOUNTER — PRENATAL OFFICE VISIT (OUTPATIENT)
Dept: OBGYN | Facility: OTHER | Age: 31
End: 2021-11-16
Attending: OBSTETRICS & GYNECOLOGY
Payer: COMMERCIAL

## 2021-11-16 VITALS
WEIGHT: 240 LBS | BODY MASS INDEX: 39.94 KG/M2 | DIASTOLIC BLOOD PRESSURE: 74 MMHG | SYSTOLIC BLOOD PRESSURE: 122 MMHG | HEART RATE: 92 BPM

## 2021-11-16 DIAGNOSIS — O09.93 HIGH-RISK PREGNANCY IN THIRD TRIMESTER: ICD-10-CM

## 2021-11-16 DIAGNOSIS — J45.21 MILD INTERMITTENT REACTIVE AIRWAY DISEASE WITH ACUTE EXACERBATION: ICD-10-CM

## 2021-11-16 DIAGNOSIS — O30.043 DICHORIONIC DIAMNIOTIC TWIN PREGNANCY IN THIRD TRIMESTER: Primary | ICD-10-CM

## 2021-11-16 PROCEDURE — 76816 OB US FOLLOW-UP PER FETUS: CPT | Mod: 59

## 2021-11-16 PROCEDURE — 59025 FETAL NON-STRESS TEST: CPT | Performed by: OBSTETRICS & GYNECOLOGY

## 2021-11-16 PROCEDURE — 99207 PR OB VISIT-NO CHARGE - GICH ONLY: CPT | Performed by: OBSTETRICS & GYNECOLOGY

## 2021-11-16 RX ORDER — ALBUTEROL SULFATE 90 UG/1
2 AEROSOL, METERED RESPIRATORY (INHALATION) EVERY 4 HOURS PRN
Qty: 18 G | Refills: 3 | Status: SHIPPED | OUTPATIENT
Start: 2021-11-16 | End: 2022-09-13

## 2021-11-16 ASSESSMENT — PAIN SCALES - GENERAL: PAINLEVEL: NO PAIN (0)

## 2021-11-16 NOTE — NURSING NOTE
Chief Complaint   Patient presents with     Prenatal Care     35w3d       Medication Reconciliation: completed   Davina Mortensen LPN  11/16/2021 12:58 PM

## 2021-11-16 NOTE — PROGRESS NOTES
CC: Recheck OB visit at 35w3d    HPI: Ana Patrick presents for a routine OB visit now at 35w3d  Concerns: Recoverring from COVID, still had cough on Day 13 since onset of symptoms.  contractions have subsided.  Patient notices normal fetal movement, denies contractions, vaginal bleeding or leaking of fluid.    OB History    Para Term  AB Living   5 1 1 0 3 1   SAB IAB Ectopic Multiple Live Births   3 0 0 0 1      # Outcome Date GA Lbr Mandeep/2nd Weight Sex Delivery Anes PTL Lv   5 Current            4 Term 20 38w0d  3.617 kg (7 lb 15.6 oz) F CS-Unspec   PAUL      Name: CHUCKIE,FEMALE-ANA      Apgar1: 6  Apgar5: 7   3 SAB 19           2 SAB 18           1 SAB 16             Current Outpatient Medications   Medication     acetaminophen (TYLENOL) 325 MG tablet     aspirin (ASA) 81 MG EC tablet     calcium carbonate (TUMS) 500 MG chewable tablet     docusate sodium (COLACE) 100 MG capsule     enoxaparin ANTICOAGULANT (LOVENOX) 40 MG/0.4ML syringe     Ferrous Gluconate 240 (27 Fe) MG TABS     folic acid 800 MCG tablet     ondansetron (ZOFRAN-ODT) 4 MG ODT tab     pantoprazole (PROTONIX) 40 MG EC tablet     Prenatal Vit-Fe Fumarate-FA (PRENATAL MULTIVITAMIN  PLUS IRON) 27-1 MG TABS     sucralfate (CARAFATE) 1 GM tablet     lisdexamfetamine (VYVANSE) 40 MG capsule     No current facility-administered medications for this visit.         O: /74 (BP Location: Right arm, Patient Position: Sitting, Cuff Size: Adult Large)   Pulse 92   Wt 108.9 kg (240 lb)   LMP 2021 (Exact Date)   Breastfeeding No   BMI 39.94 kg/m    Body mass index is 39.94 kg/m .  See OB flow sheet  EXAM:  NAD    NST doucmentation:    Indication: (O30.043) Dichorionic diamniotic twin pregnancy in third trimester  (primary encounter diagnosis)    Duration: 30 min     35w3d  FHR baseline: 140/150 with moderate variability  Accelerations: y  Decelerations: n  Contractions: n    Category 1  for both twins        No results found for any visits on 21.    A/P: (O30.043) Dichorionic diamniotic twin pregnancy in third trimester  (primary encounter diagnosis)  Comment:   Plan:     Recheck in 1 week    Problem List:   Pregnancy risk factors include:  APAS with lupus anticoagulant- Lovenox after viability established, baby asa  History of recurrent SAB  Prior  section-plans repeat  Dichorionic Twins- ART- repeat  37-38 weeks  COVID pos    Gerry Kim MD FACOG  1:18 PM 2021

## 2021-11-23 ENCOUNTER — PRENATAL OFFICE VISIT (OUTPATIENT)
Dept: OBGYN | Facility: OTHER | Age: 31
End: 2021-11-23
Attending: OBSTETRICS & GYNECOLOGY
Payer: COMMERCIAL

## 2021-11-23 VITALS
HEART RATE: 94 BPM | WEIGHT: 248.5 LBS | DIASTOLIC BLOOD PRESSURE: 70 MMHG | SYSTOLIC BLOOD PRESSURE: 122 MMHG | BODY MASS INDEX: 41.35 KG/M2

## 2021-11-23 DIAGNOSIS — O30.043 DICHORIONIC DIAMNIOTIC TWIN PREGNANCY IN THIRD TRIMESTER: Primary | ICD-10-CM

## 2021-11-23 PROCEDURE — 99207 PR OB VISIT-NO CHARGE - GICH ONLY: CPT | Performed by: OBSTETRICS & GYNECOLOGY

## 2021-11-23 PROCEDURE — 59025 FETAL NON-STRESS TEST: CPT | Performed by: OBSTETRICS & GYNECOLOGY

## 2021-11-23 NOTE — NURSING NOTE
Pt presents to clinic today for prenatal care 36w3d. Pt denies any bleeding, or leakage of fluid at this time. States baby is moving good.      Medication Reconciliation: complete  Rylie Gay LPN

## 2021-11-23 NOTE — PROGRESS NOTES
CC: Recheck OB visit at 36w3d    HPI: Ana Patrick presents for a routine OB visit now at 36w3d  Concerns: none  Patient notices normal fetal movement, denies contractions, vaginal bleeding or leaking of fluid.    OB History    Para Term  AB Living   5 1 1 0 3 1   SAB IAB Ectopic Multiple Live Births   3 0 0 0 1      # Outcome Date GA Lbr Mandeep/2nd Weight Sex Delivery Anes PTL Lv   5 Current            4 Term 20 38w0d  3.617 kg (7 lb 15.6 oz) F CS-Unspec   PAUL      Name: CHUCKIE,FEMALE-ANA      Apgar1: 6  Apgar5: 7   3 SAB 19           2 SAB 18           1 SAB 16             Current Outpatient Medications   Medication     acetaminophen (TYLENOL) 325 MG tablet     albuterol (PROAIR HFA/PROVENTIL HFA/VENTOLIN HFA) 108 (90 Base) MCG/ACT inhaler     aspirin (ASA) 81 MG EC tablet     calcium carbonate (TUMS) 500 MG chewable tablet     docusate sodium (COLACE) 100 MG capsule     enoxaparin ANTICOAGULANT (LOVENOX) 40 MG/0.4ML syringe     Ferrous Gluconate 240 (27 Fe) MG TABS     folic acid 800 MCG tablet     lisdexamfetamine (VYVANSE) 40 MG capsule     ondansetron (ZOFRAN-ODT) 4 MG ODT tab     pantoprazole (PROTONIX) 40 MG EC tablet     Prenatal Vit-Fe Fumarate-FA (PRENATAL MULTIVITAMIN  PLUS IRON) 27-1 MG TABS     sucralfate (CARAFATE) 1 GM tablet     No current facility-administered medications for this visit.         O: LMP 2021 (Exact Date)   There is no height or weight on file to calculate BMI.  See OB flow sheet  EXAM:  NAD    NST doucmentation:    Indication: (O30.043) Dichorionic diamniotic twin pregnancy in third trimester  (primary encounter diagnosis)  Comment:   Plan: FETAL NON-STRESS TEST            Duration: 30 min     36w3d  FHR baseline: 120/150 with moderate variability  Accelerations: y  Decelerations: n  Contractions: n    Category 1 for both twins          No results found for any visits on 21.    A/P: (O30.043) Dichorionic diamniotic twin  pregnancy in third trimester  (primary encounter diagnosis)  Comment:   Plan: FETAL NON-STRESS TEST           planned for this coming Monday  Had Covid last week, no screen needed.  Hold Lovenox on     Problem List:   Pregnancy risk factors include:  APAS with lupus anticoagulant- Lovenox after viability established, baby asa  History of recurrent SAB  Prior  section-plans repeat  Dichorionic Twins- ART- repeat  37-38 weeks  COVID pos    Gerry Kim MD FACOG  3:02 PM 2021

## 2021-11-24 ENCOUNTER — ANESTHESIA EVENT (OUTPATIENT)
Dept: SURGERY | Facility: OTHER | Age: 31
End: 2021-11-24
Payer: COMMERCIAL

## 2021-11-29 ENCOUNTER — ANESTHESIA (OUTPATIENT)
Dept: SURGERY | Facility: OTHER | Age: 31
End: 2021-11-29
Payer: COMMERCIAL

## 2021-11-29 ENCOUNTER — HOSPITAL ENCOUNTER (INPATIENT)
Facility: OTHER | Age: 31
LOS: 3 days | Discharge: HOME | End: 2021-12-02
Attending: OBSTETRICS & GYNECOLOGY | Admitting: OBSTETRICS & GYNECOLOGY
Payer: COMMERCIAL

## 2021-11-29 DIAGNOSIS — O30.009: Primary | ICD-10-CM

## 2021-11-29 DIAGNOSIS — Z98.891 HISTORY OF C-SECTION: ICD-10-CM

## 2021-11-29 LAB
ABO/RH(D): ABNORMAL
ANTIBODY SCREEN: POSITIVE
CREAT SERPL-MCNC: 0.46 MG/DL (ref 0.6–1.2)
ERYTHROCYTE [DISTWIDTH] IN BLOOD BY AUTOMATED COUNT: 12.8 % (ref 10–15)
GFR SERPL CREATININE-BSD FRML MDRD: >90 ML/MIN/1.73M2
HCT VFR BLD AUTO: 32.3 % (ref 35–47)
HGB BLD-MCNC: 11.1 G/DL (ref 11.7–15.7)
HGB BLD-MCNC: 11.1 G/DL (ref 11.7–15.7)
MCH RBC QN AUTO: 29.5 PG (ref 26.5–33)
MCHC RBC AUTO-ENTMCNC: 34.3 G/DL (ref 31.5–36.5)
MCV RBC AUTO: 86 FL (ref 78–100)
PLATELET # BLD AUTO: 245 10E3/UL (ref 150–450)
RBC # BLD AUTO: 3.76 10E6/UL (ref 3.8–5.2)
SPECIMEN EXPIRATION DATE: ABNORMAL
SPECIMEN EXPIRATION DATE: NORMAL
WBC # BLD AUTO: 8.6 10E3/UL (ref 4–11)

## 2021-11-29 PROCEDURE — 272N000001 HC OR GENERAL SUPPLY STERILE: Performed by: OBSTETRICS & GYNECOLOGY

## 2021-11-29 PROCEDURE — 250N000009 HC RX 250: Performed by: OBSTETRICS & GYNECOLOGY

## 2021-11-29 PROCEDURE — 88302 TISSUE EXAM BY PATHOLOGIST: CPT

## 2021-11-29 PROCEDURE — 88307 TISSUE EXAM BY PATHOLOGIST: CPT

## 2021-11-29 PROCEDURE — 86870 RBC ANTIBODY IDENTIFICATION: CPT | Performed by: OBSTETRICS & GYNECOLOGY

## 2021-11-29 PROCEDURE — 258N000003 HC RX IP 258 OP 636: Performed by: NURSE ANESTHETIST, CERTIFIED REGISTERED

## 2021-11-29 PROCEDURE — 0UB70ZZ EXCISION OF BILATERAL FALLOPIAN TUBES, OPEN APPROACH: ICD-10-PCS | Performed by: OBSTETRICS & GYNECOLOGY

## 2021-11-29 PROCEDURE — 710N000010 HC RECOVERY PHASE 1, LEVEL 2, PER MIN: Performed by: OBSTETRICS & GYNECOLOGY

## 2021-11-29 PROCEDURE — 85018 HEMOGLOBIN: CPT | Performed by: OBSTETRICS & GYNECOLOGY

## 2021-11-29 PROCEDURE — 250N000011 HC RX IP 250 OP 636: Performed by: NURSE ANESTHETIST, CERTIFIED REGISTERED

## 2021-11-29 PROCEDURE — 250N000009 HC RX 250: Performed by: NURSE ANESTHETIST, CERTIFIED REGISTERED

## 2021-11-29 PROCEDURE — 250N000011 HC RX IP 250 OP 636: Performed by: OBSTETRICS & GYNECOLOGY

## 2021-11-29 PROCEDURE — 64488 TAP BLOCK BI INJECTION: CPT | Mod: XU | Performed by: NURSE ANESTHETIST, CERTIFIED REGISTERED

## 2021-11-29 PROCEDURE — 82565 ASSAY OF CREATININE: CPT | Performed by: OBSTETRICS & GYNECOLOGY

## 2021-11-29 PROCEDURE — 59510 CESAREAN DELIVERY: CPT | Performed by: NURSE ANESTHETIST, CERTIFIED REGISTERED

## 2021-11-29 PROCEDURE — 86780 TREPONEMA PALLIDUM: CPT | Performed by: OBSTETRICS & GYNECOLOGY

## 2021-11-29 PROCEDURE — 59510 CESAREAN DELIVERY: CPT | Mod: 22 | Performed by: OBSTETRICS & GYNECOLOGY

## 2021-11-29 PROCEDURE — 85027 COMPLETE CBC AUTOMATED: CPT | Performed by: OBSTETRICS & GYNECOLOGY

## 2021-11-29 PROCEDURE — 250N000013 HC RX MED GY IP 250 OP 250 PS 637: Performed by: OBSTETRICS & GYNECOLOGY

## 2021-11-29 PROCEDURE — 86900 BLOOD TYPING SEROLOGIC ABO: CPT | Performed by: OBSTETRICS & GYNECOLOGY

## 2021-11-29 PROCEDURE — 58611 LIGATE OVIDUCT(S) ADD-ON: CPT | Performed by: OBSTETRICS & GYNECOLOGY

## 2021-11-29 PROCEDURE — 360N000076 HC SURGERY LEVEL 3, PER MIN: Performed by: OBSTETRICS & GYNECOLOGY

## 2021-11-29 PROCEDURE — 258N000003 HC RX IP 258 OP 636: Performed by: OBSTETRICS & GYNECOLOGY

## 2021-11-29 PROCEDURE — 250N000025 HC SEVOFLURANE, PER MIN: Performed by: OBSTETRICS & GYNECOLOGY

## 2021-11-29 PROCEDURE — 36415 COLL VENOUS BLD VENIPUNCTURE: CPT | Performed by: OBSTETRICS & GYNECOLOGY

## 2021-11-29 PROCEDURE — 370N000017 HC ANESTHESIA TECHNICAL FEE, PER MIN: Performed by: OBSTETRICS & GYNECOLOGY

## 2021-11-29 PROCEDURE — 120N000001 HC R&B MED SURG/OB

## 2021-11-29 RX ORDER — LIDOCAINE 40 MG/G
CREAM TOPICAL
Status: DISCONTINUED | OUTPATIENT
Start: 2021-11-29 | End: 2021-11-30 | Stop reason: CLARIF

## 2021-11-29 RX ORDER — BISACODYL 10 MG
10 SUPPOSITORY, RECTAL RECTAL DAILY PRN
Status: DISCONTINUED | OUTPATIENT
Start: 2021-12-01 | End: 2021-12-02 | Stop reason: HOSPADM

## 2021-11-29 RX ORDER — OXYTOCIN 10 [USP'U]/ML
10 INJECTION, SOLUTION INTRAMUSCULAR; INTRAVENOUS
Status: DISCONTINUED | OUTPATIENT
Start: 2021-11-29 | End: 2021-12-02 | Stop reason: HOSPADM

## 2021-11-29 RX ORDER — CITRIC ACID/SODIUM CITRATE 334-500MG
30 SOLUTION, ORAL ORAL
Status: COMPLETED | OUTPATIENT
Start: 2021-11-29 | End: 2021-11-29

## 2021-11-29 RX ORDER — FENTANYL CITRATE 50 UG/ML
INJECTION, SOLUTION INTRAMUSCULAR; INTRAVENOUS PRN
Status: DISCONTINUED | OUTPATIENT
Start: 2021-11-29 | End: 2021-11-29

## 2021-11-29 RX ORDER — NALOXONE HYDROCHLORIDE 0.4 MG/ML
0.2 INJECTION, SOLUTION INTRAMUSCULAR; INTRAVENOUS; SUBCUTANEOUS
Status: DISCONTINUED | OUTPATIENT
Start: 2021-11-29 | End: 2021-12-02 | Stop reason: HOSPADM

## 2021-11-29 RX ORDER — ACETAMINOPHEN 325 MG/1
975 TABLET ORAL ONCE
Status: COMPLETED | OUTPATIENT
Start: 2021-11-29 | End: 2021-11-29

## 2021-11-29 RX ORDER — NALOXONE HYDROCHLORIDE 0.4 MG/ML
0.4 INJECTION, SOLUTION INTRAMUSCULAR; INTRAVENOUS; SUBCUTANEOUS
Status: DISCONTINUED | OUTPATIENT
Start: 2021-11-29 | End: 2021-12-02 | Stop reason: HOSPADM

## 2021-11-29 RX ORDER — ONDANSETRON 2 MG/ML
4 INJECTION INTRAMUSCULAR; INTRAVENOUS EVERY 6 HOURS PRN
Status: DISCONTINUED | OUTPATIENT
Start: 2021-11-29 | End: 2021-12-02 | Stop reason: HOSPADM

## 2021-11-29 RX ORDER — ONDANSETRON 2 MG/ML
4 INJECTION INTRAMUSCULAR; INTRAVENOUS EVERY 30 MIN PRN
Status: DISCONTINUED | OUTPATIENT
Start: 2021-11-29 | End: 2021-11-29 | Stop reason: HOSPADM

## 2021-11-29 RX ORDER — CARBOPROST TROMETHAMINE 250 UG/ML
250 INJECTION, SOLUTION INTRAMUSCULAR
Status: DISCONTINUED | OUTPATIENT
Start: 2021-11-29 | End: 2021-11-29 | Stop reason: HOSPADM

## 2021-11-29 RX ORDER — METOCLOPRAMIDE 10 MG/1
10 TABLET ORAL EVERY 6 HOURS PRN
Status: DISCONTINUED | OUTPATIENT
Start: 2021-11-29 | End: 2021-12-02 | Stop reason: HOSPADM

## 2021-11-29 RX ORDER — BUPIVACAINE HYDROCHLORIDE 5 MG/ML
INJECTION, SOLUTION EPIDURAL; INTRACAUDAL PRN
Status: DISCONTINUED | OUTPATIENT
Start: 2021-11-29 | End: 2021-11-29

## 2021-11-29 RX ORDER — AMOXICILLIN 250 MG
2 CAPSULE ORAL 2 TIMES DAILY
Status: DISCONTINUED | OUTPATIENT
Start: 2021-11-29 | End: 2021-12-02 | Stop reason: HOSPADM

## 2021-11-29 RX ORDER — LIDOCAINE 40 MG/G
CREAM TOPICAL
Status: DISCONTINUED | OUTPATIENT
Start: 2021-11-29 | End: 2021-11-29 | Stop reason: HOSPADM

## 2021-11-29 RX ORDER — ACETAMINOPHEN 325 MG/1
975 TABLET ORAL EVERY 6 HOURS
Status: DISCONTINUED | OUTPATIENT
Start: 2021-11-29 | End: 2021-12-02 | Stop reason: HOSPADM

## 2021-11-29 RX ORDER — METHYLERGONOVINE MALEATE 0.2 MG/ML
200 INJECTION INTRAVENOUS
Status: DISCONTINUED | OUTPATIENT
Start: 2021-11-29 | End: 2021-11-29 | Stop reason: HOSPADM

## 2021-11-29 RX ORDER — AMOXICILLIN 250 MG
1 CAPSULE ORAL 2 TIMES DAILY
Status: DISCONTINUED | OUTPATIENT
Start: 2021-11-29 | End: 2021-12-02 | Stop reason: HOSPADM

## 2021-11-29 RX ORDER — DEXAMETHASONE SODIUM PHOSPHATE 10 MG/ML
INJECTION, SOLUTION INTRAMUSCULAR; INTRAVENOUS PRN
Status: DISCONTINUED | OUTPATIENT
Start: 2021-11-29 | End: 2021-11-29

## 2021-11-29 RX ORDER — MAGNESIUM HYDROXIDE 1200 MG/15ML
LIQUID ORAL PRN
Status: DISCONTINUED | OUTPATIENT
Start: 2021-11-29 | End: 2021-11-29 | Stop reason: HOSPADM

## 2021-11-29 RX ORDER — OXYCODONE HYDROCHLORIDE 5 MG/1
5 TABLET ORAL EVERY 4 HOURS PRN
Status: DISCONTINUED | OUTPATIENT
Start: 2021-11-29 | End: 2021-11-29 | Stop reason: HOSPADM

## 2021-11-29 RX ORDER — HYDROCORTISONE 2.5 %
CREAM (GRAM) TOPICAL 3 TIMES DAILY PRN
Status: DISCONTINUED | OUTPATIENT
Start: 2021-11-29 | End: 2021-12-02 | Stop reason: HOSPADM

## 2021-11-29 RX ORDER — IBUPROFEN 400 MG/1
800 TABLET, FILM COATED ORAL EVERY 6 HOURS
Status: DISCONTINUED | OUTPATIENT
Start: 2021-11-30 | End: 2021-12-02 | Stop reason: HOSPADM

## 2021-11-29 RX ORDER — MISOPROSTOL 100 UG/1
400 TABLET ORAL
Status: DISCONTINUED | OUTPATIENT
Start: 2021-11-29 | End: 2021-12-02 | Stop reason: HOSPADM

## 2021-11-29 RX ORDER — OXYTOCIN/0.9 % SODIUM CHLORIDE 30/500 ML
100-340 PLASTIC BAG, INJECTION (ML) INTRAVENOUS CONTINUOUS PRN
Status: DISCONTINUED | OUTPATIENT
Start: 2021-11-29 | End: 2021-12-02 | Stop reason: HOSPADM

## 2021-11-29 RX ORDER — ONDANSETRON 4 MG/1
4 TABLET, ORALLY DISINTEGRATING ORAL EVERY 30 MIN PRN
Status: DISCONTINUED | OUTPATIENT
Start: 2021-11-29 | End: 2021-11-29 | Stop reason: HOSPADM

## 2021-11-29 RX ORDER — METOCLOPRAMIDE HYDROCHLORIDE 5 MG/ML
10 INJECTION INTRAMUSCULAR; INTRAVENOUS EVERY 6 HOURS PRN
Status: DISCONTINUED | OUTPATIENT
Start: 2021-11-29 | End: 2021-12-02 | Stop reason: HOSPADM

## 2021-11-29 RX ORDER — OXYTOCIN 10 [USP'U]/ML
10 INJECTION, SOLUTION INTRAMUSCULAR; INTRAVENOUS
Status: DISCONTINUED | OUTPATIENT
Start: 2021-11-29 | End: 2021-11-29 | Stop reason: HOSPADM

## 2021-11-29 RX ORDER — SODIUM CHLORIDE, SODIUM LACTATE, POTASSIUM CHLORIDE, CALCIUM CHLORIDE 600; 310; 30; 20 MG/100ML; MG/100ML; MG/100ML; MG/100ML
INJECTION, SOLUTION INTRAVENOUS CONTINUOUS
Status: DISCONTINUED | OUTPATIENT
Start: 2021-11-29 | End: 2021-11-29 | Stop reason: HOSPADM

## 2021-11-29 RX ORDER — BUPIVACAINE HYDROCHLORIDE 7.5 MG/ML
INJECTION, SOLUTION INTRASPINAL PRN
Status: DISCONTINUED | OUTPATIENT
Start: 2021-11-29 | End: 2021-11-29

## 2021-11-29 RX ORDER — CEFAZOLIN SODIUM 2 G/100ML
2 INJECTION, SOLUTION INTRAVENOUS
Status: DISCONTINUED | OUTPATIENT
Start: 2021-11-29 | End: 2021-11-29 | Stop reason: HOSPADM

## 2021-11-29 RX ORDER — METHYLERGONOVINE MALEATE 0.2 MG/ML
200 INJECTION INTRAVENOUS
Status: DISCONTINUED | OUTPATIENT
Start: 2021-11-29 | End: 2021-12-02 | Stop reason: HOSPADM

## 2021-11-29 RX ORDER — DEXTROSE, SODIUM CHLORIDE, SODIUM LACTATE, POTASSIUM CHLORIDE, AND CALCIUM CHLORIDE 5; .6; .31; .03; .02 G/100ML; G/100ML; G/100ML; G/100ML; G/100ML
INJECTION, SOLUTION INTRAVENOUS CONTINUOUS
Status: DISCONTINUED | OUTPATIENT
Start: 2021-11-29 | End: 2021-11-30 | Stop reason: CLARIF

## 2021-11-29 RX ORDER — KETOROLAC TROMETHAMINE 30 MG/ML
30 INJECTION, SOLUTION INTRAMUSCULAR; INTRAVENOUS EVERY 6 HOURS
Status: COMPLETED | OUTPATIENT
Start: 2021-11-29 | End: 2021-11-30

## 2021-11-29 RX ORDER — OXYTOCIN/0.9 % SODIUM CHLORIDE 30/500 ML
PLASTIC BAG, INJECTION (ML) INTRAVENOUS CONTINUOUS PRN
Status: DISCONTINUED | OUTPATIENT
Start: 2021-11-29 | End: 2021-11-29

## 2021-11-29 RX ORDER — LIDOCAINE HYDROCHLORIDE 10 MG/ML
INJECTION, SOLUTION INFILTRATION; PERINEURAL PRN
Status: DISCONTINUED | OUTPATIENT
Start: 2021-11-29 | End: 2021-11-29

## 2021-11-29 RX ORDER — MEPERIDINE HYDROCHLORIDE 50 MG/ML
12.5 INJECTION INTRAMUSCULAR; INTRAVENOUS; SUBCUTANEOUS
Status: DISCONTINUED | OUTPATIENT
Start: 2021-11-29 | End: 2021-11-29 | Stop reason: HOSPADM

## 2021-11-29 RX ORDER — CARBOPROST TROMETHAMINE 250 UG/ML
250 INJECTION, SOLUTION INTRAMUSCULAR
Status: DISCONTINUED | OUTPATIENT
Start: 2021-11-29 | End: 2021-12-02 | Stop reason: HOSPADM

## 2021-11-29 RX ORDER — MISOPROSTOL 100 UG/1
400 TABLET ORAL
Status: DISCONTINUED | OUTPATIENT
Start: 2021-11-29 | End: 2021-11-29 | Stop reason: HOSPADM

## 2021-11-29 RX ORDER — TRANEXAMIC ACID 10 MG/ML
1 INJECTION, SOLUTION INTRAVENOUS EVERY 30 MIN PRN
Status: DISCONTINUED | OUTPATIENT
Start: 2021-11-29 | End: 2021-11-30 | Stop reason: CLARIF

## 2021-11-29 RX ORDER — OXYCODONE HYDROCHLORIDE 5 MG/1
5-10 TABLET ORAL EVERY 4 HOURS PRN
Status: DISCONTINUED | OUTPATIENT
Start: 2021-11-29 | End: 2021-12-02 | Stop reason: HOSPADM

## 2021-11-29 RX ORDER — DEXMEDETOMIDINE HYDROCHLORIDE 4 UG/ML
INJECTION, SOLUTION INTRAVENOUS PRN
Status: DISCONTINUED | OUTPATIENT
Start: 2021-11-29 | End: 2021-11-29

## 2021-11-29 RX ORDER — OXYTOCIN/0.9 % SODIUM CHLORIDE 30/500 ML
340 PLASTIC BAG, INJECTION (ML) INTRAVENOUS CONTINUOUS PRN
Status: DISCONTINUED | OUTPATIENT
Start: 2021-11-29 | End: 2021-11-29 | Stop reason: HOSPADM

## 2021-11-29 RX ORDER — FENTANYL CITRATE 50 UG/ML
50 INJECTION, SOLUTION INTRAMUSCULAR; INTRAVENOUS EVERY 5 MIN PRN
Status: DISCONTINUED | OUTPATIENT
Start: 2021-11-29 | End: 2021-11-29 | Stop reason: HOSPADM

## 2021-11-29 RX ORDER — MODIFIED LANOLIN
OINTMENT (GRAM) TOPICAL
Status: DISCONTINUED | OUTPATIENT
Start: 2021-11-29 | End: 2021-11-30 | Stop reason: CLARIF

## 2021-11-29 RX ORDER — CEFAZOLIN SODIUM 2 G/100ML
2 INJECTION, SOLUTION INTRAVENOUS SEE ADMIN INSTRUCTIONS
Status: DISCONTINUED | OUTPATIENT
Start: 2021-11-29 | End: 2021-11-29 | Stop reason: HOSPADM

## 2021-11-29 RX ORDER — OXYTOCIN/0.9 % SODIUM CHLORIDE 30/500 ML
340 PLASTIC BAG, INJECTION (ML) INTRAVENOUS CONTINUOUS PRN
Status: DISCONTINUED | OUTPATIENT
Start: 2021-11-29 | End: 2021-12-02 | Stop reason: HOSPADM

## 2021-11-29 RX ORDER — TRANEXAMIC ACID 10 MG/ML
1 INJECTION, SOLUTION INTRAVENOUS EVERY 30 MIN PRN
Status: DISCONTINUED | OUTPATIENT
Start: 2021-11-29 | End: 2021-11-29 | Stop reason: HOSPADM

## 2021-11-29 RX ORDER — PROCHLORPERAZINE 25 MG
25 SUPPOSITORY, RECTAL RECTAL EVERY 12 HOURS PRN
Status: DISCONTINUED | OUTPATIENT
Start: 2021-11-29 | End: 2021-12-02 | Stop reason: HOSPADM

## 2021-11-29 RX ORDER — HYDROMORPHONE HYDROCHLORIDE 1 MG/ML
0.4 INJECTION, SOLUTION INTRAMUSCULAR; INTRAVENOUS; SUBCUTANEOUS EVERY 5 MIN PRN
Status: DISCONTINUED | OUTPATIENT
Start: 2021-11-29 | End: 2021-11-29 | Stop reason: HOSPADM

## 2021-11-29 RX ORDER — SODIUM CHLORIDE, SODIUM LACTATE, POTASSIUM CHLORIDE, CALCIUM CHLORIDE 600; 310; 30; 20 MG/100ML; MG/100ML; MG/100ML; MG/100ML
INJECTION, SOLUTION INTRAVENOUS CONTINUOUS
Status: DISCONTINUED | OUTPATIENT
Start: 2021-11-29 | End: 2021-11-30 | Stop reason: CLARIF

## 2021-11-29 RX ORDER — ONDANSETRON 4 MG/1
4 TABLET, ORALLY DISINTEGRATING ORAL EVERY 6 HOURS PRN
Status: DISCONTINUED | OUTPATIENT
Start: 2021-11-29 | End: 2021-12-02 | Stop reason: HOSPADM

## 2021-11-29 RX ORDER — SIMETHICONE 80 MG
80 TABLET,CHEWABLE ORAL 4 TIMES DAILY PRN
Status: DISCONTINUED | OUTPATIENT
Start: 2021-11-29 | End: 2021-12-02 | Stop reason: HOSPADM

## 2021-11-29 RX ORDER — PROCHLORPERAZINE MALEATE 10 MG
10 TABLET ORAL EVERY 6 HOURS PRN
Status: DISCONTINUED | OUTPATIENT
Start: 2021-11-29 | End: 2021-12-02 | Stop reason: HOSPADM

## 2021-11-29 RX ADMIN — FENTANYL CITRATE 12.5 MCG: 50 INJECTION, SOLUTION INTRAMUSCULAR; INTRAVENOUS at 07:52

## 2021-11-29 RX ADMIN — KETOROLAC TROMETHAMINE 30 MG: 30 INJECTION, SOLUTION INTRAMUSCULAR at 21:17

## 2021-11-29 RX ADMIN — OXYCODONE HYDROCHLORIDE 10 MG: 5 TABLET ORAL at 21:59

## 2021-11-29 RX ADMIN — BUPIVACAINE HYDROCHLORIDE 15 ML: 5 INJECTION, SOLUTION EPIDURAL; INTRACAUDAL; PERINEURAL at 09:01

## 2021-11-29 RX ADMIN — Medication 340 ML/HR: at 08:18

## 2021-11-29 RX ADMIN — ACETAMINOPHEN 975 MG: 325 TABLET, FILM COATED ORAL at 18:16

## 2021-11-29 RX ADMIN — DEXAMETHASONE SODIUM PHOSPHATE 5 MG: 10 INJECTION, SOLUTION INTRAMUSCULAR; INTRAVENOUS at 09:01

## 2021-11-29 RX ADMIN — ACETAMINOPHEN 975 MG: 325 TABLET, FILM COATED ORAL at 13:31

## 2021-11-29 RX ADMIN — SODIUM CHLORIDE, POTASSIUM CHLORIDE, SODIUM LACTATE AND CALCIUM CHLORIDE: 600; 310; 30; 20 INJECTION, SOLUTION INTRAVENOUS at 06:21

## 2021-11-29 RX ADMIN — SODIUM CHLORIDE, POTASSIUM CHLORIDE, SODIUM LACTATE AND CALCIUM CHLORIDE 100 ML: 600; 310; 30; 20 INJECTION, SOLUTION INTRAVENOUS at 07:30

## 2021-11-29 RX ADMIN — ACETAMINOPHEN 975 MG: 325 TABLET, FILM COATED ORAL at 06:20

## 2021-11-29 RX ADMIN — DEXMEDETOMIDINE HYDROCHLORIDE 16 MCG: 4 INJECTION, SOLUTION INTRAVENOUS at 09:06

## 2021-11-29 RX ADMIN — LIDOCAINE HYDROCHLORIDE 2 ML: 10 INJECTION, SOLUTION INFILTRATION; PERINEURAL at 07:47

## 2021-11-29 RX ADMIN — BUPIVACAINE HYDROCHLORIDE 15 ML: 5 INJECTION, SOLUTION EPIDURAL; INTRACAUDAL; PERINEURAL at 09:06

## 2021-11-29 RX ADMIN — DEXAMETHASONE SODIUM PHOSPHATE 5 MG: 10 INJECTION, SOLUTION INTRAMUSCULAR; INTRAVENOUS at 09:06

## 2021-11-29 RX ADMIN — CEFAZOLIN SODIUM 2 G: 2 INJECTION, SOLUTION INTRAVENOUS at 07:55

## 2021-11-29 RX ADMIN — OXYCODONE HYDROCHLORIDE 10 MG: 5 TABLET ORAL at 14:17

## 2021-11-29 RX ADMIN — DOCUSATE SODIUM 50 MG AND SENNOSIDES 8.6 MG 1 TABLET: 8.6; 5 TABLET, FILM COATED ORAL at 21:16

## 2021-11-29 RX ADMIN — BUPIVACAINE HYDROCHLORIDE 2 ML: 7.5 INJECTION, SOLUTION INTRASPINAL at 07:52

## 2021-11-29 RX ADMIN — KETOROLAC TROMETHAMINE 30 MG: 30 INJECTION, SOLUTION INTRAMUSCULAR at 15:31

## 2021-11-29 RX ADMIN — OXYCODONE HYDROCHLORIDE 10 MG: 5 TABLET ORAL at 18:14

## 2021-11-29 RX ADMIN — LIDOCAINE HYDROCHLORIDE 1 ML: 10 INJECTION, SOLUTION INFILTRATION; PERINEURAL at 07:50

## 2021-11-29 RX ADMIN — SODIUM CITRATE AND CITRIC ACID MONOHYDRATE 30 ML: 500; 334 SOLUTION ORAL at 07:27

## 2021-11-29 RX ADMIN — PHENYLEPHRINE HYDROCHLORIDE 0.4 MCG/KG/MIN: 10 INJECTION INTRAVENOUS at 07:53

## 2021-11-29 RX ADMIN — SODIUM CHLORIDE, POTASSIUM CHLORIDE, SODIUM LACTATE AND CALCIUM CHLORIDE: 600; 310; 30; 20 INJECTION, SOLUTION INTRAVENOUS at 10:05

## 2021-11-29 RX ADMIN — DEXMEDETOMIDINE HYDROCHLORIDE 16 MCG: 4 INJECTION, SOLUTION INTRAVENOUS at 09:01

## 2021-11-29 ASSESSMENT — ACTIVITIES OF DAILY LIVING (ADL)
ADLS_ACUITY_SCORE: 4
ADLS_ACUITY_SCORE: 4

## 2021-11-29 NOTE — ANESTHESIA PREPROCEDURE EVALUATION
Anesthesia Pre-Procedure Evaluation    Patient: Ana Patrick   MRN: [id number] : 1990        Preoperative Diagnosis: History of  [Z98.891]    Procedure : Procedure(s):   SECTION, WITH POSTPARTUM TUBAL LIGATION          Past Medical History:   Diagnosis Date     Internal derangement of knee     2016     Personal history of other medical treatment (CODE)          Polycystic ovarian syndrome     3/18/2016      Past Surgical History:   Procedure Laterality Date     ARTHROSCOPY KNEE Right     Dr. Landeros      SECTION N/A 2020    Procedure:  SECTION;  Surgeon: Gerry Kim MD;  Location:  OR     HYSTEROSCOPY DIAGNOSTIC N/A 2018    Procedure: HYSTEROSCOPY DIAGNOSTIC;  Diagnostic Hysteroscopy with Endometrial Scratching.      .;  Surgeon: Gerry Kim MD;  Location:  OR     MANDIBLE SURGERY Right           Allergies   Allergen Reactions     Meloxicam Rash      Social History     Tobacco Use     Smoking status: Never Smoker     Smokeless tobacco: Never Used   Substance Use Topics     Alcohol use: Not Currently     Alcohol/week: 0.0 standard drinks     Comment: Alcoholic Drinks/day: rare      Wt Readings from Last 1 Encounters:   21 112.7 kg (248 lb 8 oz)        Anesthesia Evaluation   Pt has had prior anesthetic. Type: Regional.    No history of anesthetic complications       ROS/MED HX  ENT/Pulmonary:  - neg pulmonary ROS     Neurologic:  - neg neurologic ROS     Cardiovascular:  - neg cardiovascular ROS   (+) -----Taking blood thinners     METS/Exercise Tolerance: >4 METS    Hematologic: Comments: Antiphospholipid syndrome      Musculoskeletal:       GI/Hepatic:     (+) GERD, Asymptomatic on medication,     Renal/Genitourinary:  - neg Renal ROS     Endo:     (+) Obesity,     Psychiatric/Substance Use:  - neg psychiatric ROS     Infectious Disease:  - neg infectious disease ROS     Malignancy:  - neg malignancy ROS     Other:      (+)  Possibly pregnant, ,         Physical Exam    Airway        Mallampati: II   TM distance: > 3 FB   Neck ROM: full   Mouth opening: > 3 cm    Respiratory Devices and Support         Dental  no notable dental history         Cardiovascular          Rhythm and rate: regular and normal     Pulmonary           breath sounds clear to auscultation           OUTSIDE LABS:  CBC:   Lab Results   Component Value Date    WBC 6.3 11/07/2021    WBC 10.6 08/31/2021    HGB 11.1 (L) 11/29/2021    HGB 11.6 (L) 11/07/2021    HCT 33.3 (L) 11/07/2021    HCT 33.8 (L) 08/31/2021     11/07/2021     08/31/2021     BMP:   Lab Results   Component Value Date     11/07/2021     04/23/2021    POTASSIUM 3.1 (L) 11/07/2021    POTASSIUM 3.4 (L) 04/23/2021    CHLORIDE 107 11/07/2021    CHLORIDE 107 04/23/2021    CO2 19 (L) 11/07/2021    CO2 24 04/23/2021    BUN 4 (L) 11/07/2021    BUN 8 04/23/2021    CR 0.61 11/07/2021    CR 0.75 04/23/2021     (H) 11/07/2021     (H) 04/23/2021     COAGS:   Lab Results   Component Value Date    PTT 41 (H) 08/09/2018    INR 1.07 08/09/2018     POC:   Lab Results   Component Value Date    HCG Negative 03/24/2020     HEPATIC:   Lab Results   Component Value Date    ALBUMIN 3.8 04/23/2021    PROTTOTAL 6.8 04/23/2021    ALT 35 04/23/2021    AST 34 04/23/2021    ALKPHOS 62 04/23/2021    BILITOTAL 0.3 04/23/2021    BILIDIRECT 0.07 05/04/2017     OTHER:   Lab Results   Component Value Date    A1C 4.7 03/01/2019    ALEX 8.6 11/07/2021    MAG 2.1 04/23/2021    LIPASE 49 12/21/2018    AMYLASE 34 07/19/2017       Anesthesia Plan    ASA Status:  3   NPO Status:  NPO Appropriate    Anesthesia Type: Spinal.              Consents    Anesthesia Plan(s) and associated risks, benefits, and realistic alternatives discussed. Questions answered and patient/representative(s) expressed understanding.    - Discussed:     - Discussed with:  Patient         Postoperative Care    Pain management:  Peripheral nerve block (Single Shot) (TAP block).        Comments:                CHELY CRYSTAL CRNA

## 2021-11-29 NOTE — ANESTHESIA CARE TRANSFER NOTE
Patient: Ana Patrick    Procedure: Procedure(s):   SECTION, WITH POSTPARTUM TUBAL LIGATION       Diagnosis: History of  [Z98.891]  Diagnosis Additional Information: No value filed.    Anesthesia Type:   Spinal     Note:      Level of Consciousness: drowsy  Oxygen Supplementation: room air    Independent Airway: airway patency satisfactory and stable  Dentition: dentition unchanged  Vital Signs Stable: post-procedure vital signs reviewed and stable  Report to RN Given: handoff report given  Patient transferred to: Labor and Delivery    Handoff Report: Identifed the Patient, Identified the Reponsible Provider, Reviewed the pertinent medical history, Discussed the surgical course, Reviewed Intra-OP anesthesia mangement and issues during anesthesia, Set expectations for post-procedure period and Allowed opportunity for questions and acknowledgement of understanding      Vitals:  Vitals Value Taken Time   /93 21 0912   Temp     Pulse 61 21 0916   Resp 13 21 0916   SpO2 97 % 21 0916   Vitals shown include unvalidated device data.    Electronically Signed By: CHELY CRYSTAL CRNA  2021  9:16 AM

## 2021-11-29 NOTE — ANESTHESIA PROCEDURE NOTES
Intrathecal injection Procedure Note    Pre-Procedure   Staff -        CRNA: Deuce Pandey APRN CRNA       Performed By: CRNA       Location: OR       Procedure Start/Stop Times: 11/29/2021 7:43 AM and 11/29/2021 7:52 AM       Pre-Anesthestic Checklist: patient identified, IV checked, risks and benefits discussed, informed consent, monitors and equipment checked, pre-op evaluation and at physician/surgeon's request  Timeout:       Correct Patient: Yes        Correct Procedure: Yes        Correct Site: Yes        Correct Position: Yes   Procedure Documentation  Procedure: intrathecal injection       Patient Position: sitting       Patient Prep/Sterile Barriers: sterile gloves, mask, patient draped       Skin prep: Chloraprep       Insertion Site: L3-4. (midline approach).       Needle Gauge: 25.        Needle Length (Inches): 3.5        Spinal Needle Type: Lesley tip       Introducer used       # of attempts: 2 and  # of redirects:  3    Assessment/Narrative         Paresthesias: No.       CSF fluid: clear.

## 2021-11-29 NOTE — ANESTHESIA PROCEDURE NOTES
TAP Procedure Note    Pre-Procedure   Staff -        CRNA: Deuce Pandey APRN CRNA       Performed By: CRNA       Location: OR       Procedure Start/Stop Times: 11/29/2021 8:57 AM and 11/29/2021 9:06 AM       Pre-Anesthestic Checklist: patient identified, IV checked, site marked, risks and benefits discussed, informed consent, monitors and equipment checked, pre-op evaluation, at physician/surgeon's request and post-op pain management  Timeout:       Correct Patient: Yes        Correct Procedure: Yes        Correct Site: Yes        Correct Position: Yes        Correct Laterality: N/A        Site Marked: N/A  Procedure Documentation  Procedure: TAP       Diagnosis: POST-OP PAIN CONTROL       Laterality: bilateral       Patient Position: supine       Patient Prep/Sterile Barriers: sterile gloves, mask       Skin prep: Chloraprep       Needle Gauge: 20.        Needle Length (Inches): 6        Ultrasound guided       1. Ultrasound was used to identify targeted nerve, plexus, vascular marker, or fascial plane and place a needle adjacent to it in real-time.       2. Ultrasound was used to visualize the spread of anesthetic in close proximity to the above referenced structure.       3. A permanent image is entered into the patient's record.       4. The visualized anatomic structures appeared normal.       5. There were no apparent abnormal pathologic findings.    Assessment/Narrative         The placement was negative for: blood aspirated, painful injection and site bleeding       Paresthesias: No.     Bolus given via needle..        Secured via.        Insertion/Infusion Method: Single Shot

## 2021-11-29 NOTE — H&P
[facility]    History and Physical  Obstetrics and Gynecology     Date of Admission:  2021    Assessment & Plan   Ana Patrick is a 30 year old female who presents with repeat  with a twin gestation  ASSESSMENT:   IUP @ 37w2d with twins for repeat   APAS with LA pos, on px lovenox.  NST reactive.  Category  I for both babies    PLAN:   Admit - see IP orders    Gerry Kim    History of Present Illness   Ana Patrick is a 30 year old female  37w2d  Estimated Date of Delivery: Dec 18, 2021 is calculated from Patient's last menstrual period was 2021 (exact date). [facility] . Pregnancy complicated by dichorionic twins, APAS, LA pos with lovenox px. Planning repeat  with tubal today.    PRENATAL COURSE  Prenatal course was complicated by the above.      Recent Labs   Lab Test 21  1355   ABO A   RH Neg   AS Neg     Rhogam not indicated   Recent Labs   Lab Test 21  1355   HEPBANG Nonreactive   HIAGAB Nonreactive   RUQIGG 32       Past Medical History    I have reviewed this patient's medical history and updated it with pertinent information if needed.   Past Medical History:   Diagnosis Date     Internal derangement of knee     2016     Personal history of other medical treatment (CODE)          Polycystic ovarian syndrome     3/18/2016       Past Surgical History   I have reviewed this patient's surgical history and updated it with pertinent information if needed.  Past Surgical History:   Procedure Laterality Date     ARTHROSCOPY KNEE Right     Dr. Landeros      SECTION N/A 2020    Procedure:  SECTION;  Surgeon: Gerry Kim MD;  Location:  OR     HYSTEROSCOPY DIAGNOSTIC N/A 2018    Procedure: HYSTEROSCOPY DIAGNOSTIC;  Diagnostic Hysteroscopy with Endometrial Scratching.      .;  Surgeon: Gerry Kim MD;  Location:  OR     MANDIBLE SURGERY Right      2001       Prior to Admission Medications   Prior to Admission Medications   Prescriptions Last Dose Informant Patient Reported? Taking?   Ferrous Gluconate 240 (27 Fe) MG TABS 11/28/2021 at Unknown time  Yes Yes   Prenatal Vit-Fe Fumarate-FA (PRENATAL MULTIVITAMIN  PLUS IRON) 27-1 MG TABS 11/28/2021 at Unknown time  Yes Yes   Sig: Take 1 tablet by mouth daily   acetaminophen (TYLENOL) 325 MG tablet Past Month at Unknown time  Yes Yes   Sig: Take 650 mg by mouth   albuterol (PROAIR HFA/PROVENTIL HFA/VENTOLIN HFA) 108 (90 Base) MCG/ACT inhaler Past Month at Unknown time  No Yes   Sig: Inhale 2 puffs into the lungs every 4 hours as needed for shortness of breath / dyspnea or wheezing   aspirin (ASA) 81 MG EC tablet 11/28/2021 at Unknown time  No Yes   Sig: Take 1 tablet (81 mg) by mouth daily   calcium carbonate (TUMS) 500 MG chewable tablet 11/28/2021 at Unknown time  Yes Yes   Sig: Take 2 chew tab by mouth 2 times daily   docusate sodium (COLACE) 100 MG capsule 11/28/2021 at Unknown time  Yes Yes   Sig: Take 100 mg by mouth 2 times daily   enoxaparin ANTICOAGULANT (LOVENOX) 40 MG/0.4ML syringe   Yes No   Sig: Inject 0.4 mLs (40 mg) Subcutaneous daily   folic acid 800 MCG tablet 11/28/2021 at Unknown time  Yes Yes   Sig: Take 800 mcg by mouth daily   lisdexamfetamine (VYVANSE) 40 MG capsule   No No   Sig: Take 1 capsule (40 mg) by mouth daily as needed (concentration deficit)   Patient not taking: Reported on 10/26/2021   ondansetron (ZOFRAN-ODT) 4 MG ODT tab Past Week at Unknown time  No Yes   Sig: Take 1 tablet (4 mg) by mouth every 8 hours as needed for nausea or vomiting   pantoprazole (PROTONIX) 40 MG EC tablet 11/28/2021 at Unknown time  No Yes   Sig: Take 1 tablet (40 mg) by mouth 2 times daily   sucralfate (CARAFATE) 1 GM tablet 11/28/2021 at Unknown time  No Yes   Sig: TAKE 1 TABLET BY MOUTH 3 TIMES DAILY BEFORE MEALS      Facility-Administered Medications: None     Allergies   Allergies   Allergen  Reactions     Meloxicam Rash       Social History   I have reviewed this patient's social history and updated it with pertinent information if needed. Ana Patrick  reports that she has never smoked. She has never used smokeless tobacco. She reports previous alcohol use. She reports that she does not use drugs.    Family History   I have reviewed this patient's family history and updated it with pertinent information if needed.   Family History   Problem Relation Age of Onset     Family History Negative Mother         Good Health     Family History Negative Father         Good Health     Cancer Maternal Grandmother         Cancer     Diabetes Other         Diabetes,maternal side     Anxiety Disorder Sister        Immunization History   Immunizations are up to date    Physical Exam   Temp: 97.5  F (36.4  C) Temp src: Temporal BP: 130/80     Resp: 18 SpO2: 97 %      Vital Signs with Ranges  Temp:  [97.5  F (36.4  C)] 97.5  F (36.4  C)  Resp:  [18] 18  BP: (130)/(80) 130/80  SpO2:  [97 %] 97 %    Abdomen: gravid, NT  EFM Cat 1 for both babies      Constitutional: healthy, alert and active   Respiratory: No increased work of breathing, good air exchange, clear to auscultation bilaterally, no crackles or wheezing  Cardiovascular: Normal apical impulse, regular rate and rhythm, normal S1 and S2, no S3 or S4, and no murmur noted  Skin/Extremites: no rashes and no lesions  Neurologic: A&O x 3

## 2021-11-29 NOTE — OR NURSING
PACU Transfer Note    Ana mejía transferred to [facility] via bed.  Equipment used for transport:  none.  Accompanied by:  Anesthesia  Prescriptions were: none    PACU Respiratory Event Documentation     1) Episodes of Apnea greater than or equal to 10 seconds: 0    2) Bradypnea - less than 8 breaths per minute: 0    3) Pain score on 0 to 10 scale: 0    4) Pain-sedation mismatch (yes or no): no    5) Repeated 02 desaturation less than 90% (yes or no): no    Anesthesia notified? (yes or no): na   Fundus firm at 2 below umbilicus for entire PACU stay  Minimal vaginal bleeding  Any of the above events occuring repeatedly in separate 30 minute intervals may be considered recurrent PACU respiratory events.    Patient stable and meets phase 1 discharge criteria for transport from PACU.  The patient [id number] 77.9 mL of Pitocin 30 units in 500 mL of Lactated Ringers 1500 during the OR phase and 500 mL during the PACU phase.  Report given to Tino Bhat RN on 11/29/2021 at 10:10 AM

## 2021-11-29 NOTE — PROGRESS NOTES
Scheduled AM C/S Admit Note  Ana Patrick  MRN: [id number]  Gestational Age: 37w2d      Ana Patrick presents to unit at 0535 for scheduled repeat  section.  Patient denies contractions, bleeding or LOF. EFM strip initiated.Fetuses active per patient. Alan wipes and shaved prepped. IV started without difficulty and fluids infusing. History and plan of care reviewed. Patient has been NPO since  last evening. Patient oriented to room, unit and plan of care. Call light within reach.  Explained admission packet with patient bill of rights brochure. Will continue to monitor and document as needed.       Pt reports her last lovenox dose was at 1000 on .    FHT FETUS A 140    FHT FETUS B 130      Plan:  - section at 0730

## 2021-11-29 NOTE — OR NURSING
Baby boy A born at 0815, baby boy B born at 0817. To warmers with baby docs and nurses and RT. Viable and crying. Cord segments sent with OB nurses. Gabriela Hicks RN on 11/29/2021 at 8:46 AM

## 2021-11-29 NOTE — L&D DELIVERY NOTE
[facility] Obstetrics Operative Note    Pre-operative diagnosis: Dichorionic twins   Lupus anticoagulant positive  Antiphospholipid antibody syndrome  History of recurrent spontaneous miscarriage  History of  section for cephalopelvic disproportion  Desires sterilization  37w2d   Post-operative diagnosis: Same  Liveborn twin male infants  Status post sterilization   Procedure: Repeat low transverse  section  Bilateral salpingectomy   Surgeon: Gerry Kim MD   Assistant(s): I requested Shannon Ashton and Jason Gregory to assist with this  for  evaluation of twins at 37w2d by  delivery     Anesthesia: Spinal anesthesia   Estimated blood loss: 700ml   Total IV fluids: (See anesthesia record)   Blood transfusion: No transfusion was given during surgery   Total urine output: (See anesthesia record)   Drains: Kelley catheter   Specimens: placenta sent to pathology and right and left Fallopian Tube   Findings: Live   Male twins  Normal uterus and ovaries   Complications: None   Condition: Mother and infants stable, transfered to floor   Operative Details:    The patient was transferred to the OR where spinalanesthesia was accomplished.  A kelley catheter was inserted and clear urine was noted. The abdomen was scrubbed prepped and draped in the usual manner.  A hard stop timeout was accomplished.  A transverse Pfannenstiel incision was made and sharp and electrocautery dissection carried down to the fascial layer.  The Fascia was opened in the midline and extended bluntly bilaterally.  The rectus muscles were  in the midline bluntly.The peritoneum was bluntly divided and the Alanis ring retractor was placed.  A bladder flap was made sharply.  The lower uterine segment was incised sharply in a low transverse fashion and the amniotic sac ruptured for Twin A with toothed forceps. Clear fluid was noted and the incision extended bluntly.  The fetal head was  deliverd in the OT position.  The infant received nasal and oropharyngeal suction with the bulb suction. Nuchal cord x1 was noted and reduced. The shoulders delivered atraumatically followed by the remainder of the baby. The cord was clamped and cut after one minute and the infant handed to  Dr. Gregory. A single plastic clamp was placed on the cord after cord blood was collected, and a segment of cord handed off for pathology.    The second amniotic sac was ruptured with toothed forceps.  The fetus was notably transverse with back down.  The fetal head was rotated up into the upper uterus and the breech brought down into the lower uterine segment.  The baby was then delivered in the breech presentation.  The breech was first delivered followed by the legs to the level of the shoulders.  A Pinard maneuver was performed and the right shoulder delivered first followed by the left shoulder.  A Mauriceau maneuver was performed and the head delivered without difficulty.  The  was suctioned with a bulb suction and after 1 minute the clamp was placed on the cord, it was cut and the baby was handed off to Dr. Moody for evaluation.  Cord blood was again collected and tube plastic clamps placed on the cord for twin B.  The placenta was then manually extracted and sent to pathology for evaluation. The uterus was wiped clear of clots and debris. Pitocin was added to the IV infusion and the uterus was noted to firm-up nicely. The tubes and ovaries appeared normal. The uterine incision was closed in a double layer of #1 Chromic in a running fashion. Hemostasis was adequate.     The left tube was grasped. The mesoalpinx was clamped with Tanja clamps and divided. The tube was excised and the pedicles free tied with vicryl. Cautery was used for hemostasis on the tube and mesosalpinx. The same was done on the right and included a paratubal cyst which ruptured at excision. The abdomen irrigated clear of clots and debris.  The  peritoneum and the rectus  muscles were approximated with  3 0 vicryl.   The fascia was closed with 0 PDS.  The subcutaneous layer was irrigated and made hemastatic with electrocautery.  It was closed in a single layer of 3 0 vicryl. The skin was closed with Insorb staples.  The wound was dressed with steri-strips deonna pressure dressing.  Counts were correct times 2.  The patient and  were transferred to the recovery room in stable condition.

## 2021-11-29 NOTE — ANESTHESIA POSTPROCEDURE EVALUATION
Patient: Ana Patrick    Procedure: Procedure(s):   SECTION, WITH POSTPARTUM TUBAL LIGATION       Diagnosis:History of  [Z98.891]  Diagnosis Additional Information: No value filed.    Anesthesia Type:  Spinal    Note:  Disposition: Inpatient   Postop Pain Control: Uneventful            Sign Out: Well controlled pain   PONV: No   Neuro/Psych: Uneventful            Sign Out: Acceptable/Baseline neuro status   Airway/Respiratory: Uneventful            Sign Out: Acceptable/Baseline resp. status   CV/Hemodynamics: Uneventful            Sign Out: Acceptable CV status   Other NRE: NONE   DID A NON-ROUTINE EVENT OCCUR? No           Last vitals:  Vitals Value Taken Time   BP 93/53 21 1000   Temp 97.5  F (36.4  C) 21 0931   Pulse 61 21 1000   Resp 16 21 0940   SpO2 95 % 21 1001   Vitals shown include unvalidated device data.    Electronically Signed By: CHELY WEISS CRNA  2021  10:19 AM

## 2021-11-29 NOTE — OP NOTE
[facility] Obstetrics Operative Note     Pre-operative diagnosis: Dichorionic twins   Lupus anticoagulant positive  Antiphospholipid antibody syndrome  History of recurrent spontaneous miscarriage  History of  section for cephalopelvic disproportion  Desires sterilization  37w2d   Post-operative diagnosis: Same  Liveborn twin male infants  Status post sterilization   Procedure: Repeat low transverse  section  Bilateral salpingectomy   Surgeon: Gerry Kim MD   Assistant(s): I requested Shannon Ashton and Jason Gregory to assist with this  for  evaluation of twins at 37w2d by  delivery      Anesthesia: Spinal anesthesia   Estimated blood loss: 700ml   Total IV fluids: (See anesthesia record)   Blood transfusion: No transfusion was given during surgery   Total urine output: (See anesthesia record)   Drains: Kelley catheter   Specimens: placenta sent to pathology and right and left Fallopian Tube   Findings: Live   Male twins  Normal uterus and ovaries   Complications: None   Condition: Mother and infants stable, transfered to floor   Operative Details:    The patient was transferred to the OR where spinalanesthesia was accomplished.  A kelley catheter was inserted and clear urine was noted. The abdomen was scrubbed prepped and draped in the usual manner.  A hard stop timeout was accomplished.  A transverse Pfannenstiel incision was made and sharp and electrocautery dissection carried down to the fascial layer.  The Fascia was opened in the midline and extended bluntly bilaterally.  The rectus muscles were  in the midline bluntly.The peritoneum was bluntly divided and the Alanis ring retractor was placed.  A bladder flap was made sharply.  The lower uterine segment was incised sharply in a low transverse fashion and the amniotic sac ruptured for Twin A with toothed forceps. Clear fluid was noted and the incision extended bluntly.  The fetal head  was deliverd in the OT position.  The infant received nasal and oropharyngeal suction with the bulb suction. Nuchal cord x1 was noted and reduced. The shoulders delivered atraumatically followed by the remainder of the baby. The cord was clamped and cut after one minute and the infant handed to  Dr. Gregory. A single plastic clamp was placed on the cord after cord blood was collected, and a segment of cord handed off for pathology.     The second amniotic sac was ruptured with toothed forceps.  The fetus was notably transverse with back down.  The fetal head was rotated up into the upper uterus and the breech brought down into the lower uterine segment.  The baby was then delivered in the breech presentation.  The breech was first delivered followed by the legs to the level of the shoulders.  A Pinard maneuver was performed and the right shoulder delivered first followed by the left shoulder.  A Mauriceau maneuver was performed and the head delivered without difficulty.  The  was suctioned with a bulb suction and after 1 minute the clamp was placed on the cord, it was cut and the baby was handed off to Dr. Moody for evaluation.  Cord blood was again collected and tube plastic clamps placed on the cord for twin B.  The placenta was then manually extracted and sent to pathology for evaluation. The uterus was wiped clear of clots and debris. Pitocin was added to the IV infusion and the uterus was noted to firm-up nicely. The tubes and ovaries appeared normal. The uterine incision was closed in a double layer of #1 Chromic in a running fashion. Hemostasis was adequate.               The left tube was grasped. The mesoalpinx was clamped with Tanja clamps and divided. The tube was excised and the pedicles free tied with vicryl. Cautery was used for hemostasis on the tube and mesosalpinx. The same was done on the right and included a paratubal cyst which ruptured at excision. The abdomen irrigated clear of clots and  debris.  The peritoneum and the rectus  muscles were approximated with  3 0 vicryl.   The fascia was closed with 0 PDS.  The subcutaneous layer was irrigated and made hemastatic with electrocautery.  It was closed in a single layer of 3 0 vicryl. The skin was closed with Insorb staples.  The wound was dressed with steri-strips deonna pressure dressing.  Counts were correct times 2.  The patient and  were transferred to the recovery room in stable condition.                    Revision History                                Routing History

## 2021-11-30 LAB
HGB BLD-MCNC: 9.7 G/DL (ref 11.7–15.7)
T PALLIDUM AB SER QL: NONREACTIVE

## 2021-11-30 PROCEDURE — 250N000011 HC RX IP 250 OP 636: Performed by: OBSTETRICS & GYNECOLOGY

## 2021-11-30 PROCEDURE — 250N000013 HC RX MED GY IP 250 OP 250 PS 637: Performed by: OBSTETRICS & GYNECOLOGY

## 2021-11-30 PROCEDURE — 36415 COLL VENOUS BLD VENIPUNCTURE: CPT | Performed by: OBSTETRICS & GYNECOLOGY

## 2021-11-30 PROCEDURE — 85018 HEMOGLOBIN: CPT | Performed by: OBSTETRICS & GYNECOLOGY

## 2021-11-30 PROCEDURE — 120N000001 HC R&B MED SURG/OB

## 2021-11-30 PROCEDURE — 99207 PR NO CHARGE LOS: CPT | Performed by: OBSTETRICS & GYNECOLOGY

## 2021-11-30 RX ORDER — FENTANYL CITRATE 50 UG/ML
50 INJECTION, SOLUTION INTRAMUSCULAR; INTRAVENOUS
Status: DISCONTINUED | OUTPATIENT
Start: 2021-11-30 | End: 2021-12-02 | Stop reason: HOSPADM

## 2021-11-30 RX ADMIN — SIMETHICONE 80 MG: 80 TABLET, CHEWABLE ORAL at 18:23

## 2021-11-30 RX ADMIN — IBUPROFEN 800 MG: 400 TABLET ORAL at 09:20

## 2021-11-30 RX ADMIN — OXYCODONE HYDROCHLORIDE 10 MG: 5 TABLET ORAL at 06:31

## 2021-11-30 RX ADMIN — ENOXAPARIN SODIUM 40 MG: 40 INJECTION SUBCUTANEOUS at 09:24

## 2021-11-30 RX ADMIN — OXYCODONE HYDROCHLORIDE 10 MG: 5 TABLET ORAL at 12:49

## 2021-11-30 RX ADMIN — KETOROLAC TROMETHAMINE 30 MG: 30 INJECTION, SOLUTION INTRAMUSCULAR at 04:23

## 2021-11-30 RX ADMIN — OXYCODONE HYDROCHLORIDE 10 MG: 5 TABLET ORAL at 18:23

## 2021-11-30 RX ADMIN — ACETAMINOPHEN 975 MG: 325 TABLET, FILM COATED ORAL at 00:16

## 2021-11-30 RX ADMIN — ACETAMINOPHEN 975 MG: 325 TABLET, FILM COATED ORAL at 12:48

## 2021-11-30 RX ADMIN — ACETAMINOPHEN 975 MG: 325 TABLET, FILM COATED ORAL at 06:27

## 2021-11-30 RX ADMIN — IBUPROFEN 800 MG: 400 TABLET ORAL at 15:52

## 2021-11-30 RX ADMIN — DOCUSATE SODIUM 50 MG AND SENNOSIDES 8.6 MG 2 TABLET: 8.6; 5 TABLET, FILM COATED ORAL at 21:45

## 2021-11-30 RX ADMIN — ENOXAPARIN SODIUM 40 MG: 40 INJECTION SUBCUTANEOUS at 21:46

## 2021-11-30 RX ADMIN — SIMETHICONE 80 MG: 80 TABLET, CHEWABLE ORAL at 09:20

## 2021-11-30 RX ADMIN — IBUPROFEN 800 MG: 400 TABLET ORAL at 21:46

## 2021-11-30 RX ADMIN — ACETAMINOPHEN 975 MG: 325 TABLET, FILM COATED ORAL at 18:23

## 2021-11-30 RX ADMIN — DOCUSATE SODIUM 50 MG AND SENNOSIDES 8.6 MG 2 TABLET: 8.6; 5 TABLET, FILM COATED ORAL at 09:20

## 2021-11-30 NOTE — PLAN OF CARE
Pt is doing well, VSS. She is tolerating a regular diet. Pain is controlled with Tylenol, Toradol, Oxycodone and ice packs to abdomen. She is breast feeding as well as supplementing both babies. Incision is CDI, no drainage noted. Fundus is firm at U/1, bleeding is scant to light. She is ambulating without difficulty and voiding spontaneously. She is very attentive to both babies and is bonding well. She is independent with infant cares.

## 2021-11-30 NOTE — PROGRESS NOTES
Up in her room and to shower independently. Pain is being controlled with PO medications. She offers no complaints at this time.

## 2021-11-30 NOTE — PROGRESS NOTES
[facility]    Obstetrics Post-Op / Progress Note    Assessment & Plan   Assessment:  -1 Day Post-Op  Procedure(s):   SECTION, WITH POSTPARTUM TUBAL LIGATION    Doing well.  No excessive bleeding  Pain well-controlled.    Plan:  Ambulation encouraged  Lactation consultation  Monitor wound for signs of infection  Anticipate discharge in 2 days    Gerry Kim     Interval History   Doing well.  Pain is well-controlled.  No fevers.  No history of wound drainage, warmth or significant erythema.  Good appetite.  Denies chest pain, shortness of breath, nausea or vomiting.  Ambulatory.  Breastfeeding well.    Medications     dextrose 5% lactated ringers       lactated ringers 100 mL/hr at 21 1005     oxytocin in 0.9% NaCl       oxytocin in 0.9% NaCl         acetaminophen  975 mg Oral Q6H     enoxaparin ANTICOAGULANT  40 mg Subcutaneous Q12H     ibuprofen  800 mg Oral Q6H     Measles, Mumps & Rubella Vac  0.5 mL Subcutaneous Once     senna-docusate  1 tablet Oral BID    Or     senna-docusate  2 tablet Oral BID     sodium chloride (PF)  3 mL Intracatheter Q8H     Tdap (tetanus-diptheria-acell pertussis)  0.5 mL Intramuscular Once       Physical Exam   Temp: (!) 96.3  F (35.7  C) Temp src: Temporal BP: 112/60 Pulse: 56   Resp: 16 SpO2: 97 % O2 Device: None (Room air)    There were no vitals filed for this visit.  Vital Signs with Ranges  Temp:  [96.3  F (35.7  C)-97.6  F (36.4  C)] 96.3  F (35.7  C)  Pulse:  [56-67] 56  Resp:  [16-18] 16  BP: ()/(53-93) 112/60  SpO2:  [95 %-98 %] 97 %  I/O last 3 completed shifts:  In: -   Out:  [Urine:1440; Blood:515]    Uterine fundus is firm, non-tender and at the level of the umbilicus  Incision C/D/I  Extremities Non-tender    Data   Recent Labs   Lab Test 2118 21  1355   ABO  --  A   RH  --  Neg   AS Positive* Neg     Recent Labs   Lab Test 21  0442 21  0618   HGB 9.7* 11.1*  11.1*     Recent Labs   Lab Test  04/27/21  1355   QIGG 32

## 2021-11-30 NOTE — PROGRESS NOTES
Assessment done, VSS. See flowsheet for further info. Patient is ambulating independently in room, voiding W/O difficulty. She offers no complaints at this time. Will continue to monitor.

## 2021-12-01 LAB
PATH REPORT.COMMENTS IMP SPEC: NORMAL
PATH REPORT.FINAL DX SPEC: NORMAL
PHOTO IMAGE: NORMAL

## 2021-12-01 PROCEDURE — 120N000001 HC R&B MED SURG/OB

## 2021-12-01 PROCEDURE — 99207 PR NO CHARGE LOS: CPT | Performed by: OBSTETRICS & GYNECOLOGY

## 2021-12-01 PROCEDURE — 250N000011 HC RX IP 250 OP 636: Performed by: OBSTETRICS & GYNECOLOGY

## 2021-12-01 PROCEDURE — 250N000013 HC RX MED GY IP 250 OP 250 PS 637: Performed by: OBSTETRICS & GYNECOLOGY

## 2021-12-01 RX ADMIN — ACETAMINOPHEN 975 MG: 325 TABLET, FILM COATED ORAL at 00:44

## 2021-12-01 RX ADMIN — IBUPROFEN 800 MG: 400 TABLET ORAL at 09:18

## 2021-12-01 RX ADMIN — DOCUSATE SODIUM 50 MG AND SENNOSIDES 8.6 MG 2 TABLET: 8.6; 5 TABLET, FILM COATED ORAL at 09:55

## 2021-12-01 RX ADMIN — OXYCODONE HYDROCHLORIDE 10 MG: 5 TABLET ORAL at 18:40

## 2021-12-01 RX ADMIN — ENOXAPARIN SODIUM 40 MG: 40 INJECTION SUBCUTANEOUS at 21:11

## 2021-12-01 RX ADMIN — OXYCODONE HYDROCHLORIDE 10 MG: 5 TABLET ORAL at 00:43

## 2021-12-01 RX ADMIN — IBUPROFEN 800 MG: 400 TABLET ORAL at 21:10

## 2021-12-01 RX ADMIN — IBUPROFEN 800 MG: 400 TABLET ORAL at 15:28

## 2021-12-01 RX ADMIN — ACETAMINOPHEN 975 MG: 325 TABLET, FILM COATED ORAL at 07:37

## 2021-12-01 RX ADMIN — ACETAMINOPHEN 975 MG: 325 TABLET, FILM COATED ORAL at 13:19

## 2021-12-01 RX ADMIN — OXYCODONE HYDROCHLORIDE 10 MG: 5 TABLET ORAL at 13:19

## 2021-12-01 RX ADMIN — OXYCODONE HYDROCHLORIDE 10 MG: 5 TABLET ORAL at 07:37

## 2021-12-01 RX ADMIN — DOCUSATE SODIUM 50 MG AND SENNOSIDES 8.6 MG 2 TABLET: 8.6; 5 TABLET, FILM COATED ORAL at 21:10

## 2021-12-01 RX ADMIN — ENOXAPARIN SODIUM 40 MG: 40 INJECTION SUBCUTANEOUS at 09:19

## 2021-12-01 RX ADMIN — IBUPROFEN 800 MG: 400 TABLET ORAL at 04:05

## 2021-12-01 RX ADMIN — ACETAMINOPHEN 975 MG: 325 TABLET, FILM COATED ORAL at 18:40

## 2021-12-01 NOTE — PROGRESS NOTES
Patient up ambulating independent in room. Voiding without difficulty. Has not had bowel movement since  but has been passing flatus.Voiding without difficulty. Complains of mild incisional pain and was given scheduled Ibuprofen and ice pack.  Attentive to both babies needs and appears to be bonding well.   Angela Correa RN on 2021 at 4:18 AM

## 2021-12-02 VITALS
RESPIRATION RATE: 20 BRPM | SYSTOLIC BLOOD PRESSURE: 141 MMHG | TEMPERATURE: 96.7 F | HEART RATE: 70 BPM | DIASTOLIC BLOOD PRESSURE: 79 MMHG | OXYGEN SATURATION: 98 %

## 2021-12-02 LAB — PLATELET # BLD AUTO: 223 10E3/UL (ref 150–450)

## 2021-12-02 PROCEDURE — 99207 PR NO CHARGE LOS: CPT | Performed by: OBSTETRICS & GYNECOLOGY

## 2021-12-02 PROCEDURE — 250N000013 HC RX MED GY IP 250 OP 250 PS 637: Performed by: OBSTETRICS & GYNECOLOGY

## 2021-12-02 PROCEDURE — 85049 AUTOMATED PLATELET COUNT: CPT | Performed by: OBSTETRICS & GYNECOLOGY

## 2021-12-02 PROCEDURE — 36415 COLL VENOUS BLD VENIPUNCTURE: CPT | Performed by: OBSTETRICS & GYNECOLOGY

## 2021-12-02 PROCEDURE — 250N000011 HC RX IP 250 OP 636: Performed by: OBSTETRICS & GYNECOLOGY

## 2021-12-02 RX ORDER — IBUPROFEN 600 MG/1
600 TABLET, FILM COATED ORAL EVERY 6 HOURS PRN
Qty: 30 TABLET | Refills: 0 | Status: SHIPPED | OUTPATIENT
Start: 2021-12-02 | End: 2022-01-10

## 2021-12-02 RX ORDER — OXYCODONE HYDROCHLORIDE 5 MG/1
5-10 TABLET ORAL EVERY 4 HOURS PRN
Qty: 20 TABLET | Refills: 0 | Status: SHIPPED | OUTPATIENT
Start: 2021-12-02 | End: 2022-01-10

## 2021-12-02 RX ADMIN — ACETAMINOPHEN 975 MG: 325 TABLET, FILM COATED ORAL at 08:33

## 2021-12-02 RX ADMIN — IBUPROFEN 800 MG: 400 TABLET ORAL at 05:26

## 2021-12-02 RX ADMIN — ACETAMINOPHEN 975 MG: 325 TABLET, FILM COATED ORAL at 01:10

## 2021-12-02 RX ADMIN — DOCUSATE SODIUM 50 MG AND SENNOSIDES 8.6 MG 1 TABLET: 8.6; 5 TABLET, FILM COATED ORAL at 08:33

## 2021-12-02 RX ADMIN — ENOXAPARIN SODIUM 40 MG: 40 INJECTION SUBCUTANEOUS at 08:34

## 2021-12-02 RX ADMIN — OXYCODONE HYDROCHLORIDE 10 MG: 5 TABLET ORAL at 01:10

## 2021-12-02 RX ADMIN — OXYCODONE HYDROCHLORIDE 5 MG: 5 TABLET ORAL at 08:34

## 2021-12-02 NOTE — PLAN OF CARE
Pt afebrile, vss. Fundus firm  1 above umbilicus, light rubra bleeding. +1 edema in BLE. Bonding well with both babies. Pt rates pain 2/10 PRN oxycodone and tylenol given, will continue to monitor.        BP (!) 141/79 (BP Location: Right arm)   Pulse 70   Temp (!) 96.7  F (35.9  C) (Temporal)   Resp 20   LMP 03/13/2021 (Exact Date)   SpO2 98%   Breastfeeding Unknown

## 2021-12-02 NOTE — PROGRESS NOTES
NSG DISCHARGE NOTE    Patient discharged to home at 11:42 AM via ambulation. Accompanied by spouse and staff. Discharge instructions reviewed with patient and spouse, opportunity offered to ask questions. Prescriptions sent to patients preferred pharmacy. All belongings sent with patient.    Alia Jo RN

## 2021-12-02 NOTE — PROGRESS NOTES
[facility]    Obstetrics Post-Op / Progress Note    Assessment & Plan   Assessment:  -3 Days Post-Op  Procedure(s):   SECTION, WITH POSTPARTUM TUBAL LIGATION    Doing well.  Clean wound without signs of infection.  No immediate surgical complications identified.  No excessive bleeding  Pain well-controlled.    Plan:  Discharge later today    Gerry Kim     Interval History   Doing well.  Pain is well-controlled.  No fevers.  No history of wound drainage, warmth or significant erythema.  Good appetite.  Denies chest pain, shortness of breath, nausea or vomiting.  Ambulatory.  Breastfeeding well.    Medications     oxytocin in 0.9% NaCl       oxytocin in 0.9% NaCl         acetaminophen  975 mg Oral Q6H     enoxaparin ANTICOAGULANT  40 mg Subcutaneous Q12H     ibuprofen  800 mg Oral Q6H     senna-docusate  1 tablet Oral BID    Or     senna-docusate  2 tablet Oral BID       Physical Exam   Temp: (!) 96.4  F (35.8  C) Temp src: Temporal BP: 122/64 Pulse: 78   Resp: 16 SpO2: 98 % O2 Device: None (Room air)    There were no vitals filed for this visit.  Vital Signs with Ranges  Temp:  [96.1  F (35.6  C)-96.8  F (36  C)] 96.4  F (35.8  C)  Pulse:  [63-78] 78  Resp:  [16] 16  BP: (115-122)/(56-78) 122/64  SpO2:  [98 %-100 %] 98 %  No intake/output data recorded.    Uterine fundus is firm, non-tender and at the level of the umbilicus  Incision C/D/I  Extremities Non-tender    Data   Recent Labs   Lab Test 21  1355   ABO  --  A   RH  --  Neg   AS Positive* Neg     Recent Labs   Lab Test 21  0442 2118   HGB 9.7* 11.1*  11.1*     Recent Labs   Lab Test 21  1355   RUQIGG 32

## 2021-12-02 NOTE — PROGRESS NOTES
Assessment completed, vitals stable: /64 (BP Location: Right arm, Patient Position: Supine)   Pulse 78   Temp (!) 96.4  F (35.8  C) (Temporal)   Resp 16   LMP 03/13/2021 (Exact Date)   SpO2 98%   Breastfeeding Unknown     Voiding. FF, midline and 1 above the umbilicus. Bleeding light. Nipples tender, using lanolin cream. Breastfeeding every 2-3 hours and supplementing with formula. Bonding well with newborns. Reports that she was able to get some sleep.    Deejay Murray RN 12/02/21 7:23 AM

## 2021-12-02 NOTE — DISCHARGE SUMMARY
[facility]    Discharge Summary  Obstetrics    Date of Admission:  2021  Date of Discharge:  2021  Discharging Provider: Gerry Kim    Discharge Diagnoses   History of  [Z98.891]    Patient Active Problem List   Diagnosis     Abnormal pap     Generalized anxiety disorder     Patellar malalignment syndrome     PCOS (polycystic ovarian syndrome)     Gastroesophageal reflux disease without esophagitis     Lupus anticoagulant positive     History of recurrent miscarriages, not currently pregnant     Antiphospholipid antibody syndrome complicating pregnancy (H)     High-risk pregnancy in third trimester     S/P  section     Morbid obesity (H)     Encounter for triage in pregnant patient     Depression, major     HPV (human papilloma virus) infection     Knee pain     Slow transit constipation     History of        Procedure/Surgery Information   Procedure: Procedure(s):   SECTION, WITH POSTPARTUM TUBAL LIGATION   Surgeon(s): Surgeon(s) and Role:     * Gerry Kim MD - Primary     History of Present Illness   Ana Patrick is a 30 year old female who presented with repeat  section and salpingectomy with twins at 37w2d      Hospital Course   The patient's hospital course was unremarkable.  She recovered as anticipated and experienced no post-operative complications.  On discharge, her pain was well controlled. Vaginal bleeding is similar to peak menstrual flow.  Voiding without difficulty.  Ambulating well and tolerating a normal diet.  No fever or significant wound drainage.  Breastfeeding well.  Infant is stable.  She was discharged on post-partum day #3.    Post-partum hemoglobin:   Hemoglobin   Date Value Ref Range Status   2021 9.7 (L) 11.7 - 15.7 g/dL Final   2021 13.3 11.7 - 15.7 g/dL Final       Gerry Kim MD    Discharge Disposition   Discharged to home   Condition at discharge: Stable    Pending Results    Final pathology results: Pending at time of discharge  These results will be followed up by Gerry Kim MD    Unresulted Labs Ordered in the Past 30 Days of this Admission     No orders found from 10/30/2021 to 2021.          Primary Care Physician   Abiola Ashton    Consultations This Hospital Stay   HOME CARE POST PARTUM/ IP CONSULT  LACTATION IP CONSULT    Discharge Orders      Activity    Activity as tolerated     Reason for your hospital stay    Maternity care     Follow Up    Follow up with provider in 2 weeks and 6 weeks for post-delivery checks     Breast pump    Breast Pump Documentation:  Manual/Electric Pump: To support adequate breast milk production and nutrition for infant.     I, the undersigned, certify that the above prescribed supplies are medically necessary for this patient and is both reasonable and necessary in reference to accepted standards of medical and necessary in reference to accepted standards of medical practice in the treatment of this patient's condition and is not prescribed as a convenience.     Diet    Resume previous diet     Discharge Medications   Current Discharge Medication List      START taking these medications    Details   ibuprofen (ADVIL/MOTRIN) 600 MG tablet Take 1 tablet (600 mg) by mouth every 6 hours as needed for moderate pain  Qty: 30 tablet, Refills: 0    Associated Diagnoses: History of       oxyCODONE (ROXICODONE) 5 MG tablet Take 1-2 tablets (5-10 mg) by mouth every 4 hours as needed  Qty: 20 tablet, Refills: 0    Associated Diagnoses: History of          CONTINUE these medications which have NOT CHANGED    Details   acetaminophen (TYLENOL) 325 MG tablet Take 650 mg by mouth      albuterol (PROAIR HFA/PROVENTIL HFA/VENTOLIN HFA) 108 (90 Base) MCG/ACT inhaler Inhale 2 puffs into the lungs every 4 hours as needed for shortness of breath / dyspnea or wheezing  Qty: 18 g, Refills: 3    Comments: Pharmacy may dispense brand  covered by insurance (Proair, or proventil or ventolin or generic albuterol inhaler)  Associated Diagnoses: Mild intermittent reactive airway disease with acute exacerbation      calcium carbonate (TUMS) 500 MG chewable tablet Take 2 chew tab by mouth 2 times daily      docusate sodium (COLACE) 100 MG capsule Take 100 mg by mouth 2 times daily      Ferrous Gluconate 240 (27 Fe) MG TABS       folic acid 800 MCG tablet Take 800 mcg by mouth daily      Prenatal Vit-Fe Fumarate-FA (PRENATAL MULTIVITAMIN  PLUS IRON) 27-1 MG TABS Take 1 tablet by mouth daily      enoxaparin ANTICOAGULANT (LOVENOX) 40 MG/0.4ML syringe Inject 0.4 mLs (40 mg) Subcutaneous daily         STOP taking these medications       aspirin (ASA) 81 MG EC tablet Comments:   Reason for Stopping:         lisdexamfetamine (VYVANSE) 40 MG capsule Comments:   Reason for Stopping:         ondansetron (ZOFRAN-ODT) 4 MG ODT tab Comments:   Reason for Stopping:         pantoprazole (PROTONIX) 40 MG EC tablet Comments:   Reason for Stopping:         sucralfate (CARAFATE) 1 GM tablet Comments:   Reason for Stopping:             Allergies   Allergies   Allergen Reactions     Meloxicam Rash

## 2021-12-13 ENCOUNTER — OFFICE VISIT (OUTPATIENT)
Dept: OBGYN | Facility: OTHER | Age: 31
End: 2021-12-13
Attending: OBSTETRICS & GYNECOLOGY
Payer: COMMERCIAL

## 2021-12-13 VITALS
HEART RATE: 78 BPM | SYSTOLIC BLOOD PRESSURE: 121 MMHG | TEMPERATURE: 100.8 F | BODY MASS INDEX: 35.78 KG/M2 | WEIGHT: 215 LBS | DIASTOLIC BLOOD PRESSURE: 70 MMHG

## 2021-12-13 DIAGNOSIS — Z98.890 POSTOPERATIVE STATE: Primary | ICD-10-CM

## 2021-12-13 PROCEDURE — G0463 HOSPITAL OUTPT CLINIC VISIT: HCPCS

## 2021-12-13 PROCEDURE — 99024 POSTOP FOLLOW-UP VISIT: CPT | Performed by: OBSTETRICS & GYNECOLOGY

## 2021-12-13 ASSESSMENT — PAIN SCALES - GENERAL: PAINLEVEL: MILD PAIN (2)

## 2021-12-13 NOTE — PROGRESS NOTES
Ana Patrick presents todayfor a postop checkup at 2 week(s) after repeat  and salpingectomy    S: Bowels and bladder are functioning normally, no abnormal bleeding or pain. No concerns about the incision(s).    Current Outpatient Medications   Medication     acetaminophen (TYLENOL) 325 MG tablet     albuterol (PROAIR HFA/PROVENTIL HFA/VENTOLIN HFA) 108 (90 Base) MCG/ACT inhaler     calcium carbonate (TUMS) 500 MG chewable tablet     docusate sodium (COLACE) 100 MG capsule     enoxaparin ANTICOAGULANT (LOVENOX) 40 MG/0.4ML syringe     Ferrous Gluconate 240 (27 Fe) MG TABS     folic acid 800 MCG tablet     ibuprofen (ADVIL/MOTRIN) 600 MG tablet     Prenatal Vit-Fe Fumarate-FA (PRENATAL MULTIVITAMIN  PLUS IRON) 27-1 MG TABS     oxyCODONE (ROXICODONE) 5 MG tablet     No current facility-administered medications for this visit.     Allergies   Allergen Reactions     Meloxicam Rash     Past Medical History:   Diagnosis Date     Internal derangement of knee     2016     Personal history of other medical treatment (CODE)          Polycystic ovarian syndrome     3/18/2016     Past Surgical History:   Procedure Laterality Date     ARTHROSCOPY KNEE Right     Dr. Landeros      SECTION N/A 2020    Procedure:  SECTION;  Surgeon: Gerry Kim MD;  Location:  OR      SECTION, TUBAL LIGATION, COMBINED Bilateral 2021    Procedure:  SECTION, WITH POSTPARTUM TUBAL LIGATION;  Surgeon: Gerry Kim MD;  Location:  OR     HYSTEROSCOPY DIAGNOSTIC N/A 2018    Procedure: HYSTEROSCOPY DIAGNOSTIC;  Diagnostic Hysteroscopy with Endometrial Scratching.      .;  Surgeon: Grery Kim MD;  Location:  OR     MANDIBLE SURGERY Right            COMPLETE REVIEW OF SYSTEMS: Neg except as above        O: /70   Pulse 78   Temp (!) 100.8  F (38.2  C) (Tympanic)   Wt 97.5 kg (215 lb)   LMP 2021 (Exact Date)   BMI 35.78 kg/m   Body mass index is 35.78  kg/m .    EXAM:  NAD  Incision CDI      I/P:    (Z98.890) Postoperative state  (primary encounter diagnosis)  Comment:   Plan:   Continue lovenox x 1 month      Recheck in a month  Restrictions reiterated      Gerry Kim MD FACOG  1:49 PM 12/13/2021

## 2021-12-13 NOTE — NURSING NOTE
"Chief Complaint   Patient presents with     Surgical Followup     c section       Initial /70   Pulse 78   Temp (!) 100.8  F (38.2  C) (Tympanic)   Wt 97.5 kg (215 lb)   LMP 03/13/2021 (Exact Date)   BMI 35.78 kg/m   Estimated body mass index is 35.78 kg/m  as calculated from the following:    Height as of 11/7/21: 1.651 m (5' 5\").    Weight as of this encounter: 97.5 kg (215 lb).  Medication Reconciliation: complete    FOOD SECURITY SCREENING QUESTIONS  Hunger Vital Signs:  Within the past 12 months we worried whether our food would run out before we got money to buy more. Never  Within the past 12 months the food we bought just didn't last and we didn't have money to get more. Never  Jessenia Josue LPN 12/13/2021 11:02 AM             Jessenia Josue LPN  "

## 2021-12-16 ENCOUNTER — MEDICAL CORRESPONDENCE (OUTPATIENT)
Dept: HEALTH INFORMATION MANAGEMENT | Facility: OTHER | Age: 31
End: 2021-12-16
Payer: COMMERCIAL

## 2022-01-10 ENCOUNTER — PRENATAL OFFICE VISIT (OUTPATIENT)
Dept: OBGYN | Facility: OTHER | Age: 32
End: 2022-01-10
Attending: OBSTETRICS & GYNECOLOGY
Payer: COMMERCIAL

## 2022-01-10 VITALS
WEIGHT: 219 LBS | BODY MASS INDEX: 36.44 KG/M2 | SYSTOLIC BLOOD PRESSURE: 118 MMHG | OXYGEN SATURATION: 96 % | DIASTOLIC BLOOD PRESSURE: 70 MMHG | HEART RATE: 68 BPM

## 2022-01-10 PROCEDURE — 99207 PR OB VISIT-NO CHARGE - GICH ONLY: CPT | Performed by: OBSTETRICS & GYNECOLOGY

## 2022-01-10 PROCEDURE — G0463 HOSPITAL OUTPT CLINIC VISIT: HCPCS

## 2022-01-10 RX ORDER — SERTRALINE HYDROCHLORIDE 100 MG/1
100 TABLET, FILM COATED ORAL DAILY
Qty: 90 TABLET | Refills: 3 | Status: SHIPPED | OUTPATIENT
Start: 2022-01-10 | End: 2022-11-29 | Stop reason: SINTOL

## 2022-01-10 ASSESSMENT — EDINBURGH POSTNATAL DEPRESSION SCALE (EPDS)
I HAVE FELT SCARED OR PANICKY FOR NO GOOD REASON: NO, NOT AT ALL
I HAVE FELT SCARED OR PANICKY FOR NO GOOD REASON: NO, NOT AT ALL
THE THOUGHT OF HARMING MYSELF HAS OCCURRED TO ME: NEVER
I HAVE BEEN ABLE TO LAUGH AND SEE THE FUNNY SIDE OF THINGS: AS MUCH AS I ALWAYS COULD
THINGS HAVE BEEN GETTING ON TOP OF ME: NO, MOST OF THE TIME I HAVE COPED QUITE WELL
I HAVE BEEN SO UNHAPPY THAT I HAVE HAD DIFFICULTY SLEEPING: YES, SOMETIMES
I HAVE LOOKED FORWARD WITH ENJOYMENT TO THINGS: RATHER LESS THAN I USED TO
I HAVE BLAMED MYSELF UNNECESSARILY WHEN THINGS WENT WRONG: NOT VERY OFTEN
I HAVE FELT SAD OR MISERABLE: YES, QUITE OFTEN
THINGS HAVE BEEN GETTING ON TOP OF ME: NO, MOST OF THE TIME I HAVE COPED QUITE WELL
I HAVE BLAMED MYSELF UNNECESSARILY WHEN THINGS WENT WRONG: NOT VERY OFTEN
I HAVE BEEN ANXIOUS OR WORRIED FOR NO GOOD REASON: YES, SOMETIMES
I HAVE BEEN ANXIOUS OR WORRIED FOR NO GOOD REASON: YES, SOMETIMES
TOTAL SCORE: 10
I HAVE FELT SAD OR MISERABLE: YES, QUITE OFTEN
I HAVE BEEN SO UNHAPPY THAT I HAVE BEEN CRYING: ONLY OCCASIONALLY
I HAVE LOOKED FORWARD WITH ENJOYMENT TO THINGS: RATHER LESS THAN I USED TO
I HAVE BEEN SO UNHAPPY THAT I HAVE HAD DIFFICULTY SLEEPING: YES, SOMETIMES
I HAVE BEEN ABLE TO LAUGH AND SEE THE FUNNY SIDE OF THINGS: AS MUCH AS I ALWAYS COULD
THE THOUGHT OF HARMING MYSELF HAS OCCURRED TO ME: NEVER
I HAVE BEEN SO UNHAPPY THAT I HAVE BEEN CRYING: ONLY OCCASIONALLY

## 2022-01-10 ASSESSMENT — PATIENT HEALTH QUESTIONNAIRE - PHQ9
SUM OF ALL RESPONSES TO PHQ QUESTIONS 1-9: 14
SUM OF ALL RESPONSES TO PHQ QUESTIONS 1-9: 14
10. IF YOU CHECKED OFF ANY PROBLEMS, HOW DIFFICULT HAVE THESE PROBLEMS MADE IT FOR YOU TO DO YOUR WORK, TAKE CARE OF THINGS AT HOME, OR GET ALONG WITH OTHER PEOPLE: VERY DIFFICULT

## 2022-01-10 ASSESSMENT — PAIN SCALES - GENERAL: PAINLEVEL: NO PAIN (1)

## 2022-01-10 NOTE — NURSING NOTE
"Chief Complaint   Patient presents with     Postpartum Care     6 week post partum        Initial /70   Pulse 68   Wt 99.3 kg (219 lb)   LMP 03/13/2021 (Exact Date)   SpO2 96%   BMI 36.44 kg/m   Estimated body mass index is 36.44 kg/m  as calculated from the following:    Height as of 11/7/21: 1.651 m (5' 5\").    Weight as of this encounter: 99.3 kg (219 lb).  Medication Reconciliation: complete    FOOD SECURITY SCREENING QUESTIONS  Hunger Vital Signs:  Within the past 12 months we worried whether our food would run out before we got money to buy more. Never  Within the past 12 months the food we bought just didn't last and we didn't have money to get more. Never  Jessenia Josue LPN 1/10/2022 11:09 AM             Jessenia Josue LPN  "

## 2022-01-10 NOTE — PROGRESS NOTES
CC: postpartum exam at 6 weeks from delivery    HPI: Ana Patrick presents for postpartum exam. Twins were born by   delivery. Complications included:   No post-operative complications  Mood: Occasional down moods- would like to re-start previous antidepressant     Incision(s): Well-healed, no drainage or erythema   Bleeding: None- yellow discharge  Birthcontrol:Tubal     Past Medical History:   Diagnosis Date     Internal derangement of knee     2016     Personal history of other medical treatment (CODE)          Polycystic ovarian syndrome     3/18/2016     Past Surgical History:   Procedure Laterality Date     ARTHROSCOPY KNEE Right     Dr. Landeros      SECTION N/A 2020    Procedure:  SECTION;  Surgeon: Gerry Kim MD;  Location:  OR      SECTION, TUBAL LIGATION, COMBINED Bilateral 2021    Procedure:  SECTION, WITH POSTPARTUM TUBAL LIGATION;  Surgeon: Gerry Kim MD;  Location:  OR     HYSTEROSCOPY DIAGNOSTIC N/A 2018    Procedure: HYSTEROSCOPY DIAGNOSTIC;  Diagnostic Hysteroscopy with Endometrial Scratching.      .;  Surgeon: Gerry Kim MD;  Location:  OR     MANDIBLE SURGERY Right          Current Outpatient Medications   Medication     albuterol (PROAIR HFA/PROVENTIL HFA/VENTOLIN HFA) 108 (90 Base) MCG/ACT inhaler     calcium carbonate (TUMS) 500 MG chewable tablet     docusate sodium (COLACE) 100 MG capsule     enoxaparin ANTICOAGULANT (LOVENOX) 40 MG/0.4ML syringe     Ferrous Gluconate 240 (27 Fe) MG TABS     folic acid 800 MCG tablet     Prenatal Vit-Fe Fumarate-FA (PRENATAL MULTIVITAMIN  PLUS IRON) 27-1 MG TABS     sertraline (ZOLOFT) 100 MG tablet     No current facility-administered medications for this visit.      Allergies   Allergen Reactions     Meloxicam Rash       REVIEW OF SYSTEMS:  Neg for bowel, bladder, and breast concerns  Positive for occasional down moods    O: /70   Pulse 68   Wt 99.3 kg  (219 lb)   LMP 03/13/2021 (Exact Date)   SpO2 96%   BMI 36.44 kg/m    Body mass index is 36.44 kg/m .    Exam:  Constitutional: healthy, alert and no distress    Answers for HPI/ROS submitted by the patient on 1/10/2022  If you checked off any problems, how difficult have these problems made it for you to do your work, take care of things at home, or get along with other people?: Very difficult  PHQ9 TOTAL SCORE: 14      No results found for any visits on 01/10/22.      I/P:  Postpartum visit.    - Re-start zoloft 100mg daily  -May resume regular activities and work  -Pap due 2023        Resume annual preventive healthcare. Continue PNV's until done with breastfeeding.    RTC prn      Yamilet Armendariz on 1/10/2022 at 11:39 AM      Gerry Kim MD FACOG  11:28 AM 1/10/2022

## 2022-01-10 NOTE — PROGRESS NOTES
- Back to work- 6 hour   - Off of lovenox  -Second dose of covid  - moods  Answers for HPI/ROS submitted by the patient on 1/10/2022  If you checked off any problems, how difficult have these problems made it for you to do your work, take care of things at home, or get along with other people?: Very difficult  PHQ9 TOTAL SCORE: 14

## 2022-01-28 ENCOUNTER — OFFICE VISIT (OUTPATIENT)
Dept: FAMILY MEDICINE | Facility: OTHER | Age: 32
End: 2022-01-28
Attending: FAMILY MEDICINE
Payer: COMMERCIAL

## 2022-01-28 VITALS
HEIGHT: 65 IN | HEART RATE: 64 BPM | SYSTOLIC BLOOD PRESSURE: 114 MMHG | TEMPERATURE: 98.1 F | DIASTOLIC BLOOD PRESSURE: 68 MMHG | BODY MASS INDEX: 36.69 KG/M2 | OXYGEN SATURATION: 98 % | WEIGHT: 220.25 LBS | RESPIRATION RATE: 16 BRPM

## 2022-01-28 DIAGNOSIS — K21.9 GASTROESOPHAGEAL REFLUX DISEASE WITHOUT ESOPHAGITIS: ICD-10-CM

## 2022-01-28 DIAGNOSIS — F90.9 ADULT ADHD (ATTENTION DEFICIT HYPERACTIVITY DISORDER): ICD-10-CM

## 2022-01-28 PROBLEM — O09.93 HIGH-RISK PREGNANCY IN THIRD TRIMESTER: Status: RESOLVED | Noted: 2019-12-23 | Resolved: 2022-01-28

## 2022-01-28 PROBLEM — Z36.89 ENCOUNTER FOR TRIAGE IN PREGNANT PATIENT: Status: RESOLVED | Noted: 2021-11-11 | Resolved: 2022-01-28

## 2022-01-28 PROCEDURE — 99214 OFFICE O/P EST MOD 30 MIN: CPT | Performed by: FAMILY MEDICINE

## 2022-01-28 PROCEDURE — G0463 HOSPITAL OUTPT CLINIC VISIT: HCPCS

## 2022-01-28 RX ORDER — LISDEXAMFETAMINE DIMESYLATE 50 MG/1
50 CAPSULE ORAL DAILY
Qty: 30 CAPSULE | Refills: 0 | Status: SHIPPED | OUTPATIENT
Start: 2022-03-29 | End: 2022-04-28

## 2022-01-28 RX ORDER — LISDEXAMFETAMINE DIMESYLATE 50 MG/1
50 CAPSULE ORAL DAILY
Qty: 30 CAPSULE | Refills: 0 | Status: SHIPPED | OUTPATIENT
Start: 2022-02-27 | End: 2022-03-29

## 2022-01-28 RX ORDER — SUCRALFATE 1 G/1
TABLET ORAL
Qty: 270 TABLET | Refills: 2 | Status: SHIPPED | OUTPATIENT
Start: 2022-01-28 | End: 2022-11-29

## 2022-01-28 RX ORDER — LISDEXAMFETAMINE DIMESYLATE 40 MG/1
40 CAPSULE ORAL DAILY
Qty: 30 CAPSULE | Refills: 0 | Status: SHIPPED | OUTPATIENT
Start: 2022-01-28 | End: 2022-02-27

## 2022-01-28 RX ORDER — PANTOPRAZOLE SODIUM 40 MG/1
40 TABLET, DELAYED RELEASE ORAL 2 TIMES DAILY
Qty: 180 TABLET | Refills: 1 | Status: SHIPPED | OUTPATIENT
Start: 2022-01-28 | End: 2022-06-21

## 2022-01-28 ASSESSMENT — ANXIETY QUESTIONNAIRES
4. TROUBLE RELAXING: NEARLY EVERY DAY
2. NOT BEING ABLE TO STOP OR CONTROL WORRYING: MORE THAN HALF THE DAYS
GAD7 TOTAL SCORE: 16
1. FEELING NERVOUS, ANXIOUS, OR ON EDGE: NEARLY EVERY DAY
GAD7 TOTAL SCORE: 16
6. BECOMING EASILY ANNOYED OR IRRITABLE: NEARLY EVERY DAY
GAD7 TOTAL SCORE: 16
7. FEELING AFRAID AS IF SOMETHING AWFUL MIGHT HAPPEN: SEVERAL DAYS
3. WORRYING TOO MUCH ABOUT DIFFERENT THINGS: MORE THAN HALF THE DAYS
5. BEING SO RESTLESS THAT IT IS HARD TO SIT STILL: MORE THAN HALF THE DAYS
7. FEELING AFRAID AS IF SOMETHING AWFUL MIGHT HAPPEN: SEVERAL DAYS

## 2022-01-28 ASSESSMENT — MIFFLIN-ST. JEOR: SCORE: 1706.99

## 2022-01-28 ASSESSMENT — PATIENT HEALTH QUESTIONNAIRE - PHQ9
SUM OF ALL RESPONSES TO PHQ QUESTIONS 1-9: 16
10. IF YOU CHECKED OFF ANY PROBLEMS, HOW DIFFICULT HAVE THESE PROBLEMS MADE IT FOR YOU TO DO YOUR WORK, TAKE CARE OF THINGS AT HOME, OR GET ALONG WITH OTHER PEOPLE: SOMEWHAT DIFFICULT
SUM OF ALL RESPONSES TO PHQ QUESTIONS 1-9: 16

## 2022-01-28 ASSESSMENT — PAIN SCALES - GENERAL: PAINLEVEL: NO PAIN (0)

## 2022-01-28 ASSESSMENT — ASTHMA QUESTIONNAIRES: ACT_TOTALSCORE: 23

## 2022-01-28 NOTE — LETTER
My Asthma Action Plan    Name: Ana Patrick   YOB: 1990  Date: 1/28/2022   My doctor: Abiola Ashton, DO   My clinic: [facility]        My Rescue Medicine:   Albuterol inhaler (Proair/Ventolin/Proventil HFA)  2-4 puffs EVERY 4 HOURS as needed. Use a spacer if recommended by your provider.   My Asthma Severity:   Intermittent / Exercise Induced  Know your asthma triggers: Patient is unaware of triggers             GREEN ZONE   Good Control    I feel good    No cough or wheeze    Can work, sleep and play without asthma symptoms       Take your asthma control medicine every day.     1. If exercise triggers your asthma, take your rescue medication    15 minutes before exercise or sports, and    During exercise if you have asthma symptoms  2. Spacer to use with inhaler: If you have a spacer, make sure to use it with your inhaler             YELLOW ZONE Getting Worse  I have ANY of these:    I do not feel good    Cough or wheeze    Chest feels tight    Wake up at night   1. Keep taking your Green Zone medications  2. Start taking your rescue medicine:    every 20 minutes for up to 1 hour. Then every 4 hours for 24-48 hours.  3. If you stay in the Yellow Zone for more than 12-24 hours, contact your doctor.  4. If you do not return to the Green Zone in 12-24 hours or you get worse, start taking your oral steroid medicine if prescribed by your provider.           RED ZONE Medical Alert - Get Help  I have ANY of these:    I feel awful    Medicine is not helping    Breathing getting harder    Trouble walking or talking    Nose opens wide to breathe       1. Take your rescue medicine NOW  2. If your provider has prescribed an oral steroid medicine, start taking it NOW  3. Call your doctor NOW  4. If you are still in the Red Zone after 20 minutes and you have not reached your doctor:    Take your rescue medicine again and    Call 911 or go to the emergency room right away    See  your regular doctor within 2 weeks of an Emergency Room or Urgent Care visit for follow-up treatment.          Annual Reminders:  Meet with Asthma Educator,  Flu Shot in the Fall, consider Pneumonia Vaccination for patients with asthma (aged 19 and older).    Pharmacy: [facility] - [place], [place] - [email or web address]    Electronically signed by Abiola Ashton, DO   Date: 01/28/22                    Asthma Triggers  How To Control Things That Make Your Asthma Worse    Triggers are things that make your asthma worse.  Look at the list below to help you find your triggers and   what you can do about them. You can help prevent asthma flare-ups by staying away from your triggers.      Trigger                                                          What you can do   Cigarette Smoke  Tobacco smoke can make asthma worse. Do not allow smoking in your home, car or around you.  Be sure no one smokes at a child s day care or school.  If you smoke, ask your health care provider for ways to help you quit.  Ask family members to quit too.  Ask your health care provider for a referral to Quit Plan to help you quit smoking, or call 9-870-866PLAN.     Colds, Flu, Bronchitis  These are common triggers of asthma. Wash your hands often.  Don t touch your eyes, nose or mouth.  Get a flu shot every year.     Dust Mites  These are tiny bugs that live in cloth or carpet. They are too small to see. Wash sheets and blankets in hot water every week.   Encase pillows and mattress in dust mite proof covers.  Avoid having carpet if you can. If you have carpet, vacuum weekly.   Use a dust mask and HEPA vacuum.   Pollen and Outdoor Mold  Some people are allergic to trees, grass, or weed pollen, or molds. Try to keep your windows closed.  Limit time out doors when pollen count is high.   Ask you health care provider about taking medicine during allergy season.     Animal Dander  Some people are allergic to skin flakes, urine or  saliva from pets with fur or feathers. Keep pets with fur or feathers out of your home.    If you can t keep the pet outdoors, then keep the pet out of your bedroom.  Keep the bedroom door closed.  Keep pets off cloth furniture and away from stuffed toys.     Mice, Rats, and Cockroaches  Some people are allergic to the waste from these pests.   Cover food and garbage.  Clean up spills and food crumbs.  Store grease in the refrigerator.   Keep food out of the bedroom.   Indoor Mold  This can be a trigger if your home has high moisture. Fix leaking faucets, pipes, or other sources of water.   Clean moldy surfaces.  Dehumidify basement if it is damp and smelly.   Smoke, Strong Odors, and Sprays  These can reduce air quality. Stay away from strong odors and sprays, such as perfume, powder, hair spray, paints, smoke incense, paint, cleaning products, candles and new carpet.   Exercise or Sports  Some people with asthma have this trigger. Be active!  Ask your doctor about taking medicine before sports or exercise to prevent symptoms.    Warm up for 5-10 minutes before and after sports or exercise.     Other Triggers of Asthma  Cold air:  Cover your nose and mouth with a scarf.  Sometimes laughing or crying can be a trigger.  Some medicines and food can trigger asthma.

## 2022-01-28 NOTE — LETTER
My Depression Action Plan  Name: Ana Patrick   Date of Birth 1990  Date: 1/28/2022    My doctor: Abiola Ashton   My clinic: [facility]  [address]  [place] [id number] [phone]          GREEN    ZONE   Good Control    What it looks like:     Things are going generally well. You have normal ups and downs. You may even feel depressed from time to time, but bad moods usually last less than a day.   What you need to do:  1. Continue to care for yourself (see self care plan)  2. Check your depression survival kit and update it as needed  3. Follow your physician s recommendations including any medication.  4. Do not stop taking medication unless you consult with your physician first.           YELLOW         ZONE Getting Worse    What it looks like:     Depression is starting to interfere with your life.     It may be hard to get out of bed; you may be starting to isolate yourself from others.    Symptoms of depression are starting to last most all day and this has happened for several days.     You may have suicidal thoughts but they are not constant.   What you need to do:     1. Call your care team. Your response to treatment will improve if you keep your care team informed of your progress. Yellow periods are signs an adjustment may need to be made.     2. Continue your self-care.  Just get dressed and ready for the day.  Don't give yourself time to talk yourself out of it.    3. Talk to someone in your support network.    4. Open up your Depression Self-Care Plan/Wellness Kit.           RED    ZONE Medical Alert - Get Help    What it looks like:     Depression is seriously interfering with your life.     You may experience these or other symptoms: You can t get out of bed most days, can t work or engage in other necessary activities, you have trouble taking care of basic hygiene, or basic responsibilities, thoughts of suicide or death that will  not go away, self-injurious behavior.     What you need to do:  1. Call your care team and request a same-day appointment. If they are not available (weekends or after hours) call your local crisis line, emergency room or 911.          Depression Self-Care Plan / Wellness Kit    Many people find that medication and therapy are helpful treatments for managing depression. In addition, making small changes to your everyday life can help to boost your mood and improve your wellbeing. Below are some tips for you to consider. Be sure to talk with your medical provider and/or behavioral health consultant if your symptoms are worsening or not improving.     Sleep   Sleep hygiene  means all of the habits that support good, restful sleep. It includes maintaining a consistent bedtime and wake time, using your bedroom only for sleeping or sex, and keeping the bedroom dark and free of distractions like a computer, smartphone, or television.     Develop a Healthy Routine  Maintain good hygiene. Get out of bed in the morning, make your bed, brush your teeth, take a shower, and get dressed. Don t spend too much time viewing media that makes you feel stressed. Find time to relax each day.    Exercise  Get some form of exercise every day. This will help reduce pain and release endorphins, the  feel good  chemicals in your brain. It can be as simple as just going for a walk or doing some gardening, anything that will get you moving.      Diet  Strive to eat healthy foods, including fruits and vegetables. Drink plenty of water. Avoid excessive sugar, caffeine, alcohol, and other mood-altering substances.     Stay Connected with Others  Stay in touch with friends and family members.    Manage Your Mood  Try deep breathing, massage therapy, biofeedback, or meditation. Take part in fun activities when you can. Try to find something to smile about each day.     Psychotherapy  Be open to working with a therapist if your provider recommends  it.     Medication  Be sure to take your medication as prescribed. Most anti-depressants need to be taken every day. It usually takes several weeks for medications to work. Not all medicines work for all people. It is important to follow-up with your provider to make sure you have a treatment plan that is working for you. Do not stop your medication abruptly without first discussing it with your provider.    Crisis Resources   These hotlines are for both adults and children. They and are open 24 hours a day, 7 days a week unless noted otherwise.      National Suicide Prevention Lifeline   3-212-295-TALK (5796)      Crisis Text Line    www.crisistextline.org  Text HOME to 559782 from anywhere in the [place], anytime, about any type of crisis. A live, trained crisis counselor will receive the text and respond quickly.      [employer] Lifeline for LGBTQ Youth  A national crisis intervention and suicide lifeline for LGBTQ youth under 25. Provides a safe place to talk without judgement. Call 1-707.843.1151; text START to 641169 or visit www.thetrevorproject.org to talk to a trained counselor.      For [facility] crisis numbers, visit the [facility] website at:  https://[place].gov/dhs/people-we-serve/adults/health-care/mental-health/resources/crisis-contacts.jsp

## 2022-01-28 NOTE — PROGRESS NOTES
Assessment & Plan     1. Adult ADHD (attention deficit hyperactivity disorder)  Vyvanse refilled.  Will start 1 month of 40mg daily as she has been off for pregnancy; but tolerated 50mg daily prior to becoming pregnant.  No concerns.   accessed and appropriate.  - lisdexamfetamine (VYVANSE) 40 MG capsule; Take 1 capsule (40 mg) by mouth daily  Dispense: 30 capsule; Refill: 0  - lisdexamfetamine (VYVANSE) 50 MG capsule; Take 1 capsule (50 mg) by mouth daily  Dispense: 30 capsule; Refill: 0  - lisdexamfetamine (VYVANSE) 50 MG capsule; Take 1 capsule (50 mg) by mouth daily  Dispense: 30 capsule; Refill: 0    2. Gastroesophageal reflux disease without esophagitis  Chronic, worsening now after increased activity/exercise since delivery and initial weight loss of twins.  Encouraged to start with protonix daily, then increase to BID prn.  May or may not require her full course of carafate in addition due to weight loss.  Reflux food list reviewed - on patient instructions.  Follow up prn.  Consider EGD if unable to wean or with any s/s of worsening.  - pantoprazole (PROTONIX) 40 MG EC tablet; Take 1 tablet (40 mg) by mouth 2 times daily  Dispense: 180 tablet; Refill: 1  - sucralfate (CARAFATE) 1 GM tablet; TAKE 1 TABLET BY MOUTH 3 TIMES DAILY BEFORE MEALS  Dispense: 270 tablet; Refill: 2    3. Postpartum depression, postpartum condition  New; with previous depression, recent twin delivery and 2 year old at home.   Improved on zoloft which she has started with OB/GYN.  Continue on same dose.  OK to use with vyvanse and GERD medications.   Aware of s/s of worsening and what to watch for.    Abiola Ashton,   [facility]    Subjective   Ana is a 31 year old who presents for the following health issues  accompanied by her: , daughter and twin sons.    History of Present Illness       She eats 2-3 servings of fruits and vegetables daily.She consumes 1 sweetened beverage(s) daily.She  "exercises with enough effort to increase her heart rate 10 to 19 minutes per day.  She exercises with enough effort to increase her heart rate 3 or less days per week. She is missing 2 dose(s) of medications per week.       ADHD Follow-Up (Adult)  Concerns: wanting to restart medications after pregnancy/delivery of twins  Changes since last visit: Worse - without medications  Taking controlled (daily) medications as prescribed: Yes; previously  Sleep: multiple awakenings with  twins/2 year old  Adult ADHD Self-Reporting form given to patient?:  No  Currently in counseling: No    Medication Benefits:   Controlled symptoms: Attention span, Distractability, Finishing tasks and School failure - when on medications  Uncontrolled symptoms:  None - when on medications    Medication Side Effects:  Reports:  none  Sleep Problems? Yes Details: see above.  Up with newborns.  ++++++++++++++++++++++++++++++++++++++++++++++++    Employer Concerns/Feedback: None  Coworker Concerns:   None  Home/Family Concerns:  is present and relates less relationship frustration and noticeable improvement when she is on her medications.    GERD:  Needing refill of her pantoprazole/carafate.  Has used for GERD.  Was improved initially after delivery and weight loss.  But she is working out more now and notices intermittent reflux.  No hematemesis, melena or hematochezia.   Intentional weight loss.    Postpartum Depression:  With history of depression.  At risk with twins and older sibling.  + exhaustion.  Was placed on Zoloft by OB/GYN and improved.  Wondering about taking all her medications together.        Objective    /68   Pulse 64   Temp 98.1  F (36.7  C) (Tympanic)   Resp 16   Ht 1.638 m (5' 4.5\")   Wt 99.9 kg (220 lb 4 oz)   LMP 2022 (Approximate)   SpO2 98%   Breastfeeding No   BMI 37.22 kg/m    Body mass index is 37.22 kg/m .  Physical Exam   GENERAL: healthy, alert and no distress  EYES: Eyes grossly " normal to inspection, PERRL and conjunctivae and sclerae normal  HENT: ear canals and TM's normal, nose and mouth without ulcers or lesions  NECK: no adenopathy, no asymmetry, masses, or scars and thyroid normal to palpation  MS: no gross musculoskeletal defects noted, no edema  SKIN: no suspicious lesions or rashes  PSYCH: mentation appears normal, affect normal/bright    No results found for any visits on 01/28/22.        Answers for HPI/ROS submitted by the patient on 1/28/2022  If you checked off any problems, how difficult have these problems made it for you to do your work, take care of things at home, or get along with other people?: Somewhat difficult  PHQ9 TOTAL SCORE: 16  VITA 7 TOTAL SCORE: 16

## 2022-01-28 NOTE — NURSING NOTE
"Chief Complaint   Patient presents with     Recheck Medication       Initial /68   Pulse 64   Temp 98.1  F (36.7  C) (Tympanic)   Resp 16   Ht 1.638 m (5' 4.5\")   Wt 99.9 kg (220 lb 4 oz)   LMP 01/12/2022 (Approximate)   SpO2 98%   Breastfeeding No   BMI 37.22 kg/m   Estimated body mass index is 37.22 kg/m  as calculated from the following:    Height as of this encounter: 1.638 m (5' 4.5\").    Weight as of this encounter: 99.9 kg (220 lb 4 oz).  Medication Reconciliation: complete    Anny Lo LPN     Advanced Care Directive reviewed.     "

## 2022-01-29 ENCOUNTER — HEALTH MAINTENANCE LETTER (OUTPATIENT)
Age: 32
End: 2022-01-29

## 2022-01-29 ASSESSMENT — PATIENT HEALTH QUESTIONNAIRE - PHQ9: SUM OF ALL RESPONSES TO PHQ QUESTIONS 1-9: 16

## 2022-01-29 ASSESSMENT — ANXIETY QUESTIONNAIRES: GAD7 TOTAL SCORE: 16

## 2022-04-01 ENCOUNTER — MEDICAL CORRESPONDENCE (OUTPATIENT)
Dept: HEALTH INFORMATION MANAGEMENT | Facility: OTHER | Age: 32
End: 2022-04-01
Payer: COMMERCIAL

## 2022-04-06 ENCOUNTER — HOSPITAL ENCOUNTER (EMERGENCY)
Facility: OTHER | Age: 32
Discharge: HOME | End: 2022-04-06
Attending: FAMILY MEDICINE | Admitting: FAMILY MEDICINE
Payer: COMMERCIAL

## 2022-04-06 ENCOUNTER — APPOINTMENT (OUTPATIENT)
Dept: CT IMAGING | Facility: OTHER | Age: 32
End: 2022-04-06
Attending: FAMILY MEDICINE
Payer: COMMERCIAL

## 2022-04-06 VITALS
TEMPERATURE: 98.3 F | HEART RATE: 75 BPM | BODY MASS INDEX: 36.65 KG/M2 | RESPIRATION RATE: 18 BRPM | DIASTOLIC BLOOD PRESSURE: 74 MMHG | SYSTOLIC BLOOD PRESSURE: 125 MMHG | OXYGEN SATURATION: 97 % | WEIGHT: 220 LBS | HEIGHT: 65 IN

## 2022-04-06 DIAGNOSIS — S60.551A OPEN WOUND OF RIGHT HAND WITH FOREIGN BODY, UNSPECIFIED WOUND TYPE, INITIAL ENCOUNTER: ICD-10-CM
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
## 2022-04-06 DIAGNOSIS — S61.401A OPEN WOUND OF RIGHT HAND WITH FOREIGN BODY, UNSPECIFIED WOUND TYPE, INITIAL ENCOUNTER: ICD-10-CM

## 2022-04-06 DIAGNOSIS — M54.2 NECK PAIN: ICD-10-CM

## 2022-04-06 PROCEDURE — 99283 EMERGENCY DEPT VISIT LOW MDM: CPT | Performed by: FAMILY MEDICINE

## 2022-04-06 PROCEDURE — 250N000013 HC RX MED GY IP 250 OP 250 PS 637: Performed by: FAMILY MEDICINE

## 2022-04-06 PROCEDURE — 72125 CT NECK SPINE W/O DYE: CPT | Mod: TC

## 2022-04-06 PROCEDURE — 99283 EMERGENCY DEPT VISIT LOW MDM: CPT | Mod: 25 | Performed by: FAMILY MEDICINE

## 2022-04-06 RX ORDER — CEPHALEXIN 500 MG/1
500 CAPSULE ORAL 4 TIMES DAILY
Qty: 40 CAPSULE | Refills: 0 | Status: SHIPPED | OUTPATIENT
Start: 2022-04-06 | End: 2022-04-16

## 2022-04-06 RX ORDER — HYDROCODONE BITARTRATE AND ACETAMINOPHEN 5; 325 MG/1; MG/1
1 TABLET ORAL ONCE
Status: COMPLETED | OUTPATIENT
Start: 2022-04-06 | End: 2022-04-06

## 2022-04-06 RX ADMIN — HYDROCODONE BITARTRATE AND ACETAMINOPHEN 1 TABLET: 5; 325 TABLET ORAL at 21:29

## 2022-04-06 RX ADMIN — CEPHALEXIN 500 MG: 250 CAPSULE ORAL at 22:58

## 2022-04-06 ASSESSMENT — ENCOUNTER SYMPTOMS
NECK PAIN: 1
WOUND: 1

## 2022-04-07 NOTE — ED NOTES
Multiple superficial scratches on right hand.  Appears to be a black and blue puncture site on outside of right wrist,   Minimal amount of blood.  Luna Ortega RN.............................4/6/2022 10:07 PM
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
## 2022-04-07 NOTE — DISCHARGE INSTRUCTIONS
"Ana    I think that the \"wait and see\" approach is reasonable for your skin wound on the right hand.  If you decide to get it taken out (or have someone try to take it out) I would recommend that you talk to a general surgeon.    I recommend that you start Keflex to prevent infection of your small cuts and I did send a prescription to the pharmacy.     Thank you for choosing our Emergency Department for your care.     You may receive a phone call or letter for a survey about your care in our ED.  Please complete this as this is how we improve care for our patients.     If you have any questions after leaving the ED you can call me at [phone] during hours that I am working. I am working tonight until 7 am tomorrow and tomorrow night from 7 PM until 7 AM on Friday.    Sincerely,    Dr Michael Martel M.D.    "
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
## 2022-04-22 ENCOUNTER — OFFICE VISIT (OUTPATIENT)
Dept: OBGYN | Facility: OTHER | Age: 32
End: 2022-04-22
Attending: OBSTETRICS & GYNECOLOGY
Payer: COMMERCIAL

## 2022-04-22 VITALS
WEIGHT: 223.1 LBS | RESPIRATION RATE: 16 BRPM | OXYGEN SATURATION: 98 % | DIASTOLIC BLOOD PRESSURE: 70 MMHG | SYSTOLIC BLOOD PRESSURE: 106 MMHG | HEART RATE: 60 BPM | BODY MASS INDEX: 37.13 KG/M2

## 2022-04-22 DIAGNOSIS — R76.0 LUPUS ANTICOAGULANT POSITIVE: ICD-10-CM

## 2022-04-22 DIAGNOSIS — N93.9 ABNORMAL UTERINE BLEEDING (AUB): Primary | ICD-10-CM

## 2022-04-22 LAB
B-HCG SERPL-ACNC: <1 IU/L
ERYTHROCYTE [DISTWIDTH] IN BLOOD BY AUTOMATED COUNT: 12.2 % (ref 10–15)
HCT VFR BLD AUTO: 38.5 % (ref 35–47)
HGB BLD-MCNC: 12.7 G/DL (ref 11.7–15.7)
MCH RBC QN AUTO: 28.1 PG (ref 26.5–33)
MCHC RBC AUTO-ENTMCNC: 33 G/DL (ref 31.5–36.5)
MCV RBC AUTO: 85 FL (ref 78–100)
PLATELET # BLD AUTO: 222 10E3/UL (ref 150–450)
PROLACTIN SERPL-MCNC: 5 UG/L (ref 3–27)
RBC # BLD AUTO: 4.52 10E6/UL (ref 3.8–5.2)
TSH SERPL DL<=0.005 MIU/L-ACNC: 0.74 MU/L (ref 0.4–4)
WBC # BLD AUTO: 4.5 10E3/UL (ref 4–11)

## 2022-04-22 PROCEDURE — 93010 ELECTROCARDIOGRAM REPORT: CPT | Performed by: INTERNAL MEDICINE

## 2022-04-22 PROCEDURE — 84702 CHORIONIC GONADOTROPIN TEST: CPT | Mod: ZL | Performed by: OBSTETRICS & GYNECOLOGY

## 2022-04-22 PROCEDURE — 99214 OFFICE O/P EST MOD 30 MIN: CPT | Performed by: OBSTETRICS & GYNECOLOGY

## 2022-04-22 PROCEDURE — 85027 COMPLETE CBC AUTOMATED: CPT | Mod: ZL | Performed by: OBSTETRICS & GYNECOLOGY

## 2022-04-22 PROCEDURE — 36415 COLL VENOUS BLD VENIPUNCTURE: CPT | Mod: ZL | Performed by: OBSTETRICS & GYNECOLOGY

## 2022-04-22 PROCEDURE — G0463 HOSPITAL OUTPT CLINIC VISIT: HCPCS

## 2022-04-22 PROCEDURE — 84146 ASSAY OF PROLACTIN: CPT | Mod: ZL | Performed by: OBSTETRICS & GYNECOLOGY

## 2022-04-22 PROCEDURE — 93005 ELECTROCARDIOGRAM TRACING: CPT | Performed by: OBSTETRICS & GYNECOLOGY

## 2022-04-22 PROCEDURE — 84443 ASSAY THYROID STIM HORMONE: CPT | Mod: ZL | Performed by: OBSTETRICS & GYNECOLOGY

## 2022-04-22 RX ORDER — CEFAZOLIN SODIUM 2 G/100ML
2 INJECTION, SOLUTION INTRAVENOUS
Status: CANCELLED | OUTPATIENT
Start: 2022-04-22

## 2022-04-22 RX ORDER — ACETAMINOPHEN 325 MG/1
975 TABLET ORAL ONCE
Status: CANCELLED | OUTPATIENT
Start: 2022-04-22 | End: 2022-04-22

## 2022-04-22 RX ORDER — CEFAZOLIN SODIUM 2 G/100ML
2 INJECTION, SOLUTION INTRAVENOUS SEE ADMIN INSTRUCTIONS
Status: CANCELLED | OUTPATIENT
Start: 2022-04-22

## 2022-04-22 RX ORDER — ENOXAPARIN SODIUM 100 MG/ML
40 INJECTION SUBCUTANEOUS
Status: CANCELLED | OUTPATIENT
Start: 2022-04-22

## 2022-04-22 RX ORDER — PHENAZOPYRIDINE HYDROCHLORIDE 100 MG/1
200 TABLET, FILM COATED ORAL ONCE
Status: CANCELLED | OUTPATIENT
Start: 2022-04-22 | End: 2022-04-22

## 2022-04-22 ASSESSMENT — PAIN SCALES - GENERAL: PAINLEVEL: NO PAIN (0)

## 2022-04-22 NOTE — PROGRESS NOTES
CC: abnormal vaginal bleeding  HPI:  Ana is a 31 year old female who presents for discussion and management of abnormal uterine bleeding.  She notices irregular heavy menstrual cycles that require protection change every 1-2 hours for 2 to 3 days sporadically.  These are painful at times as well.  She is staying in the house and not leaving due to the amount of bleeding that she is doing when she gets her cycle.  She has significant medical history of antiphospholipid antibody syndrome with a lupus anticoagulant.  She is not able to take oral contraceptives due to her increased risk of DVT.  She has past surgical history of  x2 and a hysteroscopy with D&C.  She is also known to have polycystic ovarian syndrome.  She is 5 months from delivering twins by .  She did have a prior  for a bingham as well.  She is up-to-date on Pap smears and they have been recently normal.  She had a salpingectomy at the time of her .  Patient's last menstrual period was 2022.      OB History    Para Term  AB Living   5 2 2 0 3 3   SAB IAB Ectopic Multiple Live Births   3 0 0 1 3      # Outcome Date GA Lbr Mandeep/2nd Weight Sex Delivery Anes PTL Lv   5A Term 21 37w2d  2.76 kg (6 lb 1.4 oz) M CS-LTranv Spinal N PAUL      Name: CHUCKIEMALE-A ANA      Apgar1: 8  Apgar5: 9   5B Term 21 37w2d  2.242 kg (4 lb 15.1 oz) M CS-LTranv Spinal N PAUL      Name: CHUCKIEMALE-B ANA      Apgar1: 9  Apgar5: 9   4 Term 20 38w0d  3.617 kg (7 lb 15.6 oz) F CS-Unspec   PAUL      Name: CHUCKIEFEMALE-ANA      Apgar1: 6  Apgar5: 7   3 SAB 19           2 SAB 18           1 SAB 16             Past Medical History:   Diagnosis Date     Internal derangement of knee     2016     Personal history of other medical treatment (CODE)          Polycystic ovarian syndrome     3/18/2016     Past Surgical History:   Procedure Laterality Date     ARTHROSCOPY KNEE  Right     Dr. Landeros      SECTION N/A 2020    Procedure:  SECTION;  Surgeon: Gerry Kim MD;  Location:  OR      SECTION, TUBAL LIGATION, COMBINED Bilateral 2021    Procedure:  SECTION, WITH POSTPARTUM TUBAL LIGATION;  Surgeon: Gerry Kim MD;  Location:  OR     HYSTEROSCOPY DIAGNOSTIC N/A 2018    Procedure: HYSTEROSCOPY DIAGNOSTIC;  Diagnostic Hysteroscopy with Endometrial Scratching.      .;  Surgeon: Gerry Kim MD;  Location:  OR     MANDIBLE SURGERY Right          Social History     Socioeconomic History     Marital status:      Spouse name: Not on file     Number of children: Not on file     Years of education: Not on file     Highest education level: Not on file   Occupational History     Not on file   Tobacco Use     Smoking status: Never Smoker     Smokeless tobacco: Never Used   Vaping Use     Vaping Use: Never used   Substance and Sexual Activity     Alcohol use: Not Currently     Alcohol/week: 0.0 standard drinks     Comment: Alcoholic Drinks/day: rare     Drug use: No     Sexual activity: Yes     Partners: Male   Other Topics Concern     Parent/sibling w/ CABG, MI or angioplasty before 65F 55M? Not Asked   Social History Narrative     Not on file     Social Determinants of Health     Financial Resource Strain: Not on file   Food Insecurity: Not on file   Transportation Needs: Not on file   Physical Activity: Not on file   Stress: Not on file   Social Connections: Not on file   Intimate Partner Violence: Not on file   Housing Stability: Not on file     Family History   Problem Relation Age of Onset     Family History Negative Mother         Good Health     Family History Negative Father         Good Health     Cancer Maternal Grandmother         Cancer     Diabetes Other         Diabetes,maternal side     Anxiety Disorder Sister        Current Outpatient Medications   Medication     albuterol (PROAIR HFA/PROVENTIL  HFA/VENTOLIN HFA) 108 (90 Base) MCG/ACT inhaler     calcium carbonate (TUMS) 500 MG chewable tablet     lisdexamfetamine (VYVANSE) 50 MG capsule     pantoprazole (PROTONIX) 40 MG EC tablet     sertraline (ZOLOFT) 100 MG tablet     sucralfate (CARAFATE) 1 GM tablet     No current facility-administered medications for this visit.     Allergies   Allergen Reactions     Meloxicam Rash     /70 (BP Location: Right arm, Patient Position: Sitting, Cuff Size: Adult Regular)   Pulse 60   Resp 16   Wt 101.2 kg (223 lb 1.6 oz)   LMP 04/11/2022   SpO2 98%   Breastfeeding No   BMI 37.13 kg/m      REVIEW OF SYSTEMS  Negative except as above    Exam:  Constitutional: healthy, alert, active and no distress  CV: RRR no GRM   Resp: CTA B   Abdomen: NT, ND   Pelvic: Normal BUSE, vulva appears normal, vaginal and cervix are normal.  Uterus is normal size, shape position and mobility without adnexal mass or tenderness. Chaperone was present.         Lab:   Results for orders placed or performed in visit on 04/22/22   CBC W PLT No Diff     Status: Normal   Result Value Ref Range    WBC Count 4.5 4.0 - 11.0 10e3/uL    RBC Count 4.52 3.80 - 5.20 10e6/uL    Hemoglobin 12.7 11.7 - 15.7 g/dL    Hematocrit 38.5 35.0 - 47.0 %    MCV 85 78 - 100 fL    MCH 28.1 26.5 - 33.0 pg    MCHC 33.0 31.5 - 36.5 g/dL    RDW 12.2 10.0 - 15.0 %    Platelet Count 222 150 - 450 10e3/uL       ASSESSMENT/PLAN :  1. Abnormal uterine bleeding (AUB)    Her options for management of her abnormal bleeding are limited due to her antiphospholipid antibody syndrome.  We discussed use of a progesterone only pill, Mirena IUD, endometrial ablation, or laparoscopic hysterectomy.  At this point she is done with childbearing and would like to proceed with surgical intervention in the form of hysterectomy.  This would be the preferable route in my opinion due to her increased risk of endometrial hyperplasia secondary to her polycystic ovarian syndrome.  We would  recommend doing Lovenox prophylactically prior to surgery and while in the hospital and until fully ambulatory.  She is okay for general anesthesia for the procedure.  We will schedule it accordingly.      Gerry Kim MD FACOG  11:26 AM 4/22/2022

## 2022-04-22 NOTE — PROGRESS NOTES
"Date of Surgery: 5/12/22  Type of Surgery: hyst  Surgeon: Dr. Gerry Kim     appropriate appointments were scheduled by the [facility] . Surgical forms were copied and kept for informative purposes. Originals were delivered to Day-surgery. Questions were answered to the best of this nurse's ability.        STOP BANG    Fever/Chills or other infectious symptoms in past month? no  >10 pound weight loss in the past 2 months? no  Health Care Directive on file? no  History of blood transfusions? no  Td up to date? yes  History of VRE/MRSA? no      Obstructive Sleep Apnea screening    Preoperative Evaluation: Obstructive Sleep Apnea screening    S: Snore -  Do you snore loudly? (louder than talking or loud enough to be heard through closed doors) No  T: Tired - Do you often feel tired, fatigued, or sleepy during the daytime?No  O: Observed - Has anyone ever observed you stop breathing during your sleep?No  P: Pressure - Do you have or are you being treated for high blood pressure?No  B: BMI - BMI greater than 35kg/m2?Yes  A: Age - Age over 50 years old?No  N: Neck - Neck circumference greater than 40 cm?Yes  G: Gender - Gender: Male?No    Total number of \"YES\" responses:  2    Scoring: Low risk of JARRET 0-2  At Risk of JARRET: >3 High Risk of JARRET: 5-8      Total yes answers in JARRET section:    Low risk 0-2  At risk 3-4  High risk 5-8    Trudy Ryan RN............. 4/22/2022 2:04 PM     "

## 2022-04-24 LAB
ATRIAL RATE - MUSE: 63 BPM
DIASTOLIC BLOOD PRESSURE - MUSE: NORMAL MMHG
INTERPRETATION ECG - MUSE: NORMAL
P AXIS - MUSE: 37 DEGREES
PR INTERVAL - MUSE: 146 MS
QRS DURATION - MUSE: 84 MS
QT - MUSE: 406 MS
QTC - MUSE: 415 MS
R AXIS - MUSE: 26 DEGREES
SYSTOLIC BLOOD PRESSURE - MUSE: NORMAL MMHG
T AXIS - MUSE: 13 DEGREES
VENTRICULAR RATE- MUSE: 63 BPM

## 2022-04-27 ENCOUNTER — OFFICE VISIT (OUTPATIENT)
Dept: FAMILY MEDICINE | Facility: OTHER | Age: 32
End: 2022-04-27
Attending: FAMILY MEDICINE
Payer: COMMERCIAL

## 2022-04-27 VITALS
BODY MASS INDEX: 36.67 KG/M2 | DIASTOLIC BLOOD PRESSURE: 68 MMHG | RESPIRATION RATE: 16 BRPM | HEART RATE: 68 BPM | WEIGHT: 220.38 LBS | TEMPERATURE: 98.3 F | SYSTOLIC BLOOD PRESSURE: 116 MMHG | OXYGEN SATURATION: 98 %

## 2022-04-27 DIAGNOSIS — K21.9 GASTROESOPHAGEAL REFLUX DISEASE WITHOUT ESOPHAGITIS: ICD-10-CM

## 2022-04-27 DIAGNOSIS — S54.02XS DAMAGE TO LEFT ULNAR NERVE, SEQUELA: Primary | ICD-10-CM

## 2022-04-27 PROCEDURE — 99214 OFFICE O/P EST MOD 30 MIN: CPT | Performed by: FAMILY MEDICINE

## 2022-04-27 PROCEDURE — G0463 HOSPITAL OUTPT CLINIC VISIT: HCPCS

## 2022-04-27 RX ORDER — PANTOPRAZOLE SODIUM 20 MG/1
20 TABLET, DELAYED RELEASE ORAL DAILY
Qty: 30 TABLET | Refills: 1 | Status: SHIPPED | OUTPATIENT
Start: 2022-04-27 | End: 2022-11-29

## 2022-04-27 RX ORDER — FAMOTIDINE 20 MG/1
20 TABLET, FILM COATED ORAL 2 TIMES DAILY
Qty: 60 TABLET | Refills: 2 | Status: SHIPPED | OUTPATIENT
Start: 2022-04-27 | End: 2023-09-12

## 2022-04-27 RX ORDER — NAPROXEN 500 MG/1
500 TABLET ORAL 2 TIMES DAILY WITH MEALS
Qty: 28 TABLET | Refills: 0 | Status: ON HOLD | OUTPATIENT
Start: 2022-04-27 | End: 2022-05-12

## 2022-04-27 ASSESSMENT — ANXIETY QUESTIONNAIRES
5. BEING SO RESTLESS THAT IT IS HARD TO SIT STILL: NEARLY EVERY DAY
1. FEELING NERVOUS, ANXIOUS, OR ON EDGE: NEARLY EVERY DAY
GAD7 TOTAL SCORE: 18
4. TROUBLE RELAXING: NEARLY EVERY DAY
GAD7 TOTAL SCORE: 18
7. FEELING AFRAID AS IF SOMETHING AWFUL MIGHT HAPPEN: SEVERAL DAYS
GAD7 TOTAL SCORE: 18
7. FEELING AFRAID AS IF SOMETHING AWFUL MIGHT HAPPEN: SEVERAL DAYS
2. NOT BEING ABLE TO STOP OR CONTROL WORRYING: MORE THAN HALF THE DAYS
6. BECOMING EASILY ANNOYED OR IRRITABLE: NEARLY EVERY DAY
3. WORRYING TOO MUCH ABOUT DIFFERENT THINGS: NEARLY EVERY DAY

## 2022-04-27 ASSESSMENT — PATIENT HEALTH QUESTIONNAIRE - PHQ9
SUM OF ALL RESPONSES TO PHQ QUESTIONS 1-9: 16
SUM OF ALL RESPONSES TO PHQ QUESTIONS 1-9: 16
10. IF YOU CHECKED OFF ANY PROBLEMS, HOW DIFFICULT HAVE THESE PROBLEMS MADE IT FOR YOU TO DO YOUR WORK, TAKE CARE OF THINGS AT HOME, OR GET ALONG WITH OTHER PEOPLE: SOMEWHAT DIFFICULT

## 2022-04-27 ASSESSMENT — PAIN SCALES - GENERAL: PAINLEVEL: NO PAIN (0)
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
## 2022-04-28 ASSESSMENT — ANXIETY QUESTIONNAIRES: GAD7 TOTAL SCORE: 18

## 2022-04-28 ASSESSMENT — PATIENT HEALTH QUESTIONNAIRE - PHQ9: SUM OF ALL RESPONSES TO PHQ QUESTIONS 1-9: 16

## 2022-05-09 ENCOUNTER — ALLIED HEALTH/NURSE VISIT (OUTPATIENT)
Dept: FAMILY MEDICINE | Facility: OTHER | Age: 32
End: 2022-05-09
Attending: OBSTETRICS & GYNECOLOGY
Payer: COMMERCIAL

## 2022-05-09 DIAGNOSIS — Z20.822 COVID-19 RULED OUT: Primary | ICD-10-CM

## 2022-05-09 PROCEDURE — C9803 HOPD COVID-19 SPEC COLLECT: HCPCS

## 2022-05-09 PROCEDURE — U0005 INFEC AGEN DETEC AMPLI PROBE: HCPCS | Mod: ZL

## 2022-05-09 NOTE — PROGRESS NOTES
Patient here today for Covid test. Procedure 5/12/22.     Arianna Thayer CNA .............................on 5/9/2022 at 8:02 AM

## 2022-05-10 LAB — SARS-COV-2 RNA RESP QL NAA+PROBE: NEGATIVE

## 2022-05-11 ENCOUNTER — ANESTHESIA EVENT (OUTPATIENT)
Dept: SURGERY | Facility: OTHER | Age: 32
End: 2022-05-11
Payer: COMMERCIAL

## 2022-05-12 ENCOUNTER — HOSPITAL ENCOUNTER (OUTPATIENT)
Facility: OTHER | Age: 32
Discharge: HOME | End: 2022-05-13
Attending: OBSTETRICS & GYNECOLOGY | Admitting: OBSTETRICS & GYNECOLOGY
Payer: COMMERCIAL

## 2022-05-12 ENCOUNTER — ANESTHESIA (OUTPATIENT)
Dept: SURGERY | Facility: OTHER | Age: 32
End: 2022-05-12
Payer: COMMERCIAL

## 2022-05-12 DIAGNOSIS — O99.119 ANTIPHOSPHOLIPID ANTIBODY SYNDROME COMPLICATING PREGNANCY (H): ICD-10-CM

## 2022-05-12 DIAGNOSIS — G89.18 POSTOPERATIVE PAIN: Primary | ICD-10-CM

## 2022-05-12 DIAGNOSIS — N93.9 ABNORMAL UTERINE BLEEDING (AUB): ICD-10-CM

## 2022-05-12 DIAGNOSIS — D68.61 ANTIPHOSPHOLIPID ANTIBODY SYNDROME (H): ICD-10-CM

## 2022-05-12 DIAGNOSIS — R76.0 LUPUS ANTICOAGULANT POSITIVE: ICD-10-CM

## 2022-05-12 DIAGNOSIS — D68.61 ANTIPHOSPHOLIPID ANTIBODY SYNDROME COMPLICATING PREGNANCY (H): ICD-10-CM

## 2022-05-12 LAB
ABO/RH(D): NORMAL
ANTIBODY SCREEN: NEGATIVE
BASOPHILS # BLD AUTO: 0 10E3/UL (ref 0–0.2)
BASOPHILS NFR BLD AUTO: 1 %
CREAT SERPL-MCNC: 0.57 MG/DL (ref 0.6–1.2)
EOSINOPHIL # BLD AUTO: 0.2 10E3/UL (ref 0–0.7)
EOSINOPHIL NFR BLD AUTO: 3 %
ERYTHROCYTE [DISTWIDTH] IN BLOOD BY AUTOMATED COUNT: 12.7 % (ref 10–15)
GFR SERPL CREATININE-BSD FRML MDRD: >90 ML/MIN/1.73M2
GLUCOSE BLDC GLUCOMTR-MCNC: 106 MG/DL (ref 70–99)
HCT VFR BLD AUTO: 38.1 % (ref 35–47)
HGB BLD-MCNC: 13 G/DL (ref 11.7–15.7)
IMM GRANULOCYTES # BLD: 0 10E3/UL
IMM GRANULOCYTES NFR BLD: 0 %
LYMPHOCYTES # BLD AUTO: 2 10E3/UL (ref 0.8–5.3)
LYMPHOCYTES NFR BLD AUTO: 31 %
MCH RBC QN AUTO: 29 PG (ref 26.5–33)
MCHC RBC AUTO-ENTMCNC: 34.1 G/DL (ref 31.5–36.5)
MCV RBC AUTO: 85 FL (ref 78–100)
MONOCYTES # BLD AUTO: 0.4 10E3/UL (ref 0–1.3)
MONOCYTES NFR BLD AUTO: 7 %
NEUTROPHILS # BLD AUTO: 3.8 10E3/UL (ref 1.6–8.3)
NEUTROPHILS NFR BLD AUTO: 58 %
NRBC # BLD AUTO: 0 10E3/UL
NRBC BLD AUTO-RTO: 0 /100
PLATELET # BLD AUTO: 243 10E3/UL (ref 150–450)
RBC # BLD AUTO: 4.49 10E6/UL (ref 3.8–5.2)
SPECIMEN EXPIRATION DATE: NORMAL
WBC # BLD AUTO: 6.5 10E3/UL (ref 4–11)

## 2022-05-12 PROCEDURE — 370N000017 HC ANESTHESIA TECHNICAL FEE, PER MIN: Performed by: OBSTETRICS & GYNECOLOGY

## 2022-05-12 PROCEDURE — 272N000001 HC OR GENERAL SUPPLY STERILE: Performed by: OBSTETRICS & GYNECOLOGY

## 2022-05-12 PROCEDURE — 258N000003 HC RX IP 258 OP 636: Performed by: NURSE ANESTHETIST, CERTIFIED REGISTERED

## 2022-05-12 PROCEDURE — 58570 TLH UTERUS 250 G OR LESS: CPT | Performed by: NURSE ANESTHETIST, CERTIFIED REGISTERED

## 2022-05-12 PROCEDURE — 360N000077 HC SURGERY LEVEL 4, PER MIN: Performed by: OBSTETRICS & GYNECOLOGY

## 2022-05-12 PROCEDURE — 96374 THER/PROPH/DIAG INJ IV PUSH: CPT | Mod: XU

## 2022-05-12 PROCEDURE — 96376 TX/PRO/DX INJ SAME DRUG ADON: CPT | Mod: XU

## 2022-05-12 PROCEDURE — 250N000011 HC RX IP 250 OP 636: Performed by: OBSTETRICS & GYNECOLOGY

## 2022-05-12 PROCEDURE — 96375 TX/PRO/DX INJ NEW DRUG ADDON: CPT | Mod: XU

## 2022-05-12 PROCEDURE — 258N000003 HC RX IP 258 OP 636: Performed by: OBSTETRICS & GYNECOLOGY

## 2022-05-12 PROCEDURE — 82962 GLUCOSE BLOOD TEST: CPT

## 2022-05-12 PROCEDURE — 36415 COLL VENOUS BLD VENIPUNCTURE: CPT | Performed by: OBSTETRICS & GYNECOLOGY

## 2022-05-12 PROCEDURE — 82565 ASSAY OF CREATININE: CPT | Performed by: OBSTETRICS & GYNECOLOGY

## 2022-05-12 PROCEDURE — 64488 TAP BLOCK BI INJECTION: CPT | Mod: XU | Performed by: NURSE ANESTHETIST, CERTIFIED REGISTERED

## 2022-05-12 PROCEDURE — 999N000141 HC STATISTIC PRE-PROCEDURE NURSING ASSESSMENT: Performed by: OBSTETRICS & GYNECOLOGY

## 2022-05-12 PROCEDURE — 258N000001 HC RX 258: Performed by: OBSTETRICS & GYNECOLOGY

## 2022-05-12 PROCEDURE — 710N000010 HC RECOVERY PHASE 1, LEVEL 2, PER MIN: Performed by: OBSTETRICS & GYNECOLOGY

## 2022-05-12 PROCEDURE — 250N000009 HC RX 250: Performed by: NURSE ANESTHETIST, CERTIFIED REGISTERED

## 2022-05-12 PROCEDURE — 85025 COMPLETE CBC W/AUTO DIFF WBC: CPT | Performed by: OBSTETRICS & GYNECOLOGY

## 2022-05-12 PROCEDURE — 88307 TISSUE EXAM BY PATHOLOGIST: CPT

## 2022-05-12 PROCEDURE — 250N000026 HC DESFLURANE, PER MIN: Performed by: OBSTETRICS & GYNECOLOGY

## 2022-05-12 PROCEDURE — 250N000013 HC RX MED GY IP 250 OP 250 PS 637: Performed by: OBSTETRICS & GYNECOLOGY

## 2022-05-12 PROCEDURE — 58570 TLH UTERUS 250 G OR LESS: CPT | Performed by: OBSTETRICS & GYNECOLOGY

## 2022-05-12 PROCEDURE — 250N000011 HC RX IP 250 OP 636: Performed by: NURSE ANESTHETIST, CERTIFIED REGISTERED

## 2022-05-12 PROCEDURE — 86901 BLOOD TYPING SEROLOGIC RH(D): CPT | Performed by: OBSTETRICS & GYNECOLOGY

## 2022-05-12 RX ORDER — ACETAMINOPHEN 325 MG/1
975 TABLET ORAL EVERY 6 HOURS
Status: DISCONTINUED | OUTPATIENT
Start: 2022-05-12 | End: 2022-05-13 | Stop reason: HOSPADM

## 2022-05-12 RX ORDER — ONDANSETRON 2 MG/ML
INJECTION INTRAMUSCULAR; INTRAVENOUS PRN
Status: DISCONTINUED | OUTPATIENT
Start: 2022-05-12 | End: 2022-05-12

## 2022-05-12 RX ORDER — OXYCODONE HYDROCHLORIDE 5 MG/1
10 TABLET ORAL EVERY 4 HOURS PRN
Status: DISCONTINUED | OUTPATIENT
Start: 2022-05-12 | End: 2022-05-13 | Stop reason: HOSPADM

## 2022-05-12 RX ORDER — ALBUTEROL SULFATE 90 UG/1
2 AEROSOL, METERED RESPIRATORY (INHALATION) EVERY 4 HOURS PRN
Status: DISCONTINUED | OUTPATIENT
Start: 2022-05-12 | End: 2022-05-13 | Stop reason: HOSPADM

## 2022-05-12 RX ORDER — KETAMINE HYDROCHLORIDE 10 MG/ML
INJECTION INTRAMUSCULAR; INTRAVENOUS PRN
Status: DISCONTINUED | OUTPATIENT
Start: 2022-05-12 | End: 2022-05-12

## 2022-05-12 RX ORDER — OXYCODONE HYDROCHLORIDE 5 MG/1
5-10 TABLET ORAL EVERY 4 HOURS PRN
Qty: 12 TABLET | Refills: 0 | Status: SHIPPED | OUTPATIENT
Start: 2022-05-12 | End: 2022-05-23

## 2022-05-12 RX ORDER — CEFAZOLIN SODIUM/WATER 2 G/20 ML
2 SYRINGE (ML) INTRAVENOUS
Status: COMPLETED | OUTPATIENT
Start: 2022-05-12 | End: 2022-05-12

## 2022-05-12 RX ORDER — BUPIVACAINE HYDROCHLORIDE 2.5 MG/ML
INJECTION, SOLUTION INFILTRATION; PERINEURAL PRN
Status: DISCONTINUED | OUTPATIENT
Start: 2022-05-12 | End: 2022-05-12 | Stop reason: HOSPADM

## 2022-05-12 RX ORDER — MEPERIDINE HYDROCHLORIDE 50 MG/ML
12.5 INJECTION INTRAMUSCULAR; INTRAVENOUS; SUBCUTANEOUS
Status: DISCONTINUED | OUTPATIENT
Start: 2022-05-12 | End: 2022-05-12 | Stop reason: HOSPADM

## 2022-05-12 RX ORDER — BUPIVACAINE HYDROCHLORIDE 5 MG/ML
INJECTION, SOLUTION EPIDURAL; INTRACAUDAL PRN
Status: DISCONTINUED | OUTPATIENT
Start: 2022-05-12 | End: 2022-05-12

## 2022-05-12 RX ORDER — HYDROMORPHONE HYDROCHLORIDE 1 MG/ML
0.4 INJECTION, SOLUTION INTRAMUSCULAR; INTRAVENOUS; SUBCUTANEOUS EVERY 5 MIN PRN
Status: DISCONTINUED | OUTPATIENT
Start: 2022-05-12 | End: 2022-05-12 | Stop reason: HOSPADM

## 2022-05-12 RX ORDER — AMOXICILLIN 250 MG
1 CAPSULE ORAL 2 TIMES DAILY
Status: DISCONTINUED | OUTPATIENT
Start: 2022-05-12 | End: 2022-05-13 | Stop reason: HOSPADM

## 2022-05-12 RX ORDER — KETOROLAC TROMETHAMINE 15 MG/ML
15 INJECTION, SOLUTION INTRAMUSCULAR; INTRAVENOUS EVERY 6 HOURS
Status: DISCONTINUED | OUTPATIENT
Start: 2022-05-12 | End: 2022-05-13 | Stop reason: HOSPADM

## 2022-05-12 RX ORDER — PROCHLORPERAZINE MALEATE 10 MG
10 TABLET ORAL EVERY 6 HOURS PRN
Status: DISCONTINUED | OUTPATIENT
Start: 2022-05-12 | End: 2022-05-13 | Stop reason: HOSPADM

## 2022-05-12 RX ORDER — NALOXONE HYDROCHLORIDE 0.4 MG/ML
0.2 INJECTION, SOLUTION INTRAMUSCULAR; INTRAVENOUS; SUBCUTANEOUS
Status: DISCONTINUED | OUTPATIENT
Start: 2022-05-12 | End: 2022-05-13 | Stop reason: HOSPADM

## 2022-05-12 RX ORDER — NAPROXEN 500 MG/1
500 TABLET ORAL 2 TIMES DAILY PRN
COMMUNITY
End: 2022-09-13

## 2022-05-12 RX ORDER — ACETAMINOPHEN 325 MG/1
975 TABLET ORAL ONCE
Status: COMPLETED | OUTPATIENT
Start: 2022-05-12 | End: 2022-05-12

## 2022-05-12 RX ORDER — NALOXONE HYDROCHLORIDE 0.4 MG/ML
0.4 INJECTION, SOLUTION INTRAMUSCULAR; INTRAVENOUS; SUBCUTANEOUS
Status: DISCONTINUED | OUTPATIENT
Start: 2022-05-12 | End: 2022-05-13 | Stop reason: HOSPADM

## 2022-05-12 RX ORDER — DEXMEDETOMIDINE HYDROCHLORIDE 4 UG/ML
INJECTION, SOLUTION INTRAVENOUS PRN
Status: DISCONTINUED | OUTPATIENT
Start: 2022-05-12 | End: 2022-05-12

## 2022-05-12 RX ORDER — AMOXICILLIN 250 MG
1-2 CAPSULE ORAL 2 TIMES DAILY PRN
Qty: 30 TABLET | Refills: 0 | Status: SHIPPED | OUTPATIENT
Start: 2022-05-12 | End: 2022-05-23

## 2022-05-12 RX ORDER — CEFAZOLIN SODIUM/WATER 2 G/20 ML
2 SYRINGE (ML) INTRAVENOUS SEE ADMIN INSTRUCTIONS
Status: DISCONTINUED | OUTPATIENT
Start: 2022-05-12 | End: 2022-05-12 | Stop reason: HOSPADM

## 2022-05-12 RX ORDER — ONDANSETRON 4 MG/1
4 TABLET, ORALLY DISINTEGRATING ORAL EVERY 30 MIN PRN
Status: DISCONTINUED | OUTPATIENT
Start: 2022-05-12 | End: 2022-05-12 | Stop reason: HOSPADM

## 2022-05-12 RX ORDER — SODIUM CHLORIDE, SODIUM LACTATE, POTASSIUM CHLORIDE, CALCIUM CHLORIDE 600; 310; 30; 20 MG/100ML; MG/100ML; MG/100ML; MG/100ML
INJECTION, SOLUTION INTRAVENOUS CONTINUOUS
Status: DISCONTINUED | OUTPATIENT
Start: 2022-05-12 | End: 2022-05-13 | Stop reason: HOSPADM

## 2022-05-12 RX ORDER — ENOXAPARIN SODIUM 100 MG/ML
40 INJECTION SUBCUTANEOUS EVERY 24 HOURS
Status: DISCONTINUED | OUTPATIENT
Start: 2022-05-13 | End: 2022-05-13 | Stop reason: HOSPADM

## 2022-05-12 RX ORDER — LIDOCAINE 40 MG/G
CREAM TOPICAL
Status: DISCONTINUED | OUTPATIENT
Start: 2022-05-12 | End: 2022-05-12 | Stop reason: HOSPADM

## 2022-05-12 RX ORDER — PROPOFOL 10 MG/ML
INJECTION, EMULSION INTRAVENOUS CONTINUOUS PRN
Status: DISCONTINUED | OUTPATIENT
Start: 2022-05-12 | End: 2022-05-12

## 2022-05-12 RX ORDER — HYDROMORPHONE HCL IN WATER/PF 6 MG/30 ML
0.4 PATIENT CONTROLLED ANALGESIA SYRINGE INTRAVENOUS
Status: DISCONTINUED | OUTPATIENT
Start: 2022-05-12 | End: 2022-05-13 | Stop reason: HOSPADM

## 2022-05-12 RX ORDER — FENTANYL CITRATE 50 UG/ML
INJECTION, SOLUTION INTRAMUSCULAR; INTRAVENOUS PRN
Status: DISCONTINUED | OUTPATIENT
Start: 2022-05-12 | End: 2022-05-12

## 2022-05-12 RX ORDER — FENTANYL CITRATE 50 UG/ML
50 INJECTION, SOLUTION INTRAMUSCULAR; INTRAVENOUS EVERY 5 MIN PRN
Status: DISCONTINUED | OUTPATIENT
Start: 2022-05-12 | End: 2022-05-12 | Stop reason: HOSPADM

## 2022-05-12 RX ORDER — IBUPROFEN 400 MG/1
800 TABLET, FILM COATED ORAL EVERY 6 HOURS
Status: DISCONTINUED | OUTPATIENT
Start: 2022-05-12 | End: 2022-05-13 | Stop reason: HOSPADM

## 2022-05-12 RX ORDER — PHENAZOPYRIDINE HYDROCHLORIDE 100 MG/1
200 TABLET, FILM COATED ORAL ONCE
Status: COMPLETED | OUTPATIENT
Start: 2022-05-12 | End: 2022-05-12

## 2022-05-12 RX ORDER — OXYCODONE HYDROCHLORIDE 5 MG/1
5 TABLET ORAL EVERY 4 HOURS PRN
Status: DISCONTINUED | OUTPATIENT
Start: 2022-05-12 | End: 2022-05-13 | Stop reason: HOSPADM

## 2022-05-12 RX ORDER — ONDANSETRON 2 MG/ML
4 INJECTION INTRAMUSCULAR; INTRAVENOUS EVERY 6 HOURS PRN
Status: DISCONTINUED | OUTPATIENT
Start: 2022-05-12 | End: 2022-05-13 | Stop reason: HOSPADM

## 2022-05-12 RX ORDER — NEOSTIGMINE METHYLSULFATE 1 MG/ML
VIAL (ML) INJECTION PRN
Status: DISCONTINUED | OUTPATIENT
Start: 2022-05-12 | End: 2022-05-12

## 2022-05-12 RX ORDER — CALCIUM CARBONATE 500 MG/1
500 TABLET, CHEWABLE ORAL 4 TIMES DAILY PRN
Status: DISCONTINUED | OUTPATIENT
Start: 2022-05-12 | End: 2022-05-13 | Stop reason: HOSPADM

## 2022-05-12 RX ORDER — GLYCOPYRROLATE 0.2 MG/ML
INJECTION, SOLUTION INTRAMUSCULAR; INTRAVENOUS PRN
Status: DISCONTINUED | OUTPATIENT
Start: 2022-05-12 | End: 2022-05-12

## 2022-05-12 RX ORDER — LIDOCAINE HYDROCHLORIDE 20 MG/ML
INJECTION, SOLUTION INFILTRATION; PERINEURAL PRN
Status: DISCONTINUED | OUTPATIENT
Start: 2022-05-12 | End: 2022-05-12

## 2022-05-12 RX ORDER — ENOXAPARIN SODIUM 100 MG/ML
40 INJECTION SUBCUTANEOUS
Status: COMPLETED | OUTPATIENT
Start: 2022-05-12 | End: 2022-05-12

## 2022-05-12 RX ORDER — FENTANYL CITRATE 50 UG/ML
25 INJECTION, SOLUTION INTRAMUSCULAR; INTRAVENOUS
Status: DISCONTINUED | OUTPATIENT
Start: 2022-05-12 | End: 2022-05-12 | Stop reason: CLARIF

## 2022-05-12 RX ORDER — OXYCODONE HYDROCHLORIDE 5 MG/1
5 TABLET ORAL EVERY 4 HOURS PRN
Status: DISCONTINUED | OUTPATIENT
Start: 2022-05-12 | End: 2022-05-12 | Stop reason: HOSPADM

## 2022-05-12 RX ORDER — PROPOFOL 10 MG/ML
INJECTION, EMULSION INTRAVENOUS PRN
Status: DISCONTINUED | OUTPATIENT
Start: 2022-05-12 | End: 2022-05-12

## 2022-05-12 RX ORDER — ONDANSETRON 2 MG/ML
4 INJECTION INTRAMUSCULAR; INTRAVENOUS EVERY 30 MIN PRN
Status: DISCONTINUED | OUTPATIENT
Start: 2022-05-12 | End: 2022-05-12 | Stop reason: HOSPADM

## 2022-05-12 RX ORDER — SODIUM CHLORIDE, SODIUM LACTATE, POTASSIUM CHLORIDE, CALCIUM CHLORIDE 600; 310; 30; 20 MG/100ML; MG/100ML; MG/100ML; MG/100ML
INJECTION, SOLUTION INTRAVENOUS CONTINUOUS
Status: DISCONTINUED | OUTPATIENT
Start: 2022-05-12 | End: 2022-05-12 | Stop reason: HOSPADM

## 2022-05-12 RX ORDER — HYDROMORPHONE HCL IN WATER/PF 6 MG/30 ML
0.2 PATIENT CONTROLLED ANALGESIA SYRINGE INTRAVENOUS
Status: DISCONTINUED | OUTPATIENT
Start: 2022-05-12 | End: 2022-05-13 | Stop reason: HOSPADM

## 2022-05-12 RX ORDER — DEXAMETHASONE SODIUM PHOSPHATE 10 MG/ML
INJECTION, SOLUTION INTRAMUSCULAR; INTRAVENOUS PRN
Status: DISCONTINUED | OUTPATIENT
Start: 2022-05-12 | End: 2022-05-12

## 2022-05-12 RX ORDER — ONDANSETRON 4 MG/1
4 TABLET, ORALLY DISINTEGRATING ORAL EVERY 6 HOURS PRN
Status: DISCONTINUED | OUTPATIENT
Start: 2022-05-12 | End: 2022-05-13 | Stop reason: HOSPADM

## 2022-05-12 RX ORDER — PANTOPRAZOLE SODIUM 40 MG/1
40 TABLET, DELAYED RELEASE ORAL 2 TIMES DAILY
Status: DISCONTINUED | OUTPATIENT
Start: 2022-05-12 | End: 2022-05-13 | Stop reason: HOSPADM

## 2022-05-12 RX ORDER — FAMOTIDINE 20 MG/1
20 TABLET, FILM COATED ORAL 2 TIMES DAILY
Status: DISCONTINUED | OUTPATIENT
Start: 2022-05-12 | End: 2022-05-13 | Stop reason: HOSPADM

## 2022-05-12 RX ORDER — PHENYLEPHRINE HYDROCHLORIDE 10 MG/ML
INJECTION INTRAVENOUS PRN
Status: DISCONTINUED | OUTPATIENT
Start: 2022-05-12 | End: 2022-05-12

## 2022-05-12 RX ORDER — LIDOCAINE 40 MG/G
CREAM TOPICAL
Status: DISCONTINUED | OUTPATIENT
Start: 2022-05-12 | End: 2022-05-13 | Stop reason: HOSPADM

## 2022-05-12 RX ORDER — ACETAMINOPHEN 325 MG/1
650 TABLET ORAL EVERY 6 HOURS
Status: DISCONTINUED | OUTPATIENT
Start: 2022-05-15 | End: 2022-05-13 | Stop reason: HOSPADM

## 2022-05-12 RX ORDER — BISACODYL 10 MG
10 SUPPOSITORY, RECTAL RECTAL DAILY PRN
Status: DISCONTINUED | OUTPATIENT
Start: 2022-05-12 | End: 2022-05-13 | Stop reason: HOSPADM

## 2022-05-12 RX ADMIN — OXYCODONE HYDROCHLORIDE 10 MG: 5 TABLET ORAL at 17:17

## 2022-05-12 RX ADMIN — PROPOFOL 75 MCG/KG/MIN: 10 INJECTION, EMULSION INTRAVENOUS at 07:43

## 2022-05-12 RX ADMIN — HYDROMORPHONE HYDROCHLORIDE 0.4 MG: 0.2 INJECTION, SOLUTION INTRAMUSCULAR; INTRAVENOUS; SUBCUTANEOUS at 18:28

## 2022-05-12 RX ADMIN — DEXAMETHASONE SODIUM PHOSPHATE 5 MG: 10 INJECTION, SOLUTION INTRAMUSCULAR; INTRAVENOUS at 07:55

## 2022-05-12 RX ADMIN — GLYCOPYRROLATE 0.2 MG: 0.2 INJECTION INTRAMUSCULAR; INTRAVENOUS at 08:30

## 2022-05-12 RX ADMIN — ROCURONIUM BROMIDE 10 MG: 50 INJECTION, SOLUTION INTRAVENOUS at 08:41

## 2022-05-12 RX ADMIN — IBUPROFEN 800 MG: 400 TABLET, FILM COATED ORAL at 15:10

## 2022-05-12 RX ADMIN — ROCURONIUM BROMIDE 40 MG: 50 INJECTION, SOLUTION INTRAVENOUS at 07:43

## 2022-05-12 RX ADMIN — ACETAMINOPHEN 975 MG: 325 TABLET ORAL at 06:55

## 2022-05-12 RX ADMIN — PHENYLEPHRINE HYDROCHLORIDE 100 MCG: 10 INJECTION INTRAVENOUS at 08:05

## 2022-05-12 RX ADMIN — KETOROLAC TROMETHAMINE 15 MG: 15 INJECTION, SOLUTION INTRAMUSCULAR; INTRAVENOUS at 21:48

## 2022-05-12 RX ADMIN — GLYCOPYRROLATE 0.6 MG: 0.2 INJECTION INTRAMUSCULAR; INTRAVENOUS at 09:13

## 2022-05-12 RX ADMIN — LIDOCAINE HYDROCHLORIDE 60 MG: 20 INJECTION, SOLUTION INFILTRATION; PERINEURAL at 07:43

## 2022-05-12 RX ADMIN — SODIUM CHLORIDE, POTASSIUM CHLORIDE, SODIUM LACTATE AND CALCIUM CHLORIDE 1000 ML: 600; 310; 30; 20 INJECTION, SOLUTION INTRAVENOUS at 11:19

## 2022-05-12 RX ADMIN — HYDROMORPHONE HYDROCHLORIDE 0.4 MG: 0.2 INJECTION, SOLUTION INTRAMUSCULAR; INTRAVENOUS; SUBCUTANEOUS at 20:32

## 2022-05-12 RX ADMIN — BUPIVACAINE HYDROCHLORIDE 15 ML: 5 INJECTION, SOLUTION EPIDURAL; INTRACAUDAL; PERINEURAL at 07:53

## 2022-05-12 RX ADMIN — Medication 20 MG: at 08:34

## 2022-05-12 RX ADMIN — MIDAZOLAM HYDROCHLORIDE 2 MG: 1 INJECTION, SOLUTION INTRAMUSCULAR; INTRAVENOUS at 07:41

## 2022-05-12 RX ADMIN — DEXAMETHASONE SODIUM PHOSPHATE 5 MG: 10 INJECTION, SOLUTION INTRAMUSCULAR; INTRAVENOUS at 07:53

## 2022-05-12 RX ADMIN — SODIUM CHLORIDE, SODIUM LACTATE, POTASSIUM CHLORIDE, AND CALCIUM CHLORIDE: 600; 310; 30; 20 INJECTION, SOLUTION INTRAVENOUS at 08:10

## 2022-05-12 RX ADMIN — SERTRALINE HYDROCHLORIDE 100 MG: 50 TABLET ORAL at 17:17

## 2022-05-12 RX ADMIN — ACETAMINOPHEN 975 MG: 325 TABLET ORAL at 15:11

## 2022-05-12 RX ADMIN — FENTANYL CITRATE 100 MCG: 50 INJECTION, SOLUTION INTRAMUSCULAR; INTRAVENOUS at 09:15

## 2022-05-12 RX ADMIN — OXYCODONE HYDROCHLORIDE 5 MG: 5 TABLET ORAL at 12:23

## 2022-05-12 RX ADMIN — ONDANSETRON HYDROCHLORIDE 4 MG: 2 SOLUTION INTRAMUSCULAR; INTRAVENOUS at 07:43

## 2022-05-12 RX ADMIN — BUPIVACAINE HYDROCHLORIDE 15 ML: 5 INJECTION, SOLUTION EPIDURAL; INTRACAUDAL; PERINEURAL at 07:55

## 2022-05-12 RX ADMIN — SODIUM CHLORIDE, SODIUM LACTATE, POTASSIUM CHLORIDE, AND CALCIUM CHLORIDE: 600; 310; 30; 20 INJECTION, SOLUTION INTRAVENOUS at 07:31

## 2022-05-12 RX ADMIN — FENTANYL CITRATE 50 MCG: 50 INJECTION, SOLUTION INTRAMUSCULAR; INTRAVENOUS at 08:26

## 2022-05-12 RX ADMIN — FENTANYL CITRATE 50 MCG: 50 INJECTION, SOLUTION INTRAMUSCULAR; INTRAVENOUS at 07:41

## 2022-05-12 RX ADMIN — PHENAZOPYRIDINE 200 MG: 100 TABLET ORAL at 06:55

## 2022-05-12 RX ADMIN — PANTOPRAZOLE SODIUM 40 MG: 40 TABLET, DELAYED RELEASE ORAL at 21:47

## 2022-05-12 RX ADMIN — ACETAMINOPHEN 975 MG: 325 TABLET ORAL at 21:47

## 2022-05-12 RX ADMIN — SENNOSIDES AND DOCUSATE SODIUM 1 TABLET: 50; 8.6 TABLET ORAL at 11:29

## 2022-05-12 RX ADMIN — NEOSTIGMINE METHYLSULFATE 3 MG: 1 INJECTION INTRAVENOUS at 09:13

## 2022-05-12 RX ADMIN — ENOXAPARIN SODIUM 40 MG: 40 INJECTION SUBCUTANEOUS at 07:22

## 2022-05-12 RX ADMIN — DEXMEDETOMIDINE HYDROCHLORIDE 16 MCG: 4 INJECTION, SOLUTION INTRAVENOUS at 07:53

## 2022-05-12 RX ADMIN — PROPOFOL 200 MG: 10 INJECTION, EMULSION INTRAVENOUS at 07:43

## 2022-05-12 RX ADMIN — SENNOSIDES AND DOCUSATE SODIUM 1 TABLET: 50; 8.6 TABLET ORAL at 21:47

## 2022-05-12 RX ADMIN — DEXMEDETOMIDINE HYDROCHLORIDE 16 MCG: 4 INJECTION, SOLUTION INTRAVENOUS at 07:55

## 2022-05-12 RX ADMIN — HYDROMORPHONE HYDROCHLORIDE 0.4 MG: 0.2 INJECTION, SOLUTION INTRAMUSCULAR; INTRAVENOUS; SUBCUTANEOUS at 11:14

## 2022-05-12 RX ADMIN — Medication 2 G: at 07:33

## 2022-05-12 ASSESSMENT — ACTIVITIES OF DAILY LIVING (ADL)
TOILETING_ISSUES: NO
DIFFICULTY_EATING/SWALLOWING: NO
FALL_HISTORY_WITHIN_LAST_SIX_MONTHS: NO
DIFFICULTY_COMMUNICATING: NO
VISION_MANAGEMENT: GLASSES
DOING_ERRANDS_INDEPENDENTLY_DIFFICULTY: NO
CONCENTRATING,_REMEMBERING_OR_MAKING_DECISIONS_DIFFICULTY: NO
DRESSING/BATHING_DIFFICULTY: NO
WALKING_OR_CLIMBING_STAIRS_DIFFICULTY: NO
CHANGE_IN_FUNCTIONAL_STATUS_SINCE_ONSET_OF_CURRENT_ILLNESS/INJURY: NO
HEARING_DIFFICULTY_OR_DEAF: NO
WEAR_GLASSES_OR_BLIND: YES

## 2022-05-12 NOTE — OP NOTE
[facility] Gynecology Operative Note    Pre-operative diagnosis: Menometrorrhagia, dysmenorrhea   Post-operative diagnosis: Same   Procedure: Total laparoscopic hysterectomy  Cystoscopy   Surgeon: Gerry Kim MD   Assistant(s): I requested Dr. Celina Waldron to assist with this procedure. Assistance was medically necessary in order to safely performthe procedure without increased blood loss or morbidity. Assistance was provided through positioning, instrumentation, and retraction of incisions for better visualization of underlying structures and cauterization forhemostasis, and use of additional expertise.     Anesthesia: General Endotracheal Anesthesia   Estimated blood loss: 50ml   Total IV fluids: (See anesthesia record)   Blood transfusion: No transfusion was given during surgery   Total urine output: (See anesthesia record)   Drains: None   Specimens: Uterus   Findings: Normal abdomen and pelvis, midly enlarge uterus   Complications: None   Condition: Stable   Procedure details:    After consent was obtained the patient was taken back to the operative suite and placed under general anesthesia after spinal was administered. She was positioned in synchronous position. After timeout was performed and sterile prep and drape. A v-care device was placed in the vagina.  Prather catheter was inserted into the urethra. An occluder balloon was placed within the vagina.  The 5 millimeter incision was made within the umbilicus and a Veress needle was inserted and the abdomen was insufflated.  The incision was slightly enlarged and a 10 mm trocar was inserted.  A 10 mm laparoscope was introduced and the upper abdomen was within normal limits. The uterus was mildly enlarged and both right and left ovary were normal.  3 more 5 mm ports were placed.  One was in the left lower quadrant one in the periumbilical region on the left and one in the right lower quadrant.  The uterus was deviated to the patient's  right and the ovary grasped and moved laterally  The left utero-ovarian ligament was cross clamped sealed and divided.  The cardinal ligament was then crossclamped sealed and divided.  The round ligament had previously been opened and the bladder flap started.  The ureter was visibly free of the dissection on the left.  We then proceeded similarly on the right.  The right ovary was grasped and brought laterally.  The uteroovarian ligament was sealed and divided.  The broad ligament was opened and the ureter visualized.  The cardinal ligament complex was sealed and divided.  The bladder flap was completed.  A circumferential incision was made circumscribing the cervix.  The uterus was removed through the vagina.  The vaginal cuff was then closed with the V lock barbed suture.  Adequate hemostasis was noted.  The pelvis was copiously irrigated with saline.  The 4 trochars were removed.  The infraumbilical incision was closed at the level of the fascia with an 0 Vicryl.  Skin incisions were all closed with 3-0 Monocryl and Exofin    EBL was 50 cc.  Pathology included uterus.  There were no complications.  The patient was awakened and taken to the PACU in stable condition.    Gerry Kim MD FACOG  9:30 AM 5/12/2022

## 2022-05-12 NOTE — PROVIDER NOTIFICATION
05/12/22 1036   Valuables   Patient Belongings remains with patient   Patient Belongings Remaining with Patient clothing;shoes;ring;purse/wallet;cash/credit card;glasses;cell phone/electronics  (3 rings, apple watch, 2 debit cards, 1 credit card.)   Did you bring any home meds/supplements to the hospital?  Yes   Disposition of meds  Sent Home   A               Admission:  I am responsible for any personal items that are not sent to the safe or pharmacy.  [facility] is not responsible for loss, theft or damage of any property in my possession.    Signature:  _________________________________ Date: _______  Time: _____                                              Staff Signature:  ____________________________ Date: ________  Time: _____      2nd Staff person, if patient is unable/unwilling to sign:    Signature: ________________________________ Date: ________  Time: _____     Discharge:  [facility] has returned all of my personal belongings:    Signature: _________________________________ Date: ________  Time: _____                                          Staff Signature:  ____________________________ Date: ________  Time: _____

## 2022-05-12 NOTE — ANESTHESIA PREPROCEDURE EVALUATION
Anesthesia Pre-Procedure Evaluation    Patient: Ana Patrick   MRN: [id number] : 1990        Procedure : Procedure(s):  HYSTERECTOMY, TOTAL, LAPAROSCOPIC,          Past Medical History:   Diagnosis Date     Internal derangement of knee     2016     Personal history of other medical treatment (CODE)     2013     Polycystic ovarian syndrome     3/18/2016      Past Surgical History:   Procedure Laterality Date     ARTHROSCOPY KNEE Right     Dr. Landeros      SECTION N/A 2020    Procedure:  SECTION;  Surgeon: Gerry Kim MD;  Location:  OR      SECTION, TUBAL LIGATION, COMBINED Bilateral 2021    Procedure:  SECTION, WITH POSTPARTUM TUBAL LIGATION;  Surgeon: Gerry Kim MD;  Location:  OR     HYSTEROSCOPY DIAGNOSTIC N/A 2018    Procedure: HYSTEROSCOPY DIAGNOSTIC;  Diagnostic Hysteroscopy with Endometrial Scratching.      .;  Surgeon: Gerry Kim MD;  Location:  OR     MANDIBLE SURGERY Right           Allergies   Allergen Reactions     Meloxicam Rash      Social History     Tobacco Use     Smoking status: Never Smoker     Smokeless tobacco: Never Used   Substance Use Topics     Alcohol use: Not Currently     Alcohol/week: 0.0 standard drinks     Comment: Alcoholic Drinks/day: rare      Wt Readings from Last 1 Encounters:   22 100 kg (220 lb 6 oz)        Anesthesia Evaluation   Pt has had prior anesthetic.     No history of anesthetic complications       ROS/MED HX  ENT/Pulmonary:  - neg pulmonary ROS     Neurologic:  - neg neurologic ROS     Cardiovascular:  - neg cardiovascular ROS     METS/Exercise Tolerance: >4 METS    Hematologic:  - neg hematologic  ROS     Musculoskeletal:  - neg musculoskeletal ROS     GI/Hepatic:     (+) GERD, Asymptomatic on medication,     Renal/Genitourinary:  - neg Renal ROS     Endo:     (+) Obesity,     Psychiatric/Substance Use:     (+) psychiatric history anxiety and depression     Infectious  Disease:  - neg infectious disease ROS     Malignancy:  - neg malignancy ROS     Other:  - neg other ROS          Physical Exam    Airway        Mallampati: II   TM distance: > 3 FB   Neck ROM: full   Mouth opening: > 3 cm    Respiratory Devices and Support         Dental  no notable dental history         Cardiovascular   cardiovascular exam normal       Rhythm and rate: regular and normal     Pulmonary   pulmonary exam normal        breath sounds clear to auscultation           OUTSIDE LABS:  CBC:   Lab Results   Component Value Date    WBC 6.5 05/12/2022    WBC 4.5 04/22/2022    HGB 13.0 05/12/2022    HGB 12.7 04/22/2022    HCT 38.1 05/12/2022    HCT 38.5 04/22/2022     05/12/2022     04/22/2022     BMP:   Lab Results   Component Value Date     11/07/2021     04/23/2021    POTASSIUM 3.1 (L) 11/07/2021    POTASSIUM 3.4 (L) 04/23/2021    CHLORIDE 107 11/07/2021    CHLORIDE 107 04/23/2021    CO2 19 (L) 11/07/2021    CO2 24 04/23/2021    BUN 4 (L) 11/07/2021    BUN 8 04/23/2021    CR 0.46 (L) 11/29/2021    CR 0.61 11/07/2021     (H) 11/07/2021     (H) 04/23/2021     COAGS:   Lab Results   Component Value Date    PTT 41 (H) 08/09/2018    INR 1.07 08/09/2018     POC:   Lab Results   Component Value Date    HCG Negative 03/24/2020     HEPATIC:   Lab Results   Component Value Date    ALBUMIN 3.8 04/23/2021    PROTTOTAL 6.8 04/23/2021    ALT 35 04/23/2021    AST 34 04/23/2021    ALKPHOS 62 04/23/2021    BILITOTAL 0.3 04/23/2021    BILIDIRECT 0.07 05/04/2017     OTHER:   Lab Results   Component Value Date    A1C 4.7 03/01/2019    ALEX 8.6 11/07/2021    MAG 2.1 04/23/2021    LIPASE 49 12/21/2018    AMYLASE 34 07/19/2017    TSH 0.74 04/22/2022       Anesthesia Plan    ASA Status:  2   NPO Status:  NPO Appropriate    Anesthesia Type: General.     - Airway: ETT   Induction: Propofol.   Maintenance: Balanced.        Consents    Anesthesia Plan(s) and associated risks, benefits, and  realistic alternatives discussed. Questions answered and patient/representative(s) expressed understanding.     - Discussed: Risks, Benefits and Alternatives for BOTH SEDATION and the PROCEDURE were discussed     - Discussed with:  Patient      - Extended Intubation/Ventilatory Support Discussed: No.      - Patient is DNR/DNI Status: No    Use of blood products discussed: Yes.     - Discussed with: Patient.     - Consented: consented to blood products            Reason for refusal: other.     Postoperative Care    Pain management: IV analgesics, Multi-modal analgesia, Peripheral nerve block (Single Shot).   PONV prophylaxis: Ondansetron (or other 5HT-3), Dexamethasone or Solumedrol     Comments:                CHELY TURNER CRNA

## 2022-05-12 NOTE — ANESTHESIA PROCEDURE NOTES
TAP Procedure Note    Pre-Procedure   Staff -        CRNA: Melinda Vidal APRN CRNA       Performed By: CRNA       Location: OR       Procedure Start/Stop Times: 5/12/2022 7:50 AM and 5/12/2022 7:55 AM       Pre-Anesthestic Checklist: patient identified, IV checked, site marked, risks and benefits discussed, informed consent, monitors and equipment checked, pre-op evaluation, at physician/surgeon's request and post-op pain management  Timeout:       Correct Patient: Yes        Correct Procedure: Yes        Correct Site: Yes        Correct Position: Yes        Correct Laterality: N/A        Site Marked: N/A  Procedure Documentation  Procedure: TAP       Diagnosis: POST OPERATIVE PAIN CONTROL       Laterality: bilateral       Patient Position: supine       Patient Prep/Sterile Barriers: sterile gloves, mask       Skin prep: Chloraprep       Needle Type: insulated and short bevel       Needle Gauge: 20.        Needle Length (Inches): 6        Ultrasound guided       1. Ultrasound was used to identify targeted nerve, plexus, vascular marker, or fascial plane and place a needle adjacent to it in real-time.       2. Ultrasound was used to visualize the spread of anesthetic in close proximity to the above referenced structure.       3. A permanent image is entered into the patient's record.       4. The visualized anatomic structures appeared normal.       5. There were no apparent abnormal pathologic findings.    Assessment/Narrative         The placement was negative for: blood aspirated, painful injection and site bleeding       Paresthesias: No.       Bolus given via needle..        Secured via.        Insertion/Infusion Method: Single Shot       Complications: none    Medication(s) Administered   Medication Administration Time: 5/12/2022 7:50 AM

## 2022-05-12 NOTE — ANESTHESIA PROCEDURE NOTES
Airway       Patient location during procedure: OR       Procedure Start/Stop Times: 5/12/2022 7:47 AM  Staff -        CRNA: Melinda Vidal APRN CRNA       Performed By: CRNA  Consent for Airway        Urgency: elective  Indications and Patient Condition       Indications for airway management: deyanira-procedural       Induction type:intravenous       Mask difficulty assessment: 1 - vent by mask    Final Airway Details       Final airway type: endotracheal airway       Successful airway: ETT - single and Oral  Endotracheal Airway Details        ETT size (mm): 7.0       Cuffed: yes       Successful intubation technique: direct laryngoscopy       DL Blade Type: Blue 2       Grade View of Cords: 1       Position: Center       Measured from: gums/teeth       Secured at (cm): 22       Bite block used: None    Post intubation assessment        Placement verified by: capnometry, equal breath sounds and chest rise        Number of attempts at approach: 1       Number of other approaches attempted: 0       Secured with: silk tape       Ease of procedure: easy       Dentition: Intact and Unchanged    Medication(s) Administered   Medication Administration Time: 5/12/2022 7:47 AM

## 2022-05-12 NOTE — ANESTHESIA POSTPROCEDURE EVALUATION
Patient: Ana Patrick    Procedure: Procedure(s):  HYSTERECTOMY, TOTAL, LAPAROSCOPIC,  Cystoscopy       Anesthesia Type:  General    Note:  Disposition: Outpatient   Postop Pain Control: Uneventful            Sign Out: Well controlled pain   PONV: No   Neuro/Psych: Uneventful            Sign Out: Acceptable/Baseline neuro status   Airway/Respiratory: Uneventful            Sign Out: Acceptable/Baseline resp. status   CV/Hemodynamics: Uneventful            Sign Out: Acceptable CV status; No obvious hypovolemia; No obvious fluid overload   Other NRE: NONE   DID A NON-ROUTINE EVENT OCCUR? No           Last vitals:  Vitals Value Taken Time   BP 93/83 05/12/22 1005   Temp 96.6  F (35.9  C) 05/12/22 0953   Pulse 48 05/12/22 1010   Resp 8 05/12/22 1011   SpO2 97 % 05/12/22 1010   Vitals shown include unvalidated device data.    Electronically Signed By: CHELY TURNER CRNA  May 12, 2022  10:44 AM

## 2022-05-12 NOTE — PHARMACY-ADMISSION MEDICATION HISTORY
"Pharmacy -- Admission Medication Reconciliation    Prior to admission (PTA) medications were reviewed and the patient's PTA medication list was updated.    Sources Consulted: chart review, sure scripts, patient interview    The reliability of this Medication Reconciliation is: Reliability: Reliable    The following significant changes were made:    Removed:  Tums    Updated:  Naproxen to PRN    Note: patient was taking pantoprazole 40 mg twice daily and sucralfate three times daily PTA. Patient stated after surgery, she is to switch to pantoprazole 20 mg daily and famotidine.     In addition, the patient's allergies were reviewed with the patient and updated as follows:   Allergies: Meloxicam    The pharmacist has reviewed with the patient that all personal medications should be removed from the building or locked in the belongings safe.  Patient shall only take medications ordered by the physician and administered by the nursing staff.     Medication barriers identified: none   Medication adherence concerns: sertraline last filled 3/18/22 for 30 day fill. Patient stated she was \"getting low\" but did not need a refill   Understanding of emergency medications: n/a    Beverly Machado [facility], 5/12/2022,  11:31 AM   "

## 2022-05-12 NOTE — OR NURSING
PACU Transfer Note    Ana Patrick was transferred to [facility] via bed.  Equipment used for transport:  n/a.  Accompanied by:  Kyra WELLS  Prescriptions were: n/a    PACU Respiratory Event Documentation     1) Episodes of Apnea greater than or equal to 10 seconds: 0    2) Bradypnea - less than 8 breaths per minute: 0    3) Pain score on 0 to 10 scale: 3    4) Pain-sedation mismatch (yes or no): no    5) Repeated 02 desaturation less than 90% (yes or no): no    Anesthesia notified? (yes or no): n/a    Any of the above events occuring repeatedly in separate 30 minute intervals may be considered recurrent PACU respiratory events.    Patient stable and meets phase 1 discharge criteria for transport from PACU.

## 2022-05-12 NOTE — PLAN OF CARE
"Pt is A/O x4, Pt has had moderate to severe pain this shift. Pt gets temporary relief from PRN oxycodone and dilaudid. Pain seems to be due to gas, as it is radiating to her shoulder. Pt has been up to walk x 5 this shift. She has had scant red blood drainage x 1. Pt has been progressed to a regular diet and tolerated well.    Goal Outcome Evaluation:    Plan of Care Reviewed With: patient     Overall Patient Progress: no change       Problem: Plan of Care - These are the overarching goals to be used throughout the patient stay.    Goal: Plan of Care Review/Shift Note  Description: The Plan of Care Review/Shift note should be completed every shift.  The Outcome Evaluation is a brief statement about your assessment that the patient is improving, declining, or no change.  This information will be displayed automatically on your shift note.  Outcome: Ongoing, Not Progressing  Flowsheets (Taken 5/12/2022 1840)  Plan of Care Reviewed With: patient  Overall Patient Progress: no change  Goal: Patient-Specific Goal (Individualized)  Description: You can add care plan individualizations to a care plan. Examples of Individualization might be:  \"Parent requests to be called daily at 9am for status\", \"I have a hard time hearing out of my right ear\", or \"Do not touch me to wake me up as it startles me\".  Outcome: Ongoing, Not Progressing  Flowsheets (Taken 5/12/2022 1840)  Individualized Care Needs: Education provided about pain management following laproscopic surgery.  Anxieties, Fears or Concerns: concerns with pain radiating to R shoulder.  Goal: Absence of Hospital-Acquired Illness or Injury  Outcome: Ongoing, Not Progressing  Intervention: Identify and Manage Fall Risk  Recent Flowsheet Documentation  Taken 5/12/2022 1500 by Dell Wolff, RN  Safety Promotion/Fall Prevention:   safety round/check completed   room door open   lighting adjusted  Taken 5/12/2022 1044 by Dell Wolff, RN  Safety Promotion/Fall Prevention:   " safety round/check completed   room door open   lighting adjusted  Intervention: Prevent and Manage VTE (Venous Thromboembolism) Risk  Recent Flowsheet Documentation  Taken 5/12/2022 1500 by Dell Wolff RN  VTE Prevention/Management: SCDs (sequential compression devices) on  Taken 5/12/2022 1044 by Dell Wolff RN  VTE Prevention/Management: SCDs (sequential compression devices) on  Goal: Optimal Comfort and Wellbeing  Outcome: Ongoing, Not Progressing  Intervention: Monitor Pain and Promote Comfort  Recent Flowsheet Documentation  Taken 5/12/2022 1828 by Dell Wolff RN  Pain Management Interventions:   medication (see MAR)   cold applied  Taken 5/12/2022 1822 by Dell Wolff RN  Pain Management Interventions:   medication (see MAR)   cold applied  Taken 5/12/2022 1559 by Dell Wolff RN  Pain Management Interventions:   medication (see MAR)   cold applied  Taken 5/12/2022 1511 by Dell Wolff RN  Pain Management Interventions:   medication (see MAR)   cold applied  Taken 5/12/2022 1306 by Dell Wolff RN  Pain Management Interventions:   medication (see MAR)   cold applied  Taken 5/12/2022 1133 by Dell Wolff RN  Pain Management Interventions:   medication (see MAR)   cold applied  Taken 5/12/2022 1042 by Dell Wolff RN  Pain Management Interventions:   medication (see MAR)   cold applied  Goal: Readiness for Transition of Care  Outcome: Ongoing, Not Progressing  Intervention: Mutually Develop Transition Plan  Recent Flowsheet Documentation  Taken 5/12/2022 1039 by Dell Wolff RN  Equipment Currently Used at Home: none

## 2022-05-12 NOTE — ANESTHESIA CARE TRANSFER NOTE
Patient: Ana Patrick    Procedure: Procedure(s):  HYSTERECTOMY, TOTAL, LAPAROSCOPIC,  Cystoscopy       Diagnosis: Abnormal uterine bleeding (AUB) [N93.9]  Lupus anticoagulant positive [R76.0]  Diagnosis Additional Information: No value filed.    Anesthesia Type:   General     Note:    Oropharynx: oropharynx clear of all foreign objects  Level of Consciousness: drowsy  Oxygen Supplementation: face mask  Level of Supplemental Oxygen (L/min / FiO2): 8  Independent Airway: airway patency satisfactory and stable  Dentition: dentition unchanged  Vital Signs Stable: post-procedure vital signs reviewed and stable  Report to RN Given: handoff report given  Patient transferred to: PACU    Handoff Report: Identifed the Patient, Identified the Reponsible Provider, Reviewed the pertinent medical history, Discussed the surgical course, Reviewed Intra-OP anesthesia mangement and issues during anesthesia, Set expectations for post-procedure period and Allowed opportunity for questions and acknowledgement of understanding      Vitals:  Vitals Value Taken Time   BP     Temp     Pulse     Resp     SpO2         Electronically Signed By: CHELY TURNER CRNA  May 12, 2022  9:35 AM

## 2022-05-13 VITALS
HEART RATE: 62 BPM | RESPIRATION RATE: 18 BRPM | DIASTOLIC BLOOD PRESSURE: 62 MMHG | BODY MASS INDEX: 36.67 KG/M2 | WEIGHT: 220.37 LBS | TEMPERATURE: 98.4 F | SYSTOLIC BLOOD PRESSURE: 115 MMHG | OXYGEN SATURATION: 96 %

## 2022-05-13 LAB
GLUCOSE BLDC GLUCOMTR-MCNC: 134 MG/DL (ref 70–99)
HGB BLD-MCNC: 12 G/DL (ref 11.7–15.7)

## 2022-05-13 PROCEDURE — 36415 COLL VENOUS BLD VENIPUNCTURE: CPT | Performed by: OBSTETRICS & GYNECOLOGY

## 2022-05-13 PROCEDURE — 250N000011 HC RX IP 250 OP 636: Performed by: OBSTETRICS & GYNECOLOGY

## 2022-05-13 PROCEDURE — 85018 HEMOGLOBIN: CPT | Performed by: OBSTETRICS & GYNECOLOGY

## 2022-05-13 PROCEDURE — 96376 TX/PRO/DX INJ SAME DRUG ADON: CPT

## 2022-05-13 PROCEDURE — 82962 GLUCOSE BLOOD TEST: CPT

## 2022-05-13 PROCEDURE — 250N000013 HC RX MED GY IP 250 OP 250 PS 637: Performed by: OBSTETRICS & GYNECOLOGY

## 2022-05-13 PROCEDURE — 96372 THER/PROPH/DIAG INJ SC/IM: CPT | Performed by: OBSTETRICS & GYNECOLOGY

## 2022-05-13 RX ORDER — ENOXAPARIN SODIUM 100 MG/ML
40 INJECTION SUBCUTANEOUS EVERY 24 HOURS
Qty: 16.8 ML | Refills: 0 | Status: SHIPPED | OUTPATIENT
Start: 2022-05-14 | End: 2022-06-21

## 2022-05-13 RX ADMIN — ENOXAPARIN SODIUM 40 MG: 40 INJECTION SUBCUTANEOUS at 05:01

## 2022-05-13 RX ADMIN — HYDROMORPHONE HYDROCHLORIDE 0.4 MG: 0.2 INJECTION, SOLUTION INTRAMUSCULAR; INTRAVENOUS; SUBCUTANEOUS at 06:42

## 2022-05-13 RX ADMIN — OXYCODONE HYDROCHLORIDE 10 MG: 5 TABLET ORAL at 08:30

## 2022-05-13 RX ADMIN — SENNOSIDES AND DOCUSATE SODIUM 1 TABLET: 50; 8.6 TABLET ORAL at 09:50

## 2022-05-13 RX ADMIN — SERTRALINE HYDROCHLORIDE 100 MG: 50 TABLET ORAL at 09:48

## 2022-05-13 RX ADMIN — PANTOPRAZOLE SODIUM 40 MG: 40 TABLET, DELAYED RELEASE ORAL at 09:49

## 2022-05-13 RX ADMIN — KETOROLAC TROMETHAMINE 15 MG: 15 INJECTION, SOLUTION INTRAMUSCULAR; INTRAVENOUS at 05:00

## 2022-05-13 RX ADMIN — ACETAMINOPHEN 975 MG: 325 TABLET ORAL at 09:47

## 2022-05-13 RX ADMIN — ACETAMINOPHEN 975 MG: 325 TABLET ORAL at 04:58

## 2022-05-13 RX ADMIN — OXYCODONE HYDROCHLORIDE 10 MG: 5 TABLET ORAL at 01:33

## 2022-05-13 NOTE — PROGRESS NOTES
Gyn Progress Note    S: Patient is feeling well this morning. Shoulder pain is bothering her more than incisional pain. Voiding without difficulty, feels empty. Tolerating regular diet.    O:   Vitals:    05/12/22 2008 05/13/22 0100 05/13/22 0504 05/13/22 0831   BP: 132/65 109/50 119/64 115/62   BP Location:    Left arm   Patient Position:    Semi-Quintana's   Cuff Size:    Adult Regular   Pulse: 66 59 73 62   Resp: 18 18 18 18   Temp: 98.1  F (36.7  C) 98.5  F (36.9  C) 98.1  F (36.7  C) 98.4  F (36.9  C)   TempSrc: Tympanic Tympanic Tympanic Tympanic   SpO2: 96% 98% 95% 96%   Weight:         Gen: resting in bed, NAD  Abd: soft, nondistended, nontender. Incisions c/d/i  Ext: nontender    Hemoglobin   Date Value Ref Range Status   05/13/2022 12.0 11.7 - 15.7 g/dL Final   04/27/2021 13.3 11.7 - 15.7 g/dL Final   ]    A/P: 31 year old POD#1 s/p TLH, cysto  - doing well. Okay to discharge. Will need lovenox for DVT ppx    Jenny Waldron MD FACOG  OB/GYN  5/13/2022 8:54 AM

## 2022-05-13 NOTE — PLAN OF CARE
"  PRIMARY DIAGNOSIS: \"GENERIC\" NURSING  OUTPATIENT/OBSERVATION GOALS TO BE MET BEFORE DISCHARGE:  ADLs back to baseline: Yes    Activity and level of assistance: Ambulating independently.    Pain status: Improved but still requiring IV narcotics.    Return to near baseline physical activity: Yes     Discharge Planner Nurse   Safe discharge environment identified: Yes  Barriers to discharge: Yes        Overnight stay post op       Entered by: Samina Bermudez RN 05/13/2022 4:54 AM     Please review provider order for any additional goals.   Nurse to notify provider when observation goals have been met and patient is ready for discharge.      "

## 2022-05-13 NOTE — DISCHARGE SUMMARY
[facility] Discharge Summary    Ana Patrick MRN# [id number]   Age: 31 year old YOB: 1990     Date of Admission:  5/12/2022  Date of Discharge::  5/13/2022   Admitting Physician:  Jenny Waldron MD   Discharge Physician:  Jenny Waldron MD           Admission Diagnoses:     AUB  Antiphospholipid antibody syndrome          Discharge Diagnosis:     Same s/p below procedure          Procedures:   Procedure(s):  total laparoscopic hysterectomy, cystoscopy            Medications Prior to Admission:     Medications Prior to Admission   Medication Sig Dispense Refill Last Dose     albuterol (PROAIR HFA/PROVENTIL HFA/VENTOLIN HFA) 108 (90 Base) MCG/ACT inhaler Inhale 2 puffs into the lungs every 4 hours as needed for shortness of breath / dyspnea or wheezing 18 g 3 Past Week at Unknown time     naproxen (NAPROSYN) 500 MG tablet Take 500 mg by mouth 2 times daily as needed for moderate pain   5/11/2022 at Unknown time     pantoprazole (PROTONIX) 40 MG EC tablet Take 1 tablet (40 mg) by mouth 2 times daily 180 tablet 1 5/11/2022 at Unknown time     sertraline (ZOLOFT) 100 MG tablet Take 1 tablet (100 mg) by mouth daily 90 tablet 3 5/11/2022 at Unknown time     sucralfate (CARAFATE) 1 GM tablet TAKE 1 TABLET BY MOUTH 3 TIMES DAILY BEFORE MEALS 270 tablet 2 5/11/2022 at Unknown time     famotidine (PEPCID) 20 MG tablet Take 1 tablet (20 mg) by mouth 2 times daily 60 tablet 2 not started     pantoprazole (PROTONIX) 20 MG EC tablet Take 1 tablet (20 mg) by mouth daily 30 tablet 1 not started             Discharge Medications:     Current Discharge Medication List      START taking these medications    Details   enoxaparin ANTICOAGULANT (LOVENOX) 40 MG/0.4ML syringe Inject 0.4 mLs (40 mg) Subcutaneous every 24 hours  Qty: 16.8 mL, Refills: 0    Associated Diagnoses: Antiphospholipid antibody syndrome (H)      oxyCODONE (ROXICODONE) 5 MG tablet Take 1-2 tablets (5-10 mg) by mouth every 4 hours as  needed for moderate to severe pain  Qty: 12 tablet, Refills: 0    Associated Diagnoses: Postoperative pain      senna-docusate (SENOKOT-S/PERICOLACE) 8.6-50 MG tablet Take 1-2 tablets by mouth 2 times daily as needed for constipation (While on oral opioids.)  Qty: 30 tablet, Refills: 0    Associated Diagnoses: Postoperative pain         CONTINUE these medications which have NOT CHANGED    Details   albuterol (PROAIR HFA/PROVENTIL HFA/VENTOLIN HFA) 108 (90 Base) MCG/ACT inhaler Inhale 2 puffs into the lungs every 4 hours as needed for shortness of breath / dyspnea or wheezing  Qty: 18 g, Refills: 3    Comments: Pharmacy may dispense brand covered by insurance (Proair, or proventil or ventolin or generic albuterol inhaler)  Associated Diagnoses: Mild intermittent reactive airway disease with acute exacerbation      naproxen (NAPROSYN) 500 MG tablet Take 500 mg by mouth 2 times daily as needed for moderate pain      !! pantoprazole (PROTONIX) 40 MG EC tablet Take 1 tablet (40 mg) by mouth 2 times daily  Qty: 180 tablet, Refills: 1    Associated Diagnoses: Gastroesophageal reflux disease without esophagitis      sertraline (ZOLOFT) 100 MG tablet Take 1 tablet (100 mg) by mouth daily  Qty: 90 tablet, Refills: 3    Associated Diagnoses: Postpartum depression, postpartum condition      sucralfate (CARAFATE) 1 GM tablet TAKE 1 TABLET BY MOUTH 3 TIMES DAILY BEFORE MEALS  Qty: 270 tablet, Refills: 2    Associated Diagnoses: Gastroesophageal reflux disease without esophagitis      famotidine (PEPCID) 20 MG tablet Take 1 tablet (20 mg) by mouth 2 times daily  Qty: 60 tablet, Refills: 2    Associated Diagnoses: Gastroesophageal reflux disease without esophagitis      !! pantoprazole (PROTONIX) 20 MG EC tablet Take 1 tablet (20 mg) by mouth daily  Qty: 30 tablet, Refills: 1    Associated Diagnoses: Gastroesophageal reflux disease without esophagitis       !! - Potential duplicate medications found. Please discuss with provider.                 Consultations:   No consultations were requested during this admission          Brief History of Illness:     Ana Patrick is a 31 year old  who presented with abnormal uterine bleeding and declined medical management. On 22, she underwent an uncomplicated surgery and was admitted for postoperative recovery.           Hospital Course:     The patient's hospital course was unremarkable.  She recovered as anticipated and experienced no post-operative complications. On day of discharge, she was tolerating a regular diet, ambulating without difficulty, voiding spontaneously, and pain was well controlled on oral medications.  She was deemed stable for discharge on POD#1. She will continue on prophylactic lovenox postoperatively due to history of antiphospholipid antibody syndrome.          Discharge Instructions and Follow-Up:   Discharge diet: Regular   Discharge activity: Lifting restricted to 15 pounds  No heavy lifting, pushing, pulling for 6 week(s)  Pelvic rest: abstain from intercourse and do not use tampons for 6 week(s)   Discharge follow-up: Follow up with Dr Kim in 2 and 6 weeks   Wound care Drink plenty of fluids  Ice to area for comfort  Keep wound clean and dry           Discharge Disposition:   Discharged to home       Jenny Waldron MD

## 2022-05-13 NOTE — PROGRESS NOTES
Discharge Note      Data:  Ana Patrick discharged to home at wheel 1040 via wheel chair. Accompanied by staff.    Action:  Written discharge/follow-up instructions were provided to patient. Prescriptions sent to patients preferred pharmacy. All belongings sent with patient.    Response:  Patient verbalized understanding of discharge instructions, reason for discharge, and necessary follow-up appointments.

## 2022-05-13 NOTE — PLAN OF CARE
"Observation patient post hysterectomy; Independent in her room; VS stable; pain controlled with oral pain medications & ice     Goal Outcome Evaluation:  Problem: Plan of Care - These are the overarching goals to be used throughout the patient stay.    Goal: Plan of Care Review/Shift Note  Description: The Plan of Care Review/Shift note should be completed every shift.  The Outcome Evaluation is a brief statement about your assessment that the patient is improving, declining, or no change.  This information will be displayed automatically on your shift note.  Outcome: Adequate for Care Transition  Goal: Patient-Specific Goal (Individualized)  Description: You can add care plan individualizations to a care plan. Examples of Individualization might be:  \"Parent requests to be called daily at 9am for status\", \"I have a hard time hearing out of my right ear\", or \"Do not touch me to wake me up as it startles me\".  Outcome: Adequate for Care Transition  Goal: Absence of Hospital-Acquired Illness or Injury  Outcome: Adequate for Care Transition  Intervention: Prevent and Manage VTE (Venous Thromboembolism) Risk  Recent Flowsheet Documentation  Taken 5/13/2022 0831 by Francy Beckford RN  VTE Prevention/Management: SCDs (sequential compression devices) on  Activity Management: ambulated to bathroom  Goal: Optimal Comfort and Wellbeing  Outcome: Adequate for Care Transition  Intervention: Monitor Pain and Promote Comfort  Recent Flowsheet Documentation  Taken 5/13/2022 0831 by Francy Beckford RN  Pain Management Interventions:   medication (see MAR)   cold applied  Goal: Readiness for Transition of Care  Outcome: Adequate for Care Transition                       "

## 2022-05-13 NOTE — PLAN OF CARE
"PRIMARY DIAGNOSIS: \"GENERIC\" NURSING  OUTPATIENT/OBSERVATION GOALS TO BE MET BEFORE DISCHARGE:  ADLs back to baseline: Yes    Activity and level of assistance: Ambulating independently.    Pain status: Improved but still requiring IV narcotics.    Return to near baseline physical activity: Yes     Discharge Planner Nurse   Safe discharge environment identified: Yes  Barriers to discharge: Yes    Patient voiding without difficulty.  Surgical incisions are CDI.  Active bowel sounds, passing flatus.   Scant vaginal bleeding.           Entered by: Samina Bermudez RN 05/13/2022 5:24 AM     Please review provider order for any additional goals.   Nurse to notify provider when observation goals have been met and patient is ready for discharge.                      "

## 2022-05-13 NOTE — PHARMACY - DISCHARGE MEDICATION RECONCILIATION AND EDUCATION
Pharmacy:  Discharge Counseling and Medication Reconciliation    Ana MI Patrick  [address]   [place] [id number] [phone] (home)   31 year old female  PCP: Abiola Ashton    Allergies: Meloxicam    Discharge Counseling:    Pharmacist met with patient today to review the medication portion of the After Visit Summary (with an emphasis on NEW medications) and to address patient's questions/concerns.    Summary of Education: Counseled patient on new medications of Lovenox, Oxycodone, and Senna-docusate including indication, administration, and possible common side effects.    Advised patient that Oxycodone is an oral narcotic pain medication that can cause drowsiness and slow reaction time- patient should not drive after taking, should not take with alcohol, or take with other prescription narcotic pain medications.    Offered counseling on Lovenox- patient reports she is familiar with medication and administration, as she has used during both of her pregnancies. Denied additional counseling, denied Lovenox teaching kit.    Materials Provided:  MedCounselor sheets printed from Clinical Pharmacology on: oxycodone, enoxaparin, senna-docusate    Discharge Medication Reconciliation:    It has been determined that the patient has an adequate supply of medications available or which can be obtained from the patient's preferred pharmacy, which she has confirmed as: [facility].    Thank you for the consult.    Raul Llamas RPH........May 13, 2022 12:24 PM

## 2022-05-16 ENCOUNTER — PATIENT OUTREACH (OUTPATIENT)
Dept: CARE COORDINATION | Facility: CLINIC | Age: 32
End: 2022-05-16
Payer: COMMERCIAL

## 2022-05-16 LAB
PATH REPORT.COMMENTS IMP SPEC: NORMAL
PATH REPORT.FINAL DX SPEC: NORMAL
PATH REPORT.RELEVANT HX SPEC: NORMAL
PHOTO IMAGE: NORMAL

## 2022-05-16 NOTE — PROGRESS NOTES
Clinic Care Coordination Contact  OB/GYN Surgical. Patient discharged with OB/GYN surgical follow-up only. No TCM call required per policy.   Gabriela Prajapati RN ,....................  5/16/2022   10:09 AM

## 2022-05-23 ENCOUNTER — OFFICE VISIT (OUTPATIENT)
Dept: OBGYN | Facility: OTHER | Age: 32
End: 2022-05-23
Attending: FAMILY MEDICINE
Payer: COMMERCIAL

## 2022-05-23 VITALS
TEMPERATURE: 97.9 F | WEIGHT: 226 LBS | BODY MASS INDEX: 37.61 KG/M2 | SYSTOLIC BLOOD PRESSURE: 118 MMHG | DIASTOLIC BLOOD PRESSURE: 72 MMHG | HEART RATE: 72 BPM

## 2022-05-23 DIAGNOSIS — Z98.890 POSTOPERATIVE STATE: Primary | ICD-10-CM

## 2022-05-23 PROCEDURE — 99024 POSTOP FOLLOW-UP VISIT: CPT | Performed by: OBSTETRICS & GYNECOLOGY

## 2022-05-23 PROCEDURE — G0463 HOSPITAL OUTPT CLINIC VISIT: HCPCS

## 2022-05-23 ASSESSMENT — PAIN SCALES - GENERAL: PAINLEVEL: MODERATE PAIN (4)

## 2022-05-23 NOTE — PROGRESS NOTES
Ana STARK Jesusleroy presents todayfor a postop checkup at 2 week(s) after total lap hysterectomy    S: Bowels and bladder are functioning normally, no abnormal bleeding or pain. No concerns about the incision(s).    Current Outpatient Medications   Medication     albuterol (PROAIR HFA/PROVENTIL HFA/VENTOLIN HFA) 108 (90 Base) MCG/ACT inhaler     enoxaparin ANTICOAGULANT (LOVENOX) 40 MG/0.4ML syringe     naproxen (NAPROSYN) 500 MG tablet     pantoprazole (PROTONIX) 40 MG EC tablet     sertraline (ZOLOFT) 100 MG tablet     sucralfate (CARAFATE) 1 GM tablet     famotidine (PEPCID) 20 MG tablet     pantoprazole (PROTONIX) 20 MG EC tablet     No current facility-administered medications for this visit.     Allergies   Allergen Reactions     Meloxicam Rash     Past Medical History:   Diagnosis Date     Internal derangement of knee     2016     Personal history of other medical treatment (CODE)          Polycystic ovarian syndrome     3/18/2016     Past Surgical History:   Procedure Laterality Date     ARTHROSCOPY KNEE Right     Dr. Landeros      SECTION N/A 2020    Procedure:  SECTION;  Surgeon: Gerry Kim MD;  Location:  OR      SECTION, TUBAL LIGATION, COMBINED Bilateral 2021    Procedure:  SECTION, WITH POSTPARTUM TUBAL LIGATION;  Surgeon: Gerry Kim MD;  Location:  OR     CYSTOSCOPY N/A 2022    Procedure: Cystoscopy;  Surgeon: Gerry Kim MD;  Location:  OR     HYSTEROSCOPY DIAGNOSTIC N/A 2018    Procedure: HYSTEROSCOPY DIAGNOSTIC;  Diagnostic Hysteroscopy with Endometrial Scratching.      .;  Surgeon: Gerry Kim MD;  Location:  OR     LAPAROSCOPIC HYSTERECTOMY TOTAL, SALPINGECTOMY N/A 2022    Procedure: HYSTERECTOMY, TOTAL, LAPAROSCOPIC,;  Surgeon: Gerry Kim MD;  Location:  OR     MANDIBLE SURGERY Right            COMPLETE REVIEW OF SYSTEMS: Neg except as above        O: LMP 2022  There is no height or  weight on file to calculate BMI.    EXAM:  NAD  Incisions ROCKY      I/P:  Stable postop    Recheck in a month  Restrictions reiterated  Pathology reviewed    Gerry Kim MD FACOG  8:31 AM 5/23/2022

## 2022-05-23 NOTE — NURSING NOTE
"Chief Complaint   Patient presents with     Surgical Followup     2 week postop lap hyst     Patient presents to clinic for 2 week pos to lap hyst. No concerns or questions. Still having some soreness and bleeding.   Initial /72   Pulse 72   Temp 97.9  F (36.6  C) (Tympanic)   Wt 102.5 kg (226 lb)   LMP 04/11/2022   BMI 37.61 kg/m   Estimated body mass index is 37.61 kg/m  as calculated from the following:    Height as of 4/6/22: 1.651 m (5' 5\").    Weight as of this encounter: 102.5 kg (226 lb).  Medication Reconciliation: complete          Jessenia Josue LPN  "

## 2022-06-02 NOTE — NURSING NOTE
Patient presents today as a follow-up from a hysteroscopy two weeks ago.  Anny Lo LPN  10/8/2018  2:13 PM                no

## 2022-06-21 ENCOUNTER — OFFICE VISIT (OUTPATIENT)
Dept: OBGYN | Facility: OTHER | Age: 32
End: 2022-06-21
Attending: FAMILY MEDICINE
Payer: COMMERCIAL

## 2022-06-21 VITALS
BODY MASS INDEX: 37.94 KG/M2 | DIASTOLIC BLOOD PRESSURE: 72 MMHG | SYSTOLIC BLOOD PRESSURE: 124 MMHG | TEMPERATURE: 97.9 F | HEART RATE: 78 BPM | WEIGHT: 228 LBS

## 2022-06-21 DIAGNOSIS — Z98.890 POSTOPERATIVE STATE: Primary | ICD-10-CM

## 2022-06-21 PROCEDURE — G0463 HOSPITAL OUTPT CLINIC VISIT: HCPCS

## 2022-06-21 PROCEDURE — 99024 POSTOP FOLLOW-UP VISIT: CPT | Performed by: OBSTETRICS & GYNECOLOGY

## 2022-06-21 ASSESSMENT — PAIN SCALES - GENERAL: PAINLEVEL: NO PAIN (0)

## 2022-06-21 NOTE — NURSING NOTE
"Chief Complaint   Patient presents with     Surgical Followup     6 week post op TLH       Initial /72   Pulse 78   Temp 97.9  F (36.6  C) (Tympanic)   Wt 103.4 kg (228 lb)   BMI 37.94 kg/m   Estimated body mass index is 37.94 kg/m  as calculated from the following:    Height as of 4/6/22: 1.651 m (5' 5\").    Weight as of this encounter: 103.4 kg (228 lb).  Medication Reconciliation: complete    FOOD SECURITY SCREENING QUESTIONS  Hunger Vital Signs:  Within the past 12 months we worried whether our food would run out before we got money to buy more. Never  Within the past 12 months the food we bought just didn't last and we didn't have money to get more. Never  Jessenia Josue LPN 6/21/2022 8:10 AM           Jessenia Josue LPN  "

## 2022-06-21 NOTE — PROGRESS NOTES
Ana Patrick presents todayfor a postop checkup at 6 week(s) after total lap hysterectomy    S: Bowels and bladder are functioning normally, no abnormal bleeding or pain. No concerns about the incision(s). Occasional tugging or pulling, but otherwise doing well.    Current Outpatient Medications   Medication     albuterol (PROAIR HFA/PROVENTIL HFA/VENTOLIN HFA) 108 (90 Base) MCG/ACT inhaler     enoxaparin ANTICOAGULANT (LOVENOX) 40 MG/0.4ML syringe     famotidine (PEPCID) 20 MG tablet     naproxen (NAPROSYN) 500 MG tablet     pantoprazole (PROTONIX) 20 MG EC tablet     pantoprazole (PROTONIX) 40 MG EC tablet     sertraline (ZOLOFT) 100 MG tablet     sucralfate (CARAFATE) 1 GM tablet     No current facility-administered medications for this visit.     Allergies   Allergen Reactions     Meloxicam Rash     Past Medical History:   Diagnosis Date     Internal derangement of knee     2016     Personal history of other medical treatment (CODE)          Polycystic ovarian syndrome     3/18/2016     Past Surgical History:   Procedure Laterality Date     ARTHROSCOPY KNEE Right     Dr. Landeros      SECTION N/A 2020    Procedure:  SECTION;  Surgeon: Gerry Kim MD;  Location:  OR      SECTION, TUBAL LIGATION, COMBINED Bilateral 2021    Procedure:  SECTION, WITH POSTPARTUM TUBAL LIGATION;  Surgeon: Gerry Kim MD;  Location:  OR     CYSTOSCOPY N/A 2022    Procedure: Cystoscopy;  Surgeon: Gerry Kim MD;  Location:  OR     HYSTEROSCOPY DIAGNOSTIC N/A 2018    Procedure: HYSTEROSCOPY DIAGNOSTIC;  Diagnostic Hysteroscopy with Endometrial Scratching.      .;  Surgeon: Gerry Kim MD;  Location:  OR     LAPAROSCOPIC HYSTERECTOMY TOTAL, SALPINGECTOMY N/A 2022    Procedure: HYSTERECTOMY, TOTAL, LAPAROSCOPIC,;  Surgeon: Gerry Kim MD;  Location:  OR     MANDIBLE SURGERY Right            COMPLETE REVIEW OF SYSTEMS: Neg except as  above        O: There were no vitals taken for this visit. There is no height or weight on file to calculate BMI.    EXAM:    Incisions CDI  Speculum: Well healed and supported vaginal cuff, some suture present.    I/P:  Stable    Restrictions lifted  Pathology reviewed and benign  F/u prn    Gerry Kim MD FACOG  8:05 AM 6/21/2022

## 2022-07-12 ENCOUNTER — APPOINTMENT (OUTPATIENT)
Dept: CT IMAGING | Facility: OTHER | Age: 32
End: 2022-07-12
Attending: EMERGENCY MEDICINE
Payer: COMMERCIAL

## 2022-07-12 ENCOUNTER — HOSPITAL ENCOUNTER (EMERGENCY)
Facility: OTHER | Age: 32
Discharge: HOME | End: 2022-07-13
Attending: EMERGENCY MEDICINE | Admitting: EMERGENCY MEDICINE
Payer: COMMERCIAL

## 2022-07-12 VITALS
OXYGEN SATURATION: 99 % | TEMPERATURE: 98.6 F | BODY MASS INDEX: 36.65 KG/M2 | RESPIRATION RATE: 16 BRPM | HEIGHT: 65 IN | HEART RATE: 62 BPM | DIASTOLIC BLOOD PRESSURE: 75 MMHG | WEIGHT: 220 LBS | SYSTOLIC BLOOD PRESSURE: 121 MMHG

## 2022-07-12 DIAGNOSIS — N20.1 URETEROLITHIASIS: ICD-10-CM

## 2022-07-12 LAB
ALBUMIN UR-MCNC: 20 MG/DL
ANION GAP SERPL CALCULATED.3IONS-SCNC: 7 MMOL/L (ref 3–14)
APPEARANCE UR: CLEAR
BACTERIA #/AREA URNS HPF: ABNORMAL /HPF
BASOPHILS # BLD AUTO: 0.1 10E3/UL (ref 0–0.2)
BASOPHILS NFR BLD AUTO: 1 %
BILIRUB UR QL STRIP: NEGATIVE
BUN SERPL-MCNC: 12 MG/DL (ref 7–25)
CALCIUM SERPL-MCNC: 9.7 MG/DL (ref 8.6–10.3)
CHLORIDE BLD-SCNC: 105 MMOL/L (ref 98–107)
CO2 SERPL-SCNC: 27 MMOL/L (ref 21–31)
COLOR UR AUTO: ABNORMAL
CREAT SERPL-MCNC: 0.85 MG/DL (ref 0.6–1.2)
EOSINOPHIL # BLD AUTO: 0.1 10E3/UL (ref 0–0.7)
EOSINOPHIL NFR BLD AUTO: 1 %
ERYTHROCYTE [DISTWIDTH] IN BLOOD BY AUTOMATED COUNT: 12.6 % (ref 10–15)
GFR SERPL CREATININE-BSD FRML MDRD: >90 ML/MIN/1.73M2
GLUCOSE BLD-MCNC: 109 MG/DL (ref 70–105)
GLUCOSE UR STRIP-MCNC: NEGATIVE MG/DL
HCT VFR BLD AUTO: 39 % (ref 35–47)
HGB BLD-MCNC: 13.3 G/DL (ref 11.7–15.7)
HGB UR QL STRIP: ABNORMAL
IMM GRANULOCYTES # BLD: 0 10E3/UL
IMM GRANULOCYTES NFR BLD: 0 %
KETONES UR STRIP-MCNC: ABNORMAL MG/DL
LEUKOCYTE ESTERASE UR QL STRIP: ABNORMAL
LYMPHOCYTES # BLD AUTO: 2.6 10E3/UL (ref 0.8–5.3)
LYMPHOCYTES NFR BLD AUTO: 30 %
MCH RBC QN AUTO: 28.9 PG (ref 26.5–33)
MCHC RBC AUTO-ENTMCNC: 34.1 G/DL (ref 31.5–36.5)
MCV RBC AUTO: 85 FL (ref 78–100)
MONOCYTES # BLD AUTO: 0.6 10E3/UL (ref 0–1.3)
MONOCYTES NFR BLD AUTO: 7 %
MUCOUS THREADS #/AREA URNS LPF: PRESENT /LPF
NEUTROPHILS # BLD AUTO: 5.2 10E3/UL (ref 1.6–8.3)
NEUTROPHILS NFR BLD AUTO: 61 %
NITRATE UR QL: NEGATIVE
NRBC # BLD AUTO: 0 10E3/UL
NRBC BLD AUTO-RTO: 0 /100
PH UR STRIP: 5.5 [PH] (ref 5–9)
PLATELET # BLD AUTO: 266 10E3/UL (ref 150–450)
POTASSIUM BLD-SCNC: 3.4 MMOL/L (ref 3.5–5.1)
RBC # BLD AUTO: 4.6 10E6/UL (ref 3.8–5.2)
RBC URINE: 32 /HPF
SODIUM SERPL-SCNC: 139 MMOL/L (ref 134–144)
SP GR UR STRIP: 1.02 (ref 1–1.03)
SQUAMOUS EPITHELIAL: 3 /HPF
UROBILINOGEN UR STRIP-MCNC: NORMAL MG/DL
WBC # BLD AUTO: 8.7 10E3/UL (ref 4–11)
WBC URINE: 5 /HPF

## 2022-07-12 PROCEDURE — 74176 CT ABD & PELVIS W/O CONTRAST: CPT | Mod: TC

## 2022-07-12 PROCEDURE — 80048 BASIC METABOLIC PNL TOTAL CA: CPT | Performed by: EMERGENCY MEDICINE

## 2022-07-12 PROCEDURE — 250N000011 HC RX IP 250 OP 636: Performed by: EMERGENCY MEDICINE

## 2022-07-12 PROCEDURE — 99284 EMERGENCY DEPT VISIT MOD MDM: CPT | Mod: 25 | Performed by: EMERGENCY MEDICINE

## 2022-07-12 PROCEDURE — 99284 EMERGENCY DEPT VISIT MOD MDM: CPT | Performed by: EMERGENCY MEDICINE

## 2022-07-12 PROCEDURE — 85014 HEMATOCRIT: CPT | Performed by: EMERGENCY MEDICINE

## 2022-07-12 PROCEDURE — 81003 URINALYSIS AUTO W/O SCOPE: CPT | Performed by: EMERGENCY MEDICINE

## 2022-07-12 PROCEDURE — 96375 TX/PRO/DX INJ NEW DRUG ADDON: CPT | Performed by: EMERGENCY MEDICINE

## 2022-07-12 PROCEDURE — 36415 COLL VENOUS BLD VENIPUNCTURE: CPT | Performed by: EMERGENCY MEDICINE

## 2022-07-12 PROCEDURE — 258N000003 HC RX IP 258 OP 636: Performed by: EMERGENCY MEDICINE

## 2022-07-12 PROCEDURE — 96374 THER/PROPH/DIAG INJ IV PUSH: CPT | Performed by: EMERGENCY MEDICINE

## 2022-07-12 RX ORDER — ONDANSETRON 2 MG/ML
4 INJECTION INTRAMUSCULAR; INTRAVENOUS ONCE
Status: COMPLETED | OUTPATIENT
Start: 2022-07-12 | End: 2022-07-12

## 2022-07-12 RX ORDER — IBUPROFEN 600 MG/1
600 TABLET, FILM COATED ORAL EVERY 6 HOURS PRN
Qty: 30 TABLET | Refills: 0 | Status: SHIPPED | OUTPATIENT
Start: 2022-07-12 | End: 2022-09-13

## 2022-07-12 RX ORDER — LISDEXAMFETAMINE DIMESYLATE 50 MG/1
50 CAPSULE ORAL EVERY MORNING
COMMUNITY
End: 2022-09-13

## 2022-07-12 RX ORDER — ONDANSETRON 4 MG/1
4 TABLET, ORALLY DISINTEGRATING ORAL EVERY 8 HOURS PRN
Qty: 5 TABLET | Refills: 0 | Status: SHIPPED | OUTPATIENT
Start: 2022-07-12 | End: 2022-09-13

## 2022-07-12 RX ORDER — OXYCODONE AND ACETAMINOPHEN 5; 325 MG/1; MG/1
1 TABLET ORAL EVERY 6 HOURS PRN
Qty: 6 TABLET | Refills: 0 | Status: SHIPPED | OUTPATIENT
Start: 2022-07-12 | End: 2022-09-13

## 2022-07-12 RX ORDER — KETOROLAC TROMETHAMINE 15 MG/ML
15 INJECTION, SOLUTION INTRAMUSCULAR; INTRAVENOUS ONCE
Status: COMPLETED | OUTPATIENT
Start: 2022-07-12 | End: 2022-07-12

## 2022-07-12 RX ORDER — SODIUM CHLORIDE 9 MG/ML
INJECTION, SOLUTION INTRAVENOUS CONTINUOUS
Status: DISCONTINUED | OUTPATIENT
Start: 2022-07-12 | End: 2022-07-13 | Stop reason: HOSPADM

## 2022-07-12 RX ADMIN — ONDANSETRON 4 MG: 2 INJECTION INTRAMUSCULAR; INTRAVENOUS at 22:18

## 2022-07-12 RX ADMIN — KETOROLAC TROMETHAMINE 15 MG: 15 INJECTION, SOLUTION INTRAMUSCULAR; INTRAVENOUS at 22:17

## 2022-07-12 RX ADMIN — SODIUM CHLORIDE: 9 INJECTION, SOLUTION INTRAVENOUS at 22:17

## 2022-07-13 NOTE — ED TRIAGE NOTES
"Patient presents to ER with CO L flank pain 8/10 that started suddenly 2 hours ago. /75   Pulse 62   Temp 98.6  F (37  C)   Resp 16   Ht 1.651 m (5' 5\")   Wt 99.8 kg (220 lb)   SpO2 99%   BMI 36.61 kg/m         Triage Assessment     Row Name 07/12/22 0642       Triage Assessment (Adult)    Airway WDL WDL       Respiratory WDL    Respiratory WDL WDL       Skin Circulation/Temperature WDL    Skin Circulation/Temperature WDL WDL       Cardiac WDL    Cardiac WDL WDL       Peripheral/Neurovascular WDL    Peripheral Neurovascular WDL WDL       Cognitive/Neuro/Behavioral WDL    Cognitive/Neuro/Behavioral WDL WDL              "

## 2022-07-13 NOTE — ED PROVIDER NOTES
[facility]  Emergency Department Visit Note    Flank Pain      History of Present Illness     HPI:  Ana Patrick is a 31 year old female presenting with left flank pain. This started 2 hours ago. She has not had similar symptoms in the past and does not have a history of nephrolithiasis. The pain is not aggravated by changes in position. There is nausea and vomiting. There is no preceding history of trauma or falls. There are no changes with respiration. No history of pyelonephritis. No dysuria, hematuria, fevers, chills, chest pain, abdominal pain, shortness of breath, diarrhea, constipation. She has had a hysterectomy    Medications:  Prior to Admission medications    Medication Sig Last Dose Taking? Auth Provider Long Term End Date   ibuprofen (ADVIL/MOTRIN) 600 MG tablet Take 1 tablet (600 mg) by mouth every 6 hours as needed for moderate pain  Yes Niecy Schrader MD     lisdexamfetamine (VYVANSE) 50 MG capsule Take 50 mg by mouth every morning 7/12/2022 at Unknown time Yes Reported, Patient     ondansetron (ZOFRAN ODT) 4 MG ODT tab Take 1 tablet (4 mg) by mouth every 8 hours as needed for nausea  Yes Niecy Schrader MD No    oxyCODONE-acetaminophen (PERCOCET) 5-325 MG tablet Take 1 tablet by mouth every 6 hours as needed for severe pain  Yes Niecy Schrader MD     pantoprazole (PROTONIX) 20 MG EC tablet Take 1 tablet (20 mg) by mouth daily 7/12/2022 at Unknown time Yes Abiola Ashton DO No    sucralfate (CARAFATE) 1 GM tablet TAKE 1 TABLET BY MOUTH 3 TIMES DAILY BEFORE MEALS 7/12/2022 at Unknown time Yes Abiola Ashton DO No    albuterol (PROAIR HFA/PROVENTIL HFA/VENTOLIN HFA) 108 (90 Base) MCG/ACT inhaler Inhale 2 puffs into the lungs every 4 hours as needed for shortness of breath / dyspnea or wheezing   Gerry Kim MD Yes    famotidine (PEPCID) 20 MG tablet Take 1 tablet (20 mg) by mouth 2 times daily  Patient not taking: No sig reported   Poli  Abiola STARK DO     naproxen (NAPROSYN) 500 MG tablet Take 500 mg by mouth 2 times daily as needed for moderate pain  Patient not taking: Reported on 2022   Unknown, Entered By History     sertraline (ZOLOFT) 100 MG tablet Take 1 tablet (100 mg) by mouth daily   Gerry Kim MD Yes        Allergies:  Allergies   Allergen Reactions     Meloxicam Rash       Problem List:  Patient Active Problem List   Diagnosis     Abnormal pap     Generalized anxiety disorder     Patellar malalignment syndrome     PCOS (polycystic ovarian syndrome)     Gastroesophageal reflux disease without esophagitis     Lupus anticoagulant positive     History of recurrent miscarriages, not currently pregnant     Antiphospholipid antibody syndrome complicating pregnancy (H)     S/P  section     Morbid obesity (H)     Depression, major     HPV (human papilloma virus) infection     Knee pain     Slow transit constipation     Abnormal uterine bleeding (AUB)       Past Medical History:  Past Medical History:   Diagnosis Date     Internal derangement of knee     2016     Personal history of other medical treatment (CODE)          Polycystic ovarian syndrome     3/18/2016       Past Surgical History:  Past Surgical History:   Procedure Laterality Date     ARTHROSCOPY KNEE Right     Dr. Landeros      SECTION N/A 2020    Procedure:  SECTION;  Surgeon: Gerry Kim MD;  Location:  OR      SECTION, TUBAL LIGATION, COMBINED Bilateral 2021    Procedure:  SECTION, WITH POSTPARTUM TUBAL LIGATION;  Surgeon: Gerry Kim MD;  Location:  OR     CYSTOSCOPY N/A 2022    Procedure: Cystoscopy;  Surgeon: Gerry Kim MD;  Location:  OR     HYSTEROSCOPY DIAGNOSTIC N/A 2018    Procedure: HYSTEROSCOPY DIAGNOSTIC;  Diagnostic Hysteroscopy with Endometrial Scratching.      .;  Surgeon: Gerry Kim MD;  Location:  OR     LAPAROSCOPIC HYSTERECTOMY TOTAL, SALPINGECTOMY N/A  "5/12/2022    Procedure: HYSTERECTOMY, TOTAL, LAPAROSCOPIC,;  Surgeon: Gerry Kim MD;  Location: [facility] OR     MANDIBLE SURGERY Right     2001       Social History:  Social History     Tobacco Use     Smoking status: Never Smoker     Smokeless tobacco: Never Used   Vaping Use     Vaping Use: Never used   Substance Use Topics     Alcohol use: Not Currently     Alcohol/week: 0.0 standard drinks     Comment: Alcoholic Drinks/day: rare     Drug use: No       Review of Systems:  Complete review of systems obtained and pertinent positive and negative findings noted in HPI. Review of systems otherwise negative.      Physical Exam     Vital signs: /75   Pulse 62   Temp 98.6  F (37  C)   Resp 16   Ht 1.651 m (5' 5\")   Wt 99.8 kg (220 lb)   LMP 04/11/2022   SpO2 99%   BMI 36.61 kg/m      Physical Exam:    General: awake and alert, uncomfortable  HEENT: atraumatic, no scleral injection, no nasal discharge, neck supple  Abdomen: soft, nontender, no rebound or guarding, nondistended  Back: tender in left flank, nontender in bilateral costoverterbal angles  Skin: warm, dry, no rashes  Neuro: alert and oriented x 3, moving extremities x 4, ambulates without difficulty    Medical Decision Making & ED Course     nAa Patrick is a 31 year old female presenting with left flank pain. Differential includes nephrolithiasis, renal colic, pyelonephritis, urinary tract infection, hydronephrosis, thoracic or abdominal wall strain. Exam suggests a low likelihood of intra-abdominal solid organ, hollow viscus, or vascular pathology. Given concern for a source in the urinary tract, a urinalysis was obtained and revealed hematuria. With these findings and the patient's history, advanced imaging was indicated. Pain controlled in emergency department with Toradol. Will discharge with ibuprofen, percocet, zofran. Recommend close urology follow up. She is stable for further outpatient management. Patient given instructions on " follow-up and warning signs for which to return to ED. All questions were answered and the patient is comfortable with plan for discharge. The patient was discharged in stable condition.    I have reviewed the patients laboratory studies, imaging, and medical records.      Diagnosis & Disposition     Diagnosis:  1. Ureterolithiasis        Disposition:  Home    MD Raciel Alegre Theresa M, MD  07/13/22 6163

## 2022-07-18 ENCOUNTER — OFFICE VISIT (OUTPATIENT)
Dept: UROLOGY | Facility: OTHER | Age: 32
End: 2022-07-18
Attending: UROLOGY
Payer: COMMERCIAL

## 2022-07-18 VITALS
WEIGHT: 234.2 LBS | RESPIRATION RATE: 16 BRPM | OXYGEN SATURATION: 98 % | SYSTOLIC BLOOD PRESSURE: 124 MMHG | BODY MASS INDEX: 38.97 KG/M2 | DIASTOLIC BLOOD PRESSURE: 74 MMHG | HEART RATE: 74 BPM

## 2022-07-18 DIAGNOSIS — N20.1 LEFT URETERAL STONE: Primary | ICD-10-CM

## 2022-07-18 PROCEDURE — 99204 OFFICE O/P NEW MOD 45 MIN: CPT | Performed by: UROLOGY

## 2022-07-18 PROCEDURE — G0463 HOSPITAL OUTPT CLINIC VISIT: HCPCS

## 2022-07-18 ASSESSMENT — PAIN SCALES - GENERAL: PAINLEVEL: MILD PAIN (2)

## 2022-07-18 NOTE — PROGRESS NOTES
Type of Visit  NPV    Chief Complaint  Ureteral stone    HPI  Ms. Patrick is a 31 year old female who presents with left ureteral stone.  The patient initially presented to the ED 6 days ago.  At that time the patient underwent a CT scan which revealed a 4 mm obstructing stone.  The patient currently denies fevers or chills.  The patient currently denies nausea or vomiting.  She has been straining her urine but has not seen the stone pass.  She has intermittent symptoms but currently she is feeling well.  No flank pain currently.  Her flank pain is intermittent, colicky and unpredictable.  It involves her left flank and does not radiate.      Past Medical History  She  has a past medical history of Internal derangement of knee, Personal history of other medical treatment (CODE), and Polycystic ovarian syndrome.  Patient Active Problem List   Diagnosis     Abnormal pap     Generalized anxiety disorder     Patellar malalignment syndrome     PCOS (polycystic ovarian syndrome)     Gastroesophageal reflux disease without esophagitis     Lupus anticoagulant positive     History of recurrent miscarriages, not currently pregnant     Antiphospholipid antibody syndrome complicating pregnancy (H)     S/P  section     Morbid obesity (H)     Depression, major     HPV (human papilloma virus) infection     Knee pain     Slow transit constipation     Abnormal uterine bleeding (AUB)       Past Surgical History  She  has a past surgical history that includes Mandible surgery (Right); Arthroscopy knee (Right, ); Hysteroscopy Diagnostic (N/A, 2018);  section (N/A, 2020);  section, tubal ligation, combined (Bilateral, 2021); Laparoscopic Hysterectomy Total, Salpingectomy (N/A, 2022); and Cystoscopy (N/A, 2022).    Medications  She has a current medication list which includes the following prescription(s): albuterol, famotidine, ibuprofen, lisdexamfetamine, naproxen, ondansetron,  oxycodone-acetaminophen, pantoprazole, sertraline, and sucralfate.    Allergies  Allergies   Allergen Reactions     Meloxicam Rash       Social History  She  reports that she has never smoked. She has never used smokeless tobacco. She reports previous alcohol use. She reports that she does not use drugs.  No drug abuse.    Family History  Family History   Problem Relation Age of Onset     Family History Negative Mother         Good Health     Family History Negative Father         Good Health     Cancer Maternal Grandmother         Cancer     Diabetes Other         Diabetes,maternal side     Anxiety Disorder Sister        Review of Systems  I personally reviewed the ROS with the patient.    Nursing Notes:   Edith Santacruz LPN  7/18/2022  2:21 PM  Addendum  Chief Complaint   Patient presents with     Consult     Kidney stones   Patient presents to the clinic today for a consult for kidney stones    Review of Systems:    Weight loss:    No     Recent fever/chills:  No   Night sweats:   No  Current skin rash:  No   Recent hair loss:  No  Heat intolerance:  No   Cold intolerance:  No  Chest pain:   No   Palpitations:   No  Shortness of breath:  No   Wheezing:   No  Constipation:    No   Diarrhea:   No   Nausea:   No   Vomiting:   No   Kidney/side pain:  Yes   Back pain:   No  Frequent headaches:  No   Dizziness:     No  Leg swelling:   No   Calf pain:    Yes    Parents, brothers or sisters with history of kidney cancer:   No  Parents, brothers or sisters with history of bladder cancer: No      Medication Reconciliation: completed   Edith Santacruz LPN  7/18/2022 2:16 PM       Physical Exam  Vitals:    07/18/22 1420   BP: 124/74   BP Location: Right arm   Patient Position: Sitting   Cuff Size: Adult Large   Pulse: 74   Resp: 16   SpO2: 98%   Weight: 106.2 kg (234 lb 3.2 oz)     Constitutional: NAD, WDWN  Head: NCAT  Eyes: Conjunctivae normal  Cardiovascular: Regular rate  Pulmonary/Chest: Respirations are even and  non-labored bilaterally  Abdominal: Soft with no distension, tenderness, masses, guarding.    - left CVA tenderness    - right CVA tenderness  Extremities: YARITZA x 4, warm, no clubbing, no cyanosis  Skin: Pink, warm, dry with no rash  Psychiatric:  Normal mood and affect  Genitourinary: Nonpalpable bladder    Labs   07/12/22 22:04 07/12/22 23:10   Sodium 139    Potassium 3.4 (L)    Chloride 105    Carbon Dioxide 27    Urea Nitrogen 12    Creatinine 0.85    GFR Estimate >90    Calcium 9.7    Anion Gap 7    Glucose 109 (H)    WBC 8.7    Hemoglobin 13.3    Hematocrit 39.0    Platelet Count 266    RBC Count 4.60    MCV 85    MCH 28.9    MCHC 34.1    RDW 12.6    % Neutrophils 61    % Lymphocytes 30    % Monocytes 7    % Eosinophils 1    % Basophils 1    Absolute Basophils 0.1    Absolute Eosinophils 0.1    Absolute Immature Granulocytes 0.0    Absolute Lymphocytes 2.6    Absolute Monocytes 0.6    % Immature Granulocytes 0    Absolute Neutrophils 5.2    Absolute NRBCs 0.0    NRBCs per 100 WBC 0    Color Urine  Light Yellow   Appearance Urine  Clear   Glucose Urine  Negative   Bilirubin Urine  Negative   Ketones Urine  Trace !   Specific Gravity Urine  1.020   pH Urine  5.5   Protein Albumin Urine  20  !   Urobilinogen mg/dL  Normal   Nitrite Urine  Negative   Blood Urine  Large !   Leukocyte Esterase Urine  Small !   WBC Urine  5   RBC Urine  32 (H)   Bacteria Urine  Few !   Squamous Epithelial /HPF Urine  3 (H)   Mucus Urine  Present !     Imaging  I personally reviewed and interpreted the images and report.  CT a/p   7/12/2022  IMPRESSION:   0.4 cm stone in the left ureter mild left hydronephrosis.    Assessment  Ms. Patrick is a 31 year old female who presents with left ureteral stone.  Reviewed the past notes labs and imaging to conduct the visit.  We discussed her acutely obstructing stone as well as long-term stone prevention strategies.    Discussed risks and benefits of the treatment options for the ureteral stone  such as trial of passage, treatment with ureteroscopy or ESWL and the patient elected to proceed with a trial of passage.    Explained to patient to return to ED, or call the urology office if during office hours, if having flu like symptoms, fevers over 101, intractable nausea/vomiting, intractable pain not controlled by pain medications.    Plan  We discussed stone prevention specifically increasing fluids by 1 L of water per day, low-salt diet, increasing citrate supplementation and maintaining a moderate protein intake diet  Strain urine  Pain meds  Follow up in clinic with a renal ultrasound prior if stone does not pass based on straining and/or symptoms persist

## 2022-07-18 NOTE — NURSING NOTE
Chief Complaint   Patient presents with     Consult     Kidney stones   Patient presents to the clinic today for a consult for kidney stones    Review of Systems:    Weight loss:    No     Recent fever/chills:  No   Night sweats:   No  Current skin rash:  No   Recent hair loss:  No  Heat intolerance:  No   Cold intolerance:  No  Chest pain:   No   Palpitations:   No  Shortness of breath:  No   Wheezing:   No  Constipation:    No   Diarrhea:   No   Nausea:   No   Vomiting:   No   Kidney/side pain:  Yes   Back pain:   No  Frequent headaches:  No   Dizziness:     No  Leg swelling:   No   Calf pain:    Yes    Parents, brothers or sisters with history of kidney cancer:   No  Parents, brothers or sisters with history of bladder cancer: No      Medication Reconciliation: completed   Edith Santacruz LPN  7/18/2022 2:16 PM

## 2022-08-02 DIAGNOSIS — J45.21 MILD INTERMITTENT REACTIVE AIRWAY DISEASE WITH ACUTE EXACERBATION: ICD-10-CM

## 2022-08-02 RX ORDER — ALBUTEROL SULFATE 90 UG/1
AEROSOL, METERED RESPIRATORY (INHALATION)
Qty: 18 G | Refills: 3 | OUTPATIENT
Start: 2022-08-02

## 2022-08-02 NOTE — TELEPHONE ENCOUNTER
Pt is no longer under providers care at this time. Rx refused.     Trudy Ryan RN on 8/2/2022 at 8:19 AM

## 2022-09-11 ENCOUNTER — HEALTH MAINTENANCE LETTER (OUTPATIENT)
Age: 32
End: 2022-09-11

## 2022-09-11 NOTE — PROGRESS NOTES
"  Assessment & Plan     1. Insulin resistance  2. BMI 39.0-39.9,adult  Chronic, worsening.  Discussed things counting against her include her PCOS, raising young children currently, limited time for exercise/meal planning.  Discussed macros change to a lower carb diet (goal ~40-50g/day)  R/B/O of metformin, which she has been on previously, other oral weight loss medications or the GLP-1 inhibitors discussed today.  Elect to proceed with GLP-1 inhibitor.  Rx for victoza based on insurance preference.  Start 0.6mg daily and after 1 month increase to 1.2mg.  Follow up in ~2-3 months.  Hopeful for 5-10% weigh reduction.  - liraglutide (VICTOZA) 18 MG/3ML solution; Inject 0.6 mg Subcutaneous daily for 28 days, THEN 1.2 mg daily.  Dispense: 9 mL; Refill: 4    3. Adult ADHD (attention deficit hyperactivity disorder)  Chronic, stable.  Current dose effective.  Rx renewed x 3 months.  - lisdexamfetamine (VYVANSE) 50 MG capsule; Take 1 capsule (50 mg) by mouth every morning  Dispense: 30 capsule; Refill: 0  - lisdexamfetamine (VYVANSE) 50 MG capsule; Take 1 capsule (50 mg) by mouth every morning  Dispense: 30 capsule; Refill: 0  - lisdexamfetamine (VYVANSE) 50 MG capsule; Take 1 capsule (50 mg) by mouth every morning  Dispense: 30 capsule; Refill: 0    4. Mild intermittent reactive airway disease with acute exacerbation  Chronic, with EIB or with illness.  Rx renewed going into cough/cold season.  - albuterol (PROAIR HFA/PROVENTIL HFA/VENTOLIN HFA) 108 (90 Base) MCG/ACT inhaler; Inhale 2 puffs into the lungs every 4 hours as needed for shortness of breath / dyspnea or wheezing  Dispense: 18 g; Refill: 3       BMI:   Estimated body mass index is 39.79 kg/m  as calculated from the following:    Height as of 7/12/22: 1.651 m (5' 5\").    Weight as of this encounter: 108.5 kg (239 lb 2 oz).   Weight management plan: Discussed healthy diet and exercise guidelines and see plan above.    No follow-ups on file.    Abiola Ashton, "   [facility]      Subjective   Ana is a 31 year old, presenting for the following health issues:  Weight Problem      History of Present Illness       Reason for visit:  Weight concerns    She eats 2-3 servings of fruits and vegetables daily.She consumes 1 sweetened beverage(s) daily.She exercises with enough effort to increase her heart rate 30 to 60 minutes per day.  She exercises with enough effort to increase her heart rate 3 or less days per week. She is missing 3 dose(s) of medications per week.    Today's PHQ-9         PHQ-9 Total Score: 17    PHQ-9 Q9 Thoughts of better off dead/self-harm past 2 weeks :   Not at all    How difficult have these problems made it for you to do your work, take care of things at home, or get along with other people: Very difficult     Concern - weight concerns  Onset: years  Description: worsening over the years  Intensity: moderate  Progression of Symptoms:  worsening and constant  Accompanying Signs & Symptoms: IR, mood  Previous history of similar problem: Yes, chronic  Precipitating factors:        Worsened by: pregnancy, stress, Depo in [id number] and has been worse since that time.  Alleviating factors:        Improved by: exercise/eating right  Therapies tried and outcome: exercise regime, food changes    Today: 239# (BMI 39.8)  7/18/22: 234# (BMI 39)  1/28/22: 220# (BMI 37.2)  11/23/21 (last prenatal visit with twins): 248# (BMI 41.3)  1/28/21: 238# (BMI 40.5)  6/2/20: 242# (BMI 41.2)  3/1/19: 236# (BMI 39.3)  6/1/18: 241#  7/19/17: 243#  9/1/16: 230#  4/2/15: 213#  10/7/14: 210#  10/8/13: 181#     ADHD Follow-Up (Adult)  Concerns: None  Changes since last visit: Stable  Taking controlled (daily) medications as prescribed: Yes  Sleep: trouble staying asleep - primarily because of children  Adult ADHD Self-Reporting form given to patient?:  No  Currently in counseling: No    Medication Benefits:   Controlled symptoms: Attention span,  Distractability, Impulse control, Frustration tolerance and Accepting limits  Uncontrolled symptoms:  Finishing tasks    Medication Side Effects:  Reports:  none  Sleep Problems? Yes Details: young children still getting up multiple times throughout the night  ++++++++++++++++++++++++++++++++++++++++++++++++    Employer Concerns/Feedback: None  Coworker Concerns:   None  Home/Family Concerns: None      Review of Systems   CONSTITUTIONAL: NEGATIVE for fever, chills, change in weight  ENT/MOUTH: NEGATIVE for ear, mouth and throat problems  RESP: NEGATIVE for significant cough or SOB  CV: NEGATIVE for chest pain, palpitations or peripheral edema      Objective    /68   Pulse 61   Temp 98.4  F (36.9  C) (Tympanic)   Resp 16   Wt 108.5 kg (239 lb 2 oz)   LMP 04/11/2022   SpO2 96%   BMI 39.79 kg/m    Body mass index is 39.79 kg/m .  Physical Exam   GENERAL: healthy, alert and no distress  NECK: no adenopathy, no asymmetry, masses, or scars and thyroid normal to palpation  RESP: lungs clear to auscultation - no rales, rhonchi or wheezes  CV: regular rate and rhythm, normal S1 S2, no S3 or S4, no murmur, click or rub, no peripheral edema and peripheral pulses strong  ABDOMEN: soft, nontender, no hepatosplenomegaly, no masses and bowel sounds normal  MS: no gross musculoskeletal defects noted, no edema    No results found for any visits on 09/13/22.

## 2022-09-13 ENCOUNTER — OFFICE VISIT (OUTPATIENT)
Dept: FAMILY MEDICINE | Facility: OTHER | Age: 32
End: 2022-09-13
Attending: FAMILY MEDICINE
Payer: COMMERCIAL

## 2022-09-13 VITALS
BODY MASS INDEX: 39.79 KG/M2 | SYSTOLIC BLOOD PRESSURE: 112 MMHG | WEIGHT: 239.13 LBS | RESPIRATION RATE: 16 BRPM | HEART RATE: 61 BPM | DIASTOLIC BLOOD PRESSURE: 68 MMHG | TEMPERATURE: 98.4 F | OXYGEN SATURATION: 96 %

## 2022-09-13 DIAGNOSIS — J45.21 MILD INTERMITTENT REACTIVE AIRWAY DISEASE WITH ACUTE EXACERBATION: ICD-10-CM

## 2022-09-13 DIAGNOSIS — F90.9 ADULT ADHD (ATTENTION DEFICIT HYPERACTIVITY DISORDER): ICD-10-CM

## 2022-09-13 DIAGNOSIS — E88.819 INSULIN RESISTANCE: Primary | ICD-10-CM

## 2022-09-13 PROCEDURE — G0463 HOSPITAL OUTPT CLINIC VISIT: HCPCS

## 2022-09-13 PROCEDURE — 99214 OFFICE O/P EST MOD 30 MIN: CPT | Performed by: FAMILY MEDICINE

## 2022-09-13 RX ORDER — ALBUTEROL SULFATE 90 UG/1
2 AEROSOL, METERED RESPIRATORY (INHALATION) EVERY 4 HOURS PRN
Qty: 18 G | Refills: 3 | Status: SHIPPED | OUTPATIENT
Start: 2022-09-13 | End: 2023-03-21

## 2022-09-13 RX ORDER — LIRAGLUTIDE 6 MG/ML
INJECTION SUBCUTANEOUS
Qty: 9 ML | Refills: 4 | Status: SHIPPED | OUTPATIENT
Start: 2022-09-13 | End: 2023-03-01

## 2022-09-13 RX ORDER — LISDEXAMFETAMINE DIMESYLATE 50 MG/1
50 CAPSULE ORAL EVERY MORNING
Qty: 30 CAPSULE | Refills: 0 | Status: SHIPPED | OUTPATIENT
Start: 2022-10-13 | End: 2022-11-23

## 2022-09-13 RX ORDER — LISDEXAMFETAMINE DIMESYLATE 50 MG/1
50 CAPSULE ORAL EVERY MORNING
Qty: 30 CAPSULE | Refills: 0 | Status: SHIPPED | OUTPATIENT
Start: 2022-11-12 | End: 2022-11-23

## 2022-09-13 RX ORDER — LISDEXAMFETAMINE DIMESYLATE 50 MG/1
50 CAPSULE ORAL EVERY MORNING
Qty: 30 CAPSULE | Refills: 0 | Status: SHIPPED | OUTPATIENT
Start: 2022-09-13 | End: 2022-11-23

## 2022-09-13 ASSESSMENT — PATIENT HEALTH QUESTIONNAIRE - PHQ9
SUM OF ALL RESPONSES TO PHQ QUESTIONS 1-9: 17
10. IF YOU CHECKED OFF ANY PROBLEMS, HOW DIFFICULT HAVE THESE PROBLEMS MADE IT FOR YOU TO DO YOUR WORK, TAKE CARE OF THINGS AT HOME, OR GET ALONG WITH OTHER PEOPLE: VERY DIFFICULT
SUM OF ALL RESPONSES TO PHQ QUESTIONS 1-9: 17

## 2022-09-13 ASSESSMENT — PAIN SCALES - GENERAL: PAINLEVEL: NO PAIN (0)

## 2022-09-13 ASSESSMENT — ASTHMA QUESTIONNAIRES: ACT_TOTALSCORE: 19

## 2022-09-13 NOTE — NURSING NOTE
"Chief Complaint   Patient presents with     Weight Problem       Initial /68   Pulse 61   Temp 98.4  F (36.9  C) (Tympanic)   Resp 16   Wt 108.5 kg (239 lb 2 oz)   LMP 04/11/2022   SpO2 96%   BMI 39.79 kg/m   Estimated body mass index is 39.79 kg/m  as calculated from the following:    Height as of 7/12/22: 1.651 m (5' 5\").    Weight as of this encounter: 108.5 kg (239 lb 2 oz).  Medication Reconciliation: complete    Anny Lo LPN     Advanced Care Directive reviewed.     "

## 2022-11-23 NOTE — PROGRESS NOTES
Assessment & Plan     1. Adult ADHD (attention deficit hyperactivity disorder)  Chronic, stable.  Renewed Rx for Vyvanse x 3 months.  UDS due next visit; Contract due next visit.  - lisdexamfetamine (VYVANSE) 50 MG capsule; Take 1 capsule (50 mg) by mouth every morning  Dispense: 30 capsule; Refill: 0  - lisdexamfetamine (VYVANSE) 50 MG capsule; Take 1 capsule (50 mg) by mouth every morning  Dispense: 30 capsule; Refill: 0  - lisdexamfetamine (VYVANSE) 50 MG capsule; Take 1 capsule (50 mg) by mouth every morning  Dispense: 30 capsule; Refill: 0    2. Gastroesophageal reflux disease without esophagitis  Unable to wean.  Continuing to work on weight loss with Victoza.  For now, continue on same dosing.  Consider EGD if continued reliance on 3 medications despite lifestyle changes.  - sucralfate (CARAFATE) 1 GM tablet; TAKE 1 TABLET BY MOUTH 3 TIMES DAILY BEFORE MEALS  Dispense: 270 tablet; Refill: 3  - pantoprazole (PROTONIX) 20 MG EC tablet; Take 1 tablet (20 mg) by mouth daily  Dispense: 90 tablet; Refill: 4    3. PCOS (polycystic ovarian syndrome)  4. Morbid obesity (H)  Chronic, improving.  On Victoza with 15# weight reduction in last 3 months.  Tolerating 1.2mg/day dosing.  Continue same dosing.      MDM:  Prescription drug management    Follow up in 3 months.    Abiola Ashton, [facility]      Isha Perez is a 31 year old accompanied by her son and daughter, presenting for the following health issues:  Recheck Medication      History of Present Illness       Reason for visit:  Med refills and weight    She eats 0-1 servings of fruits and vegetables daily.She consumes 2 sweetened beverage(s) daily.She exercises with enough effort to increase her heart rate 9 or less minutes per day.  She exercises with enough effort to increase her heart rate 3 or less days per week. She is missing 2 dose(s) of medications per week.  She is not taking prescribed medications regularly due to  remembering to take.    Today's PHQ-9         PHQ-9 Total Score: 12    PHQ-9 Q9 Thoughts of better off dead/self-harm past 2 weeks :   Not at all    How difficult have these problems made it for you to do your work, take care of things at home, or get along with other people: Somewhat difficult  Today's VITA-7 Score: 12       ADHD Follow-Up (Adult)  Concerns: None  Changes since last visit: Stable  Taking controlled (daily) medications as prescribed: Yes, can miss doses occasionally.  Sleep: restless sleep due to children  Adult ADHD Self-Reporting form given to patient?:  No  Currently in counseling: No; time constraints    Medication Benefits:   Controlled symptoms: Frustration tolerance and Accepting limits  Uncontrolled symptoms:  Distractability and Finishing tasks    Medication Side Effects:  Reports:  none  Sleep Problems? No/children  ++++++++++++++++++++++++++++++++++++++++++++++++    Employer Concerns/Feedback: None  Coworker Concerns:   None  Home/Family Concerns: None    Weight:   Was last seen on 9/13/2022 and at that time initiated lower carbohydrate goal of 40-50g/day, work on increasing physical activity and time for meal planning, along with victoza 0.6mg --> 1.2mg if tolerating well.  Has known PCOS and insulin resistance; has previously been on Metformin.    Has also previously tried calorie restrictive diets and formal weight loss programs.  Since last visit: down 15#.  Was hopeful for more drastic results; but happy with progress otherwise.    GERD:  Attempted wean of Carafate, but was unable.  Usually needing 1-2/day.  Also on famotidine 20mg up to BID; and pantoprazole 20mg daily.  Has not had EGD - would recommend consideration of this in near future if no further improvement with weight reduction.        Objective    /80   Pulse 76   Temp 98.5  F (36.9  C) (Tympanic)   Resp 20   Wt 101.7 kg (224 lb 4 oz)   LMP  (LMP Unknown)   SpO2 97%   BMI 37.32 kg/m    Body mass index is 37.32  kg/m .  Physical Exam   GENERAL: healthy, alert and no distress  PSYCH: appropriate, cooperative    No results found for any visits on 11/29/22.

## 2022-11-29 ENCOUNTER — OFFICE VISIT (OUTPATIENT)
Dept: FAMILY MEDICINE | Facility: OTHER | Age: 32
End: 2022-11-29
Attending: FAMILY MEDICINE
Payer: COMMERCIAL

## 2022-11-29 VITALS
BODY MASS INDEX: 37.32 KG/M2 | OXYGEN SATURATION: 97 % | HEART RATE: 76 BPM | RESPIRATION RATE: 20 BRPM | DIASTOLIC BLOOD PRESSURE: 80 MMHG | TEMPERATURE: 98.5 F | SYSTOLIC BLOOD PRESSURE: 114 MMHG | WEIGHT: 224.25 LBS

## 2022-11-29 DIAGNOSIS — E28.2 PCOS (POLYCYSTIC OVARIAN SYNDROME): Primary | ICD-10-CM

## 2022-11-29 DIAGNOSIS — E66.01 MORBID OBESITY (H): ICD-10-CM

## 2022-11-29 DIAGNOSIS — K21.9 GASTROESOPHAGEAL REFLUX DISEASE WITHOUT ESOPHAGITIS: ICD-10-CM

## 2022-11-29 DIAGNOSIS — F90.9 ADULT ADHD (ATTENTION DEFICIT HYPERACTIVITY DISORDER): ICD-10-CM

## 2022-11-29 PROCEDURE — 99214 OFFICE O/P EST MOD 30 MIN: CPT | Performed by: FAMILY MEDICINE

## 2022-11-29 PROCEDURE — G0463 HOSPITAL OUTPT CLINIC VISIT: HCPCS

## 2022-11-29 RX ORDER — SUCRALFATE 1 G/1
TABLET ORAL
Qty: 270 TABLET | Refills: 3 | Status: SHIPPED | OUTPATIENT
Start: 2022-11-29 | End: 2023-09-12

## 2022-11-29 RX ORDER — PANTOPRAZOLE SODIUM 20 MG/1
20 TABLET, DELAYED RELEASE ORAL DAILY
Qty: 90 TABLET | Refills: 4 | Status: SHIPPED | OUTPATIENT
Start: 2022-11-29 | End: 2023-09-12

## 2022-11-29 RX ORDER — LISDEXAMFETAMINE DIMESYLATE 50 MG/1
50 CAPSULE ORAL EVERY MORNING
Qty: 30 CAPSULE | Refills: 0 | Status: SHIPPED | OUTPATIENT
Start: 2023-02-10 | End: 2023-03-01

## 2022-11-29 RX ORDER — LISDEXAMFETAMINE DIMESYLATE 50 MG/1
50 CAPSULE ORAL EVERY MORNING
Qty: 30 CAPSULE | Refills: 0 | Status: SHIPPED | OUTPATIENT
Start: 2023-01-11 | End: 2023-03-01

## 2022-11-29 RX ORDER — LISDEXAMFETAMINE DIMESYLATE 50 MG/1
50 CAPSULE ORAL EVERY MORNING
Qty: 30 CAPSULE | Refills: 0 | Status: SHIPPED | OUTPATIENT
Start: 2022-12-12 | End: 2023-03-01

## 2022-11-29 ASSESSMENT — ANXIETY QUESTIONNAIRES
GAD7 TOTAL SCORE: 12
6. BECOMING EASILY ANNOYED OR IRRITABLE: NEARLY EVERY DAY
IF YOU CHECKED OFF ANY PROBLEMS ON THIS QUESTIONNAIRE, HOW DIFFICULT HAVE THESE PROBLEMS MADE IT FOR YOU TO DO YOUR WORK, TAKE CARE OF THINGS AT HOME, OR GET ALONG WITH OTHER PEOPLE: SOMEWHAT DIFFICULT
3. WORRYING TOO MUCH ABOUT DIFFERENT THINGS: MORE THAN HALF THE DAYS
8. IF YOU CHECKED OFF ANY PROBLEMS, HOW DIFFICULT HAVE THESE MADE IT FOR YOU TO DO YOUR WORK, TAKE CARE OF THINGS AT HOME, OR GET ALONG WITH OTHER PEOPLE?: SOMEWHAT DIFFICULT
1. FEELING NERVOUS, ANXIOUS, OR ON EDGE: MORE THAN HALF THE DAYS
7. FEELING AFRAID AS IF SOMETHING AWFUL MIGHT HAPPEN: NOT AT ALL
4. TROUBLE RELAXING: MORE THAN HALF THE DAYS
GAD7 TOTAL SCORE: 12
7. FEELING AFRAID AS IF SOMETHING AWFUL MIGHT HAPPEN: NOT AT ALL
2. NOT BEING ABLE TO STOP OR CONTROL WORRYING: MORE THAN HALF THE DAYS
5. BEING SO RESTLESS THAT IT IS HARD TO SIT STILL: SEVERAL DAYS
GAD7 TOTAL SCORE: 12

## 2022-11-29 ASSESSMENT — PATIENT HEALTH QUESTIONNAIRE - PHQ9
SUM OF ALL RESPONSES TO PHQ QUESTIONS 1-9: 12
SUM OF ALL RESPONSES TO PHQ QUESTIONS 1-9: 12
10. IF YOU CHECKED OFF ANY PROBLEMS, HOW DIFFICULT HAVE THESE PROBLEMS MADE IT FOR YOU TO DO YOUR WORK, TAKE CARE OF THINGS AT HOME, OR GET ALONG WITH OTHER PEOPLE: SOMEWHAT DIFFICULT

## 2022-11-29 ASSESSMENT — PAIN SCALES - GENERAL: PAINLEVEL: NO PAIN (0)

## 2022-11-29 NOTE — NURSING NOTE
"Chief Complaint   Patient presents with     Recheck Medication       Initial /80   Pulse 76   Temp 98.5  F (36.9  C) (Tympanic)   Resp 20   Wt 101.7 kg (224 lb 4 oz)   LMP  (LMP Unknown)   SpO2 97%   BMI 37.32 kg/m   Estimated body mass index is 37.32 kg/m  as calculated from the following:    Height as of 7/12/22: 1.651 m (5' 5\").    Weight as of this encounter: 101.7 kg (224 lb 4 oz).  Medication Reconciliation: complete    Anny Lo LPN     [facility] Directive reviewed.     "

## 2022-12-01 ENCOUNTER — TELEPHONE (OUTPATIENT)
Dept: FAMILY MEDICINE | Facility: OTHER | Age: 32
End: 2022-12-01

## 2022-12-01 NOTE — TELEPHONE ENCOUNTER
----- Message from Arinana Raman PA-C sent at 11/25/2022  9:18 AM CST -----  Please call and complete ACT questionnaire.   Arianna Raman PA-C ..................11/25/2022 9:18 AM

## 2023-01-04 DIAGNOSIS — K21.9 GASTROESOPHAGEAL REFLUX DISEASE WITHOUT ESOPHAGITIS: ICD-10-CM

## 2023-01-09 RX ORDER — PANTOPRAZOLE SODIUM 20 MG/1
20 TABLET, DELAYED RELEASE ORAL DAILY
Qty: 30 TABLET | Refills: 1 | OUTPATIENT
Start: 2023-01-09

## 2023-01-09 NOTE — TELEPHONE ENCOUNTER
[facility] sent Rx request for the following:      Redundant refill request refused: Too soon:    pantoprazole (PROTONIX) 20 MG EC tablet 90 tablet 4 11/29/2022  No   Sig - Route: Take 1 tablet (20 mg) by mouth daily - Oral   Sent to pharmacy as: Pantoprazole Sodium 20 MG Oral Tablet Delayed Release (PROTONIX)   Class: E-Prescribe   Order: [id number]   E-Prescribing Status: Receipt confirmed by pharmacy (11/29/2022  8:31 AM CST)     [facility] - [place], [place] - [address]     Delmi Carrasco RN .............. 1/9/2023  9:03 AM

## 2023-02-08 ENCOUNTER — OFFICE VISIT (OUTPATIENT)
Dept: FAMILY MEDICINE | Facility: OTHER | Age: 33
End: 2023-02-08
Payer: COMMERCIAL

## 2023-02-08 VITALS
RESPIRATION RATE: 18 BRPM | HEIGHT: 65 IN | HEART RATE: 88 BPM | BODY MASS INDEX: 35.35 KG/M2 | SYSTOLIC BLOOD PRESSURE: 106 MMHG | OXYGEN SATURATION: 96 % | WEIGHT: 212.2 LBS | DIASTOLIC BLOOD PRESSURE: 72 MMHG | TEMPERATURE: 99.2 F

## 2023-02-08 DIAGNOSIS — H69.93 DYSFUNCTION OF BOTH EUSTACHIAN TUBES: ICD-10-CM

## 2023-02-08 DIAGNOSIS — J02.0 ACUTE STREPTOCOCCAL PHARYNGITIS: Primary | ICD-10-CM

## 2023-02-08 DIAGNOSIS — R09.81 NASAL CONGESTION: ICD-10-CM

## 2023-02-08 DIAGNOSIS — J02.9 SORE THROAT: ICD-10-CM

## 2023-02-08 LAB — GROUP A STREP BY PCR: DETECTED

## 2023-02-08 PROCEDURE — 87651 STREP A DNA AMP PROBE: CPT | Mod: ZL

## 2023-02-08 PROCEDURE — 99213 OFFICE O/P EST LOW 20 MIN: CPT

## 2023-02-08 PROCEDURE — G0463 HOSPITAL OUTPT CLINIC VISIT: HCPCS

## 2023-02-08 RX ORDER — PENICILLIN V POTASSIUM 500 MG/1
500 TABLET, FILM COATED ORAL 2 TIMES DAILY
Qty: 20 TABLET | Refills: 0 | Status: SHIPPED | OUTPATIENT
Start: 2023-02-08 | End: 2023-02-18

## 2023-02-08 ASSESSMENT — PAIN SCALES - GENERAL: PAINLEVEL: SEVERE PAIN (7)

## 2023-02-08 NOTE — PROGRESS NOTES
ASSESSMENT/PLAN:    I have reviewed the nursing notes.  I have reviewed the findings, diagnosis, plan and need for follow up with the patient.    1. Acute streptococcal pharyngitis  2. Nasal congestion  3. Sore throat  4. Dysfunction of both eustachian tubes  - penicillin V (VEETID) 500 MG tablet; Take 1 tablet (500 mg) by mouth 2 times daily for 10 days  Dispense: 20 tablet; Refill: 0  - Group A Streptococcus PCR Throat Swab    Patient presents with upper respiratory symptoms.  Vitals are stable except for low-grade fever and patient appears nontoxic.  Strep test is positive.  Patient notified of results.  Will treat with penicillin twice a day for 10 days.  Advised patient that she is considered contagious until 24 hours after starting antibiotics and that she should replace her toothbrush on day 2. Discussed symptomatic treatment - Encouraged fluids, salt water gargles, honey, elevation, humidifier, sinus rinse/netti pot, lozenges, tea, topical vapor rub, popsicles, rest, etc. May use over-the-counter Tylenol or ibuprofen PRN.  Also advised patient that she can try an over-the-counter antihistamine such as Zyrtec or Claritin and fluticasone nasal spray to help with nasal congestion and eustachian tube dysfunction.    Discussed warning signs/symptoms indicative of need to f/u    Follow up if symptoms persist or worsen or concerns    I explained my diagnostic considerations and recommendations to the patient, who voiced understanding and agreement with the treatment plan. All questions were answered. We discussed potential side effects of any prescribed or recommended therapies, as well as expectations for response to treatments.    CHELY Tsang CNP  2/8/2023  5:26 PM    HPI:    Ana Patrick is a 32 year old female  who presents to [facility] today for concerns of URI symptoms    URI, x 4 days    Symptoms:  YES: + fevers or chills. Fever, highest reported temperature: 101.3 F  YES: + sore  throat/pharyngitis/tonsillitis.   YES: + allergy/URI Symptoms  YES: + muffled sounds/change in hearing  YES: + sensation of fullness in ear(s)  YES: + ringing in ears/tinnitus  No balance changes  No dizziness  YES: + congestion - nasal  YES: + cough/productive cough  YES: + post nasal drip   YES: + headache  YES: + sinus pain/pressure  YES: + myalgias  YES: + otalgia  No rash  Activity Level Changes: Yes: increased fatigue  Appetite/Liquid Intake Changes: No  Changes to Bowel Habits: Yes: diarrhea  Changes to Bladder Habits: No  Additional Symptoms to Report: No  History of similar symptoms: No  Prior workup: No    Took 3 at home covid tests since symptoms started and they have all been negative    Treatments tried: Tylenol/Ibuprofen, OTC Cough med, Fluids and Rest    Site of exposure: not known.  Type of exposure: not known    Vaccination status:   - Influenza: Not currently immunized  - COVID: 2 doses    Cardiopulmonary History:  Recent Infections (Pneumonia, etc): No  Smoker: No  Asthma: Yes: intermittent  COPD: No  Cystic Fibrosis, Dystrophy (Myotonic, etc.), etc.: No  Cardiac History Including Stroke/Stent Placement/STEMI/STEMI, etc.: No    Other Pertinent History: asthma    Allergies: Meloxicam    PCP: Dr. Ashton    Past Medical History:   Diagnosis Date     Internal derangement of knee     2016     Personal history of other medical treatment (CODE)          Polycystic ovarian syndrome     3/18/2016     Past Surgical History:   Procedure Laterality Date     ARTHROSCOPY KNEE Right     Dr. Landeros      SECTION N/A 2020    Procedure:  SECTION;  Surgeon: Gerry Kim MD;  Location:  OR      SECTION, TUBAL LIGATION, COMBINED Bilateral 2021    Procedure:  SECTION, WITH POSTPARTUM TUBAL LIGATION;  Surgeon: Gerry Kim MD;  Location:  OR     CYSTOSCOPY N/A 2022    Procedure: Cystoscopy;  Surgeon: Gerry Kim MD;  Location:  OR      HYSTEROSCOPY DIAGNOSTIC N/A 9/27/2018    Procedure: HYSTEROSCOPY DIAGNOSTIC;  Diagnostic Hysteroscopy with Endometrial Scratching.      .;  Surgeon: Gerry Kim MD;  Location: [facility] OR     LAPAROSCOPIC HYSTERECTOMY TOTAL, SALPINGECTOMY N/A 5/12/2022    Procedure: HYSTERECTOMY, TOTAL, LAPAROSCOPIC,;  Surgeon: Gerry Kim MD;  Location: [facility] OR     MANDIBLE SURGERY Right     2001     Social History     Tobacco Use     Smoking status: Never     Smokeless tobacco: Never   Substance Use Topics     Alcohol use: Not Currently     Alcohol/week: 0.0 standard drinks     Comment: Alcoholic Drinks/day: rare     Current Outpatient Medications   Medication Sig Dispense Refill     albuterol (PROAIR HFA/PROVENTIL HFA/VENTOLIN HFA) 108 (90 Base) MCG/ACT inhaler Inhale 2 puffs into the lungs every 4 hours as needed for shortness of breath / dyspnea or wheezing 18 g 3     famotidine (PEPCID) 20 MG tablet Take 1 tablet (20 mg) by mouth 2 times daily 60 tablet 2     liraglutide (VICTOZA) 18 MG/3ML solution Inject 0.6 mg Subcutaneous daily for 28 days, THEN 1.2 mg daily. 9 mL 4     [START ON 2/10/2023] lisdexamfetamine (VYVANSE) 50 MG capsule Take 1 capsule (50 mg) by mouth every morning 30 capsule 0     pantoprazole (PROTONIX) 20 MG EC tablet Take 1 tablet (20 mg) by mouth daily 90 tablet 4     sucralfate (CARAFATE) 1 GM tablet TAKE 1 TABLET BY MOUTH 3 TIMES DAILY BEFORE MEALS 270 tablet 3     insulin pen needle (32G X 4 MM) 32G X 4 MM miscellaneous Use 1 pen needles daily or as directed. (Patient not taking: Reported on 2/8/2023) 100 each prn     lisdexamfetamine (VYVANSE) 50 MG capsule Take 1 capsule (50 mg) by mouth every morning 30 capsule 0     lisdexamfetamine (VYVANSE) 50 MG capsule Take 1 capsule (50 mg) by mouth every morning 30 capsule 0     Allergies   Allergen Reactions     Meloxicam Rash     Past medical history, past surgical history, current medications and allergies reviewed and accurate to the best of my knowledge.   "    ROS:  Refer to HPI    /72 (BP Location: Left arm, Patient Position: Sitting, Cuff Size: Adult Large)   Pulse 88   Temp 99.2  F (37.3  C) (Tympanic)   Resp 18   Ht 1.657 m (5' 5.25\")   Wt 96.3 kg (212 lb 3.2 oz)   LMP  (LMP Unknown)   SpO2 96%   BMI 35.04 kg/m      EXAM:  General Appearance: Well appearing 32 year old female, appropriate appearance for age. No acute distress   Ears: Left TM intact, translucent with bony landmarks appreciated, no erythema, mild effusion and bulging, no purulence.  Right TM intact, translucent with bony landmarks appreciated, no erythema, mild effusion and bulging, no purulence.  Left auditory canal clear.  Right auditory canal clear.  Normal external ears, non tender.  Eyes: conjunctivae normal without erythema or irritation, corneas clear, no drainage or crusting, no eyelid swelling, pupils equal   Oropharynx: moist mucous membranes, posterior pharynx with erythema, tonsils symmetric and 2+, mild erythema, no exudates or petechiae, no post nasal drip seen, no trismus, voice clear.    Sinuses:  No sinus tenderness upon palpation of the frontal or maxillary sinuses  Nose:  Bilateral nares: no erythema, no edema, clear drainage and congestion   Neck: supple without adenopathy  Respiratory: normal chest wall and respirations.  Normal effort.  Clear to auscultation bilaterally, no wheezing, crackles or rhonchi.  No increased work of breathing.  No cough appreciated.  Cardiac: RRR with no murmurs  Musculoskeletal:  Equal movement of bilateral upper extremities.  Equal movement of bilateral lower extremities.  Normal gait.    Dermatological: no rashes noted of exposed skin  Neuro: Alert and oriented to person, place, and time. Psychological: normal affect, alert, oriented, and pleasant.     Labs:  Results for orders placed or performed in visit on 02/08/23   Group A Streptococcus PCR Throat Swab     Status: Abnormal    Specimen: Throat; Swab   Result Value Ref Range    " Group A strep by PCR Detected (A) Not Detected    Narrative    The Xpert Xpress Strep A test, performed on the [employer], is a rapid, qualitative in vitro diagnostic test for the detection of Streptococcus pyogenes (Group A ß-hemolytic Streptococcus, Strep A) in throat swab specimens from patients with signs and symptoms of pharyngitis. The Xpert Xpress Strep A test can be used as an aid in the diagnosis of Group A Streptococcal pharyngitis. The assay is not intended to monitor treatment for Group A Streptococcus infections. The Xpert Xpress Strep A test utilizes an automated real-time polymerase chain reaction (PCR) to detect Streptococcus pyogenes DNA.

## 2023-02-08 NOTE — PATIENT INSTRUCTIONS
"If a strep test was performed:   We will call you with the results of the strep test, in the meantime, information below on viral colds/upper respiratory tract infections were provided (on average the test takes about 30-60 minutes to return).    If the strep test returns \"POSITIVE\" for strep, you will be prescribed an antibiotic, if this is the case, please take the entire course of antibiotic, even if feeling better prior to this. Continue with symptomatic treatment below as needed. If positive, please change toothbrush on day 2.     If the strep test returns \"NEGATIVE\" you do not need antibiotics and are to continue with conservative treatment as outlined below. Continue to monitor symptoms.       Please refer to your AVS for follow up and pain/symptoms management recommendations (I.e.: medications, helpful conservative treatment modalities, appropriate follow up if need to a specialist or family practice, etc.). Please return to urgent care if your symptoms change or worsen.     Discharge instructions:  -If you were prescribed a medication(s), please take this as prescribed/directed  -Monitor your symptoms, if changing/worsening, return to UC/ER or PCP for follow up    Symptomatic treatments recommended.  - Antibiotics will not help with your symptoms, unless you were told otherwise today (strep throat, ear infection, etc. ). Education provided on symptoms of secondary bacterial infection such as new fever, chills, rigors, shortness of breath, increased work of breathing, that can occur with viral URI and need for further evaluation, if they occur.   - Ensure you are staying hydrated by drinking plenty of fluids and eating mild foods and advance diet as tolerated  - Honey can be soothing for sore throat (as long as above 12 months of age)  - Warm salt water gurgles can help soothe sore throat  - Humidifier can help with congestion and help keep mucus membranes such as throat and nose from drying out.  - Sleeping " slightly propped up can help with congestion and postnasal drainage that can worsen cough at bedtime.  - As long as you have never been told to take Tylenol and/or Ibuprofen you can use them to manage fever and body aches per package instructions  Make sure you eat when you take ibuprofen to avoid stomach upset.  - OTC cough medications per package instructions to help with cough. Check to see if the cough/cold medication already has acetaminophen (Tylenol) in it. If it does avoid taking additional Tylenol.  - If sudden onset of new fever, worsening symptoms return for further evaluation.  - OTC antihistamine such as Allegra, Zyrtec, Claritin (generic is okay) can help with nasal/sinus congestion and OTC nasal steroid such as Flonase can help decrease sinus inflammation to help with congestion.  - Education provided on symptoms of post-viral bacterial infections including ear infection and pneumonia. This would require re-evaluation for treatment.

## 2023-02-08 NOTE — NURSING NOTE
"Chief Complaint   Patient presents with     Fever     Chills     Pharyngitis     Patient is here for fever, chills, sore throat that began Sunday, 2/5/23. Last took Tylenol at 7 AM this morning and DayQuil at noon today.     Initial /72 (BP Location: Left arm, Patient Position: Sitting, Cuff Size: Adult Large)   Pulse 88   Temp 99.2  F (37.3  C) (Tympanic)   Resp 18   Ht 1.657 m (5' 5.25\")   Wt 96.3 kg (212 lb 3.2 oz)   LMP  (LMP Unknown)   SpO2 96%   BMI 35.04 kg/m   Estimated body mass index is 35.04 kg/m  as calculated from the following:    Height as of this encounter: 1.657 m (5' 5.25\").    Weight as of this encounter: 96.3 kg (212 lb 3.2 oz).       Medication Reconciliation: Complete    Ynes Pandey LPN .......  2/8/2023  5:19 PM   "

## 2023-02-28 NOTE — PROGRESS NOTES
Assessment & Plan     1. Adult ADHD (attention deficit hyperactivity disorder)  Chronic, with decrease in medication efficacy by mid-afternoon.  Discussed trial of increased dose to 60mg daily x 1 month; then 60mg daily + Ritalin 5mg at noon-2pm; and then month three she will notify me and will plan for Rx as needed at that time.   Follow up with me in 3 months.  - lisdexamfetamine (VYVANSE) 60 MG capsule; Take 1 capsule (60 mg) by mouth every morning  Dispense: 30 capsule; Refill: 0  - lisdexamfetamine (VYVANSE) 60 MG capsule; Take 1 capsule (60 mg) by mouth every morning  Dispense: 30 capsule; Refill: 0  - lisdexamfetamine (VYVANSE) 60 MG capsule; Take 1 capsule (60 mg) by mouth every morning  Dispense: 30 capsule; Refill: 0  - methylphenidate (RITALIN) 5 MG tablet; Take 1 tablet (5 mg) by mouth daily (with lunch)  Dispense: 30 tablet; Refill: 0    2. Insulin resistance  Chronic, improving with victoza dosing.  Refilled x 1 year at the 1.2mg.  Aware to possibly consider the 1.8mg dosing if hits plateau.  - liraglutide (VICTOZA) 18 MG/3ML solution; Inject 1.2 mg Subcutaneous daily  Dispense: 18 mL; Refill: 4    3. Body mass index (BMI) 34.0-34.9, adult  Chronic, see above.  - liraglutide (VICTOZA) 18 MG/3ML solution; Inject 1.2 mg Subcutaneous daily  Dispense: 18 mL; Refill: 4    4. Generalized anxiety disorder  Chronic, worsening.  Rx for wellbutrin sent to pharmacy.   - buPROPion (WELLBUTRIN XL) 150 MG 24 hr tablet; Take 1 tablet (150 mg) by mouth every morning  Dispense: 90 tablet; Refill: 1    MDM:  Prescription drug management     Follow up in 3 months.    No follow-ups on file.    Abiola Ashton, [facility]      Subjective   Ana is a 32 year old, presenting for the following health issues:  Recheck Medication      History of Present Illness       Reason for visit:  Med refill and weight follow up    She eats 2-3 servings of fruits and vegetables daily.She consumes 1 sweetened  beverage(s) daily.She exercises with enough effort to increase her heart rate 30 to 60 minutes per day.  She exercises with enough effort to increase her heart rate 3 or less days per week. She is missing 3 dose(s) of medications per week.    Today's PHQ-9         PHQ-9 Total Score: 17    PHQ-9 Q9 Thoughts of better off dead/self-harm past 2 weeks :   Several days  Thoughts of suicide or self harm: (P) No  Self-harm Plan:     Self-harm Action:       Safety concerns for self or others: (P) No    How difficult have these problems made it for you to do your work, take care of things at home, or get along with other people: Somewhat difficult  Today's VITA-7 Score: 18       ADHD Follow-Up (Adult)  Concerns: wearing off by mid-afternoon  Changes since last visit: Stable  Taking controlled (daily) medications as prescribed: Yes; does forget dosing at times.  Has taken as late as noon without difficulty sleeping.  Sleep: no problems.  Does best if she gets less than 7 hours of sleep or more than 9 hours.  Adult ADHD Self-Reporting form given to patient?:  No  Currently in counseling: No    Medication Benefits:   Controlled symptoms: Impulse control, Frustration tolerance and Accepting limits  Uncontrolled symptoms:  Attention span, Distractability and Finishing tasks    Medication Side Effects:  Reports:  none  Sleep Problems? no  ++++++++++++++++++++++++++++++++++++++++++++++++    Employer Concerns/Feedback: Stable  Coworker Concerns:   Stable  Home/Family Concerns: Stable; has felt like chores have piled up on her at times.    Weight: down 15# since our appointment in 11/2022.  Doing well.  Tolerating the Victoza without side effects.  On 1.2mg daily, can also forget this dosing occasionally.    Heartburn.  Is having breakthrough symptoms with any missed dose.  Figured things would improve with weight loss.  Is controlled with famotidine 20mg daily to BID; and pantoprazole 20mg daily.  Has never had EGD.    Mood:  increased stress.        Objective    /76   Pulse 93   Temp 97  F (36.1  C) (Tympanic)   Resp 16   Wt 95 kg (209 lb 6 oz)   SpO2 100%   BMI 34.58 kg/m    Body mass index is 34.58 kg/m .  Physical Exam   GENERAL: healthy, alert and no distress  NECK: no adenopathy, no asymmetry, masses, or scars and thyroid normal to palpation  RESP: lungs clear to auscultation - no rales, rhonchi or wheezes  CV: regular rate and rhythm, normal S1 S2, no S3 or S4, no murmur, click or rub, no peripheral edema and peripheral pulses strong  SKIN: no suspicious lesions or rashes  PSYCH: mentation appears normal, affect normal/bright    No results found for any visits on 03/01/23.

## 2023-03-01 ENCOUNTER — OFFICE VISIT (OUTPATIENT)
Dept: FAMILY MEDICINE | Facility: OTHER | Age: 33
End: 2023-03-01
Attending: FAMILY MEDICINE
Payer: COMMERCIAL

## 2023-03-01 VITALS
WEIGHT: 209.38 LBS | HEART RATE: 93 BPM | OXYGEN SATURATION: 100 % | BODY MASS INDEX: 34.58 KG/M2 | DIASTOLIC BLOOD PRESSURE: 76 MMHG | RESPIRATION RATE: 16 BRPM | TEMPERATURE: 97 F | SYSTOLIC BLOOD PRESSURE: 122 MMHG

## 2023-03-01 DIAGNOSIS — F41.1 GENERALIZED ANXIETY DISORDER: Primary | ICD-10-CM

## 2023-03-01 DIAGNOSIS — E88.819 INSULIN RESISTANCE: ICD-10-CM

## 2023-03-01 DIAGNOSIS — F90.9 ADULT ADHD (ATTENTION DEFICIT HYPERACTIVITY DISORDER): ICD-10-CM

## 2023-03-01 PROCEDURE — G0463 HOSPITAL OUTPT CLINIC VISIT: HCPCS

## 2023-03-01 PROCEDURE — 99214 OFFICE O/P EST MOD 30 MIN: CPT | Performed by: FAMILY MEDICINE

## 2023-03-01 RX ORDER — METHYLPHENIDATE HYDROCHLORIDE 5 MG/1
5 TABLET ORAL
Qty: 30 TABLET | Refills: 0 | Status: SHIPPED | OUTPATIENT
Start: 2023-03-01 | End: 2023-05-26

## 2023-03-01 RX ORDER — BUPROPION HYDROCHLORIDE 150 MG/1
150 TABLET ORAL EVERY MORNING
Qty: 90 TABLET | Refills: 1 | Status: SHIPPED | OUTPATIENT
Start: 2023-03-01 | End: 2023-05-26 | Stop reason: SINTOL

## 2023-03-01 RX ORDER — LISDEXAMFETAMINE DIMESYLATE 60 MG/1
60 CAPSULE ORAL EVERY MORNING
Qty: 30 CAPSULE | Refills: 0 | Status: SHIPPED | OUTPATIENT
Start: 2023-03-01 | End: 2023-05-26

## 2023-03-01 RX ORDER — LISDEXAMFETAMINE DIMESYLATE 50 MG/1
50 CAPSULE ORAL EVERY MORNING
Qty: 30 CAPSULE | Refills: 0 | Status: CANCELLED | OUTPATIENT
Start: 2023-03-01

## 2023-03-01 RX ORDER — LIRAGLUTIDE 6 MG/ML
1.2 INJECTION SUBCUTANEOUS DAILY
Qty: 18 ML | Refills: 4 | Status: SHIPPED | OUTPATIENT
Start: 2023-03-01 | End: 2023-05-26

## 2023-03-01 RX ORDER — LISDEXAMFETAMINE DIMESYLATE 60 MG/1
60 CAPSULE ORAL EVERY MORNING
Qty: 30 CAPSULE | Refills: 0 | Status: SHIPPED | OUTPATIENT
Start: 2023-04-28 | End: 2023-05-26

## 2023-03-01 RX ORDER — LISDEXAMFETAMINE DIMESYLATE 60 MG/1
60 CAPSULE ORAL EVERY MORNING
Qty: 30 CAPSULE | Refills: 0 | Status: SHIPPED | OUTPATIENT
Start: 2023-03-31 | End: 2023-05-26

## 2023-03-01 ASSESSMENT — PATIENT HEALTH QUESTIONNAIRE - PHQ9
10. IF YOU CHECKED OFF ANY PROBLEMS, HOW DIFFICULT HAVE THESE PROBLEMS MADE IT FOR YOU TO DO YOUR WORK, TAKE CARE OF THINGS AT HOME, OR GET ALONG WITH OTHER PEOPLE: SOMEWHAT DIFFICULT
SUM OF ALL RESPONSES TO PHQ QUESTIONS 1-9: 17
SUM OF ALL RESPONSES TO PHQ QUESTIONS 1-9: 17

## 2023-03-01 ASSESSMENT — ANXIETY QUESTIONNAIRES
6. BECOMING EASILY ANNOYED OR IRRITABLE: NEARLY EVERY DAY
IF YOU CHECKED OFF ANY PROBLEMS ON THIS QUESTIONNAIRE, HOW DIFFICULT HAVE THESE PROBLEMS MADE IT FOR YOU TO DO YOUR WORK, TAKE CARE OF THINGS AT HOME, OR GET ALONG WITH OTHER PEOPLE: SOMEWHAT DIFFICULT
GAD7 TOTAL SCORE: 18
GAD7 TOTAL SCORE: 18
3. WORRYING TOO MUCH ABOUT DIFFERENT THINGS: NEARLY EVERY DAY
4. TROUBLE RELAXING: NEARLY EVERY DAY
8. IF YOU CHECKED OFF ANY PROBLEMS, HOW DIFFICULT HAVE THESE MADE IT FOR YOU TO DO YOUR WORK, TAKE CARE OF THINGS AT HOME, OR GET ALONG WITH OTHER PEOPLE?: SOMEWHAT DIFFICULT
7. FEELING AFRAID AS IF SOMETHING AWFUL MIGHT HAPPEN: MORE THAN HALF THE DAYS
2. NOT BEING ABLE TO STOP OR CONTROL WORRYING: MORE THAN HALF THE DAYS
7. FEELING AFRAID AS IF SOMETHING AWFUL MIGHT HAPPEN: MORE THAN HALF THE DAYS
GAD7 TOTAL SCORE: 18
1. FEELING NERVOUS, ANXIOUS, OR ON EDGE: NEARLY EVERY DAY
5. BEING SO RESTLESS THAT IT IS HARD TO SIT STILL: MORE THAN HALF THE DAYS

## 2023-03-01 ASSESSMENT — ASTHMA QUESTIONNAIRES
QUESTION_1 LAST FOUR WEEKS HOW MUCH OF THE TIME DID YOUR ASTHMA KEEP YOU FROM GETTING AS MUCH DONE AT WORK, SCHOOL OR AT HOME: A LITTLE OF THE TIME
QUESTION_5 LAST FOUR WEEKS HOW WOULD YOU RATE YOUR ASTHMA CONTROL: WELL CONTROLLED
ACT_TOTALSCORE: 19
ACT_TOTALSCORE: 19
QUESTION_4 LAST FOUR WEEKS HOW OFTEN HAVE YOU USED YOUR RESCUE INHALER OR NEBULIZER MEDICATION (SUCH AS ALBUTEROL): TWO OR THREE TIMES PER WEEK
QUESTION_2 LAST FOUR WEEKS HOW OFTEN HAVE YOU HAD SHORTNESS OF BREATH: THREE TO SIX TIMES A WEEK
QUESTION_3 LAST FOUR WEEKS HOW OFTEN DID YOUR ASTHMA SYMPTOMS (WHEEZING, COUGHING, SHORTNESS OF BREATH, CHEST TIGHTNESS OR PAIN) WAKE YOU UP AT NIGHT OR EARLIER THAN USUAL IN THE MORNING: NOT AT ALL

## 2023-03-01 ASSESSMENT — PAIN SCALES - GENERAL: PAINLEVEL: NO PAIN (0)

## 2023-03-01 NOTE — LETTER
My Asthma Action Plan    Name: Ana Patrick   YOB: 1990  Date: 3/1/2023   My doctor: Abiola Ashton, DO   My clinic: [facility]        My Rescue Medicine:   Albuterol inhaler (Proair/Ventolin/Proventil HFA)  2-4 puffs EVERY 4 HOURS as needed. Use a spacer if recommended by your provider.   My Asthma Severity:   Intermittent / Exercise Induced  Know your asthma triggers: exercise or sports             GREEN ZONE   Good Control    I feel good    No cough or wheeze    Can work, sleep and play without asthma symptoms       Take your asthma control medicine every day.     1. If exercise triggers your asthma, take your rescue medication    15 minutes before exercise or sports, and    During exercise if you have asthma symptoms  2. Spacer to use with inhaler: If you have a spacer, make sure to use it with your inhaler             YELLOW ZONE Getting Worse  I have ANY of these:    I do not feel good    Cough or wheeze    Chest feels tight    Wake up at night   1. Keep taking your Green Zone medications  2. Start taking your rescue medicine:    every 20 minutes for up to 1 hour. Then every 4 hours for 24-48 hours.  3. If you stay in the Yellow Zone for more than 12-24 hours, contact your doctor.  4. If you do not return to the Green Zone in 12-24 hours or you get worse, start taking your oral steroid medicine if prescribed by your provider.           RED ZONE Medical Alert - Get Help  I have ANY of these:    I feel awful    Medicine is not helping    Breathing getting harder    Trouble walking or talking    Nose opens wide to breathe       1. Take your rescue medicine NOW  2. If your provider has prescribed an oral steroid medicine, start taking it NOW  3. Call your doctor NOW  4. If you are still in the Red Zone after 20 minutes and you have not reached your doctor:    Take your rescue medicine again and    Call 911 or go to the emergency room right away    See your regular  doctor within 2 weeks of an Emergency Room or Urgent Care visit for follow-up treatment.          Annual Reminders:  Meet with Asthma Educator,  Flu Shot in the Fall, consider Pneumonia Vaccination for patients with asthma (aged 19 and older).    Pharmacy:    [facility] - [place], [place] - [address]  [facility]    Electronically signed by Abiola Ashton,    Date: 03/01/23                    Asthma Triggers  How To Control Things That Make Your Asthma Worse    Triggers are things that make your asthma worse.  Look at the list below to help you find your triggers and   what you can do about them. You can help prevent asthma flare-ups by staying away from your triggers.      Trigger                                                          What you can do   Cigarette Smoke  Tobacco smoke can make asthma worse. Do not allow smoking in your home, car or around you.  Be sure no one smokes at a child s day care or school.  If you smoke, ask your health care provider for ways to help you quit.  Ask family members to quit too.  Ask your health care provider for a referral to Quit Plan to help you quit smoking, or call 7-794-998-PLAN.     Colds, Flu, Bronchitis  These are common triggers of asthma. Wash your hands often.  Don t touch your eyes, nose or mouth.  Get a flu shot every year.     Dust Mites  These are tiny bugs that live in cloth or carpet. They are too small to see. Wash sheets and blankets in hot water every week.   Encase pillows and mattress in dust mite proof covers.  Avoid having carpet if you can. If you have carpet, vacuum weekly.   Use a dust mask and HEPA vacuum.   Pollen and Outdoor Mold  Some people are allergic to trees, grass, or weed pollen, or molds. Try to keep your windows closed.  Limit time out doors when pollen count is high.   Ask you health care provider about taking medicine during allergy season.     Animal Dander  Some people are  allergic to skin flakes, urine or saliva from pets with fur or feathers. Keep pets with fur or feathers out of your home.    If you can t keep the pet outdoors, then keep the pet out of your bedroom.  Keep the bedroom door closed.  Keep pets off cloth furniture and away from stuffed toys.     Mice, Rats, and Cockroaches  Some people are allergic to the waste from these pests.   Cover food and garbage.  Clean up spills and food crumbs.  Store grease in the refrigerator.   Keep food out of the bedroom.   Indoor Mold  This can be a trigger if your home has high moisture. Fix leaking faucets, pipes, or other sources of water.   Clean moldy surfaces.  Dehumidify basement if it is damp and smelly.   Smoke, Strong Odors, and Sprays  These can reduce air quality. Stay away from strong odors and sprays, such as perfume, powder, hair spray, paints, smoke incense, paint, cleaning products, candles and new carpet.   Exercise or Sports  Some people with asthma have this trigger. Be active!  Ask your doctor about taking medicine before sports or exercise to prevent symptoms.    Warm up for 5-10 minutes before and after sports or exercise.     Other Triggers of Asthma  Cold air:  Cover your nose and mouth with a scarf.  Sometimes laughing or crying can be a trigger.  Some medicines and food can trigger asthma.

## 2023-03-01 NOTE — NURSING NOTE
"Chief Complaint   Patient presents with     Recheck Medication       Initial /76   Pulse 93   Temp 97  F (36.1  C) (Tympanic)   Resp 16   Wt 95 kg (209 lb 6 oz)   SpO2 100%   BMI 34.58 kg/m   Estimated body mass index is 34.58 kg/m  as calculated from the following:    Height as of 2/8/23: 1.657 m (5' 5.25\").    Weight as of this encounter: 95 kg (209 lb 6 oz).  Medication Reconciliation: complete    Anny Lo LPN     Advanced Care Directive reviewed.     "

## 2023-03-17 DIAGNOSIS — J45.21 MILD INTERMITTENT REACTIVE AIRWAY DISEASE WITH ACUTE EXACERBATION: ICD-10-CM

## 2023-03-21 RX ORDER — ALBUTEROL SULFATE 90 UG/1
AEROSOL, METERED RESPIRATORY (INHALATION)
Qty: 18 G | Refills: 3 | Status: SHIPPED | OUTPATIENT
Start: 2023-03-21 | End: 2023-10-17

## 2023-03-21 NOTE — TELEPHONE ENCOUNTER
[facility] sent Rx request for the following:      Requested Prescriptions   Pending Prescriptions Disp Refills     VENTOLIN  (90 Base) MCG/ACT inhaler [Pharmacy Med Name: Ventolin HFA 90 mcg/actuation aerosol inhaler] 18 g 3     Sig: Inhale 2 puffs into the lungs every 4 hours as needed for shortness of breath / dyspnea or wheezing     Last Prescription Date:   9/13/22  Last Fill Qty/Refills:         18 g, R-3    Last Office Visit:              3/1/23  Future Office visit:           None    Tanja Holland RN on 3/21/2023 at 10:55 AM

## 2023-04-06 ENCOUNTER — OFFICE VISIT (OUTPATIENT)
Dept: FAMILY MEDICINE | Facility: OTHER | Age: 33
End: 2023-04-06
Attending: NURSE PRACTITIONER
Payer: COMMERCIAL

## 2023-04-06 VITALS
HEIGHT: 65 IN | SYSTOLIC BLOOD PRESSURE: 114 MMHG | RESPIRATION RATE: 16 BRPM | WEIGHT: 216 LBS | BODY MASS INDEX: 35.99 KG/M2 | HEART RATE: 72 BPM | OXYGEN SATURATION: 99 % | DIASTOLIC BLOOD PRESSURE: 76 MMHG | TEMPERATURE: 98.9 F

## 2023-04-06 DIAGNOSIS — B37.31 YEAST INFECTION OF THE VAGINA: Primary | ICD-10-CM

## 2023-04-06 DIAGNOSIS — N89.8 VAGINAL IRRITATION: ICD-10-CM

## 2023-04-06 LAB
CLUE CELLS: ABNORMAL
TRICHOMONAS, WET PREP: ABNORMAL
WBC'S/HIGH POWER FIELD, WET PREP: ABNORMAL
YEAST, WET PREP: PRESENT

## 2023-04-06 PROCEDURE — 87210 SMEAR WET MOUNT SALINE/INK: CPT | Mod: ZL | Performed by: NURSE PRACTITIONER

## 2023-04-06 PROCEDURE — 99213 OFFICE O/P EST LOW 20 MIN: CPT | Performed by: NURSE PRACTITIONER

## 2023-04-06 PROCEDURE — G0463 HOSPITAL OUTPT CLINIC VISIT: HCPCS

## 2023-04-06 RX ORDER — MICONAZOLE NITRATE 20 MG/G
CREAM TOPICAL 2 TIMES DAILY
Qty: 30 G | Refills: 0 | Status: SHIPPED | OUTPATIENT
Start: 2023-04-06 | End: 2024-03-12

## 2023-04-06 RX ORDER — FLUCONAZOLE 150 MG/1
150 TABLET ORAL ONCE
Qty: 1 TABLET | Refills: 0 | Status: SHIPPED | OUTPATIENT
Start: 2023-04-06 | End: 2023-04-06

## 2023-04-06 ASSESSMENT — ASTHMA QUESTIONNAIRES
QUESTION_2 LAST FOUR WEEKS HOW OFTEN HAVE YOU HAD SHORTNESS OF BREATH: THREE TO SIX TIMES A WEEK
QUESTION_3 LAST FOUR WEEKS HOW OFTEN DID YOUR ASTHMA SYMPTOMS (WHEEZING, COUGHING, SHORTNESS OF BREATH, CHEST TIGHTNESS OR PAIN) WAKE YOU UP AT NIGHT OR EARLIER THAN USUAL IN THE MORNING: NOT AT ALL
QUESTION_4 LAST FOUR WEEKS HOW OFTEN HAVE YOU USED YOUR RESCUE INHALER OR NEBULIZER MEDICATION (SUCH AS ALBUTEROL): TWO OR THREE TIMES PER WEEK
QUESTION_1 LAST FOUR WEEKS HOW MUCH OF THE TIME DID YOUR ASTHMA KEEP YOU FROM GETTING AS MUCH DONE AT WORK, SCHOOL OR AT HOME: NONE OF THE TIME
ACT_TOTALSCORE: 20
ACT_TOTALSCORE: 20
QUESTION_5 LAST FOUR WEEKS HOW WOULD YOU RATE YOUR ASTHMA CONTROL: WELL CONTROLLED

## 2023-04-06 ASSESSMENT — ENCOUNTER SYMPTOMS
CONSTITUTIONAL NEGATIVE: 1
FREQUENCY: 0
HEMATURIA: 0

## 2023-04-06 ASSESSMENT — PATIENT HEALTH QUESTIONNAIRE - PHQ9
SUM OF ALL RESPONSES TO PHQ QUESTIONS 1-9: 13
SUM OF ALL RESPONSES TO PHQ QUESTIONS 1-9: 13
10. IF YOU CHECKED OFF ANY PROBLEMS, HOW DIFFICULT HAVE THESE PROBLEMS MADE IT FOR YOU TO DO YOUR WORK, TAKE CARE OF THINGS AT HOME, OR GET ALONG WITH OTHER PEOPLE: SOMEWHAT DIFFICULT

## 2023-04-06 ASSESSMENT — PAIN SCALES - GENERAL: PAINLEVEL: NO PAIN (0)

## 2023-04-06 NOTE — PROGRESS NOTES
Assessment & Plan   Problem List Items Addressed This Visit    None  Visit Diagnoses     Yeast infection of the vagina    -  Primary    Relevant Medications    fluconazole (DIFLUCAN) 150 MG tablet    miconazole (MICATIN) 2 % external cream    Vaginal irritation        Relevant Orders    Wet Prep, Genital (Completed)         Wet prep obtained, yeast infection noted.  Treated with Diflucan x1, Monistat cream twice daily as needed for vaginal irritation.  Follow-up as needed.    Review of the result(s) of each unique test - wet prep  Ordering of each unique test  Prescription drug management  24 minutes spent by me on the date of the encounter doing chart review, history and exam, documentation and further activities per the note       No follow-ups on file.    [facility]    Subjective   Ana is a 32 year old, presenting for the following health issues:  Vaginal Problem    Vaginal Problem   Pertinent negatives include no frequency.   History of Present Illness       Reason for visit:  Possible yeast infection  Symptom onset:  3-7 days ago  Symptoms include:  Pain, irritation, itching, discharge  Symptom intensity:  Moderate  Symptom progression:  Staying the same  Had these symptoms before:  No  What makes it worse:  Going to the bathtoom  What makes it better:  Hydrocortisone cream    She eats 2-3 servings of fruits and vegetables daily.She consumes 1 sweetened beverage(s) daily.She exercises with enough effort to increase her heart rate 30 to 60 minutes per day.  She exercises with enough effort to increase her heart rate 3 or less days per week. She is missing 3 dose(s) of medications per week.    Today's PHQ-9         PHQ-9 Total Score: 13    PHQ-9 Q9 Thoughts of better off dead/self-harm past 2 weeks :   Not at all    How difficult have these problems made it for you to do your work, take care of things at home, or get along with other people: Somewhat difficult    "    Vaginal Symptoms  Onset/Duration: 6 days  Description:  Vaginal Discharge: creamy greenish/yellow   Itching (Pruritis): YES  Burning sensation:  YES  Odor: No  Accompanying Signs & Symptoms:  Urinary symptoms: No  Abdominal pain: No  Fever: No  History:   Sexually active: YES  New Partner: No  Possibility of Pregnancy:  No  Recent antibiotic use: No  Previous vaginitis issues: YES, not recent  Precipitating or alleviating factors: sexual activity  Therapies tried and outcome: hydrocortisone cream    Having some skin irritation, abrasions. When urinating the skin burns.      Review of Systems   Constitutional: Negative.    Genitourinary: Positive for vaginal discharge and vaginal pain. Negative for frequency, hematuria, pelvic pain and urgency.          Objective    /76   Pulse 72   Temp 98.9  F (37.2  C)   Resp 16   Ht 1.657 m (5' 5.25\")   Wt 98 kg (216 lb)   LMP  (LMP Unknown)   SpO2 99%   BMI 35.67 kg/m    Body mass index is 35.67 kg/m .  Physical Exam   GENERAL: healthy, alert and no distress  EYES: Eyes grossly normal to inspection   (female): Vulva with excoriation and mild macerated appearance.  Wet prep obtained, did cause some discomfort while collecting this.  PSYCH: mentation appears normal, affect normal/bright    Results for orders placed or performed in visit on 04/06/23 (from the past 24 hour(s))   Wet Prep, Genital    Specimen: Vagina; Swab   Result Value Ref Range    Trichomonas Absent Absent    Yeast Present (A) Absent    Clue Cells Absent Absent    WBCs/high power field 3+ (A) None                   "

## 2023-04-06 NOTE — NURSING NOTE
Patient presents today for vaginal issue. Patient complains of vaginal itching and irritation for the last 6 days.    Medication Reconciliation Complete    Allie Rowe LPN  4/6/2023 7:54 AM

## 2023-05-06 ENCOUNTER — HEALTH MAINTENANCE LETTER (OUTPATIENT)
Age: 33
End: 2023-05-06

## 2023-05-09 DIAGNOSIS — F90.9 ADULT ADHD (ATTENTION DEFICIT HYPERACTIVITY DISORDER): ICD-10-CM

## 2023-05-10 RX ORDER — METHYLPHENIDATE HYDROCHLORIDE 5 MG/1
5 TABLET ORAL
Qty: 30 TABLET | Refills: 0 | OUTPATIENT
Start: 2023-05-10

## 2023-05-10 NOTE — TELEPHONE ENCOUNTER
Routing refill request to provider for review/approval because:    LOV: 3/1/23  Patient noted to have an appointment on 5/26/23  Called patient and patient states that she is fine on that medication until her follow up.    Kassie Waldron RN on 5/10/2023 at 3:01 PM

## 2023-05-25 ASSESSMENT — PATIENT HEALTH QUESTIONNAIRE - PHQ9
10. IF YOU CHECKED OFF ANY PROBLEMS, HOW DIFFICULT HAVE THESE PROBLEMS MADE IT FOR YOU TO DO YOUR WORK, TAKE CARE OF THINGS AT HOME, OR GET ALONG WITH OTHER PEOPLE: VERY DIFFICULT
SUM OF ALL RESPONSES TO PHQ QUESTIONS 1-9: 18
SUM OF ALL RESPONSES TO PHQ QUESTIONS 1-9: 18

## 2023-05-25 ASSESSMENT — ANXIETY QUESTIONNAIRES
4. TROUBLE RELAXING: NEARLY EVERY DAY
GAD7 TOTAL SCORE: 17
5. BEING SO RESTLESS THAT IT IS HARD TO SIT STILL: SEVERAL DAYS
IF YOU CHECKED OFF ANY PROBLEMS ON THIS QUESTIONNAIRE, HOW DIFFICULT HAVE THESE PROBLEMS MADE IT FOR YOU TO DO YOUR WORK, TAKE CARE OF THINGS AT HOME, OR GET ALONG WITH OTHER PEOPLE: VERY DIFFICULT
GAD7 TOTAL SCORE: 17
1. FEELING NERVOUS, ANXIOUS, OR ON EDGE: NEARLY EVERY DAY
8. IF YOU CHECKED OFF ANY PROBLEMS, HOW DIFFICULT HAVE THESE MADE IT FOR YOU TO DO YOUR WORK, TAKE CARE OF THINGS AT HOME, OR GET ALONG WITH OTHER PEOPLE?: VERY DIFFICULT
GAD7 TOTAL SCORE: 17
6. BECOMING EASILY ANNOYED OR IRRITABLE: NEARLY EVERY DAY
2. NOT BEING ABLE TO STOP OR CONTROL WORRYING: NEARLY EVERY DAY
7. FEELING AFRAID AS IF SOMETHING AWFUL MIGHT HAPPEN: SEVERAL DAYS
7. FEELING AFRAID AS IF SOMETHING AWFUL MIGHT HAPPEN: SEVERAL DAYS
3. WORRYING TOO MUCH ABOUT DIFFERENT THINGS: NEARLY EVERY DAY

## 2023-05-25 NOTE — PROGRESS NOTES
"  Assessment & Plan     1. Adult ADHD (attention deficit hyperactivity disorder)  Chronic, concern for too much stimulant effect between Vyvanse 60mg and Wellbutrin.  Will reduce to 50mg and change from Wellbutrin to Lexapro.  Continue prn Ritalin.  [place]  accessed and appropriate.  Follow up in 3 months; or sooner prn.  - lisdexamfetamine (VYVANSE) 50 MG capsule; Take 1 capsule (50 mg) by mouth every morning  Dispense: 30 capsule; Refill: 0  - lisdexamfetamine (VYVANSE) 50 MG capsule; Take 1 capsule (50 mg) by mouth every morning  Dispense: 30 capsule; Refill: 0  - lisdexamfetamine (VYVANSE) 50 MG capsule; Take 1 capsule (50 mg) by mouth every morning  Dispense: 30 capsule; Refill: 0  - methylphenidate (RITALIN) 5 MG tablet; Take 1 tablet (5 mg) by mouth daily (with lunch)  Dispense: 30 tablet; Refill: 0  - Drug Confirmation Panel Urine with Creat    2. Insulin resistance  3. Body mass index (BMI) 34.0-34.9, adult  Chronic, 2/2 to PCOS.  Increase Victoza to 1.8mg; monitor for hypoglycemic like events.  Can return to 1.2mg dose if necessary.  Continue to encourage dietary efforts and increase activity levels  - liraglutide (VICTOZA) 18 MG/3ML solution; Inject 1.8 mg Subcutaneous daily  Dispense: 27 mL; Refill: 4    4. Generalized anxiety disorder  Change away from Wellbutrin to Lexparo.  Start 5mg x 10 days; then increase to 10mg daily if tolerated well.  OK to follow up in 3 months.  - escitalopram (LEXAPRO) 10 MG tablet; Take 0.5 tablets (5 mg) by mouth daily for 10 days, THEN 1 tablet (10 mg) daily for 10 days.  Dispense: 90 tablet; Refill: 1    MDM  Prescription drug management  3+ chronic conditions addressed     BMI:   Estimated body mass index is 33.03 kg/m  as calculated from the following:    Height as of 4/6/23: 1.657 m (5' 5.25\").    Weight as of this encounter: 90.7 kg (200 lb).   Weight management plan: Discussed healthy diet and exercise guidelines and see A/P as it was addressed above.    Follow up in " 3 months; sooner prn.    Abiola Ashton,   [facility]      Subjective   Ana is a 32 year old, presenting for the following health issues:  Recheck Medication    History of Present Illness       Reason for visit:  Med check & weight f/u    She eats 2-3 servings of fruits and vegetables daily.She consumes 1 sweetened beverage(s) daily.She exercises with enough effort to increase her heart rate 30 to 60 minutes per day.  She exercises with enough effort to increase her heart rate 4 days per week. She is missing 2 dose(s) of medications per week.  She is not taking prescribed medications regularly due to remembering to take.    Today's PHQ-9         PHQ-9 Total Score: 18    PHQ-9 Q9 Thoughts of better off dead/self-harm past 2 weeks :   Not at all    How difficult have these problems made it for you to do your work, take care of things at home, or get along with other people: Very difficult  Today's VITA-7 Score: 17       ADHD Follow-Up (Adult)  Concerns: feels like her body is vibrating at times.  Not constant.  Changes since last visit: Stable  Taking controlled (daily) medications as prescribed: Yes  Sleep: no problems  Adult ADHD Self-Reporting form given to patient?:  No  Currently in counseling: No    Medication Benefits:   Controlled symptoms: Finishing tasks, Impulse control, Frustration tolerance and Accepting limits  Uncontrolled symptoms:  Attention span and Distractability    Medication Side Effects:  Reports:  none  Sleep Problems? no  ++++++++++++++++++++++++++++++++++++++++++++++++    Employer Concerns/Feedback: Stable  Coworker Concerns:   Stable  Home/Family Concerns: Stable  Also on Wellbutrin for mood.    Weight: 200#  Last visit: 3/1/23, weight at that time: 209#  On Victoza 1.2mg daily.  Feels like she has plateaued since her last visit.  No side effects of medication.        Objective    /74   Pulse 59   Temp 98.3  F (36.8  C) (Tympanic)   Resp 16   Wt 90.7 kg  (200 lb)   SpO2 97%   BMI 33.03 kg/m    Body mass index is 33.03 kg/m .  Physical Exam   GENERAL: healthy, alert and no distress  NECK: no adenopathy, no asymmetry, masses, or scars and thyroid normal to palpation  MS: no gross musculoskeletal defects noted, no edema  PSYCH: mentation appears normal, affect normal/bright    No results found for any visits on 05/26/23.

## 2023-05-26 ENCOUNTER — OFFICE VISIT (OUTPATIENT)
Dept: FAMILY MEDICINE | Facility: OTHER | Age: 33
End: 2023-05-26
Attending: FAMILY MEDICINE
Payer: COMMERCIAL

## 2023-05-26 VITALS
WEIGHT: 200 LBS | BODY MASS INDEX: 33.03 KG/M2 | SYSTOLIC BLOOD PRESSURE: 120 MMHG | TEMPERATURE: 98.3 F | RESPIRATION RATE: 16 BRPM | OXYGEN SATURATION: 97 % | HEART RATE: 59 BPM | DIASTOLIC BLOOD PRESSURE: 74 MMHG

## 2023-05-26 DIAGNOSIS — F90.9 ADULT ADHD (ATTENTION DEFICIT HYPERACTIVITY DISORDER): Primary | ICD-10-CM

## 2023-05-26 DIAGNOSIS — F41.1 GENERALIZED ANXIETY DISORDER: ICD-10-CM

## 2023-05-26 DIAGNOSIS — E88.819 INSULIN RESISTANCE: ICD-10-CM

## 2023-05-26 PROCEDURE — G0463 HOSPITAL OUTPT CLINIC VISIT: HCPCS

## 2023-05-26 PROCEDURE — 99214 OFFICE O/P EST MOD 30 MIN: CPT | Performed by: FAMILY MEDICINE

## 2023-05-26 RX ORDER — LIRAGLUTIDE 6 MG/ML
1.8 INJECTION SUBCUTANEOUS DAILY
Qty: 27 ML | Refills: 4 | Status: SHIPPED | OUTPATIENT
Start: 2023-05-26 | End: 2024-03-12

## 2023-05-26 RX ORDER — METHYLPHENIDATE HYDROCHLORIDE 5 MG/1
5 TABLET ORAL
Qty: 30 TABLET | Refills: 0 | Status: SHIPPED | OUTPATIENT
Start: 2023-05-26 | End: 2023-09-12

## 2023-05-26 RX ORDER — ESCITALOPRAM OXALATE 10 MG/1
TABLET ORAL
Qty: 90 TABLET | Refills: 1 | Status: SHIPPED | OUTPATIENT
Start: 2023-05-26 | End: 2024-03-12 | Stop reason: SINTOL

## 2023-05-26 RX ORDER — LISDEXAMFETAMINE DIMESYLATE 50 MG/1
50 CAPSULE ORAL EVERY MORNING
Qty: 30 CAPSULE | Refills: 0 | Status: SHIPPED | OUTPATIENT
Start: 2023-06-26 | End: 2023-09-12

## 2023-05-26 RX ORDER — LISDEXAMFETAMINE DIMESYLATE 50 MG/1
50 CAPSULE ORAL EVERY MORNING
Qty: 30 CAPSULE | Refills: 0 | Status: SHIPPED | OUTPATIENT
Start: 2023-05-26 | End: 2023-09-12

## 2023-05-26 RX ORDER — LISDEXAMFETAMINE DIMESYLATE 50 MG/1
50 CAPSULE ORAL EVERY MORNING
Qty: 30 CAPSULE | Refills: 0 | Status: SHIPPED | OUTPATIENT
Start: 2023-07-26 | End: 2023-09-12

## 2023-05-26 ASSESSMENT — ANXIETY QUESTIONNAIRES: GAD7 TOTAL SCORE: 17

## 2023-05-26 ASSESSMENT — PATIENT HEALTH QUESTIONNAIRE - PHQ9
10. IF YOU CHECKED OFF ANY PROBLEMS, HOW DIFFICULT HAVE THESE PROBLEMS MADE IT FOR YOU TO DO YOUR WORK, TAKE CARE OF THINGS AT HOME, OR GET ALONG WITH OTHER PEOPLE: VERY DIFFICULT
SUM OF ALL RESPONSES TO PHQ QUESTIONS 1-9: 18

## 2023-05-26 ASSESSMENT — PAIN SCALES - GENERAL: PAINLEVEL: NO PAIN (0)

## 2023-05-26 NOTE — NURSING NOTE
"Chief Complaint   Patient presents with     Recheck Medication       Initial /74   Pulse 59   Temp 98.3  F (36.8  C) (Tympanic)   Resp 16   Wt 90.7 kg (200 lb)   SpO2 97%   BMI 33.03 kg/m   Estimated body mass index is 33.03 kg/m  as calculated from the following:    Height as of 4/6/23: 1.657 m (5' 5.25\").    Weight as of this encounter: 90.7 kg (200 lb).  Medication Reconciliation: complete    Anny Lo LPN     Advanced Care Directive reviewed.     "

## 2023-09-11 NOTE — PROGRESS NOTES
Assessment & Plan     1. Adult ADHD (attention deficit hyperactivity disorder)  Chronic, stable on current Vyvanse 50mg.  Did not notice a significant improvement with the prn Ritalin into the evening.  No change in sleep.  Will increase this to 10mg for prn dose and monitor again until next visit.  Contract updated; UDS next visit.  PDMP Review         Value Time User    State PDMP site checked  Yes 9/13/2023  4:59 AM Abiola Ashton DO        - lisdexamfetamine (VYVANSE) 50 MG capsule; Take 1 capsule (50 mg) by mouth every morning  Dispense: 30 capsule; Refill: 0  - lisdexamfetamine (VYVANSE) 50 MG capsule; Take 1 capsule (50 mg) by mouth every morning  Dispense: 30 capsule; Refill: 0  - lisdexamfetamine (VYVANSE) 50 MG capsule; Take 1 capsule (50 mg) by mouth every morning  Dispense: 30 capsule; Refill: 0  - methylphenidate (RITALIN) 10 MG tablet; Take 1 tablet (10 mg) by mouth daily (with lunch)  Dispense: 30 tablet; Refill: 0    2. Class 2 obesity with body mass index (BMI) of 37.0 to 37.9 in adult, unspecified obesity type, unspecified whether serious comorbidity present  Chronic, worsening.  Referral to weight management.  - Adult Comprehensive Weight Management  Referral; Future    3. Gastroesophageal reflux disease without esophagitis  Chronic, stable on current Pepcid/Carafate.  Goal to completely wean off Protonix (what she was using prior to Pepcid) for long term bone health.  Will likely improve with weight loss.  - famotidine (PEPCID) 20 MG tablet; Take 1 tablet (20 mg) by mouth 2 times daily  Dispense: 60 tablet; Refill: 2  - sucralfate (CARAFATE) 1 GM tablet; TAKE 1 TABLET BY MOUTH 3 TIMES DAILY BEFORE MEALS  Dispense: 270 tablet; Refill: 3    MDM:  Prescription drug management  3 chronic conditions addressed.    Follow up in 3 months.    No follow-ups on file.    Abiola Ashton DO  [facility] [place]   Ana is a 32 year old, presenting for the  "following health issues:  Recheck Medication        9/12/2023     8:45 AM   Additional Questions   Roomed by JESUS Mccormick   Accompanied by self       History of Present Illness       Reason for visit:  Weight f/u    She eats 2-3 servings of fruits and vegetables daily.She consumes 1 sweetened beverage(s) daily.She exercises with enough effort to increase her heart rate 30 to 60 minutes per day.  She exercises with enough effort to increase her heart rate 4 days per week. She is missing 2 dose(s) of medications per week.       Follow up weight.  On Victoza 1.8mg daily; Vyvanse 50mg daily.  Last visit: 200#      ADHD Follow-Up (Adult)  Concerns: None  Changes since last visit: None  Taking controlled (daily) medications as prescribed: Yes  Sleep: no problems  Adult ADHD Self-Reporting form given to patient?:  No  Currently in counseling: No    Medication Benefits:   Controlled symptoms: Finishing tasks, Impulse control, Frustration tolerance, and Accepting limits  Uncontrolled symptoms:  Distractability    Medication Side Effects:  Reports:  none  Sleep Problems? no  ++++++++++++++++++++++++++++++++++++++++++++++++    Employer Concerns/Feedback: None  Coworker Concerns:   None  Home/Family Concerns: None        Objective    /64   Pulse 80   Temp (!) 96.6  F (35.9  C) (Tympanic)   Resp 16   Ht 1.657 m (5' 5.25\")   Wt 103 kg (227 lb)   LMP 05/12/2022   SpO2 97%   BMI 37.49 kg/m    Body mass index is 37.49 kg/m .  Physical Exam   GENERAL: healthy, alert and no distress  NECK: no adenopathy, no asymmetry, masses, or scars and thyroid normal to palpation  RESP: lungs clear to auscultation - no rales, rhonchi or wheezes  CV: regular rate and rhythm, normal S1 S2, no S3 or S4, no murmur, click or rub, no peripheral edema and peripheral pulses strong  PSYCH: mentation appears normal, affect normal/bright    No results found for any visits on 09/12/23.  "

## 2023-09-12 ENCOUNTER — OFFICE VISIT (OUTPATIENT)
Dept: FAMILY MEDICINE | Facility: OTHER | Age: 33
End: 2023-09-12
Attending: FAMILY MEDICINE
Payer: COMMERCIAL

## 2023-09-12 VITALS
OXYGEN SATURATION: 97 % | HEIGHT: 65 IN | WEIGHT: 227 LBS | BODY MASS INDEX: 37.82 KG/M2 | HEART RATE: 80 BPM | SYSTOLIC BLOOD PRESSURE: 108 MMHG | DIASTOLIC BLOOD PRESSURE: 64 MMHG | TEMPERATURE: 96.6 F | RESPIRATION RATE: 16 BRPM

## 2023-09-12 DIAGNOSIS — K21.9 GASTROESOPHAGEAL REFLUX DISEASE WITHOUT ESOPHAGITIS: ICD-10-CM

## 2023-09-12 DIAGNOSIS — F90.9 ADULT ADHD (ATTENTION DEFICIT HYPERACTIVITY DISORDER): ICD-10-CM

## 2023-09-12 DIAGNOSIS — E66.812 CLASS 2 OBESITY WITH BODY MASS INDEX (BMI) OF 37.0 TO 37.9 IN ADULT, UNSPECIFIED OBESITY TYPE, UNSPECIFIED WHETHER SERIOUS COMORBIDITY PRESENT: Primary | ICD-10-CM

## 2023-09-12 PROCEDURE — 99214 OFFICE O/P EST MOD 30 MIN: CPT | Performed by: FAMILY MEDICINE

## 2023-09-12 PROCEDURE — G0463 HOSPITAL OUTPT CLINIC VISIT: HCPCS

## 2023-09-12 RX ORDER — METHYLPHENIDATE HYDROCHLORIDE 10 MG/1
10 TABLET ORAL
Qty: 30 TABLET | Refills: 0 | Status: SHIPPED | OUTPATIENT
Start: 2023-09-12 | End: 2024-03-12

## 2023-09-12 RX ORDER — LISDEXAMFETAMINE DIMESYLATE 50 MG/1
50 CAPSULE ORAL EVERY MORNING
Qty: 30 CAPSULE | Refills: 0 | Status: SHIPPED | OUTPATIENT
Start: 2023-11-10 | End: 2023-11-30

## 2023-09-12 RX ORDER — LISDEXAMFETAMINE DIMESYLATE 50 MG/1
50 CAPSULE ORAL EVERY MORNING
Qty: 30 CAPSULE | Refills: 0 | Status: SHIPPED | OUTPATIENT
Start: 2023-09-12 | End: 2023-11-30

## 2023-09-12 RX ORDER — LISDEXAMFETAMINE DIMESYLATE 50 MG/1
50 CAPSULE ORAL EVERY MORNING
Qty: 30 CAPSULE | Refills: 0 | Status: SHIPPED | OUTPATIENT
Start: 2023-10-12 | End: 2023-11-30

## 2023-09-12 RX ORDER — SUCRALFATE 1 G/1
TABLET ORAL
Qty: 270 TABLET | Refills: 3 | Status: SHIPPED | OUTPATIENT
Start: 2023-09-12

## 2023-09-12 RX ORDER — FAMOTIDINE 20 MG/1
20 TABLET, FILM COATED ORAL 2 TIMES DAILY
Qty: 60 TABLET | Refills: 2 | Status: SHIPPED | OUTPATIENT
Start: 2023-09-12 | End: 2023-11-30

## 2023-09-12 ASSESSMENT — ANXIETY QUESTIONNAIRES
GAD7 TOTAL SCORE: 19
IF YOU CHECKED OFF ANY PROBLEMS ON THIS QUESTIONNAIRE, HOW DIFFICULT HAVE THESE PROBLEMS MADE IT FOR YOU TO DO YOUR WORK, TAKE CARE OF THINGS AT HOME, OR GET ALONG WITH OTHER PEOPLE: VERY DIFFICULT
6. BECOMING EASILY ANNOYED OR IRRITABLE: NEARLY EVERY DAY
5. BEING SO RESTLESS THAT IT IS HARD TO SIT STILL: MORE THAN HALF THE DAYS
2. NOT BEING ABLE TO STOP OR CONTROL WORRYING: NEARLY EVERY DAY
4. TROUBLE RELAXING: NEARLY EVERY DAY
GAD7 TOTAL SCORE: 19
3. WORRYING TOO MUCH ABOUT DIFFERENT THINGS: NEARLY EVERY DAY
7. FEELING AFRAID AS IF SOMETHING AWFUL MIGHT HAPPEN: MORE THAN HALF THE DAYS
1. FEELING NERVOUS, ANXIOUS, OR ON EDGE: NEARLY EVERY DAY

## 2023-09-12 ASSESSMENT — PATIENT HEALTH QUESTIONNAIRE - PHQ9
SUM OF ALL RESPONSES TO PHQ QUESTIONS 1-9: 18
10. IF YOU CHECKED OFF ANY PROBLEMS, HOW DIFFICULT HAVE THESE PROBLEMS MADE IT FOR YOU TO DO YOUR WORK, TAKE CARE OF THINGS AT HOME, OR GET ALONG WITH OTHER PEOPLE: VERY DIFFICULT
SUM OF ALL RESPONSES TO PHQ QUESTIONS 1-9: 18

## 2023-09-12 ASSESSMENT — PAIN SCALES - GENERAL: PAINLEVEL: NO PAIN (0)

## 2023-09-12 NOTE — LETTER
[facility]  -- Controlled Medication Agreement    9/12/2023   Ana MI Arguetaadis   1990   [id number]       I understand that my provider is prescribing controlled medications to assist me in managing my ADHD.  The risks, benefits, and side effects of these medications have been explained to me and I agree to the following conditions for this type of treatment.    Stimulant Medication Prescribed: Vyvanse    1.  I will take my medications exactly as prescribed and will not change the medication dosage or schedule without my provider's approval.  Refills will not be given if I  runs out early.     2.  I will keep all regular appointments at this clinic.  If there are three or more missed appointments or appointments canceled less than 2 hours before the scheduled time, my medication may be discontinued.    3.  I understand that prescriptions may only be written for one month at a time, and a written prescription is required each month.  Prescriptions cannot be called in or faxed to the pharmacy.    4.  If the prescription is lost or stolen, replacement is at the discretion of my provider.  I understand that this may mean the prescription might not be replaced.    5.  If I am late for scheduled follow up, I understand that I must make an appointment and that another refill is at the discretion of my provider.  This may mean a prescription for only the amount required until the appointment, regardless of prescription co-pay.  For example, if an appointment is made in 1 week, a prescription might only be written for 7 pills.      I understand that if I violate any of the above conditions, my prescription medications and/or treatment may be terminated.  If the violation includes providing controlled substances to anyone other than to whom the medication is prescribed, a report may be made to my child's physician, pharmacy, and other authorities, including the police.    I have read this contract and it  has been explained to me.  I fully understand the consequences of violating this agreement.    _________________________________/______________/____________________________    Patient signature/Date/Witness

## 2023-09-12 NOTE — NURSING NOTE
"Chief Complaint   Patient presents with    Recheck Medication       Initial /64   Pulse 80   Temp (!) 96.6  F (35.9  C) (Tympanic)   Resp 16   Ht 1.657 m (5' 5.25\")   Wt 103 kg (227 lb)   LMP 05/12/2022   SpO2 97%   BMI 37.49 kg/m   Estimated body mass index is 37.49 kg/m  as calculated from the following:    Height as of this encounter: 1.657 m (5' 5.25\").    Weight as of this encounter: 103 kg (227 lb).  Medication Reconciliation: complete    Anny Lo LPN  Advanced Care Directive reviewed.       "

## 2023-09-29 ENCOUNTER — MYC MEDICAL ADVICE (OUTPATIENT)
Dept: FAMILY MEDICINE | Facility: OTHER | Age: 33
End: 2023-09-29
Payer: COMMERCIAL

## 2023-09-29 NOTE — TELEPHONE ENCOUNTER
Spoke with [facility] referrals department and they do have the referral, let them know that Julio Hughes said they do not have this referral, she reports it is there. They will get this going.     Tanja Dudley RN on 9/29/2023 at 1:03 PM

## 2023-10-17 ENCOUNTER — TELEPHONE (OUTPATIENT)
Dept: FAMILY MEDICINE | Facility: OTHER | Age: 33
End: 2023-10-17
Payer: COMMERCIAL

## 2023-10-17 DIAGNOSIS — J45.21 MILD INTERMITTENT REACTIVE AIRWAY DISEASE WITH ACUTE EXACERBATION: Primary | ICD-10-CM

## 2023-10-17 RX ORDER — ALBUTEROL SULFATE 90 UG/1
2 AEROSOL, METERED RESPIRATORY (INHALATION) EVERY 4 HOURS PRN
Qty: 8.5 G | Refills: 0 | Status: SHIPPED | OUTPATIENT
Start: 2023-10-17 | End: 2024-01-24

## 2023-10-17 NOTE — TELEPHONE ENCOUNTER
Ana has new insurance which no longer covers Ventolin HFA. The insurance prefers generic ProAir HFA. Sending new Rx per CPA.     Raul Cavazos, PharmD  [facility]

## 2023-11-29 NOTE — PROGRESS NOTES
Assessment & Plan     1. Adult ADHD (attention deficit hyperactivity disorder)  Chronic, stable on Vyvanse.  Feelings of overwhelming continue but would be worse without stimulant.  Rx x 3 months refilled; if cost/availability becomes an issue with insurance changes - she will notify me and may change to Adderall vs Concerta vs other.  U tox today; Contract today.  - Drug Confirmation Panel Urine with Creat  - lisdexamfetamine (VYVANSE) 50 MG capsule; Take 1 capsule (50 mg) by mouth every morning  Dispense: 30 capsule; Refill: 0  - lisdexamfetamine (VYVANSE) 50 MG capsule; Take 1 capsule (50 mg) by mouth every morning  Dispense: 30 capsule; Refill: 0  - lisdexamfetamine (VYVANSE) 50 MG capsule; Take 1 capsule (50 mg) by mouth every morning  Dispense: 30 capsule; Refill: 0    2. Gastroesophageal reflux disease without esophagitis  Chronic, uncontrolled.  Increase famotidine to max 40mg BID; if not effective, will change to PPI with hopes at that time to be able to stop both Carafate and famotidine.  She will notify me within ~2-3 weeks.  - famotidine (PEPCID) 40 MG tablet; Take 1 tablet (40 mg) by mouth 2 times daily  Dispense: 180 tablet; Refill: 4    3. Generalized anxiety disorder  4. Severe episode of recurrent major depressive disorder, without psychotic features (H)  Chronic, worsening.  Situational stressors are worsened.  Continued making future plans with Ana today.      5. Morbid obesity  BMI >40 today.  Continues to follow up with weight management office ([facility]).  Consideration for weight reduction surgery vs GLP-1 agonists, but has not had luck finding supplies of medications.  Coverage with insurance is not great.    MDM:  Review of the result(s) of each unique test - see [facility] result note  Ordering of each unique test  Prescription drug management      BMI:   Estimated body mass index is 40.19 kg/m  as calculated from the following:    Height as of this encounter: 1.657 m (5'  "5.25\").    Weight as of this encounter: 110.4 kg (243 lb 6 oz).   Weight management plan: continues to follow with weight management      Abiola Ashton DO  [facility]      Subjective   Ana is a 32 year old, presenting for the following health issues:  Recheck Medication        11/30/2023     7:55 AM   Additional Questions   Roomed by JESUS Mccormick   Accompanied by self       History of Present Illness       Reason for visit:  Vyvanse refill    She eats 2-3 servings of fruits and vegetables daily.She consumes 1 sweetened beverage(s) daily.She exercises with enough effort to increase her heart rate 30 to 60 minutes per day.  She exercises with enough effort to increase her heart rate 3 or less days per week.        Last visit: 9/12/23    Weight:  Remains on Victoza for weight.  Currently 1.8mg daily.  Last visit was: 227#    ADHD Follow-Up (Adult)  Concerns: None  Changes since last visit: None  Taking controlled (daily) medications as prescribed: Yes; Vyvanse 50mg daily and methylphenidate 10mg @ lunch prn.  Sleep: no problems  Adult ADHD Self-Reporting form given to patient?:  No  Currently in counseling: No     Medication Benefits:   Controlled symptoms: Finishing tasks, Impulse control, Frustration tolerance, and Accepting limits  Uncontrolled symptoms:  Distractability     Medication Side Effects:  Reports:  none  Sleep Problems? no  ++++++++++++++++++++++++++++++++++++++++++++++++     Employer Concerns/Feedback: None  Coworker Concerns:   None  Home/Family Concerns: None    Utox and Contract: DUE          9/12/2023     8:35 AM 11/30/2023     7:43 AM 11/30/2023     7:58 AM   PHQ   PHQ-9 Total Score 18 21 21   Q9: Thoughts of better off dead/self-harm past 2 weeks Not at all Several days Several days   F/U: Thoughts of suicide or self-harm  No    F/U: Safety concerns  No          5/25/2023     1:12 PM 9/12/2023     8:35 AM 11/30/2023     7:43 AM   VITA-7 SCORE   Total Score 17 (severe " "anxiety) 19 (severe anxiety) 18 (severe anxiety)   Total Score 17 19 18       Had discussion of upcoming visit with weight management ([facility]) re: possible bariatric surgery.  Would not leave her children.    Heartburn not under control.  Had been on protonix, and attempted changing to famotidine 20mg BID.   No side effects of medication.  Still doing carafate TID.        Objective    /74   Pulse 64   Temp (!) 96.7  F (35.9  C) (Tympanic)   Resp 16   Ht 1.657 m (5' 5.25\")   Wt 110.4 kg (243 lb 6 oz)   LMP 05/12/2022   SpO2 98%   BMI 40.19 kg/m    Body mass index is 40.19 kg/m .  Physical Exam   GENERAL: healthy, alert and no distress  RESP: lungs clear to auscultation - no rales, rhonchi or wheezes  CV: regular rate and rhythm, normal S1 S2, no S3 or S4, no murmur, click or rub, no peripheral edema and peripheral pulses strong  SKIN: no suspicious lesions or rashes  PSYCH: mentation appears normal, affect normal/bright.  Expresses stressors today (financial, spouse working away from home, three young children, etc)    Results for orders placed or performed in visit on 11/30/23   Urine Creatinine for Drug Screen Panel     Status: None   Result Value Ref Range    Creatinine Urine for Drug Screen 119 mg/dL   Drug Confirmation Panel Urine with Creat     Status: None (In process)    Narrative    The following orders were created for panel order Drug Confirmation Panel Urine with Creat.  Procedure                               Abnormality         Status                     ---------                               -----------         ------                     Urine Drug Confirmation ...[[id number]]                      In process                 Urine Creatinine for Amos...[[id number]]                      Final result                 Please view results for these tests on the individual orders.     "

## 2023-11-30 ENCOUNTER — OFFICE VISIT (OUTPATIENT)
Dept: FAMILY MEDICINE | Facility: OTHER | Age: 33
End: 2023-11-30
Attending: FAMILY MEDICINE
Payer: COMMERCIAL

## 2023-11-30 VITALS
TEMPERATURE: 96.7 F | HEIGHT: 65 IN | RESPIRATION RATE: 16 BRPM | SYSTOLIC BLOOD PRESSURE: 110 MMHG | DIASTOLIC BLOOD PRESSURE: 74 MMHG | OXYGEN SATURATION: 98 % | BODY MASS INDEX: 40.55 KG/M2 | HEART RATE: 64 BPM | WEIGHT: 243.38 LBS

## 2023-11-30 DIAGNOSIS — F33.2 SEVERE EPISODE OF RECURRENT MAJOR DEPRESSIVE DISORDER, WITHOUT PSYCHOTIC FEATURES (H): ICD-10-CM

## 2023-11-30 DIAGNOSIS — F41.1 GENERALIZED ANXIETY DISORDER: ICD-10-CM

## 2023-11-30 DIAGNOSIS — K21.9 GASTROESOPHAGEAL REFLUX DISEASE WITHOUT ESOPHAGITIS: ICD-10-CM

## 2023-11-30 DIAGNOSIS — E66.01 MORBID OBESITY (H): ICD-10-CM

## 2023-11-30 DIAGNOSIS — F90.9 ADULT ADHD (ATTENTION DEFICIT HYPERACTIVITY DISORDER): Primary | ICD-10-CM

## 2023-11-30 LAB — CREAT UR-MCNC: 119 MG/DL

## 2023-11-30 PROCEDURE — 99214 OFFICE O/P EST MOD 30 MIN: CPT | Performed by: FAMILY MEDICINE

## 2023-11-30 PROCEDURE — 80346 BENZODIAZEPINES1-12: CPT | Mod: ZL | Performed by: FAMILY MEDICINE

## 2023-11-30 PROCEDURE — 80360 METHYLPHENIDATE: CPT | Mod: ZL | Performed by: FAMILY MEDICINE

## 2023-11-30 RX ORDER — LISDEXAMFETAMINE DIMESYLATE 50 MG/1
50 CAPSULE ORAL EVERY MORNING
Qty: 30 CAPSULE | Refills: 0 | Status: SHIPPED | OUTPATIENT
Start: 2023-11-30 | End: 2024-03-12

## 2023-11-30 RX ORDER — FAMOTIDINE 40 MG/1
40 TABLET, FILM COATED ORAL 2 TIMES DAILY
Qty: 180 TABLET | Refills: 4 | Status: SHIPPED | OUTPATIENT
Start: 2023-11-30 | End: 2024-03-12

## 2023-11-30 ASSESSMENT — ANXIETY QUESTIONNAIRES
GAD7 TOTAL SCORE: 18
7. FEELING AFRAID AS IF SOMETHING AWFUL MIGHT HAPPEN: MORE THAN HALF THE DAYS
IF YOU CHECKED OFF ANY PROBLEMS ON THIS QUESTIONNAIRE, HOW DIFFICULT HAVE THESE PROBLEMS MADE IT FOR YOU TO DO YOUR WORK, TAKE CARE OF THINGS AT HOME, OR GET ALONG WITH OTHER PEOPLE: SOMEWHAT DIFFICULT
4. TROUBLE RELAXING: NEARLY EVERY DAY
7. FEELING AFRAID AS IF SOMETHING AWFUL MIGHT HAPPEN: MORE THAN HALF THE DAYS
GAD7 TOTAL SCORE: 18
3. WORRYING TOO MUCH ABOUT DIFFERENT THINGS: NEARLY EVERY DAY
2. NOT BEING ABLE TO STOP OR CONTROL WORRYING: NEARLY EVERY DAY
GAD7 TOTAL SCORE: 18
6. BECOMING EASILY ANNOYED OR IRRITABLE: NEARLY EVERY DAY
5. BEING SO RESTLESS THAT IT IS HARD TO SIT STILL: SEVERAL DAYS
1. FEELING NERVOUS, ANXIOUS, OR ON EDGE: NEARLY EVERY DAY
8. IF YOU CHECKED OFF ANY PROBLEMS, HOW DIFFICULT HAVE THESE MADE IT FOR YOU TO DO YOUR WORK, TAKE CARE OF THINGS AT HOME, OR GET ALONG WITH OTHER PEOPLE?: SOMEWHAT DIFFICULT

## 2023-11-30 ASSESSMENT — ASTHMA QUESTIONNAIRES
ACT_TOTALSCORE: 21
QUESTION_3 LAST FOUR WEEKS HOW OFTEN DID YOUR ASTHMA SYMPTOMS (WHEEZING, COUGHING, SHORTNESS OF BREATH, CHEST TIGHTNESS OR PAIN) WAKE YOU UP AT NIGHT OR EARLIER THAN USUAL IN THE MORNING: NOT AT ALL
QUESTION_5 LAST FOUR WEEKS HOW WOULD YOU RATE YOUR ASTHMA CONTROL: WELL CONTROLLED
QUESTION_4 LAST FOUR WEEKS HOW OFTEN HAVE YOU USED YOUR RESCUE INHALER OR NEBULIZER MEDICATION (SUCH AS ALBUTEROL): ONCE A WEEK OR LESS
ACT_TOTALSCORE: 21
QUESTION_2 LAST FOUR WEEKS HOW OFTEN HAVE YOU HAD SHORTNESS OF BREATH: ONCE OR TWICE A WEEK
QUESTION_1 LAST FOUR WEEKS HOW MUCH OF THE TIME DID YOUR ASTHMA KEEP YOU FROM GETTING AS MUCH DONE AT WORK, SCHOOL OR AT HOME: A LITTLE OF THE TIME

## 2023-11-30 ASSESSMENT — PAIN SCALES - GENERAL: PAINLEVEL: NO PAIN (0)

## 2023-11-30 ASSESSMENT — PATIENT HEALTH QUESTIONNAIRE - PHQ9
SUM OF ALL RESPONSES TO PHQ QUESTIONS 1-9: 21
SUM OF ALL RESPONSES TO PHQ QUESTIONS 1-9: 21
10. IF YOU CHECKED OFF ANY PROBLEMS, HOW DIFFICULT HAVE THESE PROBLEMS MADE IT FOR YOU TO DO YOUR WORK, TAKE CARE OF THINGS AT HOME, OR GET ALONG WITH OTHER PEOPLE: SOMEWHAT DIFFICULT

## 2023-11-30 NOTE — NURSING NOTE
"Chief Complaint   Patient presents with    Recheck Medication       Initial /74   Pulse 64   Temp (!) 96.7  F (35.9  C) (Tympanic)   Resp 16   Ht 1.657 m (5' 5.25\")   Wt 110.4 kg (243 lb 6 oz)   LMP 05/12/2022   SpO2 98%   BMI 40.19 kg/m   Estimated body mass index is 40.19 kg/m  as calculated from the following:    Height as of this encounter: 1.657 m (5' 5.25\").    Weight as of this encounter: 110.4 kg (243 lb 6 oz).  Medication Review: complete    The next two questions are to help us understand your food security.  If you are feeling you need any assistance in this area, we have resources available to support you today.          11/30/2023   SDOH- Food Insecurity   Within the past 12 months, did you worry that your food would run out before you got money to buy more? N   Within the past 12 months, did the food you bought just not last and you didn t have money to get more? N         Health Care Directive:  Patient does not have a Health Care Directive or Living Will: Discussed advance care planning with patient; however, patient declined at this time.    Anny Lo LPN      "

## 2023-11-30 NOTE — LETTER
[facility]  -- Controlled Medication Agreement    11/30/2023   Ana MI Arguetaadis   1990   [id number]       I understand that my provider is prescribing controlled medications to assist me in managing my ADHD.  The risks, benefits, and side effects of these medications have been explained to me and I agree to the following conditions for this type of treatment.    Stimulant Medication Prescribed: Vyvanse    1.  I will take my medications exactly as prescribed and will not change the medication dosage or schedule without my provider's approval.  Refills will not be given if I  runs out early.     2.  I will keep all regular appointments at this clinic.  If there are three or more missed appointments or appointments canceled less than 2 hours before the scheduled time, my medication may be discontinued.    3.  I understand that prescriptions may only be written for one month at a time, and a written prescription is required each month.  Prescriptions cannot be called in or faxed to the pharmacy.    4.  If the prescription is lost or stolen, replacement is at the discretion of my provider.  I understand that this may mean the prescription might not be replaced.    5.  If I am late for scheduled follow up, I understand that I must make an appointment and that another refill is at the discretion of my provider.  This may mean a prescription for only the amount required until the appointment, regardless of prescription co-pay.  For example, if an appointment is made in 1 week, a prescription might only be written for 7 pills.      I understand that if I violate any of the above conditions, my prescription medications and/or treatment may be terminated.  If the violation includes providing controlled substances to anyone other than to whom the medication is prescribed, a report may be made to my child's physician, pharmacy, and other authorities, including the police.    I have read this contract and  it has been explained to me.  I fully understand the consequences of violating this agreement.    _________________________________/______________/____________________________    Patient signature/Date/Witness

## 2024-01-14 ENCOUNTER — OFFICE VISIT (OUTPATIENT)
Dept: FAMILY MEDICINE | Facility: OTHER | Age: 34
End: 2024-01-14
Attending: NURSE PRACTITIONER
Payer: COMMERCIAL

## 2024-01-14 VITALS
OXYGEN SATURATION: 99 % | HEART RATE: 69 BPM | HEIGHT: 65 IN | RESPIRATION RATE: 16 BRPM | WEIGHT: 251.9 LBS | DIASTOLIC BLOOD PRESSURE: 78 MMHG | SYSTOLIC BLOOD PRESSURE: 110 MMHG | BODY MASS INDEX: 41.97 KG/M2 | TEMPERATURE: 98.6 F

## 2024-01-14 DIAGNOSIS — M54.50 ACUTE BILATERAL LOW BACK PAIN WITHOUT SCIATICA: Primary | ICD-10-CM

## 2024-01-14 PROCEDURE — 99213 OFFICE O/P EST LOW 20 MIN: CPT

## 2024-01-14 RX ORDER — CYCLOBENZAPRINE HCL 10 MG
10 TABLET ORAL 3 TIMES DAILY PRN
Qty: 90 TABLET | Refills: 0 | Status: SHIPPED | OUTPATIENT
Start: 2024-01-14 | End: 2024-02-13

## 2024-01-14 ASSESSMENT — PATIENT HEALTH QUESTIONNAIRE - PHQ9
SUM OF ALL RESPONSES TO PHQ QUESTIONS 1-9: 0
SUM OF ALL RESPONSES TO PHQ QUESTIONS 1-9: 0
10. IF YOU CHECKED OFF ANY PROBLEMS, HOW DIFFICULT HAVE THESE PROBLEMS MADE IT FOR YOU TO DO YOUR WORK, TAKE CARE OF THINGS AT HOME, OR GET ALONG WITH OTHER PEOPLE: NOT DIFFICULT AT ALL

## 2024-01-14 ASSESSMENT — PAIN SCALES - GENERAL: PAINLEVEL: MODERATE PAIN (4)

## 2024-01-14 NOTE — NURSING NOTE
"Chief Complaint   Patient presents with    Back Pain     Lower Back Pain x 2 Days        Initial /78 (BP Location: Right arm, Patient Position: Chair, Cuff Size: Adult Large)   Pulse 69   Temp 98.6  F (37  C) (Tympanic)   Resp 16   Ht 1.657 m (5' 5.25\")   Wt 114.3 kg (251 lb 14.4 oz)   LMP 05/12/2022   SpO2 99%   Breastfeeding No   BMI 41.60 kg/m   Estimated body mass index is 41.6 kg/m  as calculated from the following:    Height as of this encounter: 1.657 m (5' 5.25\").    Weight as of this encounter: 114.3 kg (251 lb 14.4 oz).    FOOD SECURITY SCREENING QUESTIONS:    The next two questions are to help us understand your food security.  If you are feeling you need any assistance in this area, we have resources available to support you today.    Hunger Vital Signs:  Within the past 12 months we worried whether our food would run out before we got money to buy more. Never  Within the past 12 months the food we bought just didn't last and we didn't have money to get more. Never    Medication Reconciliation: Complete.       Yaquelin Galvan LPN on 1/14/2024 at 11:34 AM     "

## 2024-01-14 NOTE — PATIENT INSTRUCTIONS
RTC in  7-10 days if not improving for further evaluation, referral to ortho and PT.  I hope you feel better soon!

## 2024-01-14 NOTE — LETTER
January 14, 2024      Ana Patrick  PO   [facility] [id number]        To Whom It May Concern:    Ana Patrick was seen in our clinic. She may return to work with the following: limited to light duty - lifting no greater than 0 pounds, no bending/stooping, no climbing, and no repetitive motions and may not operate heavy equipment  on or about for the next week.  RTC if not improving.        Sincerely,      Gabi MO- CNP

## 2024-01-14 NOTE — PROGRESS NOTES
ASSESSMENT/PLAN:    I have reviewed the nursing notes.  I have reviewed the findings, diagnosis, plan and need for follow up with the patient.    1. Acute bilateral low back pain without sciatica    - cyclobenzaprine (FLEXERIL) 10 MG tablet; Take 1 tablet (10 mg) by mouth 3 times daily as needed for muscle spasms  Dispense: 90 tablet; Refill: 0     - Symptomatic treatment - alternating ice and heat, topical analgesic creams, rest, etc     - May use over-the-counter Tylenol or ibuprofen PRN.  Take 1000 mg Tylenol and 600 mg Ibuprofen every 6 hours for pain.  Take with food to prevent GI upset (Ibuprofen)    - Discussed warning signs/symptoms indicative of need to f/u    - Follow up if symptoms persist or worsen.  May need to follow up with orthopedics and/or PT.   - Read attached information on back pain and exercises for low back pain     - I explained my diagnostic considerations and recommendations to the patient, who voiced understanding and agreement with the treatment plan. All questions were answered. We discussed potential side effects of any prescribed or recommended therapies, as well as expectations for response to treatments.    Gabi Vargas, CHELY CNP  1/14/2024  11:44 AM    HPI:    Ana Patrick is a 33 year old female who [facility] for concerns of bilateral low back pain.  Pt states she was shoveling steps yesterday with a small shovel which caused her to have to bend over further than normal and when she went to straighten up she felt immediate pain and states it was difficult to straighten her back completely.  Pt states she has been taking OTC Tylenol, Ibuprofen, ice, heat, no effective. Unable to lie flat on back, difficult to roll either way on side.  No loss of bowel or bladder control.      Past Medical History:   Diagnosis Date    Internal derangement of knee     5/23/2016    Personal history of other medical treatment (CODE)     2013    Polycystic ovarian syndrome      3/18/2016     Past Surgical History:   Procedure Laterality Date    ARTHROSCOPY KNEE Right 2010    Dr. Landeros     SECTION N/A 2020    Procedure:  SECTION;  Surgeon: Gerry Kim MD;  Location: [facility] OR     SECTION, TUBAL LIGATION, COMBINED Bilateral 2021    Procedure:  SECTION, WITH POSTPARTUM TUBAL LIGATION;  Surgeon: Gerry Kim MD;  Location: [facility] OR    CYSTOSCOPY N/A 2022    Procedure: Cystoscopy;  Surgeon: Gerry Kim MD;  Location: [facility] OR    HYSTEROSCOPY DIAGNOSTIC N/A 2018    Procedure: HYSTEROSCOPY DIAGNOSTIC;  Diagnostic Hysteroscopy with Endometrial Scratching.      .;  Surgeon: Gerry Kim MD;  Location: [facility] OR    LAPAROSCOPIC HYSTERECTOMY TOTAL, SALPINGECTOMY N/A 2022    Procedure: HYSTERECTOMY, TOTAL, LAPAROSCOPIC,;  Surgeon: Gerry Kim MD;  Location:  OR    MANDIBLE SURGERY Right          Social History     Tobacco Use    Smoking status: Never     Passive exposure: Never    Smokeless tobacco: Never   Substance Use Topics    Alcohol use: Not Currently     Alcohol/week: 0.0 standard drinks of alcohol     Comment: Alcoholic Drinks/day: rare     Current Outpatient Medications   Medication Sig Dispense Refill    albuterol (PROAIR HFA/PROVENTIL HFA/VENTOLIN HFA) 108 (90 Base) MCG/ACT inhaler Inhale 2 puffs into the lungs every 4 hours as needed for shortness of breath or wheezing 8.5 g 0    famotidine (PEPCID) 40 MG tablet Take 1 tablet (40 mg) by mouth 2 times daily 180 tablet 4    lisdexamfetamine (VYVANSE) 50 MG capsule Take 1 capsule (50 mg) by mouth every morning 30 capsule 0    lisdexamfetamine (VYVANSE) 50 MG capsule Take 1 capsule (50 mg) by mouth every morning 30 capsule 0    lisdexamfetamine (VYVANSE) 50 MG capsule Take 1 capsule (50 mg) by mouth every morning 30 capsule 0    methylphenidate (RITALIN) 10 MG tablet Take 1 tablet (10 mg) by mouth daily (with lunch) 30 tablet 0    miconazole (MICATIN) 2 % external cream Apply  "topically 2 times daily 30 g 0    sucralfate (CARAFATE) 1 GM tablet TAKE 1 TABLET BY MOUTH 3 TIMES DAILY BEFORE MEALS 270 tablet 3    escitalopram (LEXAPRO) 10 MG tablet Take 0.5 tablets (5 mg) by mouth daily for 10 days, THEN 1 tablet (10 mg) daily for 10 days. 90 tablet 1    insulin pen needle (32G X 4 MM) 32G X 4 MM miscellaneous Use 1 pen needles daily or as directed. (Patient not taking: Reported on 1/14/2024) 100 each prn    liraglutide (VICTOZA) 18 MG/3ML solution Inject 1.8 mg Subcutaneous daily (Patient not taking: Reported on 1/14/2024) 27 mL 4     Allergies   Allergen Reactions    Meloxicam Rash     Past medical history, past surgical history, current medications and allergies reviewed and accurate to the best of my knowledge.      ROS:  Refer to HPI    /78 (BP Location: Right arm, Patient Position: Chair, Cuff Size: Adult Large)   Pulse 69   Temp 98.6  F (37  C) (Tympanic)   Resp 16   Ht 1.657 m (5' 5.25\")   Wt 114.3 kg (251 lb 14.4 oz)   LMP 05/12/2022   SpO2 99%   Breastfeeding No   BMI 41.60 kg/m      EXAM:  General Appearance: Well appearing 33 year old female, appropriate appearance for age. Acute back pain distress   Respiratory: normal chest wall and respirations.  Normal effort.  Clear to auscultation bilaterally, no wheezing, crackles or rhonchi.  No increased work of breathing.  No cough appreciated.  Cardiac: RRR with no murmurs   Musculoskeletal:  Equal movement of bilateral upper extremities.  Unable to bend comfortably side to side or back and forth due to bilateral low back pain.  No pain upon palpation of spine.  Pain with palpation of muscle lateral sides of spine.  Unequal movement of bilateral lower extremities.  Pt slowly moving right leg due to back pain.  Slow deliberate gait. .    Neuro: Alert and oriented to person, place, and time.  Muscle tone normal.  No tremor.   Psychological: normal affect, alert, oriented, and pleasant.   "

## 2024-01-22 DIAGNOSIS — J45.21 MILD INTERMITTENT REACTIVE AIRWAY DISEASE WITH ACUTE EXACERBATION: ICD-10-CM

## 2024-01-22 DIAGNOSIS — E88.819 INSULIN RESISTANCE: Primary | ICD-10-CM

## 2024-01-23 ENCOUNTER — OFFICE VISIT (OUTPATIENT)
Dept: FAMILY MEDICINE | Facility: OTHER | Age: 34
End: 2024-01-23
Attending: PHYSICIAN ASSISTANT
Payer: COMMERCIAL

## 2024-01-23 VITALS
DIASTOLIC BLOOD PRESSURE: 74 MMHG | WEIGHT: 249 LBS | HEART RATE: 68 BPM | TEMPERATURE: 98 F | RESPIRATION RATE: 17 BRPM | BODY MASS INDEX: 42.51 KG/M2 | SYSTOLIC BLOOD PRESSURE: 113 MMHG | HEIGHT: 64 IN | OXYGEN SATURATION: 98 %

## 2024-01-23 DIAGNOSIS — M54.50 ACUTE BILATERAL LOW BACK PAIN WITHOUT SCIATICA: Primary | ICD-10-CM

## 2024-01-23 PROCEDURE — 99213 OFFICE O/P EST LOW 20 MIN: CPT | Performed by: PHYSICIAN ASSISTANT

## 2024-01-23 ASSESSMENT — PAIN SCALES - GENERAL: PAINLEVEL: NO PAIN (0)

## 2024-01-23 ASSESSMENT — PATIENT HEALTH QUESTIONNAIRE - PHQ9
SUM OF ALL RESPONSES TO PHQ QUESTIONS 1-9: 16
10. IF YOU CHECKED OFF ANY PROBLEMS, HOW DIFFICULT HAVE THESE PROBLEMS MADE IT FOR YOU TO DO YOUR WORK, TAKE CARE OF THINGS AT HOME, OR GET ALONG WITH OTHER PEOPLE: VERY DIFFICULT
SUM OF ALL RESPONSES TO PHQ QUESTIONS 1-9: 16

## 2024-01-23 NOTE — LETTER
January 23, 2024      Ana Patrick  [place]   [facility] [address]        To Whom It May Concern:    Ana Patrick was seen in our clinic. She may return to work without restrictions.      Sincerely,        Airanna Raman PA-C

## 2024-01-23 NOTE — PROGRESS NOTES
Assessment & Plan   Problem List Items Addressed This Visit    None  Visit Diagnoses       Acute bilateral low back pain without sciatica    -  Primary           Encouraged to take ibuprofen for relief up to 4 times per day.  Encouraged rest and elevation.  Encouraged to use ice or heat 15 minutes at a time several times per day to decrease pain. Return to clinic in 1-2 weeks as necessary for persistent pain. Return to clinic with any change or worsening of symptoms.   Increase activity as tolerated.  Gave work note.     See Patient Instructions    No follow-ups on file.      Isha Perez is a 33 year old, presenting for the following health issues:  work restrictions    History of Present Illness       Back Pain:  She presents for follow up of back pain. Patient's back pain is a new problem.    Original cause of back pain: turning/bending  First noticed back pain: 1-4 weeks ago  Patient feels back pain: dailyLocation of back pain:  Right lower back and left lower back  Description of back pain: dull ache, sharp, shooting and stabbing  Back pain spreads: right buttocks    Since patient first noticed back pain, pain is: gradually improving  Does back pain interfere with her job:  Yes  On a scale of 1-10 (10 being the worst), patient describes pain as:  2  What makes back pain worse: certain positions and other   Acupuncture: not tried  Acetaminophen: helpful  Activity or exercise: not helpful  Chiropractor:  Not tried  Cold: helpful  Heat: helpful  Massage: helpful  Muscle relaxants: not helpful  NSAIDS: helpful  Opioids: not tried  Physical Therapy: not tried  Rest: helpful  Steroid Injection: not tried  Stretching: helpful  Surgery: not tried  TENS unit: not tried  Topical pain relievers: not helpful  Other healthcare providers patient is seeing for back pain: None    She eats 2-3 servings of fruits and vegetables daily.She consumes 1 sweetened beverage(s) daily.She exercises with enough effort to increase  "her heart rate 30 to 60 minutes per day.  She exercises with enough effort to increase her heart rate 3 or less days per week.      Patient is coming today for back pain recheck.  [facility] due to acute bilateral low back pain without sciatica.  Tried Flexeril however this only made her sleepy.  Recently strained her back.  Was put on work restrictions due to the increased back pain.  Back is getting much better.  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  No current increased pain with walking.  Would like to go back to full work without restrictions.  No bowel or bladder incontinence.  Using Tylenol or ibuprofen sparingly as needed.      Review of Systems  Constitutional, HEENT, cardiovascular, pulmonary, gi and gu systems are negative, except as otherwise noted.      Objective    /74   Pulse 68   Temp 98  F (36.7  C) (Tympanic)   Resp 17   Ht 1.626 m (5' 4\")   Wt 112.9 kg (249 lb)   LMP 05/12/2022   SpO2 98%   BMI 42.74 kg/m    Body mass index is 42.74 kg/m .  Physical Exam  Vitals and nursing note reviewed.   Constitutional:       Appearance: Normal appearance.   HENT:      Head: Normocephalic and atraumatic.   Musculoskeletal:         General: No swelling, tenderness or signs of injury. Normal range of motion.      Cervical back: Normal range of motion.      Comments: No spinal or paraspinous muscle pain to palpation.  Negative straight leg raise test.  No low back pain with bilateral hip flexion, internal or external rotation.   Skin:     General: Skin is warm and dry.   Neurological:      General: No focal deficit present.      Mental Status: She is alert and oriented to person, place, and time.      Motor: No weakness.      Coordination: Coordination normal.      Gait: Gait normal.      Deep Tendon Reflexes: Reflexes normal.   Psychiatric:         Mood and Affect: Mood normal.         Behavior: " Behavior normal.          No results found for any visits on 01/23/24.        Signed Electronically by: Arianna Raman PA-C

## 2024-01-24 RX ORDER — ALBUTEROL SULFATE 90 UG/1
2 AEROSOL, METERED RESPIRATORY (INHALATION) EVERY 4 HOURS PRN
Qty: 8.5 G | Refills: 1 | Status: SHIPPED | OUTPATIENT
Start: 2024-01-24 | End: 2024-07-17

## 2024-01-24 RX ORDER — PEN NEEDLE, DIABETIC 32GX 5/32"
NEEDLE, DISPOSABLE MISCELLANEOUS
Qty: 100 EACH | Refills: 1 | Status: SHIPPED | OUTPATIENT
Start: 2024-01-24

## 2024-01-24 NOTE — TELEPHONE ENCOUNTER
"[facility] sent Rx request for the following:      Requested Prescriptions   Pending Prescriptions Disp Refills    albuterol (PROAIR HFA/PROVENTIL HFA/VENTOLIN HFA) 108 (90 Base) MCG/ACT inhaler [Pharmacy Med Name: albuterol sulfate HFA 90 mcg/actuation aerosol inhaler] 8.5 g 0     Sig: Inhale 2 puffs into the lungs every 4 hours as needed for shortness of breath or wheezing   Last Prescription Date:   10/17/23  Last Fill Qty/Refills:         8.5 g, R-0        insulin pen needle (BD ROSA U/F) 32G X 4 MM miscellaneous [Pharmacy Med Name: BD Ultra-Fine Rosa Pen Needle 32 gauge x 5/32\"]  0     Sig: Use 1 pen needles daily or as directed.   Last Prescription Date:   9/20/22  Last Fill Qty/Refills:         100, R-prn      Diabetic Supplies Protocol Failed - 1/24/2024 12:22 PM        Failed - Medication indicated for associated diagnosis     Last Office Visit:              1/23/24   Future Office visit:           None    Unable to complete prescription refill per RN Medication Refill Policy.     Delmi Carrasco RN .............. 1/24/2024  12:24 PM  "

## 2024-03-12 ENCOUNTER — OFFICE VISIT (OUTPATIENT)
Dept: FAMILY MEDICINE | Facility: OTHER | Age: 34
End: 2024-03-12
Attending: FAMILY MEDICINE
Payer: COMMERCIAL

## 2024-03-12 VITALS
HEIGHT: 64 IN | RESPIRATION RATE: 20 BRPM | BODY MASS INDEX: 42.85 KG/M2 | TEMPERATURE: 98.5 F | SYSTOLIC BLOOD PRESSURE: 120 MMHG | DIASTOLIC BLOOD PRESSURE: 78 MMHG | HEART RATE: 77 BPM | OXYGEN SATURATION: 97 % | WEIGHT: 251 LBS

## 2024-03-12 DIAGNOSIS — F90.9 ADULT ADHD (ATTENTION DEFICIT HYPERACTIVITY DISORDER): ICD-10-CM

## 2024-03-12 DIAGNOSIS — E88.819 INSULIN RESISTANCE: ICD-10-CM

## 2024-03-12 DIAGNOSIS — K21.9 GASTROESOPHAGEAL REFLUX DISEASE WITHOUT ESOPHAGITIS: ICD-10-CM

## 2024-03-12 DIAGNOSIS — F41.1 GENERALIZED ANXIETY DISORDER: Primary | ICD-10-CM

## 2024-03-12 PROCEDURE — 99214 OFFICE O/P EST MOD 30 MIN: CPT | Performed by: FAMILY MEDICINE

## 2024-03-12 RX ORDER — OMEPRAZOLE 40 MG/1
40 CAPSULE, DELAYED RELEASE ORAL DAILY
COMMUNITY

## 2024-03-12 RX ORDER — ESCITALOPRAM OXALATE 10 MG/1
10 TABLET ORAL DAILY
Qty: 90 TABLET | Refills: 4 | Status: CANCELLED | OUTPATIENT
Start: 2024-03-12

## 2024-03-12 RX ORDER — METHYLPHENIDATE HYDROCHLORIDE 10 MG/1
10 TABLET ORAL
Qty: 30 TABLET | Refills: 0 | Status: SHIPPED | OUTPATIENT
Start: 2024-03-12

## 2024-03-12 RX ORDER — LISDEXAMFETAMINE DIMESYLATE 50 MG/1
50 CAPSULE ORAL EVERY MORNING
Qty: 30 CAPSULE | Refills: 0 | Status: SHIPPED | OUTPATIENT
Start: 2024-03-12

## 2024-03-12 RX ORDER — LISDEXAMFETAMINE DIMESYLATE 50 MG/1
50 CAPSULE ORAL EVERY MORNING
Qty: 30 CAPSULE | Refills: 0 | Status: SHIPPED | OUTPATIENT
Start: 2024-04-11

## 2024-03-12 RX ORDER — LIRAGLUTIDE 6 MG/ML
1.8 INJECTION SUBCUTANEOUS DAILY
Qty: 27 ML | Refills: 4 | Status: SHIPPED | OUTPATIENT
Start: 2024-03-12

## 2024-03-12 RX ORDER — LISDEXAMFETAMINE DIMESYLATE 50 MG/1
50 CAPSULE ORAL EVERY MORNING
Qty: 30 CAPSULE | Refills: 0 | Status: SHIPPED | OUTPATIENT
Start: 2024-05-10

## 2024-03-12 RX ORDER — VENLAFAXINE HYDROCHLORIDE 75 MG/1
75 CAPSULE, EXTENDED RELEASE ORAL DAILY
Qty: 90 CAPSULE | Refills: 4 | Status: SHIPPED | OUTPATIENT
Start: 2024-03-12

## 2024-03-12 ASSESSMENT — ANXIETY QUESTIONNAIRES
1. FEELING NERVOUS, ANXIOUS, OR ON EDGE: NEARLY EVERY DAY
6. BECOMING EASILY ANNOYED OR IRRITABLE: NEARLY EVERY DAY
GAD7 TOTAL SCORE: 16
7. FEELING AFRAID AS IF SOMETHING AWFUL MIGHT HAPPEN: MORE THAN HALF THE DAYS
GAD7 TOTAL SCORE: 16
3. WORRYING TOO MUCH ABOUT DIFFERENT THINGS: MORE THAN HALF THE DAYS
2. NOT BEING ABLE TO STOP OR CONTROL WORRYING: MORE THAN HALF THE DAYS
5. BEING SO RESTLESS THAT IT IS HARD TO SIT STILL: SEVERAL DAYS
IF YOU CHECKED OFF ANY PROBLEMS ON THIS QUESTIONNAIRE, HOW DIFFICULT HAVE THESE PROBLEMS MADE IT FOR YOU TO DO YOUR WORK, TAKE CARE OF THINGS AT HOME, OR GET ALONG WITH OTHER PEOPLE: VERY DIFFICULT
GAD7 TOTAL SCORE: 16
4. TROUBLE RELAXING: NEARLY EVERY DAY
8. IF YOU CHECKED OFF ANY PROBLEMS, HOW DIFFICULT HAVE THESE MADE IT FOR YOU TO DO YOUR WORK, TAKE CARE OF THINGS AT HOME, OR GET ALONG WITH OTHER PEOPLE?: VERY DIFFICULT
7. FEELING AFRAID AS IF SOMETHING AWFUL MIGHT HAPPEN: MORE THAN HALF THE DAYS

## 2024-03-12 ASSESSMENT — PATIENT HEALTH QUESTIONNAIRE - PHQ9
SUM OF ALL RESPONSES TO PHQ QUESTIONS 1-9: 17
SUM OF ALL RESPONSES TO PHQ QUESTIONS 1-9: 17
10. IF YOU CHECKED OFF ANY PROBLEMS, HOW DIFFICULT HAVE THESE PROBLEMS MADE IT FOR YOU TO DO YOUR WORK, TAKE CARE OF THINGS AT HOME, OR GET ALONG WITH OTHER PEOPLE: VERY DIFFICULT

## 2024-03-12 ASSESSMENT — PAIN SCALES - GENERAL: PAINLEVEL: NO PAIN (0)

## 2024-03-12 NOTE — NURSING NOTE
"Chief Complaint   Patient presents with    Recheck Medication       Initial /78   Pulse 77   Temp 98.5  F (36.9  C) (Tympanic)   Resp 20   Ht 1.626 m (5' 4\")   Wt 113.9 kg (251 lb)   LMP 05/12/2022   SpO2 97%   BMI 43.08 kg/m   Estimated body mass index is 43.08 kg/m  as calculated from the following:    Height as of this encounter: 1.626 m (5' 4\").    Weight as of this encounter: 113.9 kg (251 lb).  Medication Review: complete    The next two questions are to help us understand your food security.  If you are feeling you need any assistance in this area, we have resources available to support you today.          1/23/2024   SDOH- Food Insecurity   Within the past 12 months, did you worry that your food would run out before you got money to buy more? N   Within the past 12 months, did the food you bought just not last and you didn t have money to get more? N         Health Care Directive:  Patient does not have a Health Care Directive or Living Will: Discussed advance care planning with patient; however, patient declined at this time.    Anny Lo LPN      "

## 2024-03-12 NOTE — PROGRESS NOTES
Assessment & Plan     1. Generalized anxiety disorder  Chronic, unchanged.  Not on anything currently; in counseling x 3 months (minimum) -- planning for weight reduction surgery.  Discussed R/B/O -- will start Effexor for mood boosting.  - venlafaxine (EFFEXOR XR) 75 MG 24 hr capsule; Take 1 capsule (75 mg) by mouth daily  Dispense: 90 capsule; Refill: 4    2. Insulin resistance  3. Body mass index (BMI) 34.0-34.9, adult  Chronic, doing well on Victoza.  Also following with weight management.  - liraglutide (VICTOZA) 18 MG/3ML solution; Inject 1.8 mg Subcutaneous daily  Dispense: 27 mL; Refill: 4    4. Adult ADHD (attention deficit hyperactivity disorder)  Chronic, stable on Vyvanse.   Rx x 3 months refilled; if cost/availability becomes an issue with insurance changes - she will notify me and may change to Adderall vs Concerta vs other.   Utox 1/4/24; Contract 11/30/23.  - lisdexamfetamine (VYVANSE) 50 MG capsule; Take 1 capsule (50 mg) by mouth every morning  Dispense: 30 capsule; Refill: 0  - lisdexamfetamine (VYVANSE) 50 MG capsule; Take 1 capsule (50 mg) by mouth every morning  Dispense: 30 capsule; Refill: 0  - lisdexamfetamine (VYVANSE) 50 MG capsule; Take 1 capsule (50 mg) by mouth every morning  Dispense: 30 capsule; Refill: 0  - methylphenidate (RITALIN) 10 MG tablet; Take 1 tablet (10 mg) by mouth daily (with lunch)  Dispense: 30 tablet; Refill: 0      MDM:  Prescription drug management    Follow up: 3 months, medication refill.      Isha Perez is a 33 year old, presenting for the following health issues:  Recheck Medication        3/12/2024     7:57 AM   Additional Questions   Roomed by JESUS Mccormick   Accompanied by self       History of Present Illness       Mental Health Follow-up:  Patient presents to follow-up on Depression & Anxiety.Patient's depression since last visit has been:  No change  The patient is having other symptoms associated with depression.  Patient's anxiety since last  "visit has been:  No change  The patient is having other symptoms associated with anxiety.  Any significant life events: No  Patient is feeling anxious or having panic attacks.  Patient has no concerns about alcohol or drug use.    She eats 2-3 servings of fruits and vegetables daily.She consumes 1 sweetened beverage(s) daily.She exercises with enough effort to increase her heart rate 30 to 60 minutes per day.  She exercises with enough effort to increase her heart rate 4 days per week. She is missing 2 dose(s) of medications per week.       ADHD Follow-Up (Adult) - last visit 11/30/24.  Concerns: None  Changes since last visit: None  Taking controlled (daily) medications as prescribed: Yes; Vyvanse 50mg daily and methylphenidate 10mg @ lunch prn.  Sleep: no problems  Adult ADHD Self-Reporting form given to patient?:  No  Currently in counseling: No     Medication Benefits:   Controlled symptoms: Finishing tasks, Impulse control, Frustration tolerance, and Accepting limits  Uncontrolled symptoms:  Distractability     Medication Side Effects:  Reports:  none  Sleep Problems? no  ++++++++++++++++++++++++++++++++++++++++++++++++     Employer Concerns/Feedback: None  Coworker Concerns:   None  Home/Family Concerns: None    Utox 1/4/24; contract 11/30/23  PDMP Review         Value Time User    State PDMP site checked  Yes 3/12/2024  4:45 AM Abiola Ashton, DO          Zoloft, Wellbutrin, Escitalopram - in the past.         Objective    /78   Pulse 77   Temp 98.5  F (36.9  C) (Tympanic)   Resp 20   Ht 1.626 m (5' 4\")   Wt 113.9 kg (251 lb)   LMP 05/12/2022   SpO2 97%   BMI 43.08 kg/m    Body mass index is 43.08 kg/m .  Physical Exam   GENERAL: alert and no distress  NECK: no adenopathy, no asymmetry, masses, or scars  RESP: lungs clear to auscultation - no rales, rhonchi or wheezes  CV: regular rate and rhythm, normal S1 S2, no S3 or S4, no murmur, click or rub, no peripheral edema  MS: no gross " musculoskeletal defects noted, no edema  SKIN: no suspicious lesions or rashes  PSYCH: mentation appears normal, affect normal/bright    No results found for any visits on 03/12/24.        Signed Electronically by: Abiola Ashton DO

## 2024-03-14 ENCOUNTER — TELEPHONE (OUTPATIENT)
Dept: FAMILY MEDICINE | Facility: OTHER | Age: 34
End: 2024-03-14
Payer: COMMERCIAL

## 2024-03-14 NOTE — TELEPHONE ENCOUNTER
- Received PA denial from [employer] for Victoza 18MG/3ML pen-injectors.  Faxed the denial / appeal information to HIM.      Denied - We received a request from your prescriber for coverage of Victoza 3-krystle 0.6 mg/0.1 PEN INJCTR for   you. The request was denied because:  Coverage is provided for the diagnosis of diabetes mellitus type 2. Coverage cannot be authorized at   this time.    Appeals- CaseId:[id number];Status:Denied;Review Type:Prior Auth;Appeal Information: Attention:ATTN: [employer]INICAL [employer]PARTMENT [employer] [address] [address],[place], [place],[address] Phone:[phone] Fax:[phone];

## 2024-03-19 ENCOUNTER — MYC MEDICAL ADVICE (OUTPATIENT)
Dept: FAMILY MEDICINE | Facility: OTHER | Age: 34
End: 2024-03-19
Payer: COMMERCIAL

## 2024-03-19 ENCOUNTER — TELEPHONE (OUTPATIENT)
Dept: FAMILY MEDICINE | Facility: OTHER | Age: 34
End: 2024-03-19
Payer: COMMERCIAL

## 2024-03-19 NOTE — TELEPHONE ENCOUNTER
3/12/2024  OV with Poli for anxiety.  Effexor started.      Anika Meyer RN on 3/19/2024 at 4:43 PM

## 2024-03-19 NOTE — TELEPHONE ENCOUNTER
Calling regarding prior auth for victoza to let SMS know that insurance denied it. Case # [id number]

## 2024-03-20 NOTE — TELEPHONE ENCOUNTER
See [employer]arish[employer] message from 3/14/24.  Dr. Ashton is aware.  Anny Lo LPN  3/20/2024  7:13 AM

## 2024-04-10 ENCOUNTER — OFFICE VISIT (OUTPATIENT)
Dept: FAMILY MEDICINE | Facility: OTHER | Age: 34
End: 2024-04-10
Attending: NURSE PRACTITIONER
Payer: COMMERCIAL

## 2024-04-10 VITALS
TEMPERATURE: 98 F | HEIGHT: 63 IN | OXYGEN SATURATION: 96 % | RESPIRATION RATE: 16 BRPM | BODY MASS INDEX: 44.83 KG/M2 | WEIGHT: 253 LBS | DIASTOLIC BLOOD PRESSURE: 86 MMHG | HEART RATE: 70 BPM | SYSTOLIC BLOOD PRESSURE: 122 MMHG

## 2024-04-10 DIAGNOSIS — H93.8X3 PLUGGED FEELING IN EAR, BILATERAL: ICD-10-CM

## 2024-04-10 DIAGNOSIS — H92.03 OTALGIA OF BOTH EARS: ICD-10-CM

## 2024-04-10 DIAGNOSIS — H65.93 BILATERAL NON-SUPPURATIVE OTITIS MEDIA: Primary | ICD-10-CM

## 2024-04-10 PROCEDURE — 99213 OFFICE O/P EST LOW 20 MIN: CPT | Performed by: NURSE PRACTITIONER

## 2024-04-10 RX ORDER — AMOXICILLIN 875 MG
875 TABLET ORAL 2 TIMES DAILY
Qty: 14 TABLET | Refills: 0 | Status: SHIPPED | OUTPATIENT
Start: 2024-04-10 | End: 2024-04-17

## 2024-04-10 RX ORDER — ENOXAPARIN SODIUM 100 MG/ML
40 INJECTION SUBCUTANEOUS EVERY 24 HOURS
COMMUNITY
Start: 2024-01-16

## 2024-04-10 ASSESSMENT — PAIN SCALES - GENERAL: PAINLEVEL: MILD PAIN (2)

## 2024-04-10 NOTE — NURSING NOTE
"Chief Complaint   Patient presents with    Otalgia     And plugged       Initial /86   Pulse 70   Temp 98  F (36.7  C) (Temporal)   Resp 16   Ht 1.6 m (5' 3\")   Wt 114.8 kg (253 lb)   LMP 05/12/2022   SpO2 96%   Breastfeeding No   BMI 44.82 kg/m   Estimated body mass index is 44.82 kg/m  as calculated from the following:    Height as of this encounter: 1.6 m (5' 3\").    Weight as of this encounter: 114.8 kg (253 lb).  Medication Review: complete    The next two questions are to help us understand your food security.  If you are feeling you need any assistance in this area, we have resources available to support you today.          1/23/2024   SDOH- Food Insecurity   Within the past 12 months, did you worry that your food would run out before you got money to buy more? N   Within the past 12 months, did the food you bought just not last and you didn t have money to get more? N         Health Care Directive:  Patient does not have a Health Care Directive or Living Will: Discussed advance care planning with patient; however, patient declined at this time.    Norma J. Gosselin, LPN      "

## 2024-04-10 NOTE — PROGRESS NOTES
ASSESSMENT/PLAN:     I have reviewed the nursing notes.  I have reviewed the findings, diagnosis, plan and need for follow up with the patient.        1. Plugged feeling in ear, bilateral  2. Otalgia of both ears  3. Bilateral non-suppurative otitis media    - amoxicillin (AMOXIL) 875 MG tablet; Take 1 tablet (875 mg) by mouth 2 times daily for 7 days  Dispense: 14 tablet; Refill: 0    May use over-the-counter Tylenol or ibuprofen PRN    Discussed warning signs/symptoms indicative of need to f/u  Follow up if symptoms persist or worsen or concerns      I explained my diagnostic considerations and recommendations to the patient, who voiced understanding and agreement with the treatment plan. All questions were answered. We discussed potential side effects of any prescribed or recommended therapies, as well as expectations for response to treatments.    Merly Davis NP  [facility]      SUBJECTIVE:   Ana Patrick is a 33 year old female who presents to clinic today for the following health issues:  Ears      HPI  Bilateral ear pain for the past 2 weeks and now plugged with decreased hearing the past week.  No ear drainage or bleeding.  No fevers.  Nasal drainage/congestion and sinus pressure for the past 2 weeks.    No sore throat.  Chronic cough from acid reflux, no change or worsening.  No shortness of breath.  Tried flushing ears at home resulting in dizziness and nausea.        Past Medical History:   Diagnosis Date    Internal derangement of knee     2016    Personal history of other medical treatment (CODE)         Polycystic ovarian syndrome     3/18/2016     Past Surgical History:   Procedure Laterality Date    ARTHROSCOPY KNEE Right     Dr. Landeros     SECTION N/A 2020    Procedure:  SECTION;  Surgeon: Gerry Kim MD;  Location:  OR     SECTION, TUBAL LIGATION, COMBINED Bilateral 2021    Procedure:  SECTION, WITH  POSTPARTUM TUBAL LIGATION;  Surgeon: Gerry Kim MD;  Location: [facility] OR    CYSTOSCOPY N/A 5/12/2022    Procedure: Cystoscopy;  Surgeon: Gerry Kim MD;  Location: [facility] OR    HYSTEROSCOPY DIAGNOSTIC N/A 9/27/2018    Procedure: HYSTEROSCOPY DIAGNOSTIC;  Diagnostic Hysteroscopy with Endometrial Scratching.      .;  Surgeon: Gerry Kim MD;  Location: [facility] OR    LAPAROSCOPIC HYSTERECTOMY TOTAL, SALPINGECTOMY N/A 5/12/2022    Procedure: HYSTERECTOMY, TOTAL, LAPAROSCOPIC,;  Surgeon: Gerry Kim MD;  Location:  OR    MANDIBLE SURGERY Right     2001     Social History     Tobacco Use    Smoking status: Never     Passive exposure: Never    Smokeless tobacco: Never   Substance Use Topics    Alcohol use: Not Currently     Alcohol/week: 0.0 standard drinks of alcohol     Comment: Alcoholic Drinks/day: rare     Current Outpatient Medications   Medication Sig Dispense Refill    albuterol (PROAIR HFA/PROVENTIL HFA/VENTOLIN HFA) 108 (90 Base) MCG/ACT inhaler Inhale 2 puffs into the lungs every 4 hours as needed for shortness of breath or wheezing 8.5 g 1    enoxaparin ANTICOAGULANT (LOVENOX) 40 MG/0.4ML syringe Inject 40 mg Subcutaneous every 24 hours      insulin pen needle (BD ROSA U/F) 32G X 4 MM miscellaneous Use 1 pen needles daily. Dispense as covered by insurance. Dx. Code: E88.819 100 each 1    methylphenidate (RITALIN) 10 MG tablet Take 1 tablet (10 mg) by mouth daily (with lunch) 30 tablet 0    omeprazole (PRILOSEC) 40 MG DR capsule Take 40 mg by mouth daily      sucralfate (CARAFATE) 1 GM tablet TAKE 1 TABLET BY MOUTH 3 TIMES DAILY BEFORE MEALS 270 tablet 3    venlafaxine (EFFEXOR XR) 75 MG 24 hr capsule Take 1 capsule (75 mg) by mouth daily 90 capsule 4    liraglutide (VICTOZA) 18 MG/3ML solution Inject 1.8 mg Subcutaneous daily (Patient not taking: Reported on 4/10/2024) 27 mL 4    lisdexamfetamine (VYVANSE) 50 MG capsule Take 1 capsule (50 mg) by mouth every morning (Patient not taking: Reported on  "4/10/2024) 30 capsule 0    [START ON 4/11/2024] lisdexamfetamine (VYVANSE) 50 MG capsule Take 1 capsule (50 mg) by mouth every morning (Patient not taking: Reported on 4/10/2024) 30 capsule 0    [START ON 5/10/2024] lisdexamfetamine (VYVANSE) 50 MG capsule Take 1 capsule (50 mg) by mouth every morning (Patient not taking: Reported on 4/10/2024) 30 capsule 0     Allergies   Allergen Reactions    Meloxicam Rash         Past medical history, past surgical history, current medications and allergies reviewed and accurate to the best of my knowledge.        OBJECTIVE:     /86   Pulse 70   Temp 98  F (36.7  C) (Temporal)   Resp 16   Ht 1.6 m (5' 3\")   Wt 114.8 kg (253 lb)   LMP 05/12/2022   SpO2 96%   Breastfeeding No   BMI 44.82 kg/m    Body mass index is 44.82 kg/m .        Physical Exam  General Appearance: Well appearing adult female, appropriate appearance for age. No acute distress  Ears:  Bilateral TMs intact, mild erythema, bulging dull effusions.   Bilateral auditory canals clear without drainage or bleeding.  Normal external ears, non tender.  Eyes: conjunctivae normal without erythema or irritation, corneas clear, no drainage or crusting, no eyelid swelling, pupils equal   Orophayrnx:  voice clear.    Nose:  Nares without noted swelling or narrowing, no visible drainage/congestion   Neck: supple without adenopathy  Respiratory: normal chest wall and respirations.  Normal effort. No cough appreciated.  Musculoskeletal:  Equal movement of bilateral upper extremities.  Equal movement of bilateral lower extremities.  Normal gait.    Psychological: normal affect, alert, oriented, and pleasant.           "

## 2024-07-12 DIAGNOSIS — J45.21 MILD INTERMITTENT REACTIVE AIRWAY DISEASE WITH ACUTE EXACERBATION: ICD-10-CM

## 2024-07-13 ENCOUNTER — HEALTH MAINTENANCE LETTER (OUTPATIENT)
Age: 34
End: 2024-07-13

## 2024-07-17 RX ORDER — ALBUTEROL SULFATE 90 UG/1
2 AEROSOL, METERED RESPIRATORY (INHALATION) EVERY 4 HOURS PRN
Qty: 8.5 G | Refills: 1 | Status: SHIPPED | OUTPATIENT
Start: 2024-07-17

## 2024-07-17 NOTE — TELEPHONE ENCOUNTER
[facility] sent Rx request for the following:      Requested Prescriptions   Pending Prescriptions Disp Refills    albuterol (PROAIR HFA/PROVENTIL HFA/VENTOLIN HFA) 108 (90 Base) MCG/ACT inhaler [Pharmacy Med Name: albuterol sulfate HFA 90 mcg/actuation aerosol inhaler] 8.5 g 1     Sig: Inhale 2 puffs into the lungs every 4 hours as needed for shortness of breath or wheezing       Short-Acting Beta Agonist Inhalers Protocol  Failed - 7/12/2024  7:44 AM        Failed - Asthma control assessment score within normal limits in last 6 months     Please review ACT score.          Last Prescription Date:   1/24/24  Last Fill Qty/Refills:         8.5 g, R-1    Last Office Visit:              3/12/24   Future Office visit:            none      Routing refill request to provider for review/approval because:  Drug not on the [facility] refill protocol     Francisca Sandoval RN on 7/17/2024 at 10:42 AM

## 2024-09-21 NOTE — PROGRESS NOTES
[facility] ADMISSION NOTE    Patient admitted to room 312 at approximately 1030 via bed from surgery. Patient was accompanied by spouse.     Verbal SBAR report received from PRISCILLA Sheriff prior to patient arrival.     Patient trasferred to bed via air jocelyne. Patient alert and oriented X 3. Pain is controlled with current analgesics.  Medication(s) being used: acetaminophen and narcotic analgesics including dilaudid.  . Admission vital signs: Blood pressure 97/60, pulse 62, temperature 97.2  F (36.2  C), temperature source Temporal, resp. rate 16, weight 100 kg (220 lb 6 oz), last menstrual period 04/11/2022, SpO2 92 %, not currently breastfeeding. Patient was oriented to plan of care, call light, bed controls, tv, telephone, bathroom and visiting hours.     Risk Assessment    The following safety risks were identified during admission: fall. Yellow risk band applied: YES.     Skin Initial Assessment    This writer admitted this patient and completed a full skin assessment and Quang score in the Adult PCS flowsheet. Appropriate interventions initiated as needed.       Quang Risk Assessment  Sensory Perception: 4-->no impairment  Moisture: 4-->rarely moist  Activity: 4-->walks frequently  Mobility: 4-->no limitation  Nutrition: 4-->excellent  Friction and Shear: 3-->no apparent problem  Quang Score: 23    Education    Patient has a [place] to Observation order: Yes  Observation education completed and documented: N/A      Dell Wolff RN     show

## 2024-10-14 NOTE — PROGRESS NOTES
"Nursing Notes:   Velvet Elizondo LPN  5/3/2019 10:09 AM  Signed  Patient comes in to the clinic today to request a \"back to work\" letter for her employer.    Velvet Elizondo LPN  5/3/2019 10:09 AM  Signed  No LMP recorded. (Menstrual status: Amenorrhea).  Medication Reconciliation: complete    Velvet Elizondo LPN  5/3/2019 10:09 AM      SUBJECTIVE:   Ana Patrick is a 28 year old female who presents to clinic today for the following health issues:    RAEANN Cesar is here after having some significant mood disturbance acutely.  She has been under a lot of stressors recently; dealing with her third pregnancy loss with PCOS.  She has been attempting some infertility treatments with Dr. Kim, but have not been able to maintain a pregnancy greater than 9 weeks.  In addition, she was recently fired from her job at [employer]; and therefore causing some financial constraints at home.  She believes what tipped her over the edge is that her sister delivered her 5th baby; which was an unintended pregnancy - and the plan was for 3-4 children.  So there is celebration of a baby, and that is frustrating to Tali about how easy her sister can get pregnant.    PHQ-9 SCORE 3/1/2018 3/22/2018 3/1/2019   PHQ-9 Total Score 25 16 25     VITA-7 SCORE 3/22/2018 10/8/2018 3/1/2019   Total Score 14 17 18       She has taken some days off of work; and is requiring a work note of being able to return.  She is hopeful for 2 weeks off.  Has already been ~5 days.  She painted the entryway in her house, got her hair done, and cleaned out the spare bedroom of her house as means to releasing her stress/anxiety.  She has considered therapy, but is uncertain of how to go about this.  She states on one hand she would like to be off work the rest of the month; however, she knows financially that is not feasible for her.  Already on sertraline 100mg daily.      Patient Active Problem List    Diagnosis Date Noted     Lupus anticoagulant " positive 10/08/2018     Priority: Medium     History of recurrent miscarriages, not currently pregnant 10/08/2018     Priority: Medium     Gastroesophageal reflux disease without esophagitis 03/13/2018     Priority: Medium     Generalized anxiety disorder 05/03/2017     Priority: Medium     Patellar malalignment syndrome 05/23/2016     Priority: Medium     Abnormal pap 03/18/2016     Priority: Medium     Overview:   LGSIL with + HPV (12/2013)  Colposcopy with CIN1 (1/2014)  ASCUS with - HPV (4/2015)       PCOS (polycystic ovarian syndrome) 03/18/2016     Priority: Medium     Past Medical History:   Diagnosis Date     Internal derangement of knee     5/23/2016     Personal history of other medical treatment (CODE)     2013     Polycystic ovarian syndrome     3/18/2016      Past Surgical History:   Procedure Laterality Date     ARTHROSCOPY KNEE Right 2010    Dr. Landeros     HYSTEROSCOPY DIAGNOSTIC N/A 9/27/2018    Procedure: HYSTEROSCOPY DIAGNOSTIC;  Diagnostic Hysteroscopy with Endometrial Scratching.      .;  Surgeon: Gerry Kim MD;  Location: [facility] OR     MANDIBLE SURGERY Right     2001     Family History   Problem Relation Age of Onset     Family History Negative Mother         Good Health     Family History Negative Father         Good Health     Cancer Maternal Grandmother         Cancer     Diabetes Other         Diabetes,maternal side     Anxiety Disorder Sister      Social History     Tobacco Use     Smoking status: Never Smoker     Smokeless tobacco: Never Used   Substance Use Topics     Alcohol use: Yes     Alcohol/week: 0.0 oz     Comment: Alcoholic Drinks/day: rare     Social History     Social History Narrative     Not on file     Current Outpatient Medications   Medication Sig Dispense Refill     ASPIRIN ADULT LOW STRENGTH PO Take 81 mg by mouth       esomeprazole (NEXIUM) 40 MG DR capsule Take 1 capsule (40 mg) by mouth every morning (before breakfast) Take 30-60 minutes before eating. 90 capsule 4      metFORMIN modified (GLUMETZA) 1000 MG 24 hr tablet Take 1 tablet (1,000 mg) by mouth 2 times daily (with meals) 180 tablet 4     Prenatal Vit-Fe Fumarate-FA (PRENATAL MULTIVITAMIN PLUS IRON) 27-0.8 MG TABS per tablet Take 1 tablet by mouth daily       sertraline (ZOLOFT) 100 MG tablet Take 1 tablet PO daily. 90 tablet 4     sucralfate (CARAFATE) 1 GM tablet TAKE 1 TABLET BY MOUTH 3 TIMES DAILY BEFORE MEALS 270 tablet 4     Allergies   Allergen Reactions     Meloxicam Rash     Review of Systems   Psychiatric/Behavioral: Positive for decreased concentration and mood changes. Negative for agitation, behavioral problems and suicidal ideas. The patient is nervous/anxious. The patient is not hyperactive.    All other systems reviewed and are negative.       OBJECTIVE:     /84   Pulse 76   Temp 98.9  F (37.2  C)   Resp 18   Wt 107 kg (236 lb)   BMI 39.27 kg/m    Body mass index is 39.27 kg/m .  Physical Exam   Constitutional: She appears well-developed and well-nourished.   HENT:   Head: Normocephalic and atraumatic.   Eyes: Pupils are equal, round, and reactive to light.   Neck: Normal range of motion. Neck supple.   Pulmonary/Chest: Effort normal.   Skin: Skin is warm. Capillary refill takes less than 2 seconds.   Psychiatric: She has a normal mood and affect. Judgment normal.   Nursing note and vitals reviewed.    Diagnostic Test Results:  No new    ASSESSMENT/PLAN:     1. Generalized anxiety disorder  With acute worsening due to situational triggers.  Has taken a few days off work and agreed to a 2 week break to help with mental health.  Letter provided today.  Discussed options of increasing her current sertraline to 150mg daily; but she declines as she has started a vitamin D supplement, and is hopeful to get outside daily with the weather improving.  She is interested in therapy - and will look into employee benefits through [facility] to see if they supply a couple therapy visits.  Has information  at home.  Aware if s/s of worsening depression and reasons to present to ER or [facility] crisis beds.    Follow up in 1-2 months; sooner prn.    Abiola Ashton, DO [facility]   Quality 226: Preventive Care And Screening: Tobacco Use: Screening And Cessation Intervention: Patient screened for tobacco use and is an ex/non-smoker Quality 47: Advance Care Plan: Advance Care Planning discussed and documented; advance care plan or surrogate decision maker documented in the medical record. Detail Level: Detailed Quality 130: Documentation Of Current Medications In The Medical Record: Current Medications Documented

## 2024-10-17 ENCOUNTER — OFFICE VISIT (OUTPATIENT)
Dept: FAMILY MEDICINE | Facility: OTHER | Age: 34
End: 2024-10-17
Payer: COMMERCIAL

## 2024-10-17 ENCOUNTER — HOSPITAL ENCOUNTER (OUTPATIENT)
Dept: ULTRASOUND IMAGING | Facility: OTHER | Age: 34
Discharge: HOME | End: 2024-10-17
Payer: COMMERCIAL

## 2024-10-17 VITALS
HEIGHT: 64 IN | DIASTOLIC BLOOD PRESSURE: 72 MMHG | WEIGHT: 252.4 LBS | OXYGEN SATURATION: 100 % | HEART RATE: 56 BPM | SYSTOLIC BLOOD PRESSURE: 109 MMHG | BODY MASS INDEX: 43.09 KG/M2 | RESPIRATION RATE: 16 BRPM | TEMPERATURE: 97.3 F

## 2024-10-17 DIAGNOSIS — R10.9 LEFT FLANK PAIN: Primary | ICD-10-CM

## 2024-10-17 DIAGNOSIS — Z87.442 HISTORY OF KIDNEY STONES: ICD-10-CM

## 2024-10-17 DIAGNOSIS — R39.15 URINARY URGENCY: ICD-10-CM

## 2024-10-17 DIAGNOSIS — R35.0 URINARY FREQUENCY: ICD-10-CM

## 2024-10-17 DIAGNOSIS — R10.2 SUPRAPUBIC PAIN, ACUTE: ICD-10-CM

## 2024-10-17 DIAGNOSIS — R14.0 ABDOMINAL BLOATING: ICD-10-CM

## 2024-10-17 DIAGNOSIS — R39.9 UTI SYMPTOMS: ICD-10-CM

## 2024-10-17 LAB
ALBUMIN UR-MCNC: NEGATIVE MG/DL
AMORPH CRY #/AREA URNS HPF: ABNORMAL /HPF
ANION GAP SERPL CALCULATED.3IONS-SCNC: 7 MMOL/L (ref 7–15)
APPEARANCE UR: ABNORMAL
BACTERIA #/AREA URNS HPF: ABNORMAL /HPF
BASOPHILS # BLD AUTO: 0 10E3/UL (ref 0–0.2)
BASOPHILS NFR BLD AUTO: 1 %
BILIRUB UR QL STRIP: NEGATIVE
BUN SERPL-MCNC: 5 MG/DL (ref 6–20)
CALCIUM SERPL-MCNC: 9.3 MG/DL (ref 8.8–10.4)
CHLORIDE SERPL-SCNC: 106 MMOL/L (ref 98–107)
COLOR UR AUTO: ABNORMAL
CREAT SERPL-MCNC: 0.64 MG/DL (ref 0.51–0.95)
EGFRCR SERPLBLD CKD-EPI 2021: >90 ML/MIN/1.73M2
EOSINOPHIL # BLD AUTO: 0.1 10E3/UL (ref 0–0.7)
EOSINOPHIL NFR BLD AUTO: 2 %
ERYTHROCYTE [DISTWIDTH] IN BLOOD BY AUTOMATED COUNT: 12.2 % (ref 10–15)
GLUCOSE SERPL-MCNC: 95 MG/DL (ref 70–99)
GLUCOSE UR STRIP-MCNC: NEGATIVE MG/DL
HCO3 SERPL-SCNC: 27 MMOL/L (ref 22–29)
HCT VFR BLD AUTO: 36.8 % (ref 35–47)
HGB BLD-MCNC: 12.4 G/DL (ref 11.7–15.7)
HGB UR QL STRIP: NEGATIVE
IMM GRANULOCYTES # BLD: 0 10E3/UL
IMM GRANULOCYTES NFR BLD: 0 %
KETONES UR STRIP-MCNC: NEGATIVE MG/DL
LEUKOCYTE ESTERASE UR QL STRIP: NEGATIVE
LYMPHOCYTES # BLD AUTO: 2.2 10E3/UL (ref 0.8–5.3)
LYMPHOCYTES NFR BLD AUTO: 40 %
MCH RBC QN AUTO: 29.2 PG (ref 26.5–33)
MCHC RBC AUTO-ENTMCNC: 33.7 G/DL (ref 31.5–36.5)
MCV RBC AUTO: 87 FL (ref 78–100)
MONOCYTES # BLD AUTO: 0.3 10E3/UL (ref 0–1.3)
MONOCYTES NFR BLD AUTO: 5 %
MUCOUS THREADS #/AREA URNS LPF: PRESENT /LPF
NEUTROPHILS # BLD AUTO: 2.9 10E3/UL (ref 1.6–8.3)
NEUTROPHILS NFR BLD AUTO: 52 %
NITRATE UR QL: NEGATIVE
NRBC # BLD AUTO: 0 10E3/UL
NRBC BLD AUTO-RTO: 0 /100
PH UR STRIP: 7.5 [PH] (ref 5–9)
PLATELET # BLD AUTO: 232 10E3/UL (ref 150–450)
POTASSIUM SERPL-SCNC: 4.1 MMOL/L (ref 3.4–5.3)
RBC # BLD AUTO: 4.24 10E6/UL (ref 3.8–5.2)
RBC URINE: 2 /HPF
SODIUM SERPL-SCNC: 140 MMOL/L (ref 135–145)
SP GR UR STRIP: 1.01 (ref 1–1.03)
UROBILINOGEN UR STRIP-MCNC: NORMAL MG/DL
WBC # BLD AUTO: 5.5 10E3/UL (ref 4–11)
WBC URINE: 1 /HPF

## 2024-10-17 PROCEDURE — 99214 OFFICE O/P EST MOD 30 MIN: CPT

## 2024-10-17 PROCEDURE — 76770 US EXAM ABDO BACK WALL COMP: CPT

## 2024-10-17 PROCEDURE — 85018 HEMOGLOBIN: CPT | Mod: ZL

## 2024-10-17 PROCEDURE — 96372 THER/PROPH/DIAG INJ SC/IM: CPT

## 2024-10-17 PROCEDURE — 36415 COLL VENOUS BLD VENIPUNCTURE: CPT | Mod: ZL

## 2024-10-17 PROCEDURE — 82310 ASSAY OF CALCIUM: CPT | Mod: ZL

## 2024-10-17 PROCEDURE — 81001 URINALYSIS AUTO W/SCOPE: CPT | Mod: ZL

## 2024-10-17 PROCEDURE — 250N000011 HC RX IP 250 OP 636: Mod: JZ

## 2024-10-17 PROCEDURE — 80048 BASIC METABOLIC PNL TOTAL CA: CPT | Mod: ZL

## 2024-10-17 PROCEDURE — 85004 AUTOMATED DIFF WBC COUNT: CPT | Mod: ZL

## 2024-10-17 RX ORDER — LISDEXAMFETAMINE DIMESYLATE 70 MG/1
CAPSULE ORAL
COMMUNITY
Start: 2024-10-17

## 2024-10-17 RX ORDER — TRAZODONE HYDROCHLORIDE 50 MG/1
50 TABLET, FILM COATED ORAL
COMMUNITY
Start: 2024-09-25

## 2024-10-17 RX ORDER — VENLAFAXINE HYDROCHLORIDE 150 MG/1
150 TABLET, EXTENDED RELEASE ORAL DAILY
COMMUNITY

## 2024-10-17 RX ORDER — TAMSULOSIN HYDROCHLORIDE 0.4 MG/1
0.4 CAPSULE ORAL DAILY
Qty: 14 CAPSULE | Refills: 0 | Status: SHIPPED | OUTPATIENT
Start: 2024-10-17 | End: 2024-10-31

## 2024-10-17 RX ORDER — KETOROLAC TROMETHAMINE 30 MG/ML
30 INJECTION, SOLUTION INTRAMUSCULAR; INTRAVENOUS ONCE
Status: COMPLETED | OUTPATIENT
Start: 2024-10-17 | End: 2024-10-17

## 2024-10-17 RX ORDER — KETOROLAC TROMETHAMINE 10 MG/1
10 TABLET, FILM COATED ORAL EVERY 6 HOURS PRN
Qty: 20 TABLET | Refills: 0 | Status: SHIPPED | OUTPATIENT
Start: 2024-10-17

## 2024-10-17 RX ADMIN — KETOROLAC TROMETHAMINE 30 MG: 30 INJECTION, SOLUTION INTRAMUSCULAR at 11:59

## 2024-10-17 ASSESSMENT — ASTHMA QUESTIONNAIRES
ACT_TOTALSCORE: 20
QUESTION_5 LAST FOUR WEEKS HOW WOULD YOU RATE YOUR ASTHMA CONTROL: WELL CONTROLLED
ACT_TOTALSCORE: 20
QUESTION_1 LAST FOUR WEEKS HOW MUCH OF THE TIME DID YOUR ASTHMA KEEP YOU FROM GETTING AS MUCH DONE AT WORK, SCHOOL OR AT HOME: NONE OF THE TIME
QUESTION_2 LAST FOUR WEEKS HOW OFTEN HAVE YOU HAD SHORTNESS OF BREATH: THREE TO SIX TIMES A WEEK
QUESTION_3 LAST FOUR WEEKS HOW OFTEN DID YOUR ASTHMA SYMPTOMS (WHEEZING, COUGHING, SHORTNESS OF BREATH, CHEST TIGHTNESS OR PAIN) WAKE YOU UP AT NIGHT OR EARLIER THAN USUAL IN THE MORNING: NOT AT ALL
QUESTION_4 LAST FOUR WEEKS HOW OFTEN HAVE YOU USED YOUR RESCUE INHALER OR NEBULIZER MEDICATION (SUCH AS ALBUTEROL): TWO OR THREE TIMES PER WEEK

## 2024-10-17 ASSESSMENT — PAIN SCALES - GENERAL: PAINLEVEL: MILD PAIN (3)

## 2024-10-17 NOTE — NURSING NOTE
"Chief Complaint   Patient presents with    UTI     Patient presents to the rapid clinic today for concerns of a UTI. Patient states her lower back, and abdomen hs been hurting for the past week. Patient also states she feels like has had no bladder control for the past week.   Initial /72 (BP Location: Right arm, Patient Position: Sitting, Cuff Size: Adult Regular)   Pulse 56   Temp 97.3  F (36.3  C) (Temporal)   Resp 16   Ht 1.626 m (5' 4\")   Wt 114.5 kg (252 lb 6.4 oz)   LMP 05/12/2022   SpO2 100%   BMI 43.32 kg/m   Estimated body mass index is 43.32 kg/m  as calculated from the following:    Height as of this encounter: 1.626 m (5' 4\").    Weight as of this encounter: 114.5 kg (252 lb 6.4 oz).  Medication Review: complete    The next two questions are to help us understand your food security.  If you are feeling you need any assistance in this area, we have resources available to support you today.          10/17/2024   SDOH- Food Insecurity   Within the past 12 months, did you worry that your food would run out before you got money to buy more? N   Within the past 12 months, did the food you bought just not last and you didn t have money to get more? N            Health Care Directive:  Patient does not have a Health Care Directive or Living Will: Discussed advance care planning with patient; however, patient declined at this time.    Abimael Hernandez      "

## 2024-10-17 NOTE — PROGRESS NOTES
ASSESSMENT/PLAN:    I have reviewed the nursing notes.  I have reviewed the findings, diagnosis, plan and need for follow up with the patient.    1. UTI symptoms  2. Abdominal bloating  3. Urinary frequency  4. Urinary urgency  5. Suprapubic pain, acute    - UA Macroscopic with reflex to Microscopic and Culture-slightly cloudy, few bacteria, mucus present, moderate amount amorphous crystals urine.    6. History of kidney stones  7. Left flank pain    - CBC and Differential- unremarkable results    - Basic Metabolic Panel- urea nitrogen 5.0 otherwise unremarkable    - ketorolac (TORADOL) injection 30 mg- received in clinic    - US Renal Complete Non-Vascular- Nonvisualization of the right ureteral jet without  evidence of apparent hydronephrosis.  Cannot exclude right ureteral  obstruction. There is no apparent abnormality that would account for  the patient's history of left flank pain    - ketorolac (TORADOL) 10 MG tablet; Take 1 tablet (10 mg) by mouth every 6 hours as needed for moderate pain.  Dispense: 20 tablet; Refill: 0    - tamsulosin (FLOMAX) 0.4 MG capsule; Take 1 capsule (0.4 mg) by mouth daily for 14 days.  Dispense: 14 capsule; Refill: 0  - Stone analysis; Future    - Stone analysis; Future    - May use over-the-counter Tylenol and ibuprofen as needed for pain, inflammation or fever    - Discussed warning signs/symptoms indicative of need to f/u    - Follow up if symptoms persist or worsen or concerns    - I explained my diagnostic considerations and recommendations to the patient, who voiced understanding and agreement with the treatment plan. All questions were answered. We discussed potential side effects of any prescribed or recommended therapies, as well as expectations for response to treatments.    CHELY العلي CNP  10/17/2024  11:16 AM    HPI:    Ana Leedarineladis is a 33 year old female who [facility] for concerns of possible UTI.  Patient states that for the past  week has been experiencing urinary frequency and urgency then began to feel bladder pain, and low back pain, nausea more so than normal.  States that she felt bloated when she woke up this morning.  At home care treatment consists of increased water intake.  Denies fever, chills, vomiting, diarrhea, constipation, hematuria, incontinence, abnormal vaginal discharge, headache, lightheadedness, dizziness.    Past Medical History:   Diagnosis Date    Internal derangement of knee     2016    Personal history of other medical treatment (CODE)         Polycystic ovarian syndrome     3/18/2016     Past Surgical History:   Procedure Laterality Date    ARTHROSCOPY KNEE Right     Dr. Landeros     SECTION N/A 2020    Procedure:  SECTION;  Surgeon: Gerry Kim MD;  Location:  OR     SECTION, TUBAL LIGATION, COMBINED Bilateral 2021    Procedure:  SECTION, WITH POSTPARTUM TUBAL LIGATION;  Surgeon: Gerry Kim MD;  Location:  OR    CYSTOSCOPY N/A 2022    Procedure: Cystoscopy;  Surgeon: Gerry Kim MD;  Location:  OR    HYSTEROSCOPY DIAGNOSTIC N/A 2018    Procedure: HYSTEROSCOPY DIAGNOSTIC;  Diagnostic Hysteroscopy with Endometrial Scratching.      .;  Surgeon: Gerry Kim MD;  Location:  OR    LAPAROSCOPIC HYSTERECTOMY TOTAL, SALPINGECTOMY N/A 2022    Procedure: HYSTERECTOMY, TOTAL, LAPAROSCOPIC,;  Surgeon: Gerry Kim MD;  Location:  OR    MANDIBLE SURGERY Right          Social History     Tobacco Use    Smoking status: Never     Passive exposure: Never    Smokeless tobacco: Never   Substance Use Topics    Alcohol use: Not Currently     Alcohol/week: 0.0 standard drinks of alcohol     Comment: Alcoholic Drinks/day: rare     Current Outpatient Medications   Medication Sig Dispense Refill    albuterol (PROAIR HFA/PROVENTIL HFA/VENTOLIN HFA) 108 (90 Base) MCG/ACT inhaler Inhale 2 puffs into the lungs every 4 hours as needed for  shortness of breath or wheezing 8.5 g 1    insulin pen needle (BD ROSA U/F) 32G X 4 MM miscellaneous Use 1 pen needles daily. Dispense as covered by insurance. Dx. Code: E88.819 100 each 1    ketorolac (TORADOL) 10 MG tablet Take 1 tablet (10 mg) by mouth every 6 hours as needed for moderate pain. 20 tablet 0    lisdexamfetamine (VYVANSE) 50 MG capsule Take 1 capsule (50 mg) by mouth every morning 30 capsule 0    lisdexamfetamine (VYVANSE) 70 MG capsule       omeprazole (PRILOSEC) 40 MG DR capsule Take 40 mg by mouth daily      sucralfate (CARAFATE) 1 GM tablet TAKE 1 TABLET BY MOUTH 3 TIMES DAILY BEFORE MEALS 270 tablet 3    tamsulosin (FLOMAX) 0.4 MG capsule Take 1 capsule (0.4 mg) by mouth daily for 14 days. 14 capsule 0    traZODone (DESYREL) 50 MG tablet Take 50 mg by mouth.      venlafaxine (EFFEXOR-ER) 150 MG 24 hr tablet Take 150 mg by mouth daily.      enoxaparin ANTICOAGULANT (LOVENOX) 40 MG/0.4ML syringe Inject 40 mg Subcutaneous every 24 hours (Patient not taking: Reported on 10/17/2024)      liraglutide (VICTOZA) 18 MG/3ML solution Inject 1.8 mg Subcutaneous daily (Patient not taking: Reported on 4/10/2024) 27 mL 4    lisdexamfetamine (VYVANSE) 50 MG capsule Take 1 capsule (50 mg) by mouth every morning (Patient not taking: Reported on 4/10/2024) 30 capsule 0    lisdexamfetamine (VYVANSE) 50 MG capsule Take 1 capsule (50 mg) by mouth every morning (Patient not taking: Reported on 4/10/2024) 30 capsule 0    methylphenidate (RITALIN) 10 MG tablet Take 1 tablet (10 mg) by mouth daily (with lunch) (Patient not taking: Reported on 10/17/2024) 30 tablet 0    venlafaxine (EFFEXOR XR) 75 MG 24 hr capsule Take 1 capsule (75 mg) by mouth daily (Patient not taking: Reported on 10/17/2024) 90 capsule 4     Allergies   Allergen Reactions    Meloxicam Rash     Past medical history, past surgical history, current medications and allergies reviewed and accurate to the best of my knowledge.      ROS:  Refer to HPI    BP  "109/72 (BP Location: Right arm, Patient Position: Sitting, Cuff Size: Adult Regular)   Pulse 56   Temp 97.3  F (36.3  C) (Temporal)   Resp 16   Ht 1.626 m (5' 4\")   Wt 114.5 kg (252 lb 6.4 oz)   LMP 05/12/2022   SpO2 100%   BMI 43.32 kg/m      EXAM:  General Appearance: Well appearing 33 year old female, appropriate appearance for age. No acute distress   Respiratory: normal chest wall and respirations.  Normal effort.  Clear to auscultation bilaterally, no wheezing, crackles or rhonchi.  No increased work of breathing.  No cough appreciated.  Cardiac: RRR with no murmurs  Abdomen: soft, nontender, no rigidity, no rebound tenderness or guarding, normal bowel sounds present  :  No suprapubic tenderness to palpation.  Present CVA tenderness to palpation left flank area.    Musculoskeletal:  Equal movement of bilateral upper extremities.  Equal movement of bilateral lower extremities.  Normal gait.    Neuro: Alert and oriented to person, place, and time.    Psychological: normal affect, alert, oriented, and pleasant.     Labs: Xray:  Results for orders placed or performed in visit on 10/17/24   US Renal Complete Non-Vascular     Status: None    Narrative    PROCEDURE: US RENAL COMPLETE NON-VASCULAR  10/17/2024 1:55 PM    HISTORY:Female, age 33 years, . Left flank pain, history of kidney  stones; Abdominal bloating; Left flank pain; Urinary frequency;  Urinary urgency; Suprapubic pain, acute; History of kidney stones    TECHNIQUE:  Color and grayscale renal ultrasound was performed.    COMPARISON:  No relevant prior imaging.    MEASUREMENTS:    Right renal length: 11.3 cm  Left renal length: 12.4 cm    RENAL FINDINGS: The kidneys are unremarkable.    BLADDER: The urinary bladder is unremarkable in appearance. The left  jet was seen. The right ureteral jet is not readily identified.      Impression    IMPRESSION:  Nonvisualization of the right ureteral jet without  evidence of apparent hydronephrosis.  Cannot " exclude right ureteral  obstruction. There is no apparent abnormality that would account for  the patient's history of left flank pain.    SARAH ROBERTS MD         SYSTEM ID:  [id number]   UA Macroscopic with reflex to Microscopic and Culture     Status: Abnormal    Specimen: Urine, Clean Catch   Result Value Ref Range    Color Urine Light Yellow Colorless, Straw, Light Yellow, Yellow    Appearance Urine Slightly Cloudy (A) Clear    Glucose Urine Negative Negative mg/dL    Bilirubin Urine Negative Negative    Ketones Urine Negative Negative mg/dL    Specific Gravity Urine 1.014 1.000 - 1.030    Blood Urine Negative Negative    pH Urine 7.5 5.0 - 9.0    Protein Albumin Urine Negative Negative mg/dL    Urobilinogen Urine Normal Normal, 2.0 mg/dL    Nitrite Urine Negative Negative    Leukocyte Esterase Urine Negative Negative    Bacteria Urine Few (A) None Seen /HPF    Mucus Urine Present (A) None Seen /LPF    Amorphous Crystals Urine Moderate (A) None Seen /HPF    RBC Urine 2 <=2 /HPF    WBC Urine 1 <=5 /HPF    Narrative    Urine Culture not indicated   Basic Metabolic Panel     Status: Abnormal   Result Value Ref Range    Sodium 140 135 - 145 mmol/L    Potassium 4.1 3.4 - 5.3 mmol/L    Chloride 106 98 - 107 mmol/L    Carbon Dioxide (CO2) 27 22 - 29 mmol/L    Anion Gap 7 7 - 15 mmol/L    Urea Nitrogen 5.0 (L) 6.0 - 20.0 mg/dL    Creatinine 0.64 0.51 - 0.95 mg/dL    GFR Estimate >90 >60 mL/min/1.73m2    Calcium 9.3 8.8 - 10.4 mg/dL    Glucose 95 70 - 99 mg/dL   CBC with platelets and differential     Status: None   Result Value Ref Range    WBC Count 5.5 4.0 - 11.0 10e3/uL    RBC Count 4.24 3.80 - 5.20 10e6/uL    Hemoglobin 12.4 11.7 - 15.7 g/dL    Hematocrit 36.8 35.0 - 47.0 %    MCV 87 78 - 100 fL    MCH 29.2 26.5 - 33.0 pg    MCHC 33.7 31.5 - 36.5 g/dL    RDW 12.2 10.0 - 15.0 %    Platelet Count 232 150 - 450 10e3/uL    % Neutrophils 52 %    % Lymphocytes 40 %    % Monocytes 5 %    % Eosinophils 2 %    % Basophils  1 %    % Immature Granulocytes 0 %    NRBCs per 100 WBC 0 <1 /100    Absolute Neutrophils 2.9 1.6 - 8.3 10e3/uL    Absolute Lymphocytes 2.2 0.8 - 5.3 10e3/uL    Absolute Monocytes 0.3 0.0 - 1.3 10e3/uL    Absolute Eosinophils 0.1 0.0 - 0.7 10e3/uL    Absolute Basophils 0.0 0.0 - 0.2 10e3/uL    Absolute Immature Granulocytes 0.0 <=0.4 10e3/uL    Absolute NRBCs 0.0 10e3/uL   CBC and Differential     Status: None    Narrative    The following orders were created for panel order CBC and Differential.  Procedure                               Abnormality         Status                     ---------                               -----------         ------                     CBC with platelets and d...[[id number]]                      Final result                 Please view results for these tests on the individual orders.

## 2025-01-21 ENCOUNTER — OFFICE VISIT (OUTPATIENT)
Dept: FAMILY MEDICINE | Facility: OTHER | Age: 35
End: 2025-01-21
Attending: NURSE PRACTITIONER
Payer: COMMERCIAL

## 2025-01-21 VITALS
TEMPERATURE: 98.2 F | WEIGHT: 254 LBS | SYSTOLIC BLOOD PRESSURE: 137 MMHG | HEIGHT: 64 IN | BODY MASS INDEX: 43.36 KG/M2 | OXYGEN SATURATION: 98 % | HEART RATE: 81 BPM | RESPIRATION RATE: 19 BRPM | DIASTOLIC BLOOD PRESSURE: 83 MMHG

## 2025-01-21 DIAGNOSIS — R39.9 UTI SYMPTOMS: Primary | ICD-10-CM

## 2025-01-21 LAB
ALBUMIN UR-MCNC: NEGATIVE MG/DL
APPEARANCE UR: CLEAR
BACTERIA #/AREA URNS HPF: ABNORMAL /HPF
BACTERIAL VAGINOSIS VAG-IMP: NEGATIVE
BILIRUB UR QL STRIP: NEGATIVE
CANDIDA DNA VAG QL NAA+PROBE: NOT DETECTED
CANDIDA GLABRATA / CANDIDA KRUSEI DNA: NOT DETECTED
COLOR UR AUTO: ABNORMAL
GLUCOSE UR STRIP-MCNC: NEGATIVE MG/DL
HGB UR QL STRIP: NEGATIVE
KETONES UR STRIP-MCNC: NEGATIVE MG/DL
LEUKOCYTE ESTERASE UR QL STRIP: NEGATIVE
MUCOUS THREADS #/AREA URNS LPF: PRESENT /LPF
NITRATE UR QL: NEGATIVE
PH UR STRIP: 7 [PH] (ref 5–9)
RBC URINE: 1 /HPF
SP GR UR STRIP: 1.02 (ref 1–1.03)
SQUAMOUS EPITHELIAL: 5 /HPF
T VAGINALIS DNA VAG QL NAA+PROBE: NOT DETECTED
UROBILINOGEN UR STRIP-MCNC: NORMAL MG/DL
WBC URINE: 1 /HPF

## 2025-01-21 PROCEDURE — 81515 NFCT DS BV&VAGINITIS DNA ALG: CPT | Mod: ZL | Performed by: NURSE PRACTITIONER

## 2025-01-21 PROCEDURE — 81001 URINALYSIS AUTO W/SCOPE: CPT | Mod: ZL | Performed by: NURSE PRACTITIONER

## 2025-01-21 ASSESSMENT — ENCOUNTER SYMPTOMS
MUSCULOSKELETAL NEGATIVE: 1
RESPIRATORY NEGATIVE: 1
GASTROINTESTINAL NEGATIVE: 1
NEUROLOGICAL NEGATIVE: 1
EYES NEGATIVE: 1
CONSTITUTIONAL NEGATIVE: 1
CARDIOVASCULAR NEGATIVE: 1
PSYCHIATRIC NEGATIVE: 1
ENDOCRINE NEGATIVE: 1
DYSURIA: 1
HEMATOLOGIC/LYMPHATIC NEGATIVE: 1

## 2025-01-21 ASSESSMENT — PAIN SCALES - GENERAL: PAINLEVEL_OUTOF10: MODERATE PAIN (4)

## 2025-01-21 NOTE — PROGRESS NOTES
"Chief Complaint   Patient presents with    UTI     X1 day   Patient is here to be seen for UTI symptoms.    FOOD SECURITY SCREENING QUESTIONS  Hunger Vital Signs:  Within the past 12 months we worried whether our food would run out before we got money to buy more. Never  Within the past 12 months the food we bought just didn't last and we didn't have money to get more. Never  Kathy Lazaro 1/21/2025 10:51 AM      Initial /83 (BP Location: Left arm, Patient Position: Sitting, Cuff Size: Adult Regular)   Pulse 81   Temp 98.2  F (36.8  C) (Tympanic)   Resp 19   Ht 1.626 m (5' 4\")   Wt 115.2 kg (254 lb)   LMP 05/12/2022   SpO2 98%   BMI 43.60 kg/m   Estimated body mass index is 43.6 kg/m  as calculated from the following:    Height as of this encounter: 1.626 m (5' 4\").    Weight as of this encounter: 115.2 kg (254 lb).  Medication Reconciliation: complete    Kathy Lazaro   "

## 2025-01-21 NOTE — PROGRESS NOTES
Ana Patrick  1990    ASSESSMENT/PLAN      [facility] with signs of urinary tract infection, dysuria, urinary pressure.  Urinalysis obtained today and shows no sign of infection.  Testing for negative for bacterial vaginosis, trichomonas and candidiasis.  Discussed with patient this might be related to dehydration, caffeine use, external irritation.  She will push hydration and follow-up if not improving.  Patient's vitals are stable and she appears nontoxic.        1. UTI symptoms (Primary)    - UA with Microscopic reflex to Culture  - Multiplex Vaginal Panel by PCR  - May use over-the-counter Tylenol or ibuprofen PRN  - Follow up as needed for new or worsening symptoms          *Explanation of diagnosis, treatment options and risk and benefits of medications reviewed with patient. Patient agrees with plan of care.  *All questions were answered.    *Red flags symptoms were discussed and patient was advised when they should return for reevaluation or for prompt emergency evaluation.   *Patient was given verbal and written instructions on plan of care. Instructions were printed or are available on [employer] on electronic AVS.   *We discussed potential side effects of any prescribed or recommended therapies, as well as expectations for response to treatments.  *Patient discharged in stable condition    Arianna Lilly CNP  [facility]    SUBJECTIVE  CHIEF COMPLAINT/ REASON FOR VISIT  Patient presents with:  UTI: X1 day     HISTORY OF PRESENT ILLNESS  Ana Patrick is a pleasant 34 year old female [facility] with urinary symptoms, dysuria since yesterday.  No fever, chills, nausea, vomiting, abdominal pain or flank pain.  Patient denies risk of pregnancy, no risk of STD infection.      I have reviewed the nursing notes.  I have reviewed allergies, medication list, problem list, and past medical history.    REVIEW OF SYSTEMS  Review of Systems  "  Constitutional: Negative.    HENT: Negative.     Eyes: Negative.    Respiratory: Negative.     Cardiovascular: Negative.    Gastrointestinal: Negative.    Endocrine: Negative.    Genitourinary:  Positive for dysuria and urgency.   Musculoskeletal: Negative.    Skin: Negative.    Neurological: Negative.    Hematological: Negative.    Psychiatric/Behavioral: Negative.     All other systems reviewed and are negative.       VITAL SIGNS  Vitals:    01/21/25 1049   BP: 137/83   BP Location: Left arm   Patient Position: Sitting   Cuff Size: Adult Regular   Pulse: 81   Resp: 19   Temp: 98.2  F (36.8  C)   TempSrc: Tympanic   SpO2: 98%   Weight: 115.2 kg (254 lb)   Height: 1.626 m (5' 4\")      Body mass index is 43.6 kg/m .      OBJECTIVE  PHYSICAL EXAM  Physical Exam  Vitals and nursing note reviewed.   Constitutional:       Appearance: Normal appearance.   HENT:      Head: Normocephalic.      Nose: Nose normal.      Mouth/Throat:      Mouth: Mucous membranes are moist.   Eyes:      Pupils: Pupils are equal, round, and reactive to light.   Cardiovascular:      Rate and Rhythm: Normal rate.      Pulses: Normal pulses.   Pulmonary:      Effort: Pulmonary effort is normal.   Abdominal:      General: Bowel sounds are normal.   Musculoskeletal:         General: Normal range of motion.      Cervical back: Normal range of motion.   Skin:     General: Skin is warm and dry.      Capillary Refill: Capillary refill takes less than 2 seconds.   Neurological:      General: No focal deficit present.      Mental Status: She is alert.            DIAGNOSTICS  Results for orders placed or performed in visit on 01/21/25   UA with Microscopic reflex to Culture     Status: Abnormal    Specimen: Urine, Midstream   Result Value Ref Range    Color Urine Light Yellow Colorless, Straw, Light Yellow, Yellow    Appearance Urine Clear Clear    Glucose Urine Negative Negative mg/dL    Bilirubin Urine Negative Negative    Ketones Urine Negative " Negative mg/dL    Specific Gravity Urine 1.025 1.000 - 1.030    Blood Urine Negative Negative    pH Urine 7.0 5.0 - 9.0    Protein Albumin Urine Negative Negative mg/dL    Urobilinogen Urine Normal Normal, 2.0 mg/dL    Nitrite Urine Negative Negative    Leukocyte Esterase Urine Negative Negative    Bacteria Urine Few (A) None Seen /HPF    Mucus Urine Present (A) None Seen /LPF    RBC Urine 1 <=2 /HPF    WBC Urine 1 <=5 /HPF    Squamous Epithelials Urine 5 (H) <=1 /HPF    Narrative    Urine Culture not indicated   Multiplex Vaginal Panel by PCR     Status: Normal    Specimen: Vagina; Swab   Result Value Ref Range    Bacterial Vaginosis Organism DNA Negative Negative    Candida Group DNA Not Detected Not Detected    Candida glabrata / Katherine krusei DNA Not Detected Not Detected    Trichomonas vaginalis DNA Not Detected Not Detected    Narrative    The Xpert  Xpress MVP test, performed on the [employer], is an automated, qualitative in vitro diagnostic test for the detection of DNA targets from anaerobic bacteria associated with bacterial vaginosis, Candida species associated with vulvovaginal candidiasis, and Trichomonas vaginalis. The assay uses clinician-collected and self-collected vaginal swabs from patients who are symptomatic for vaginitis/ vaginosis. The Xpert  Xpress MVP test utilizes real-time polymerase chain reaction (PCR) for the amplification of specific DNA targets and utilizes fluorogenic target-specific hybridization probes to detect and differentiate DNA. It is intended to aid in the diagnosis of vaginal infections in women with a clinical presentation consistent with bacterial vaginosis, vulvovaginal candidiasis, or trichomoniasis.   The assay targets three anaerobic microorgansims that are associated with bacterial vaginosis (BV). Other organisms that are not detected by the Xpert  Xpress MVP test have also been reported to be associated with BV. The BV organism and Candida  species targets of the Xpert  Xpress MVP test can be commensal in women; positive results must be considered in conjunction with other clinical and patient information to determine the disease status.

## 2025-02-25 ENCOUNTER — HOSPITAL ENCOUNTER (OUTPATIENT)
Dept: GENERAL RADIOLOGY | Facility: OTHER | Age: 35
Discharge: HOME | End: 2025-02-25
Attending: NURSE PRACTITIONER
Payer: COMMERCIAL

## 2025-02-25 ENCOUNTER — OFFICE VISIT (OUTPATIENT)
Dept: FAMILY MEDICINE | Facility: OTHER | Age: 35
End: 2025-02-25
Attending: STUDENT IN AN ORGANIZED HEALTH CARE EDUCATION/TRAINING PROGRAM
Payer: COMMERCIAL

## 2025-02-25 VITALS
TEMPERATURE: 98.3 F | RESPIRATION RATE: 19 BRPM | DIASTOLIC BLOOD PRESSURE: 78 MMHG | SYSTOLIC BLOOD PRESSURE: 130 MMHG | OXYGEN SATURATION: 98 % | HEART RATE: 67 BPM | WEIGHT: 234 LBS | BODY MASS INDEX: 40.17 KG/M2

## 2025-02-25 DIAGNOSIS — S93.602A SPRAIN OF LEFT FOOT, INITIAL ENCOUNTER: ICD-10-CM

## 2025-02-25 DIAGNOSIS — S99.922A FOOT INJURY, LEFT, INITIAL ENCOUNTER: Primary | ICD-10-CM

## 2025-02-25 PROCEDURE — 73630 X-RAY EXAM OF FOOT: CPT | Mod: LT

## 2025-02-25 ASSESSMENT — ENCOUNTER SYMPTOMS
CONSTITUTIONAL NEGATIVE: 1
HEMATOLOGIC/LYMPHATIC NEGATIVE: 1
ENDOCRINE NEGATIVE: 1
GASTROINTESTINAL NEGATIVE: 1
RESPIRATORY NEGATIVE: 1
CARDIOVASCULAR NEGATIVE: 1
PSYCHIATRIC NEGATIVE: 1

## 2025-02-25 ASSESSMENT — PAIN SCALES - GENERAL: PAINLEVEL_OUTOF10: MODERATE PAIN (6)

## 2025-02-25 NOTE — NURSING NOTE
"Chief Complaint   Patient presents with    Foot Pain     left     Patient here for left foot pain after slipping on the ice this morning.     Initial /78   Pulse 67   Temp 98.3  F (36.8  C) (Tympanic)   Resp 19   Wt 106.1 kg (234 lb)   LMP 05/12/2022   SpO2 98%   BMI 40.17 kg/m   Estimated body mass index is 40.17 kg/m  as calculated from the following:    Height as of 1/21/25: 1.626 m (5' 4\").    Weight as of this encounter: 106.1 kg (234 lb).  Medication Review: complete    The next two questions are to help us understand your food security.  If you are feeling you need any assistance in this area, we have resources available to support you today.          2/25/2025   SDOH- Food Insecurity   Within the past 12 months, did you worry that your food would run out before you got money to buy more? N   Within the past 12 months, did the food you bought just not last and you didn t have money to get more? N         Health Care Directive:  Patient does not have a Health Care Directive: Discussed advance care planning with patient; however, patient declined at this time.    Lucy Elmore LPN      "

## 2025-02-25 NOTE — PROGRESS NOTES
Ana Patrick  1990    ASSESSMENT/PLAN    Presents to [facility] with  left foot pain that began this morning.  Patient was walking, slipped on the ice and somehow twisted her foot with significant pain.  Patient has had difficult time walking given the pain.  X-ray obtained and shows no acute fracture, dislocation or other injury.  Will use walking boot for foot strain, sprain.  Patient's vitals are stable and she appears nontoxic.      1. Foot injury, left, initial encounter (Primary)  2. Sprain of left foot, initial encounter    - XR Foot Left G/E 3 Views  - Ankle/Foot Bracing Supplies Order Walking Boot; Left; Pneumatic; Short  - RICE  - May use over-the-counter Tylenol or ibuprofen PRN  - Follow up as needed for new or worsening symptoms        *A shared decision making model was used.   *Patient and/or associated parties understood and were agreeable to treatment plan.   *Plan and all results were discussed.   *Explanation of diagnosis, treatment options and risk and benefits of medications reviewed with patient.    *Time was taken to answer all questions.   *Red flags symptoms were discussed and patient was advised when they should return for reevaluation or for prompt emergency evaluation.   *Patient was given verbal and written instructions on plan of care. Instructions were printed or are available on Mychart on electronic AVS.   *We discussed potential side effects of any prescribed or recommended therapies, as well as expectations for response to treatments.  *Patient discharged in stable condition    Arianna Lilly CNP  [facility]    SUBJECTIVE  CHIEF COMPLAINT/ REASON FOR VISIT  Patient presents with:  Foot Pain: left     HISTORY OF PRESENT ILLNESS  Ana Patrick is a pleasant 34 year old female presents to [facility] clinic today with left foot pain that began this morning.  Patient was walking, slipped on the ice and somehow twisted her foot with significant pain.   Patient has had difficult time walking given the pain.        I have reviewed the nursing notes.  I have reviewed allergies, medication list, problem list, and past medical history.    REVIEW OF SYSTEMS  Review of Systems   Constitutional: Negative.    HENT: Negative.     Respiratory: Negative.     Cardiovascular: Negative.    Gastrointestinal: Negative.    Endocrine: Negative.    Genitourinary: Negative.    Musculoskeletal:  Positive for gait problem.        Left foot pain, midfoot, worse on sole of foot.   Hematological: Negative.    Psychiatric/Behavioral: Negative.     All other systems reviewed and are negative.       VITAL SIGNS  Vitals:    02/25/25 0921   BP: 130/78   Pulse: 67   Resp: 19   Temp: 98.3  F (36.8  C)   TempSrc: Tympanic   SpO2: 98%   Weight: 106.1 kg (234 lb)      Body mass index is 40.17 kg/m .      OBJECTIVE  PHYSICAL EXAM  Physical Exam  Vitals and nursing note reviewed.   Constitutional:       Appearance: Normal appearance.   HENT:      Head: Normocephalic.      Nose: Nose normal.      Mouth/Throat:      Mouth: Mucous membranes are moist.   Cardiovascular:      Rate and Rhythm: Normal rate.      Pulses: Normal pulses.   Pulmonary:      Effort: Pulmonary effort is normal.   Musculoskeletal:         General: Tenderness and signs of injury present. No swelling or deformity.      Cervical back: Normal range of motion.   Skin:     General: Skin is warm and dry.      Capillary Refill: Capillary refill takes less than 2 seconds.      Findings: No bruising or erythema.   Neurological:      General: No focal deficit present.      Mental Status: She is alert.            DIAGNOSTICS  Results for orders placed or performed in visit on 02/25/25   XR Foot Left G/E 3 Views     Status: None    Narrative    XR FOOT LEFT G/E 3 VIEWS    HISTORY: 34 years Female Foot injury, left, initial encounter    COMPARISON: None    TECHNIQUE: 3 views left foot    FINDINGS: Joint spaces are congruent. Articular surfaces  are smooth.  There is no evidence of fracture or dislocation. No significant  arthritic changes present.      Impression    IMPRESSION: No evidence of fracture or dislocation. No significant  arthritic change is evident    DORA ADAN MD         SYSTEM ID:  [id number]

## 2025-03-31 ENCOUNTER — OFFICE VISIT (OUTPATIENT)
Dept: FAMILY MEDICINE | Facility: OTHER | Age: 35
End: 2025-03-31
Attending: FAMILY MEDICINE
Payer: COMMERCIAL

## 2025-03-31 VITALS
WEIGHT: 234 LBS | HEART RATE: 86 BPM | OXYGEN SATURATION: 98 % | BODY MASS INDEX: 39.95 KG/M2 | RESPIRATION RATE: 18 BRPM | HEIGHT: 64 IN | SYSTOLIC BLOOD PRESSURE: 126 MMHG | TEMPERATURE: 97.4 F | DIASTOLIC BLOOD PRESSURE: 76 MMHG

## 2025-03-31 DIAGNOSIS — F90.0 ATTENTION DEFICIT HYPERACTIVITY DISORDER (ADHD), PREDOMINANTLY INATTENTIVE TYPE: ICD-10-CM

## 2025-03-31 DIAGNOSIS — Z90.710 HISTORY OF HYSTERECTOMY: ICD-10-CM

## 2025-03-31 DIAGNOSIS — F33.2 SEVERE EPISODE OF RECURRENT MAJOR DEPRESSIVE DISORDER, WITHOUT PSYCHOTIC FEATURES (H): ICD-10-CM

## 2025-03-31 DIAGNOSIS — K59.01 SLOW TRANSIT CONSTIPATION: ICD-10-CM

## 2025-03-31 DIAGNOSIS — E66.813 CLASS 3 SEVERE OBESITY DUE TO EXCESS CALORIES WITH SERIOUS COMORBIDITY AND BODY MASS INDEX (BMI) OF 40.0 TO 44.9 IN ADULT (H): ICD-10-CM

## 2025-03-31 DIAGNOSIS — F41.1 GENERALIZED ANXIETY DISORDER: ICD-10-CM

## 2025-03-31 DIAGNOSIS — E28.2 PCOS (POLYCYSTIC OVARIAN SYNDROME): ICD-10-CM

## 2025-03-31 DIAGNOSIS — R79.89 ELEVATED LFTS: ICD-10-CM

## 2025-03-31 DIAGNOSIS — E66.01 CLASS 3 SEVERE OBESITY DUE TO EXCESS CALORIES WITH SERIOUS COMORBIDITY AND BODY MASS INDEX (BMI) OF 40.0 TO 44.9 IN ADULT (H): ICD-10-CM

## 2025-03-31 DIAGNOSIS — Z11.59 NEED FOR HEPATITIS C SCREENING TEST: ICD-10-CM

## 2025-03-31 DIAGNOSIS — K22.70 BARRETT'S ESOPHAGUS WITHOUT DYSPLASIA: ICD-10-CM

## 2025-03-31 DIAGNOSIS — K21.00 GASTROESOPHAGEAL REFLUX DISEASE WITH ESOPHAGITIS WITHOUT HEMORRHAGE: ICD-10-CM

## 2025-03-31 DIAGNOSIS — Z01.818 PRE-OPERATIVE EXAMINATION: Primary | ICD-10-CM

## 2025-03-31 DIAGNOSIS — J45.990 EXERCISE-INDUCED REACTIVE AIRWAY DISEASE: ICD-10-CM

## 2025-03-31 DIAGNOSIS — R76.0 LUPUS ANTICOAGULANT POSITIVE: ICD-10-CM

## 2025-03-31 PROBLEM — Z98.891 S/P CESAREAN SECTION: Status: RESOLVED | Noted: 2020-01-31 | Resolved: 2025-03-31

## 2025-03-31 PROBLEM — D68.61 ANTIPHOSPHOLIPID ANTIBODY SYNDROME COMPLICATING PREGNANCY: Status: RESOLVED | Noted: 2019-07-08 | Resolved: 2025-03-31

## 2025-03-31 PROBLEM — O99.119 ANTIPHOSPHOLIPID ANTIBODY SYNDROME COMPLICATING PREGNANCY: Status: RESOLVED | Noted: 2019-07-08 | Resolved: 2025-03-31

## 2025-03-31 PROBLEM — K21.9 GASTROESOPHAGEAL REFLUX DISEASE WITHOUT ESOPHAGITIS: Status: RESOLVED | Noted: 2018-03-13 | Resolved: 2025-03-31

## 2025-03-31 LAB
ALBUMIN SERPL BCG-MCNC: 4 G/DL (ref 3.5–5.2)
ALP SERPL-CCNC: 88 U/L (ref 40–150)
ALT SERPL W P-5'-P-CCNC: 75 U/L (ref 0–50)
ANION GAP SERPL CALCULATED.3IONS-SCNC: 9 MMOL/L (ref 7–15)
AST SERPL W P-5'-P-CCNC: 41 U/L (ref 0–45)
ATRIAL RATE - MUSE: 77 BPM
BASOPHILS # BLD AUTO: 0 10E3/UL (ref 0–0.2)
BASOPHILS NFR BLD AUTO: 1 %
BILIRUB SERPL-MCNC: 0.5 MG/DL
BUN SERPL-MCNC: 16.2 MG/DL (ref 6–20)
CALCIUM SERPL-MCNC: 9.3 MG/DL (ref 8.8–10.4)
CHLORIDE SERPL-SCNC: 106 MMOL/L (ref 98–107)
CREAT SERPL-MCNC: 0.59 MG/DL (ref 0.51–0.95)
DIASTOLIC BLOOD PRESSURE - MUSE: NORMAL MMHG
EGFRCR SERPLBLD CKD-EPI 2021: >90 ML/MIN/1.73M2
EOSINOPHIL # BLD AUTO: 0.2 10E3/UL (ref 0–0.7)
EOSINOPHIL NFR BLD AUTO: 3 %
ERYTHROCYTE [DISTWIDTH] IN BLOOD BY AUTOMATED COUNT: 12.4 % (ref 10–15)
GLUCOSE SERPL-MCNC: 102 MG/DL (ref 70–99)
HCO3 SERPL-SCNC: 26 MMOL/L (ref 22–29)
HCT VFR BLD AUTO: 40.2 % (ref 35–47)
HGB BLD-MCNC: 13.8 G/DL (ref 11.7–15.7)
IMM GRANULOCYTES # BLD: 0 10E3/UL
IMM GRANULOCYTES NFR BLD: 0 %
INTERPRETATION ECG - MUSE: NORMAL
LYMPHOCYTES # BLD AUTO: 2 10E3/UL (ref 0.8–5.3)
LYMPHOCYTES NFR BLD AUTO: 30 %
MCH RBC QN AUTO: 29.4 PG (ref 26.5–33)
MCHC RBC AUTO-ENTMCNC: 34.3 G/DL (ref 31.5–36.5)
MCV RBC AUTO: 86 FL (ref 78–100)
MONOCYTES # BLD AUTO: 0.5 10E3/UL (ref 0–1.3)
MONOCYTES NFR BLD AUTO: 7 %
NEUTROPHILS # BLD AUTO: 4 10E3/UL (ref 1.6–8.3)
NEUTROPHILS NFR BLD AUTO: 60 %
NRBC # BLD AUTO: 0 10E3/UL
NRBC BLD AUTO-RTO: 0 /100
P AXIS - MUSE: 49 DEGREES
PLATELET # BLD AUTO: 256 10E3/UL (ref 150–450)
POTASSIUM SERPL-SCNC: 3.8 MMOL/L (ref 3.4–5.3)
PR INTERVAL - MUSE: 146 MS
PROT SERPL-MCNC: 7.1 G/DL (ref 6.4–8.3)
QRS DURATION - MUSE: 98 MS
QT - MUSE: 384 MS
QTC - MUSE: 434 MS
R AXIS - MUSE: 22 DEGREES
RBC # BLD AUTO: 4.7 10E6/UL (ref 3.8–5.2)
SODIUM SERPL-SCNC: 141 MMOL/L (ref 135–145)
SYSTOLIC BLOOD PRESSURE - MUSE: NORMAL MMHG
T AXIS - MUSE: 24 DEGREES
VENTRICULAR RATE- MUSE: 77 BPM
WBC # BLD AUTO: 6.7 10E3/UL (ref 4–11)

## 2025-03-31 PROCEDURE — 93000 ELECTROCARDIOGRAM COMPLETE: CPT | Performed by: INTERNAL MEDICINE

## 2025-03-31 PROCEDURE — 86803 HEPATITIS C AB TEST: CPT | Mod: ZL | Performed by: FAMILY MEDICINE

## 2025-03-31 PROCEDURE — 84155 ASSAY OF PROTEIN SERUM: CPT | Mod: ZL | Performed by: FAMILY MEDICINE

## 2025-03-31 PROCEDURE — 85025 COMPLETE CBC W/AUTO DIFF WBC: CPT | Mod: ZL | Performed by: FAMILY MEDICINE

## 2025-03-31 PROCEDURE — 82435 ASSAY OF BLOOD CHLORIDE: CPT | Mod: ZL | Performed by: FAMILY MEDICINE

## 2025-03-31 PROCEDURE — 36415 COLL VENOUS BLD VENIPUNCTURE: CPT | Mod: ZL | Performed by: FAMILY MEDICINE

## 2025-03-31 RX ORDER — DEXTROAMPHETAMINE SACCHARATE, AMPHETAMINE ASPARTATE, DEXTROAMPHETAMINE SULFATE AND AMPHETAMINE SULFATE 1.25; 1.25; 1.25; 1.25 MG/1; MG/1; MG/1; MG/1
5 TABLET ORAL DAILY
COMMUNITY
Start: 2025-03-13

## 2025-03-31 RX ORDER — FAMOTIDINE 40 MG/1
1 TABLET, FILM COATED ORAL
COMMUNITY
Start: 2024-09-25

## 2025-03-31 RX ORDER — OMEPRAZOLE 20 MG/1
20 CAPSULE, DELAYED RELEASE ORAL 2 TIMES DAILY
Qty: 180 CAPSULE | Refills: 3 | Status: SHIPPED | OUTPATIENT
Start: 2025-03-31

## 2025-03-31 ASSESSMENT — PAIN SCALES - GENERAL: PAINLEVEL_OUTOF10: NO PAIN (0)

## 2025-03-31 NOTE — PROGRESS NOTES
Preoperative Evaluation  [facility]  [address]  [place] [address]  Phone: [phone]  Fax: [phone]  Primary Provider: Abiola Ashton,   Pre-op Performing Provider: Jenny Borden MD  Mar 31, 2025             3/31/2025   Surgical Information   What procedure is being done? Gastric bypass   Facility or Hospital where procedure/surgery will be performed: [facility]   Who is doing the procedure / surgery? Dr Cook   Date of surgery / procedure: April 15, 2025   Time of surgery / procedure: TBD   Where do you plan to recover after surgery? at home with family     Fax number for surgical facility: Note does not need to be faxed, will be available electronically in Epic.  Fax [phone].    Assessment & Plan     The proposed surgical procedure is considered INTERMEDIATE risk.    (Z01.818) Pre-operative examination  (primary encounter diagnosis)  (E66.813,  E66.01,  Z68.41) Class 3 severe obesity due to excess calories with serious comorbidity and body mass index (BMI) of 40.0 to 44.9 in adult (H)  Comment: Medically optimized to proceed with planned surgery and anesthesia.  ALT is mildly elevated at 75, most likely due to hepatic steatosis.  Recommend rechecking in 3 months with suspicion that it will likely improve with weight reduction.  Plan: EKG 12-lead, tracing only (Same Day), CBC and         Differential, Comprehensive Metabolic Panel    (K21.00) Gastroesophageal reflux disease with esophagitis without hemorrhage  (K22.70) Mesa's esophagus without dysplasia  Comment: Chronically poorly controlled.  Trial switching omeprazole to 20 mg twice daily instead of 40 mg once daily.  Discussed that she could try adding Pepcid 1-2 times daily.  Continue Carafate as needed, averaging twice daily currently.  Plan: omeprazole (PRILOSEC) 20 MG DR capsule    (K59.01) Slow transit constipation  Comment: Improved with daily probiotic.  This will likely at least  transiently worsen after surgery.  Consider starting low-dose of MiraLAX, perhaps 1 to 2 teaspoons daily beginning 1 week prior to surgery.    (R76.0) Lupus anticoagulant positive  Comment: Had consultation with hematology with recommendation for Lovenox starting postoperatively and continuing for 30 days.  This has already been prescribed by the hematologist by report.    (J45.990) Exercise-induced reactive airway disease  Comment: Continue albuterol as needed, generally using infrequently.    (F41.1) Generalized anxiety disorder  (F33.2) Severe episode of recurrent major depressive disorder, without psychotic features (H)  (F90.0) Attention deficit hyperactivity disorder (ADHD), predominantly inattentive type  Comment: Managed by behavioral health on Effexor, Vyvanse, and short acting Adderall in the afternoon with trazodone as needed.  Discussed that her medications may need to be adjusted postoperatively due to the controlled release nature of her Effexor in particular.  Vyvanse is a capsule.  She will work with her mental health provider and bariatric team postoperatively.    (E28.2) PCOS (polycystic ovarian syndrome)  (Z90.710) History of hysterectomy  Comment:     (Z11.59) Need for hepatitis C screening test  Comment:   Plan: Hepatitis C Screen Reflex to HCV RNA Quant and         Genotype      - No identified additional risk factors other than previously addressed    Preoperative Medication Instructions  Antiplatelet or Anticoagulation Medication Instructions   - We reviewed the medication list and the patient is not on an antiplatelet or anticoagulation medications.    Additional Medication Instructions  Stop vyvanse and adderall 7 days prior to surgery.  Start lovenox after surgery for 30 days as recommended by hematology.    Recommendation  Approval given to proceed with proposed procedure, without further diagnostic evaluation.    Isha Perez is a 34 year old, presenting for the following:  Pre-Op  Exam        3/31/2025    12:54 PM   Additional Questions   Roomed by Samara LEE     HPI: Very pleasant 34-year-old female presents to clinic for preoperative examination.  She is scheduled for Jose R-en-Y gastric bypass surgery as above.  She has morbid obesity with BMI greater than 40 and has failed numerous other therapies for weight loss including medications, diet, and exercise.  Regarding preoperative risk assessment, she denies any difficulty tolerating anesthesia in the past.  She denies recent respiratory illness.  She denies chest pain or shortness of breath.  She does have a known positive lupus anticoagulant, discovered due to recurrent miscarriages.  She has never had a clot.  She took aspirin and Lovenox during pregnancies.  She does not take aspirin at baseline.  She has already seen hematology with recommendation to take Lovenox for 30 days postoperatively, none preoperatively.  This has already been prescribed for her.    She has persistently uncontrolled GERD with esophagitis and Mesa's esophagus.  She was previously on Protonix twice daily but it was no longer covered by insurance.  She switched to omeprazole 40 mg daily which also is not covered by insurance.  She has been purchasing this OTC and is taking 2 of the 20 mg tablets.  She is more likely to get breakthrough symptoms at night.  She would be agreeable to trial of taking the omeprazole 20 mg twice daily.  She believes that her bariatric surgeon switched her from Protonix to omeprazole.  She also takes Carafate twice daily on average and this improves symptom control.  She is not currently taking famotidine, believes that it was prescribed at some point in the past when insurance was not covering the prescribed PPI.  She does not think that she has tried taking the PPI and H2 blocker in combination.  She also has chronic slow transit constipation.  Symptoms have improved significantly since she started taking a probiotic daily.  She  typically has a bowel movement about once every 3 days though it is usually not firm and she typically does not need to strain.  She uses MiraLAX on an as needed basis with good effect.    She has intermittent exercise-induced reactive airway symptoms since COVID.  She uses albuterol infrequently as needed.  Exercise does not always trigger symptoms but it is essentially the only trigger she has noticed.    She also has anxiety, depression, and ADHD.  These are currently managed by [facility].  She takes Vyvanse 70 mg every morning.  Adderall short acting 5 mg was recently added in the afternoon.  She is also on Effexor 150 mg daily.  Effexor and Vyvanse were both increased in the last several months.  She does feel like she has noticed improvement in mood symptoms with increasing Effexor, though she also notices more side effects including sweating and hot flashes.  She experienced these symptoms with increasing to 75 mg initially though they did get better over time.  She takes trazodone as needed for insomnia averaging 2-3 times per week currently.  She has never tried taking it nightly consistently.  She typically only takes 50 mg but is prescribed a range of 50 to 100 mg.  She has some difficulty both with falling asleep and staying asleep.  The medication does seem to help when she takes it.    She has a history of abnormal Pap smear with positive HPV that was persistent.  She also had abnormal uterine bleeding and has PCOS.  She had a hysterectomy in 2022, ovaries are intact.        3/31/2025   Pre-Op Questionnaire   Have you ever had a heart attack or stroke? No   Have you ever had surgery on your heart or blood vessels, such as a stent placement, a coronary artery bypass, or surgery on an artery in your head, neck, heart, or legs? No   Do you have chest pain with activity? No   Do you have a history of heart failure? No   Do you currently have a cold, bronchitis or symptoms of other infection? No    Do you have a cough, shortness of breath, or wheezing? No   Do you or anyone in your family have previous history of blood clots? No   Do you or does anyone in your family have a serious bleeding problem such as prolonged bleeding following surgeries or cuts? No   Have you ever had problems with anemia or been told to take iron pills? No   Have you had any abnormal blood loss such as black, tarry or bloody stools, or abnormal vaginal bleeding? No   Have you ever had a blood transfusion? No   Are you willing to have a blood transfusion if it is medically needed before, during, or after your surgery? Yes   Have you or any of your relatives ever had problems with anesthesia? No   Do you have sleep apnea, excessive snoring or daytime drowsiness? No   Do you have any artifical heart valves or other implanted medical devices like a pacemaker, defibrillator, or continuous glucose monitor? No   Do you have artificial joints? No   Are you allergic to latex? No     Health Care Directive  Patient does not have a Health Care Directive: Discussed advance care planning with patient; however, patient declined at this time.    Preoperative Review of    reviewed - controlled substances reflected in medication list.          Patient Active Problem List    Diagnosis Date Noted    Gastroesophageal reflux disease with esophagitis without hemorrhage 03/31/2025     Priority: Medium    Class 3 severe obesity due to excess calories with serious comorbidity and body mass index (BMI) of 40.0 to 44.9 in adult (H) 03/31/2025     Priority: Medium    History of hysterectomy 03/31/2025     Priority: Medium     Ovaries intact      Exercise-induced reactive airway disease 03/31/2025     Priority: Medium    Mesa's esophagus without dysplasia 12/06/2024     Priority: Medium    Abnormal uterine bleeding (AUB) 05/12/2022     Priority: Medium    Lupus anticoagulant positive 10/08/2018     Priority: Medium    History of recurrent miscarriages,  not currently pregnant 10/08/2018     Priority: Medium    Generalized anxiety disorder 2017     Priority: Medium    Patellar malalignment syndrome 2016     Priority: Medium    Abnormal pap 2016     Priority: Medium     Overview:   LGSIL with + HPV (2013)  Colposcopy with CIN1 (2014)  ASCUS with - HPV (2015)      PCOS (polycystic ovarian syndrome) 2016     Priority: Medium    Slow transit constipation 2016     Priority: Medium    Depression, major 2013     Priority: Medium    HPV (human papilloma virus) infection 2012     Priority: Medium     Formatting of this note might be different from the original.  Pap smear/ normal cytology other than HPV        Past Medical History:   Diagnosis Date    Internal derangement of knee     2016    Personal history of other medical treatment (CODE)         Polycystic ovarian syndrome     3/18/2016     Past Surgical History:   Procedure Laterality Date    ARTHROSCOPY KNEE Right     Dr. Landeros     SECTION N/A 2020    Procedure:  SECTION;  Surgeon: Gerry Kim MD;  Location:  OR     SECTION, TUBAL LIGATION, COMBINED Bilateral 2021    Procedure:  SECTION, WITH POSTPARTUM TUBAL LIGATION;  Surgeon: Gerry Kim MD;  Location:  OR    CYSTOSCOPY N/A 2022    Procedure: Cystoscopy;  Surgeon: Gerry Kim MD;  Location:  OR    HYSTEROSCOPY DIAGNOSTIC N/A 2018    Procedure: HYSTEROSCOPY DIAGNOSTIC;  Diagnostic Hysteroscopy with Endometrial Scratching.      .;  Surgeon: Gerry Kim MD;  Location:  OR    LAPAROSCOPIC HYSTERECTOMY TOTAL, SALPINGECTOMY N/A 2022    Procedure: HYSTERECTOMY, TOTAL, LAPAROSCOPIC,;  Surgeon: Gerry Kim MD;  Location:  OR    MANDIBLE SURGERY Right          Current Outpatient Medications   Medication Sig Dispense Refill    albuterol (PROAIR HFA/PROVENTIL HFA/VENTOLIN HFA) 108 (90 Base) MCG/ACT inhaler Inhale 2 puffs into  "the lungs every 4 hours as needed for shortness of breath or wheezing 8.5 g 1    amphetamine-dextroamphetamine (ADDERALL) 5 MG tablet Take 5 mg by mouth daily. In the afternoon      famotidine (PEPCID) 40 MG tablet Take 1 tablet by mouth 2 times daily.      lisdexamfetamine (VYVANSE) 70 MG capsule       omeprazole (PRILOSEC) 20 MG DR capsule Take 1 capsule (20 mg) by mouth 2 times daily. 180 capsule 3    sucralfate (CARAFATE) 1 GM tablet TAKE 1 TABLET BY MOUTH 3 TIMES DAILY BEFORE MEALS 270 tablet 3    traZODone (DESYREL) 50 MG tablet Take 50 mg by mouth.      venlafaxine (EFFEXOR-ER) 150 MG 24 hr tablet Take 150 mg by mouth daily.      enoxaparin ANTICOAGULANT (LOVENOX) 40 MG/0.4ML syringe Inject 40 mg Subcutaneous every 24 hours (Patient not taking: Reported on 3/31/2025)         Allergies   Allergen Reactions    Meloxicam Rash        Social History     Tobacco Use    Smoking status: Never     Passive exposure: Never    Smokeless tobacco: Never   Substance Use Topics    Alcohol use: Not Currently     Alcohol/week: 0.0 standard drinks of alcohol     Comment: Alcoholic Drinks/day: rare       History   Drug Use No             Review of Systems  Pertinent positives and negatives as per HPI, otherwise negative.      Objective    /76 (BP Location: Right arm, Patient Position: Sitting, Cuff Size: Adult Large)   Pulse 86   Temp 97.4  F (36.3  C) (Temporal)   Resp 18   Ht 1.626 m (5' 4\")   Wt 106.1 kg (234 lb)   LMP 05/12/2022   SpO2 98%   BMI 40.17 kg/m     Estimated body mass index is 40.17 kg/m  as calculated from the following:    Height as of this encounter: 1.626 m (5' 4\").    Weight as of this encounter: 106.1 kg (234 lb).  Physical Exam  General: alert and oriented x 3, no acute distress, pleasant, conversant  Head: normocephalic, atraumatic  Ears: tympanic membranes pearly gray, canals clear  Eyes: pupils equal, round, and reactive to light, conjunctiva pink, sclera white  Oropharynx: pink without " tonsillar enlargement nor exudate  Neck: supple without lymphadenopathy, thyroid not enlarged  Lungs: clear to auscultation, no wheezes, rhonchi or rales, no increased work of breathing  Heart: regular rate and rhythm, no murmurs auscultated  Abdomen: soft, nontender, nondistended, normoactive bowel sounds, no palpable masses or organomegaly  Skin: no abnormal lesions or rash  Musculoskeletal/Extremities: good ROM throughout, no peripheral edema  Neuro: no gross focal deficits    Recent Labs   Lab Test 10/17/24  1158   HGB 12.4         POTASSIUM 4.1   CR 0.64        Diagnostics  Recent Results (from the past 24 hours)   EKG 12-lead, tracing only (Same Day)    Collection Time: 03/31/25 12:50 PM   Result Value Ref Range    Systolic Blood Pressure  mmHg    Diastolic Blood Pressure  mmHg    Ventricular Rate 77 BPM    Atrial Rate 77 BPM    MO Interval 146 ms    QRS Duration 98 ms     ms    QTc 434 ms    P Axis 49 degrees    R AXIS 22 degrees    T Axis 24 degrees    Interpretation ECG       Sinus rhythm  Normal ECG  When compared with ECG of 22-Apr-2022 11:55,  Premature ventricular complexes are no longer Present     Comprehensive Metabolic Panel    Collection Time: 03/31/25  1:48 PM   Result Value Ref Range    Sodium 141 135 - 145 mmol/L    Potassium 3.8 3.4 - 5.3 mmol/L    Carbon Dioxide (CO2) 26 22 - 29 mmol/L    Anion Gap 9 7 - 15 mmol/L    Urea Nitrogen 16.2 6.0 - 20.0 mg/dL    Creatinine 0.59 0.51 - 0.95 mg/dL    GFR Estimate >90 >60 mL/min/1.73m2    Calcium 9.3 8.8 - 10.4 mg/dL    Chloride 106 98 - 107 mmol/L    Glucose 102 (H) 70 - 99 mg/dL    Alkaline Phosphatase 88 40 - 150 U/L    AST 41 0 - 45 U/L    ALT 75 (H) 0 - 50 U/L    Protein Total 7.1 6.4 - 8.3 g/dL    Albumin 4.0 3.5 - 5.2 g/dL    Bilirubin Total 0.5 <=1.2 mg/dL   CBC with platelets and differential    Collection Time: 03/31/25  1:48 PM   Result Value Ref Range    WBC Count 6.7 4.0 - 11.0 10e3/uL    RBC Count 4.70 3.80 - 5.20  10e6/uL    Hemoglobin 13.8 11.7 - 15.7 g/dL    Hematocrit 40.2 35.0 - 47.0 %    MCV 86 78 - 100 fL    MCH 29.4 26.5 - 33.0 pg    MCHC 34.3 31.5 - 36.5 g/dL    RDW 12.4 10.0 - 15.0 %    Platelet Count 256 150 - 450 10e3/uL    % Neutrophils 60 %    % Lymphocytes 30 %    % Monocytes 7 %    % Eosinophils 3 %    % Basophils 1 %    % Immature Granulocytes 0 %    NRBCs per 100 WBC 0 <1 /100    Absolute Neutrophils 4.0 1.6 - 8.3 10e3/uL    Absolute Lymphocytes 2.0 0.8 - 5.3 10e3/uL    Absolute Monocytes 0.5 0.0 - 1.3 10e3/uL    Absolute Eosinophils 0.2 0.0 - 0.7 10e3/uL    Absolute Basophils 0.0 0.0 - 0.2 10e3/uL    Absolute Immature Granulocytes 0.0 <=0.4 10e3/uL    Absolute NRBCs 0.0 10e3/uL      EKG requested by facility and not completed in the last 90 days.   EKG: appears normal, NSR, normal axis, normal intervals, no acute ST/T changes c/w ischemia, no LVH by voltage criteria, unchanged from previous tracings    Revised Cardiac Risk Index (RCRI)  The patient has the following serious cardiovascular risks for perioperative complications:   - No serious cardiac risks = 0 points     RCRI Interpretation: 0 points: Class I (very low risk - 0.4% complication rate)         I spent a total of 57 minutes on the patient's care today including preparing for the visit, the visit, documentation, and follow-up care. This does not include any procedure time.    The longitudinal plan of care for the diagnosis(es)/condition(s) as documented were addressed during this visit. Due to the added complexity in care, I will continue to support Ana in the subsequent management and with ongoing continuity of care.    Signed Electronically by: Jenny Borden MD  A copy of this evaluation report is provided to the requesting physician.

## 2025-03-31 NOTE — PATIENT INSTRUCTIONS
Try taking omeprazole 20 mg twice daily rather than 40 mg once daily.    You can take Pepcid up to twice daily in addition as needed.      How to Take Your Medication Before Surgery  Preoperative Medication Instructions   Antiplatelet or Anticoagulation Medication Instructions   - We reviewed the medication list and the patient is not on an antiplatelet or anticoagulation medications.    Additional Medication Instructions  Stop vyvanse and adderall 7 days prior to surgery.  Start lovenox after surgery for 30 days as recommended by hematology.       Patient Education   Preparing for Your Surgery  For Adults  Getting started  In most cases, a nurse will call to review your health history and instructions. They will give you an arrival time based on your scheduled surgery time. Please be ready to share:  Your doctor's clinic name and phone number  Your medical, surgical, and anesthesia history  A list of allergies and sensitivities  A list of medicines, including herbal treatments and over-the-counter drugs  Whether the patient has a legal guardian (ask how to send us the papers in advance)  Note: You may not receive a call if you were seen at our PAC (Preoperative Assessment Center).  Please tell us if you're pregnant--or if there's any chance you might be pregnant. Some surgeries may injure a fetus (unborn baby), so they require a pregnancy test. Surgeries that are safe for a fetus don't always need a test, and you can choose whether to have one.   Preparing for surgery  Within 10 to 30 days of surgery: Have a pre-op exam (sometimes called an H&P, or History and Physical). This can be done at a clinic or pre-operative center.  If you're having a , you may not need this exam. Talk to your care team.  At your pre-op exam, talk to your care team about all medicines you take. (This includes CBD oil and any drugs, such as THC, marijuana, and other forms of cannabis.) If you need to stop any medicine before  surgery, ask when to start taking it again.  This is for your safety. Many medicines and drugs can make you bleed too much during surgery. Some change how well surgery (anesthesia) drugs work.  Call your insurance company to let them know you're having surgery. (If you don't have insurance, call [phone].)  Call your clinic if there's any change in your health. This includes a scrape or scratch near the surgery site, or any signs of a cold (sore throat, runny nose, cough, rash, fever).  Eating and drinking guidelines  For your safety: Unless your surgeon tells you otherwise, follow the guidelines below.  Eat and drink as normal until 8 hours before you arrive for surgery. After that, no food or milk. You can spit out gum when you arrive.  Drink clear liquids until 2 hours before you arrive. These are liquids you can see through, like water, Gatorade, and Propel Water. They also include plain black coffee and tea (no cream or milk).  No alcohol for 24 hours before you arrive. The night before surgery, stop any drinks that contain THC.  If your care team tells you to take medicine on the morning of surgery, it's okay to take it with a sip of water. No other medicines or drugs are allowed (including CBD oil)--follow your care team's instructions.  If you have questions the day of surgery, call your hospital or surgery center.   Preventing infection  Shower or bathe the night before and the morning of surgery. Follow the instructions your clinic gave you. (If no instructions, use regular soap.)  Don't shave or clip hair near your surgery site. We'll remove the hair if needed.  Don't smoke or vape the morning of surgery. No chewing tobacco for 6 hours before you arrive. A nicotine patch is okay. You may spit out nicotine gum when you arrive.  For some surgeries, the surgeon will tell you to fully quit smoking and nicotine.  We will make every effort to keep you safe from infection. We will:  Clean our hands often  with soap and water (or an alcohol-based hand rub).  Clean the skin at your surgery site with a special soap that kills germs.  Give you a special gown to keep you warm. (Cold raises the risk of infection.)  Wear hair covers, masks, gowns, and gloves during surgery.  Give antibiotic medicine, if prescribed. Not all surgeries need this medicine.  What to bring on the day of surgery  Photo ID and insurance card  Copy of your health care directive, if you have one  Glasses and hearing aids (bring cases)  You can't wear contacts during surgery  Inhaler and eye drops, if you use them (tell us about these when you arrive)  CPAP machine or breathing device, if you use them  A few personal items, if spending the night  If you have . . .  A pacemaker, ICD (cardiac defibrillator), or other implant: Bring the ID card.  An implanted stimulator: Bring the remote control.  A legal guardian: Bring a copy of the certified (court-stamped) guardianship papers.  Please remove any jewelry, including body piercings. Leave jewelry and other valuables at home.  If you're going home the day of surgery  You must have a responsible adult drive you home. They should stay with you overnight as well.  If you don't have someone to stay with you, and you aren't safe to go home alone, we may keep you overnight. Insurance often won't pay for this.  After surgery  If it's hard to control your pain or you need more pain medicine, please call your surgeon's office.  Questions?   If you have any questions for your care team, list them here:   ____________________________________________________________________________________________________________________________________________________________________________________________________________________________________________________________  For informational purposes only. Not to replace the advice of your health care provider. Copyright   2003, 2019 [facility]. All rights reserved.  Clinically reviewed by Jakob Lopez MD. [employer] [id number] - REV 08/24.

## 2025-03-31 NOTE — NURSING NOTE
"Chief Complaint   Patient presents with    Pre-Op Exam       Initial /76 (BP Location: Right arm, Patient Position: Sitting, Cuff Size: Adult Large)   Pulse 86   Temp 97.4  F (36.3  C) (Temporal)   Resp 18   Ht 1.626 m (5' 4\")   Wt 106.1 kg (234 lb)   LMP 05/12/2022   SpO2 98%   BMI 40.17 kg/m   Estimated body mass index is 40.17 kg/m  as calculated from the following:    Height as of this encounter: 1.626 m (5' 4\").    Weight as of this encounter: 106.1 kg (234 lb).  Medication Review: complete    The next two questions are to help us understand your food security.  If you are feeling you need any assistance in this area, we have resources available to support you today.          2/25/2025   SDOH- Food Insecurity   Within the past 12 months, did you worry that your food would run out before you got money to buy more? N   Within the past 12 months, did the food you bought just not last and you didn t have money to get more? N         Health Care Directive:  Patient does not have a Health Care Directive: Discussed advance care planning with patient; however, patient declined at this time.    Samara Chacko LPN      "

## 2025-03-31 NOTE — LETTER
My Asthma Action Plan    Name: Ana Patrick   YOB: 1990  Date: 3/31/2025   My doctor: Jenny Borden MD   My clinic: [facility]        My Rescue Medicine: Albuterol (Proair/Ventolin/Proventil HFA) 2-4 puffs EVERY 4 HOURS as needed. Use a spacer if recommended by your provider.   My Asthma Severity:   Intermittent / Exercise Induced  Know your asthma triggers: exercise or sports             GREEN ZONE   Good Control  I feel good  No cough or wheeze  Can work, sleep and play without asthma symptoms       Take your asthma control medicine every day.     If exercise triggers your asthma, take your rescue medication  15 minutes before exercise or sports, and  During exercise if you have asthma symptoms  Spacer to use with inhaler: If you have a spacer, make sure to use it with your inhaler             YELLOW ZONE Getting Worse  I have ANY of these:  I do not feel good  Cough or wheeze  Chest feels tight  Wake up at night   Keep taking your Green Zone medications  Start taking your rescue medicine:  every 20 minutes for up to 1 hour. Then every 4 hours for 24-48 hours.  If you stay in the Yellow Zone for more than 12-24 hours, contact your doctor.  If you do not return to the Green Zone in 12-24 hours or you get worse, start taking your oral steroid medicine if prescribed by your provider.           RED ZONE Medical Alert - Get Help  I have ANY of these:  I feel awful  Medicine is not helping  Breathing getting harder  Trouble walking or talking  Nose opens wide to breathe       Take your rescue medicine NOW  If your provider has prescribed an oral steroid medicine, start taking it NOW  Call your doctor NOW  If you are still in the Red Zone after 20 minutes and you have not reached your doctor:  Take your rescue medicine again and  Call 911 or go to the emergency room right away    See your regular doctor within 2 weeks of an Emergency Room or Urgent Care visit for follow-up  treatment.          Annual Reminders:  Meet with Asthma Educator,  Flu Shot in the Fall, consider Pneumonia Vaccination for patients with asthma (aged 19 and older).    Pharmacy: [facility] - [place], [place] - [address]    Electronically signed by Jenny Borden MD   Date: 03/31/25                    Asthma Triggers  How To Control Things That Make Your Asthma Worse    Triggers are things that make your asthma worse.  Look at the list below to help you find your triggers and   what you can do about them. You can help prevent asthma flare-ups by staying away from your triggers.      Trigger                                                          What you can do   Cigarette Smoke  Tobacco smoke can make asthma worse. Do not allow smoking in your home, car or around you.  Be sure no one smokes at a child s day care or school.  If you smoke, ask your health care provider for ways to help you quit.  Ask family members to quit too.  Ask your health care provider for a referral to Quit Plan to help you quit smoking, or call 7-260-012-PLAN.     Colds, Flu, Bronchitis  These are common triggers of asthma. Wash your hands often.  Don t touch your eyes, nose or mouth.  Get a flu shot every year.     Dust Mites  These are tiny bugs that live in cloth or carpet. They are too small to see. Wash sheets and blankets in hot water every week.   Encase pillows and mattress in dust mite proof covers.  Avoid having carpet if you can. If you have carpet, vacuum weekly.   Use a dust mask and HEPA vacuum.   Pollen and Outdoor Mold  Some people are allergic to trees, grass, or weed pollen, or molds. Try to keep your windows closed.  Limit time out doors when pollen count is high.   Ask you health care provider about taking medicine during allergy season.     Animal Dander  Some people are allergic to skin flakes, urine or saliva from pets with fur or feathers. Keep pets with fur or feathers out of your home.    If  you can t keep the pet outdoors, then keep the pet out of your bedroom.  Keep the bedroom door closed.  Keep pets off cloth furniture and away from stuffed toys.     Mice, Rats, and Cockroaches  Some people are allergic to the waste from these pests.   Cover food and garbage.  Clean up spills and food crumbs.  Store grease in the refrigerator.   Keep food out of the bedroom.   Indoor Mold  This can be a trigger if your home has high moisture. Fix leaking faucets, pipes, or other sources of water.   Clean moldy surfaces.  Dehumidify basement if it is damp and smelly.   Smoke, Strong Odors, and Sprays  These can reduce air quality. Stay away from strong odors and sprays, such as perfume, powder, hair spray, paints, smoke incense, paint, cleaning products, candles and new carpet.   Exercise or Sports  Some people with asthma have this trigger. Be active!  Ask your doctor about taking medicine before sports or exercise to prevent symptoms.    Warm up for 5-10 minutes before and after sports or exercise.     Other Triggers of Asthma  Cold air:  Cover your nose and mouth with a scarf.  Sometimes laughing or crying can be a trigger.  Some medicines and food can trigger asthma.

## 2025-04-01 LAB — HCV AB SERPL QL IA: NONREACTIVE

## 2025-04-19 ENCOUNTER — HOSPITAL ENCOUNTER (EMERGENCY)
Facility: OTHER | Age: 35
Discharge: OTHER INSTITUTION | End: 2025-04-20
Attending: EMERGENCY MEDICINE
Payer: COMMERCIAL

## 2025-04-19 ENCOUNTER — APPOINTMENT (OUTPATIENT)
Dept: CT IMAGING | Facility: OTHER | Age: 35
End: 2025-04-19
Attending: EMERGENCY MEDICINE
Payer: COMMERCIAL

## 2025-04-19 VITALS
DIASTOLIC BLOOD PRESSURE: 64 MMHG | TEMPERATURE: 97.7 F | OXYGEN SATURATION: 95 % | RESPIRATION RATE: 21 BRPM | HEART RATE: 75 BPM | SYSTOLIC BLOOD PRESSURE: 123 MMHG

## 2025-04-19 DIAGNOSIS — J98.2 PNEUMOMEDIASTINUM (H): ICD-10-CM

## 2025-04-19 DIAGNOSIS — T79.7XXA SUBCUTANEOUS EMPHYSEMA, INITIAL ENCOUNTER: ICD-10-CM

## 2025-04-19 DIAGNOSIS — R11.2 NAUSEA AND VOMITING, UNSPECIFIED VOMITING TYPE: ICD-10-CM

## 2025-04-19 DIAGNOSIS — R07.9 CHEST PAIN, UNSPECIFIED TYPE: ICD-10-CM

## 2025-04-19 LAB
ALBUMIN SERPL BCG-MCNC: 4 G/DL (ref 3.5–5.2)
ALP SERPL-CCNC: 79 U/L (ref 40–150)
ALT SERPL W P-5'-P-CCNC: 76 U/L (ref 0–50)
ANION GAP SERPL CALCULATED.3IONS-SCNC: 14 MMOL/L (ref 7–15)
AST SERPL W P-5'-P-CCNC: 49 U/L (ref 0–45)
BASOPHILS # BLD AUTO: 0 10E3/UL (ref 0–0.2)
BASOPHILS NFR BLD AUTO: 1 %
BILIRUB SERPL-MCNC: 0.3 MG/DL
BUN SERPL-MCNC: 14.2 MG/DL (ref 6–20)
CALCIUM SERPL-MCNC: 9.3 MG/DL (ref 8.8–10.4)
CHLORIDE SERPL-SCNC: 103 MMOL/L (ref 98–107)
CREAT SERPL-MCNC: 0.57 MG/DL (ref 0.51–0.95)
EGFRCR SERPLBLD CKD-EPI 2021: >90 ML/MIN/1.73M2
EOSINOPHIL # BLD AUTO: 0.2 10E3/UL (ref 0–0.7)
EOSINOPHIL NFR BLD AUTO: 3 %
ERYTHROCYTE [DISTWIDTH] IN BLOOD BY AUTOMATED COUNT: 12.4 % (ref 10–15)
GLUCOSE SERPL-MCNC: 104 MG/DL (ref 70–99)
HCO3 SERPL-SCNC: 23 MMOL/L (ref 22–29)
HCT VFR BLD AUTO: 38.1 % (ref 35–47)
HGB BLD-MCNC: 13.1 G/DL (ref 11.7–15.7)
HOLD SPECIMEN: NORMAL
HOLD SPECIMEN: NORMAL
IMM GRANULOCYTES # BLD: 0 10E3/UL
IMM GRANULOCYTES NFR BLD: 0 %
LACTATE SERPL-SCNC: 0.7 MMOL/L (ref 0.7–2)
LIPASE SERPL-CCNC: 36 U/L (ref 13–60)
LYMPHOCYTES # BLD AUTO: 1.7 10E3/UL (ref 0.8–5.3)
LYMPHOCYTES NFR BLD AUTO: 26 %
MCH RBC QN AUTO: 28.9 PG (ref 26.5–33)
MCHC RBC AUTO-ENTMCNC: 34.4 G/DL (ref 31.5–36.5)
MCV RBC AUTO: 84 FL (ref 78–100)
MONOCYTES # BLD AUTO: 0.4 10E3/UL (ref 0–1.3)
MONOCYTES NFR BLD AUTO: 6 %
NEUTROPHILS # BLD AUTO: 4.2 10E3/UL (ref 1.6–8.3)
NEUTROPHILS NFR BLD AUTO: 64 %
NRBC # BLD AUTO: 0 10E3/UL
NRBC BLD AUTO-RTO: 0 /100
PLATELET # BLD AUTO: 293 10E3/UL (ref 150–450)
POTASSIUM SERPL-SCNC: 4.1 MMOL/L (ref 3.4–5.3)
PROT SERPL-MCNC: 7.2 G/DL (ref 6.4–8.3)
RBC # BLD AUTO: 4.54 10E6/UL (ref 3.8–5.2)
SODIUM SERPL-SCNC: 140 MMOL/L (ref 135–145)
TROPONIN T SERPL HS-MCNC: <6 NG/L
WBC # BLD AUTO: 6.6 10E3/UL (ref 4–11)

## 2025-04-19 PROCEDURE — 96375 TX/PRO/DX INJ NEW DRUG ADDON: CPT | Performed by: EMERGENCY MEDICINE

## 2025-04-19 PROCEDURE — 83690 ASSAY OF LIPASE: CPT | Performed by: EMERGENCY MEDICINE

## 2025-04-19 PROCEDURE — 83605 ASSAY OF LACTIC ACID: CPT | Performed by: EMERGENCY MEDICINE

## 2025-04-19 PROCEDURE — 96361 HYDRATE IV INFUSION ADD-ON: CPT | Performed by: EMERGENCY MEDICINE

## 2025-04-19 PROCEDURE — 99285 EMERGENCY DEPT VISIT HI MDM: CPT | Mod: 25 | Performed by: EMERGENCY MEDICINE

## 2025-04-19 PROCEDURE — 93005 ELECTROCARDIOGRAM TRACING: CPT | Performed by: EMERGENCY MEDICINE

## 2025-04-19 PROCEDURE — 36415 COLL VENOUS BLD VENIPUNCTURE: CPT | Performed by: EMERGENCY MEDICINE

## 2025-04-19 PROCEDURE — 250N000011 HC RX IP 250 OP 636: Performed by: EMERGENCY MEDICINE

## 2025-04-19 PROCEDURE — 96366 THER/PROPH/DIAG IV INF ADDON: CPT | Performed by: EMERGENCY MEDICINE

## 2025-04-19 PROCEDURE — 87040 BLOOD CULTURE FOR BACTERIA: CPT | Performed by: EMERGENCY MEDICINE

## 2025-04-19 PROCEDURE — 71270 CT THORAX DX C-/C+: CPT

## 2025-04-19 PROCEDURE — 93010 ELECTROCARDIOGRAM REPORT: CPT | Performed by: INTERNAL MEDICINE

## 2025-04-19 PROCEDURE — 96376 TX/PRO/DX INJ SAME DRUG ADON: CPT | Performed by: EMERGENCY MEDICINE

## 2025-04-19 PROCEDURE — 70492 CT SFT TSUE NCK W/O & W/DYE: CPT

## 2025-04-19 PROCEDURE — 71250 CT THORAX DX C-: CPT | Mod: 26 | Performed by: RADIOLOGY

## 2025-04-19 PROCEDURE — 80053 COMPREHEN METABOLIC PANEL: CPT | Performed by: EMERGENCY MEDICINE

## 2025-04-19 PROCEDURE — 85004 AUTOMATED DIFF WBC COUNT: CPT | Performed by: EMERGENCY MEDICINE

## 2025-04-19 PROCEDURE — 258N000003 HC RX IP 258 OP 636: Performed by: EMERGENCY MEDICINE

## 2025-04-19 PROCEDURE — 96367 TX/PROPH/DG ADDL SEQ IV INF: CPT | Performed by: EMERGENCY MEDICINE

## 2025-04-19 PROCEDURE — 96365 THER/PROPH/DIAG IV INF INIT: CPT | Performed by: EMERGENCY MEDICINE

## 2025-04-19 PROCEDURE — 84484 ASSAY OF TROPONIN QUANT: CPT | Performed by: EMERGENCY MEDICINE

## 2025-04-19 PROCEDURE — 70490 CT SOFT TISSUE NECK W/O DYE: CPT

## 2025-04-19 PROCEDURE — 70490 CT SOFT TISSUE NECK W/O DYE: CPT | Mod: 26 | Performed by: RADIOLOGY

## 2025-04-19 PROCEDURE — 99285 EMERGENCY DEPT VISIT HI MDM: CPT | Performed by: EMERGENCY MEDICINE

## 2025-04-19 RX ORDER — PIPERACILLIN SODIUM, TAZOBACTAM SODIUM 4; .5 G/20ML; G/20ML
4.5 INJECTION, POWDER, LYOPHILIZED, FOR SOLUTION INTRAVENOUS EVERY 6 HOURS
Status: DISCONTINUED | OUTPATIENT
Start: 2025-04-19 | End: 2025-04-20 | Stop reason: HOSPADM

## 2025-04-19 RX ORDER — DIPHENHYDRAMINE HYDROCHLORIDE 50 MG/ML
12.5 INJECTION, SOLUTION INTRAMUSCULAR; INTRAVENOUS ONCE
Status: COMPLETED | OUTPATIENT
Start: 2025-04-19 | End: 2025-04-19

## 2025-04-19 RX ORDER — ONDANSETRON 2 MG/ML
4 INJECTION INTRAMUSCULAR; INTRAVENOUS ONCE
Status: COMPLETED | OUTPATIENT
Start: 2025-04-19 | End: 2025-04-19

## 2025-04-19 RX ORDER — MORPHINE SULFATE 2 MG/ML
2 INJECTION, SOLUTION INTRAMUSCULAR; INTRAVENOUS ONCE
Status: COMPLETED | OUTPATIENT
Start: 2025-04-19 | End: 2025-04-19

## 2025-04-19 RX ORDER — MORPHINE SULFATE 4 MG/ML
4 INJECTION, SOLUTION INTRAMUSCULAR; INTRAVENOUS ONCE
Status: COMPLETED | OUTPATIENT
Start: 2025-04-19 | End: 2025-04-19

## 2025-04-19 RX ADMIN — PIPERACILLIN AND TAZOBACTAM 4.5 G: 4; .5 INJECTION, POWDER, LYOPHILIZED, FOR SOLUTION INTRAVENOUS at 20:24

## 2025-04-19 RX ADMIN — DIPHENHYDRAMINE HYDROCHLORIDE 12.5 MG: 50 INJECTION, SOLUTION INTRAMUSCULAR; INTRAVENOUS at 22:45

## 2025-04-19 RX ADMIN — Medication 1500 MG: at 21:08

## 2025-04-19 RX ADMIN — ONDANSETRON 4 MG: 2 INJECTION INTRAMUSCULAR; INTRAVENOUS at 19:23

## 2025-04-19 RX ADMIN — SODIUM CHLORIDE 1000 ML: 9 INJECTION, SOLUTION INTRAVENOUS at 19:37

## 2025-04-19 RX ADMIN — MORPHINE SULFATE 4 MG: 4 INJECTION, SOLUTION INTRAMUSCULAR; INTRAVENOUS at 19:22

## 2025-04-19 RX ADMIN — MORPHINE SULFATE 2 MG: 2 INJECTION, SOLUTION INTRAMUSCULAR; INTRAVENOUS at 23:50

## 2025-04-19 ASSESSMENT — COLUMBIA-SUICIDE SEVERITY RATING SCALE - C-SSRS
6. HAVE YOU EVER DONE ANYTHING, STARTED TO DO ANYTHING, OR PREPARED TO DO ANYTHING TO END YOUR LIFE?: NO
1. IN THE PAST MONTH, HAVE YOU WISHED YOU WERE DEAD OR WISHED YOU COULD GO TO SLEEP AND NOT WAKE UP?: NO
2. HAVE YOU ACTUALLY HAD ANY THOUGHTS OF KILLING YOURSELF IN THE PAST MONTH?: NO

## 2025-04-19 ASSESSMENT — ACTIVITIES OF DAILY LIVING (ADL)
ADLS_ACUITY_SCORE: 48

## 2025-04-20 NOTE — PROGRESS NOTES
"Pt started feeling like her face was getting warm and put her call light on. Pt's face is blotchy and \"puffy\". MD notified  "

## 2025-04-20 NOTE — ED PROVIDER NOTES
History     Chief Complaint   Patient presents with    Chest Pain     X 1 hour     HPI  Ana Patrick is a 34 year old female who presents with chest pain and vomiting.  She had a Jose R-en-Y procedure 5 days ago at [facility] in [place].  She denies black or bloody emesis.  No fever, chills, shortness of breath, abdominal pain, black or bloody stool.    Allergies:  Allergies   Allergen Reactions    Meloxicam Rash       Problem List:    Patient Active Problem List    Diagnosis Date Noted    Gastroesophageal reflux disease with esophagitis without hemorrhage 03/31/2025     Priority: Medium    Class 3 severe obesity due to excess calories with serious comorbidity and body mass index (BMI) of 40.0 to 44.9 in adult 03/31/2025     Priority: Medium    History of hysterectomy 03/31/2025     Priority: Medium     Ovaries intact      Exercise-induced reactive airway disease 03/31/2025     Priority: Medium    Mesa's esophagus without dysplasia 12/06/2024     Priority: Medium    Abnormal uterine bleeding (AUB) 05/12/2022     Priority: Medium    Lupus anticoagulant positive 10/08/2018     Priority: Medium    History of recurrent miscarriages, not currently pregnant 10/08/2018     Priority: Medium    Generalized anxiety disorder 05/03/2017     Priority: Medium    Patellar malalignment syndrome 05/23/2016     Priority: Medium    Abnormal pap 03/18/2016     Priority: Medium     Overview:   LGSIL with + HPV (12/2013)  Colposcopy with CIN1 (1/2014)  ASCUS with - HPV (4/2015)      PCOS (polycystic ovarian syndrome) 03/18/2016     Priority: Medium    Slow transit constipation 01/12/2016     Priority: Medium    Depression, major 08/13/2013     Priority: Medium    HPV (human papilloma virus) infection 03/13/2012     Priority: Medium     Formatting of this note might be different from the original.  Pap smear/ normal cytology other than HPV          Past Medical History:    Past Medical History:   Diagnosis Date    Internal  derangement of knee     Personal history of other medical treatment (CODE)     Polycystic ovarian syndrome        Past Surgical History:    Past Surgical History:   Procedure Laterality Date    ARTHROSCOPY KNEE Right     Dr. Landeros     SECTION N/A 2020    Procedure:  SECTION;  Surgeon: Gerry Kim MD;  Location:  OR     SECTION, TUBAL LIGATION, COMBINED Bilateral 2021    Procedure:  SECTION, WITH POSTPARTUM TUBAL LIGATION;  Surgeon: Gerry Kim MD;  Location: [facility] OR    CYSTOSCOPY N/A 2022    Procedure: Cystoscopy;  Surgeon: Gerry Kim MD;  Location: [facility] OR    HYSTEROSCOPY DIAGNOSTIC N/A 2018    Procedure: HYSTEROSCOPY DIAGNOSTIC;  Diagnostic Hysteroscopy with Endometrial Scratching.      .;  Surgeon: Gerry Kim MD;  Location:  OR    LAPAROSCOPIC HYSTERECTOMY TOTAL, SALPINGECTOMY N/A 2022    Procedure: HYSTERECTOMY, TOTAL, LAPAROSCOPIC,;  Surgeon: Gerry Kim MD;  Location:  OR    MANDIBLE SURGERY Right            Family History:    Family History   Problem Relation Age of Onset    Family History Negative Mother         Good Health    Family History Negative Father         Good Health    Cancer Maternal Grandmother         Cancer    Diabetes Other         Diabetes,maternal side    Anxiety Disorder Sister        Social History:  Marital Status:   [2]  Social History     Tobacco Use    Smoking status: Never     Passive exposure: Never    Smokeless tobacco: Never   Vaping Use    Vaping status: Never Used   Substance Use Topics    Alcohol use: Not Currently     Alcohol/week: 0.0 standard drinks of alcohol     Comment: Alcoholic Drinks/day: rare    Drug use: No        Medications:    albuterol (PROAIR HFA/PROVENTIL HFA/VENTOLIN HFA) 108 (90 Base) MCG/ACT inhaler  amphetamine-dextroamphetamine (ADDERALL) 5 MG tablet  enoxaparin ANTICOAGULANT (LOVENOX) 40 MG/0.4ML syringe  famotidine (PEPCID) 40 MG tablet  lisdexamfetamine  (VYVANSE) 70 MG capsule  omeprazole (PRILOSEC) 20 MG DR capsule  sucralfate (CARAFATE) 1 GM tablet  traZODone (DESYREL) 50 MG tablet  venlafaxine (EFFEXOR-ER) 150 MG 24 hr tablet          Review of Systems    Physical Exam   BP: (!) 144/86  Pulse: 77  Temp: 100.6  F (38.1  C)  Resp: 16  SpO2: 98 %      Physical Exam  General: No acute distress  Head: No obvious trauma to head.  Ears, Nose, Throat:  External ears normal.  Nose normal.  No pharyngeal erythema, swelling or exudate.  Midline uvula. Moist mucus membranes.  Eyes:  Conjunctivae clear.   Neck: Normal range of motion.  Neck supple.   Chest: Crepitus palpated in the anterior superior chest wall  CV: Regular rate and rhythm.  No murmurs.      Respiratory: Effort normal and breath sounds normal.  No wheezing or crackles.   Gastrointestinal: Soft.  No distension. There is no tenderness.  There is no rigidity, no rebound and no guarding.   Musculoskeletal: Normal range of motion.  Non tender extremities to palpations. No lower extremity edema  Neuro: Alert. Moving all extremities appropriately.  Normal speech.    Skin: Skin is warm and dry.  No rash noted.   Psych: Normal mood and affect. Behavior is normal.       ED Course        Procedures              EKG Interpretation:      Interpreted by Fili Devlin MD  Time reviewed: 1918  Symptoms at time of EKG: chest pain   Rhythm: normal sinus   Rate: normal  Axis: normal  Ectopy: none  Conduction: normal  ST Segments/ T Waves: No ST-T wave changes  Q Waves: none  Comparison to prior: Unchanged from 3/31/25    Clinical Impression: normal EKG      Critical Care time:  none     IV Antibiotics given and/or elevated Lactate of 0.7 and no sepsis note found - Delete this reminder and enter the sepsis note or '.edcms' before signing chart.>>>None         Results for orders placed or performed during the hospital encounter of 04/19/25 (from the past 24 hours)   CBC with platelets differential    Narrative    The following  orders were created for panel order CBC with platelets differential.  Procedure                               Abnormality         Status                     ---------                               -----------         ------                     CBC with platelets and ...[[id number]]                      Final result                 Please view results for these tests on the individual orders.   Comprehensive metabolic panel   Result Value Ref Range    Sodium 140 135 - 145 mmol/L    Potassium 4.1 3.4 - 5.3 mmol/L    Carbon Dioxide (CO2) 23 22 - 29 mmol/L    Anion Gap 14 7 - 15 mmol/L    Urea Nitrogen 14.2 6.0 - 20.0 mg/dL    Creatinine 0.57 0.51 - 0.95 mg/dL    GFR Estimate >90 >60 mL/min/1.73m2    Calcium 9.3 8.8 - 10.4 mg/dL    Chloride 103 98 - 107 mmol/L    Glucose 104 (H) 70 - 99 mg/dL    Alkaline Phosphatase 79 40 - 150 U/L    AST 49 (H) 0 - 45 U/L    ALT 76 (H) 0 - 50 U/L    Protein Total 7.2 6.4 - 8.3 g/dL    Albumin 4.0 3.5 - 5.2 g/dL    Bilirubin Total 0.3 <=1.2 mg/dL   Lipase   Result Value Ref Range    Lipase 36 13 - 60 U/L   Lactic acid whole blood   Result Value Ref Range    Lactic Acid 0.7 0.7 - 2.0 mmol/L   Troponin T, High Sensitivity   Result Value Ref Range    Troponin T, High Sensitivity <6 <=14 ng/L   Witt Draw    Narrative    The following orders were created for panel order Witt Draw.  Procedure                               Abnormality         Status                     ---------                               -----------         ------                     Extra Blue Top Tube[[id number]]                             Final result               Extra Red Top Tube[[id number]]                              Final result                 Please view results for these tests on the individual orders.   CBC with platelets and differential   Result Value Ref Range    WBC Count 6.6 4.0 - 11.0 10e3/uL    RBC Count 4.54 3.80 - 5.20 10e6/uL    Hemoglobin 13.1 11.7 - 15.7 g/dL    Hematocrit 38.1 35.0 - 47.0  %    MCV 84 78 - 100 fL    MCH 28.9 26.5 - 33.0 pg    MCHC 34.4 31.5 - 36.5 g/dL    RDW 12.4 10.0 - 15.0 %    Platelet Count 293 150 - 450 10e3/uL    % Neutrophils 64 %    % Lymphocytes 26 %    % Monocytes 6 %    % Eosinophils 3 %    % Basophils 1 %    % Immature Granulocytes 0 %    NRBCs per 100 WBC 0 <1 /100    Absolute Neutrophils 4.2 1.6 - 8.3 10e3/uL    Absolute Lymphocytes 1.7 0.8 - 5.3 10e3/uL    Absolute Monocytes 0.4 0.0 - 1.3 10e3/uL    Absolute Eosinophils 0.2 0.0 - 0.7 10e3/uL    Absolute Basophils 0.0 0.0 - 0.2 10e3/uL    Absolute Immature Granulocytes 0.0 <=0.4 10e3/uL    Absolute NRBCs 0.0 10e3/uL   Extra Blue Top Tube   Result Value Ref Range    Hold Specimen JIC    Extra Red Top Tube   Result Value Ref Range    Hold Specimen hold    CT Chest w/o & w Contrast    Narrative    EXAM: CT CHEST W/O and W CONTRAST  LOCATION: [facility]  DATE: 4/19/2025    INDICATION: Joes R en Y procedure  five days ago, vomiting, crepitus in the chest  COMPARISON: CT AP 7/12/2022  TECHNIQUE: CT chest without and with IV contrast. Multiplanar reformats were obtained. Dose reduction techniques were used.    CONTRAST: 25 mL Omnipaque 240 given orally.    FINDINGS:   LUNGS AND PLEURA: No pneumothorax or interstitial emphysema. Trace left effusion with left lower lobe and lingular discoid atelectasis atelectasis    MEDIASTINUM/AXILLAE: Esophagus distended with fluid as well as Omnipaque on the repeat scan. No extravasation of contrast in the neck  or chest.    Small amount of posterior mediastinal air seen surrounding the lower esophagus (series 4 axial images 66-69). Small amount of left paraesophageal fluid noted in this area as well normal with normal cortical but without any extravasation of contrast.    Pneumomediastinum noted anteriorly throughout the chest, at the thoracic inlet with subcutaneous emphysema in the neck, supraclavicular regions and upper chest extending into the right upper  breast.    No adenopathy. Aorta is normal caliber.    CORONARY ARTERY CALCIFICATION: None.    UPPER ABDOMEN: Postsurgical changes from gastric bypass. Oral contrast passes through the Jose R-en-Y anastomosis into the efferent small bowel loops. No extravasation of contrast in the upper abdomen. Small bowel anastomosis also noted in the left upper   quadrant without any fluid surrounding it. Bowel is of normal caliber.    Contrast in the colon from a prior study. No pneumoperitoneum. Bowel is of normal caliber.    MUSCULOSKELETAL: No concerning bone lesions.      Impression    IMPRESSION:   1.  No  abnormalities are noted to explain the mild pneumomediastinum and more extensive subcutaneous emphysema  in the neck, upper chest and right breast.   2.  Specifically, no esophageal perforation or anastomotic leak.  3.  It is unclear why esophagus is slightly distended with fluid as there is no evidence of gastric outlet obstruction. Contrast passes readily through the Gastro jejunal anastomosis on the delayed scan.   4.  Could she have undocumented reflux?  5.  No pneumothorax, interstitial emphysema or subcutaneous emphysema in the abdomen or pneumoperitoneum.   6.  Minimal lower paraesophageal air and fluid at the GE junction, again without extravasation of contrast.  7.  Expected recent postsurgical changes with trace left effusion atelectasis.  8.  Findings discussed by the undersigned with Dr. Devlin at 2051. Surgery was done robotically and this either reflects atypical superior migration of air (less likely) or Boerhaave's due to her vomiting.         Medications   iohexol (OMNIPAQUE) injection 25 mL ( Intravenous Canceled Entry 4/19/25 2007)   piperacillin-tazobactam (ZOSYN) 4.5 g vial to attach to  mL bag (0 g Intravenous Stopped 4/19/25 2100)   vancomycin (VANCOCIN) 1,500 mg in 0.9% NaCl 250 mL intermittent infusion (1,500 mg Intravenous $New Bag 4/19/25 2108)   ondansetron (ZOFRAN) injection 4 mg (4 mg  Intravenous $Given 4/19/25 1923)   morphine (PF) injection 4 mg (4 mg Intravenous $Given 4/19/25 1922)   sodium chloride 0.9% BOLUS 1,000 mL (0 mLs Intravenous Stopped 4/19/25 2129)   iohexol (OMNIPAQUE) 240 mg/mL solution 25 mL (25 mLs Oral $Given 4/19/25 2008)       Assessments & Plan (with Medical Decision Making)  Patient presents with chest pain and vomiting.  She had a Jose R-en-Y procedure completed 4/15/2025.  She is hemodynamically stable.  Crepitus is appreciated on exam located at her superior anterior chest.  There is concern for esophageal perforation based on this.  Lactate is not elevated and there is no signs of sepsis.  She does have a mild fever.  EKG shows no ischemic changes.  Troponin is negative.  CBC, CMP, lipase is grossly unremarkable.  CT scan shows pneumomediastinum with subcutaneous emphysema.  There is no active extravasation.  She is given vancomycin and Zosyn.  She is also given Zofran and morphine and appears more comfortable.  I spoke to the on-call surgeon at [facility] in [place], Dr. Cook. He was the provider that performed the patient's surgery on 4/15/2025.  He agrees to accept the patient to their service for continued monitoring and management.  Patient will be transported via EMS.     I have reviewed the nursing notes.    I have reviewed the findings, diagnosis, plan and need for follow up with the patient.           Medical Decision Making  The patient's presentation was of high complexity (an acute health issue posing potential threat to life or bodily function).    The patient's evaluation involved:  review of external note(s) from 1 sources (see separate area of note for details)    The patient's management necessitated high risk (a parenteral controlled substance).        New Prescriptions    No medications on file       Final diagnoses:   Chest pain, unspecified type   Nausea and vomiting, unspecified vomiting type   Pneumomediastinum (H)   Subcutaneous emphysema,  initial encounter       4/19/2025   [facility] AND [facility]       Fili Devlin MD  04/19/25 [id number]

## 2025-04-20 NOTE — PHARMACY-VANCOMYCIN DOSING SERVICE
Pharmacy Vancomycin Initial Note  Date of Service 2025  Patient's  1990  34 year old, female    Indication: Sepsis    Current estimated CrCl = Estimated Creatinine Clearance: 165.3 mL/min (based on SCr of 0.57 mg/dL).    Creatinine for last 3 days  2025:  7:20 PM Creatinine 0.57 mg/dL    Recent Vancomycin Level(s) for last 3 days  No results found for requested labs within last 3 days.      Vancomycin IV Administrations (past 72 hours)        No vancomycin orders with administrations in past 72 hours.                    Nephrotoxins and other renal medications (From now, onward)      Start     Dose/Rate Route Frequency Ordered Stop    25  vancomycin (VANCOCIN) 1,500 mg in 0.9% NaCl 250 mL intermittent infusion         1,500 mg  over 90 Minutes Intravenous EVERY 12 HOURS 25  piperacillin-tazobactam (ZOSYN) 4.5 g vial to attach to  mL bag         4.5 g  over 30 Minutes Intravenous EVERY 6 HOURS 25              Contrast Orders - past 72 hours (72h ago, onward)      Start     Dose/Rate Route Frequency Stop    25  iohexol (OMNIPAQUE) 240 mg/mL solution 25 mL         25 mL Oral ONCE 25 1950  iohexol (OMNIPAQUE) injection 25 mL         25 mL Intravenous ONCE              [employer] Prediction of Planned Initial Vancomycin Regimen  Loading dose: N/A  Regimen: 1500 mg IV every 12 hours.  Start time: 20:23 on 2025  Exposure target: AUC24 (range)400-600 mg/L.hr   AUC24,ss: 537 mg/L.hr  Probability of AUC24 > 400: 71 %  Ctrough,ss: 13.6 mg/L  Probability of Ctrough,ss > 20: 33 %  Probability of nephrotoxicity (Lodise ROCKY ): 9 %          Plan:  Start vancomycin  1500 mg IV q12h.   Vancomycin monitoring method: AUC  Vancomycin therapeutic monitoring goal: 400-600 mg*h/L  Pharmacy will check vancomycin levels as appropriate in 1-3 Days.    Serum creatinine levels will be ordered daily for the first week of  therapy and at least twice weekly for subsequent weeks.      Tom Mathew, RPH

## 2025-04-20 NOTE — ED TRIAGE NOTES
Patient being evaluated today for chest pain that started while she was preparing dinner approx 1 hour ago. She describes it as a tightness that waxes and wanes with associated SOB, nausea, and vomiting. She had a gastric bypass and a hernia repair at [facility] on Tuesday. She denies any complications. LMP 05/12/2022        Triage Assessment (Adult)       Row Name 04/19/25 1911          Triage Assessment    Airway WDL WDL        Respiratory WDL    Respiratory WDL X;rhythm/pattern     Rhythm/Pattern, Respiratory shortness of breath        Skin Circulation/Temperature WDL    Skin Circulation/Temperature WDL WDL        Cardiac WDL    Cardiac WDL chest pain;X        Chest Pain Assessment    Chest Pain Location midsternal     Chest Pain Radiation back     Character tightness     Duration 60 min     Precipitating Factors activity     Alleviating Factors nothing     Associated Signs/Symptoms anxiety;dyspnea;nausea;vomiting     Chest Pain Intervention 12-lead ECG obtained;activity minimized        Peripheral/Neurovascular WDL    Peripheral Neurovascular WDL WDL        Cognitive/Neuro/Behavioral WDL    Cognitive/Neuro/Behavioral WDL WDL

## 2025-04-21 LAB
ATRIAL RATE - MUSE: 78 BPM
DIASTOLIC BLOOD PRESSURE - MUSE: NORMAL MMHG
INTERPRETATION ECG - MUSE: NORMAL
P AXIS - MUSE: 29 DEGREES
PR INTERVAL - MUSE: 152 MS
QRS DURATION - MUSE: 88 MS
QT - MUSE: 376 MS
QTC - MUSE: 428 MS
R AXIS - MUSE: -3 DEGREES
SYSTOLIC BLOOD PRESSURE - MUSE: NORMAL MMHG
T AXIS - MUSE: 12 DEGREES
VENTRICULAR RATE- MUSE: 78 BPM

## 2025-04-24 LAB
BACTERIA BLD CULT: NO GROWTH
BACTERIA BLD CULT: NO GROWTH

## 2025-07-15 DIAGNOSIS — J45.21 MILD INTERMITTENT REACTIVE AIRWAY DISEASE WITH ACUTE EXACERBATION: ICD-10-CM

## 2025-07-17 RX ORDER — ALBUTEROL SULFATE 90 UG/1
2 INHALANT RESPIRATORY (INHALATION) EVERY 4 HOURS PRN
Qty: 8.5 G | Refills: 1 | Status: SHIPPED | OUTPATIENT
Start: 2025-07-17

## 2025-07-17 NOTE — TELEPHONE ENCOUNTER
[facility] sent Rx request for the following:      Requested Prescriptions   Pending Prescriptions Disp Refills    albuterol (PROAIR HFA/PROVENTIL HFA/VENTOLIN HFA) 108 (90 Base) MCG/ACT inhaler [Pharmacy Med Name: albuterol sulfate HFA 90 mcg/actuation aerosol inhaler] 8.5 g 1     Sig: Inhale 2 puffs into the lungs every 4 hours as needed for shortness of breath or wheezing       Short-Acting Beta Agonist Inhalers Protocol  Failed - 7/17/2025 10:07 AM        Failed - Asthma control assessment score within normal limits in last 6 months     Please review ACT score.       4/6/2023 11/30/2023 10/17/2024   Asthma Control Test   ACT Total Score (Goal Greater than or Equal to 20) 20 21 20        Last Prescription Date:   7/17/24  Last Fill Qty/Refills:         8.5 g, R-1  Last Office Visit:              3/31/25   Future Office visit:             Next 5 appointments (look out 90 days)      Jul 18, 2025 8:00 AM  (Arrive by 7:40 AM)  Provider Visit with Abiola Ashton,   [facility] ([facility]) [address]  [place] [id number] [phone]          Missy Weiss RN on 7/17/2025 at 10:08 AM

## 2025-07-18 ENCOUNTER — VIRTUAL VISIT (OUTPATIENT)
Dept: FAMILY MEDICINE | Facility: OTHER | Age: 35
End: 2025-07-18
Attending: FAMILY MEDICINE
Payer: COMMERCIAL

## 2025-07-18 DIAGNOSIS — F90.0 ATTENTION DEFICIT HYPERACTIVITY DISORDER (ADHD), PREDOMINANTLY INATTENTIVE TYPE: Primary | ICD-10-CM

## 2025-07-18 DIAGNOSIS — F51.04 PSYCHOPHYSIOLOGICAL INSOMNIA: ICD-10-CM

## 2025-07-18 PROCEDURE — 98014 SYNCH AUDIO-ONLY EST MOD 30: CPT | Performed by: FAMILY MEDICINE

## 2025-07-18 RX ORDER — DEXTROAMPHETAMINE SACCHARATE, AMPHETAMINE ASPARTATE, DEXTROAMPHETAMINE SULFATE AND AMPHETAMINE SULFATE 1.25; 1.25; 1.25; 1.25 MG/1; MG/1; MG/1; MG/1
5 TABLET ORAL DAILY
Qty: 30 TABLET | Refills: 0 | Status: SHIPPED | OUTPATIENT
Start: 2025-07-18

## 2025-07-18 RX ORDER — LISDEXAMFETAMINE DIMESYLATE 70 MG/1
CAPSULE ORAL
Refills: 0 | Status: CANCELLED | OUTPATIENT
Start: 2025-07-18

## 2025-07-18 RX ORDER — TRAZODONE HYDROCHLORIDE 50 MG/1
50 TABLET ORAL AT BEDTIME
Qty: 90 TABLET | Refills: 1 | Status: SHIPPED | OUTPATIENT
Start: 2025-07-18

## 2025-07-18 RX ORDER — LISDEXAMFETAMINE DIMESYLATE 70 MG/1
70 CAPSULE ORAL EVERY MORNING
Qty: 30 CAPSULE | Refills: 0 | Status: SHIPPED | OUTPATIENT
Start: 2025-08-15

## 2025-07-18 RX ORDER — LISDEXAMFETAMINE DIMESYLATE 70 MG/1
70 CAPSULE ORAL EVERY MORNING
Qty: 30 CAPSULE | Refills: 0 | Status: SHIPPED | OUTPATIENT
Start: 2025-07-18

## 2025-07-18 RX ORDER — LISDEXAMFETAMINE DIMESYLATE 70 MG/1
70 CAPSULE ORAL EVERY MORNING
Qty: 30 CAPSULE | Refills: 0 | Status: SHIPPED | OUTPATIENT
Start: 2025-09-16

## 2025-07-18 ASSESSMENT — ASTHMA QUESTIONNAIRES
QUESTION_5 LAST FOUR WEEKS HOW WOULD YOU RATE YOUR ASTHMA CONTROL: COMPLETELY CONTROLLED
QUESTION_4 LAST FOUR WEEKS HOW OFTEN HAVE YOU USED YOUR RESCUE INHALER OR NEBULIZER MEDICATION (SUCH AS ALBUTEROL): NOT AT ALL
QUESTION_1 LAST FOUR WEEKS HOW MUCH OF THE TIME DID YOUR ASTHMA KEEP YOU FROM GETTING AS MUCH DONE AT WORK, SCHOOL OR AT HOME: NONE OF THE TIME
ACT_TOTALSCORE: 25
QUESTION_3 LAST FOUR WEEKS HOW OFTEN DID YOUR ASTHMA SYMPTOMS (WHEEZING, COUGHING, SHORTNESS OF BREATH, CHEST TIGHTNESS OR PAIN) WAKE YOU UP AT NIGHT OR EARLIER THAN USUAL IN THE MORNING: NOT AT ALL
QUESTION_2 LAST FOUR WEEKS HOW OFTEN HAVE YOU HAD SHORTNESS OF BREATH: NOT AT ALL

## 2025-07-18 ASSESSMENT — ANXIETY QUESTIONNAIRES
GAD7 TOTAL SCORE: 11
7. FEELING AFRAID AS IF SOMETHING AWFUL MIGHT HAPPEN: NOT AT ALL
5. BEING SO RESTLESS THAT IT IS HARD TO SIT STILL: NOT AT ALL
GAD7 TOTAL SCORE: 11
8. IF YOU CHECKED OFF ANY PROBLEMS, HOW DIFFICULT HAVE THESE MADE IT FOR YOU TO DO YOUR WORK, TAKE CARE OF THINGS AT HOME, OR GET ALONG WITH OTHER PEOPLE?: SOMEWHAT DIFFICULT
2. NOT BEING ABLE TO STOP OR CONTROL WORRYING: NEARLY EVERY DAY
3. WORRYING TOO MUCH ABOUT DIFFERENT THINGS: NEARLY EVERY DAY
7. FEELING AFRAID AS IF SOMETHING AWFUL MIGHT HAPPEN: NOT AT ALL
4. TROUBLE RELAXING: SEVERAL DAYS
1. FEELING NERVOUS, ANXIOUS, OR ON EDGE: SEVERAL DAYS
6. BECOMING EASILY ANNOYED OR IRRITABLE: NEARLY EVERY DAY
GAD7 TOTAL SCORE: 11
IF YOU CHECKED OFF ANY PROBLEMS ON THIS QUESTIONNAIRE, HOW DIFFICULT HAVE THESE PROBLEMS MADE IT FOR YOU TO DO YOUR WORK, TAKE CARE OF THINGS AT HOME, OR GET ALONG WITH OTHER PEOPLE: SOMEWHAT DIFFICULT

## 2025-07-18 ASSESSMENT — PATIENT HEALTH QUESTIONNAIRE - PHQ9
10. IF YOU CHECKED OFF ANY PROBLEMS, HOW DIFFICULT HAVE THESE PROBLEMS MADE IT FOR YOU TO DO YOUR WORK, TAKE CARE OF THINGS AT HOME, OR GET ALONG WITH OTHER PEOPLE: SOMEWHAT DIFFICULT
SUM OF ALL RESPONSES TO PHQ QUESTIONS 1-9: 15
SUM OF ALL RESPONSES TO PHQ QUESTIONS 1-9: 15

## 2025-07-18 NOTE — PROGRESS NOTES
"Ana is a 34 year old who is being evaluated via a billable video visit.    How would you like to obtain your AVS? MyChart  If the video visit is dropped, the invitation should be resent by: Text to cell phone: [phone]  Will anyone else be joining your video visit? No      Assessment & Plan     1. Attention deficit hyperactivity disorder (ADHD), predominantly inattentive type (Primary)  Chronic, stable; continue Vyvanse and prn Adderall IR as currently Rx.  Will collect UDS and update contract at next in person visit.  - amphetamine-dextroamphetamine (ADDERALL) 5 MG tablet; Take 1 tablet (5 mg) by mouth daily. In the afternoon  Dispense: 30 tablet; Refill: 0  - lisdexamfetamine (VYVANSE) 70 MG capsule; Take 1 capsule (70 mg) by mouth every morning.  Dispense: 30 capsule; Refill: 0  - lisdexamfetamine (VYVANSE) 70 MG capsule; Take 1 capsule (70 mg) by mouth every morning.  Dispense: 30 capsule; Refill: 0  - lisdexamfetamine (VYVANSE) 70 MG capsule; Take 1 capsule (70 mg) by mouth every morning.  Dispense: 30 capsule; Refill: 0    2. Psychophysiological insomnia  Chronic, worsened since surgery.  Cautioned while recovering from gastric bypass surgery as absorption rates can be different - for both the trazodone and the vyvanse.  May need to consider lower Vyvanse dose with next visit.  Seemingly to be going well at this time.  - traZODone (DESYREL) 50 MG tablet; Take 1 tablet (50 mg) by mouth at bedtime.  Dispense: 90 tablet; Refill: 1      BMI  Estimated body mass index is 40.17 kg/m  as calculated from the following:    Height as of 3/31/25: 1.626 m (5' 4\").    Weight as of 3/31/25: 106.1 kg (234 lb).   Weight management plan: Discussed healthy diet and exercise guidelines    Follow up: 3 months, in person.      Subjective   Ana is a 34 year old, presenting for the following health issues:  No chief complaint on file.        7/18/2025     8:02 AM   Additional Questions   Roomed by Susan BOLIVAR CMA     History " of Present Illness       Reason for visit:  Medication refills    She eats 2-3 servings of fruits and vegetables daily.She consumes 0 sweetened beverage(s) daily.She exercises with enough effort to increase her heart rate 30 to 60 minutes per day.  She exercises with enough effort to increase her heart rate 4 days per week. She is missing 1 dose(s) of medications per week.  She is not taking prescribed medications regularly due to remembering to take.        ADHD Follow-Up (Adult)  Concerns: none; has had gastric bypass surgery since our last visit.  Changes since last visit: None  Taking controlled (daily) medications as prescribed: Yes  Sleep: worse.  Adult ADHD Self-Reporting form given to patient?:  No  Currently in counseling: No    Medication Benefits:   Controlled symptoms: Hyperactivity - motor restlessness, Attention span, Distractability, Finishing tasks, Impulse control, Frustration tolerance, and Accepting limits  Uncontrolled symptoms:  None    Medication Side Effects:  Reports:  insomnia  Sleep Problems? Yes Details: difficulty falling asleep and staying asleep  ++++++++++++++++++++++++++++++++++++++++++++++++    Employer Concerns/Feedback: Stable; is back to work after surgery  Coworker Concerns:   Stable  Home/Family Concerns: Stable    Due for UDS and contract - will do at next in person.        Objective     Vitals:  No vitals were obtained today due to virtual visit.    Physical Exam   Unable due to video visit not working and having to transition to a phone visit.    No results found for any visits on 07/18/25.      Video-Visit Details    Type of service:  Video Visit   Originating Location (pt. Location): Home    Distant Location (provider location):  On-site  Platform used for Video Visit: [employer] --> telephone call.  Signed Electronically by: Abiola Ashton DO

## 2025-07-19 ENCOUNTER — HEALTH MAINTENANCE LETTER (OUTPATIENT)
Age: 35
End: 2025-07-19

## (undated) DEVICE — SOL WATER 1500ML

## (undated) DEVICE — SU PDS II 0 CTX 60" Z990G

## (undated) DEVICE — ENDO TROCAR FIRST ENTRY KII FIOS ADV FIX 05X100MM CFF03

## (undated) DEVICE — Device

## (undated) DEVICE — DRAPE LAVH/LAPAROSCOPY W/POUCH 29474

## (undated) DEVICE — SU VICRYL 3-0 CT 36" J356H

## (undated) DEVICE — SUCTION STRYKERFLOW II 250-070-500

## (undated) DEVICE — PACK C SECTION SMA15CSFCA

## (undated) DEVICE — KIT PATIENT POSITIONING PIGAZZI LATEX FREE 40580

## (undated) DEVICE — PAD PERI INDIV WRAP 11" 2022A

## (undated) DEVICE — SU CHROMIC 1 CTX 36" 905H

## (undated) DEVICE — DRSG MEDIPORE 3 1/2X8" 3570

## (undated) DEVICE — ESU GROUND PAD ADULT W/CORD E7507

## (undated) DEVICE — ESU HOLDER LAP INST DISP PURPLE LONG 330MM H-PRO-330

## (undated) DEVICE — ADH SKIN CLOSURE PREMIERPRO EXOFIN 1.0ML 3470

## (undated) DEVICE — PREP SKIN SCRUB TRAY 4461A

## (undated) DEVICE — GLOVE PROTEXIS POWDER FREE SMT 7.5  2D72PT75X

## (undated) DEVICE — RETR PANNICULUS TRAXI FOR PT POSITIONING PRS-1030

## (undated) DEVICE — TROCAR KII SLEEVE 5MM X 100MM 12/BX

## (undated) DEVICE — TUBING SYS AQUILEX BLUE INFLOW AQL-110 YLW OUTFLOW AQL-111

## (undated) DEVICE — PAD ABD 5X9" STERILE 9190A

## (undated) DEVICE — SLEEVE COMPRESSION SCD KNEE MED 74022

## (undated) DEVICE — ESU LIGASURE LAPAROSCPC L-HOOK SEALER/DVDR 5MM-44CM LF5644

## (undated) DEVICE — PACK LAPAROSCOPY LF SBA15LPFCA

## (undated) DEVICE — SU WND CLOSURE VLOC 180 ABS 2-0 8" VLOCA208L

## (undated) DEVICE — SYR 10ML FINGER CONTROL W/O NDL 309695

## (undated) DEVICE — PANTIES MESH LG/XLG 2PK 706M2

## (undated) DEVICE — PAD CHUX UNDERPAD 30X36" P3036C

## (undated) DEVICE — ENDO TROCAR FIRST ENTRY KII FIOS ADV FIX 12X100MM CFF73

## (undated) DEVICE — ADH LIQUID MASTISOL TOPICAL VIAL 2-3ML 0523-48

## (undated) DEVICE — NDL COUNTER 20CT 31142493

## (undated) DEVICE — SPECIMEN CONTAINER 4OZ

## (undated) DEVICE — SU MONOCRYL 3-0 PS-2 27" Y427H

## (undated) DEVICE — DRSG STERI STRIP 1/2X4" R1547

## (undated) DEVICE — TUBING INSUFFLATOR W/FILTER OLYMPUS WA95005A

## (undated) DEVICE — ENDO SCOPE WARMER DUAL LAP TM500D

## (undated) DEVICE — CATH SELF FEMALE 14FR 6" 214

## (undated) DEVICE — DRAPE SHEET REV FOLD 3/4 9349

## (undated) DEVICE — STRAP STIRRUP W/O SLIP 30187-020

## (undated) DEVICE — SU VICRYL 0 CTX 36" J370H

## (undated) DEVICE — COVER LIGHT HANDLE LT-F02

## (undated) DEVICE — VERRES NEEDLE 120MM DISPOSABLE 12/BX

## (undated) DEVICE — TUBING IRRIG TUR Y TYPE 96" LF 6543-01

## (undated) DEVICE — SEAL SET MYOSURE ROD LENS SCOPE SINGLE USE 40-902

## (undated) DEVICE — DEVICE ENDO STITCH APPLIER 10MM 173016

## (undated) DEVICE — NDL SPINAL 20GA 3.5" 405182

## (undated) DEVICE — SYR 10ML LL W/O NDL 302995

## (undated) DEVICE — CATH FOLEY 16FR 5ML SILICONE LUBRI-SIL 175816

## (undated) DEVICE — SUTURE 0 VICRYL TIES J912G

## (undated) DEVICE — PREP CHLORAPREP 26ML TINTED ORANGE  260815

## (undated) DEVICE — SU VICRYL 0 UR-6 27" J603H

## (undated) DEVICE — STPL SKIN SUBCUTICULAR INSORB  2030

## (undated) DEVICE — RETR ELEV / UTERINE MANIPULATOR V-CARE MED CUP 60-6085-201

## (undated) DEVICE — DRSG TELFA 3X8" 1238

## (undated) DEVICE — PACK LITHOTOMY SBA15LIFCA

## (undated) DEVICE — CATH TRAY FOLEY SURESTEP 16FR W/URINE MTR STATLK LF A303416A

## (undated) RX ORDER — SODIUM CHLORIDE, SODIUM LACTATE, POTASSIUM CHLORIDE, CALCIUM CHLORIDE 600; 310; 30; 20 MG/100ML; MG/100ML; MG/100ML; MG/100ML
INJECTION, SOLUTION INTRAVENOUS
Status: DISPENSED
Start: 2021-11-11

## (undated) RX ORDER — PHENAZOPYRIDINE HYDROCHLORIDE 100 MG/1
TABLET, FILM COATED ORAL
Status: DISPENSED
Start: 2022-05-12

## (undated) RX ORDER — BUPIVACAINE HYDROCHLORIDE 5 MG/ML
INJECTION, SOLUTION EPIDURAL; INTRACAUDAL
Status: DISPENSED
Start: 2022-05-12

## (undated) RX ORDER — OXYTOCIN/0.9 % SODIUM CHLORIDE 30/500 ML
PLASTIC BAG, INJECTION (ML) INTRAVENOUS
Status: DISPENSED
Start: 2021-11-29

## (undated) RX ORDER — ACETAMINOPHEN 325 MG/1
TABLET ORAL
Status: DISPENSED
Start: 2022-05-12

## (undated) RX ORDER — ONDANSETRON 2 MG/ML
INJECTION INTRAMUSCULAR; INTRAVENOUS
Status: DISPENSED
Start: 2025-04-19

## (undated) RX ORDER — CEFAZOLIN SODIUM 2 G/100ML
INJECTION, SOLUTION INTRAVENOUS
Status: DISPENSED
Start: 2018-09-27

## (undated) RX ORDER — DEXMEDETOMIDINE HYDROCHLORIDE 4 UG/ML
INJECTION, SOLUTION INTRAVENOUS
Status: DISPENSED
Start: 2021-11-29

## (undated) RX ORDER — PHENYLEPHRINE HCL IN 0.9% NACL 50MG/250ML
PLASTIC BAG, INJECTION (ML) INTRAVENOUS
Status: DISPENSED
Start: 2021-11-29

## (undated) RX ORDER — GLYCOPYRROLATE 0.2 MG/ML
INJECTION, SOLUTION INTRAMUSCULAR; INTRAVENOUS
Status: DISPENSED
Start: 2022-05-12

## (undated) RX ORDER — FENTANYL CITRATE 50 UG/ML
INJECTION, SOLUTION INTRAMUSCULAR; INTRAVENOUS
Status: DISPENSED
Start: 2022-05-12

## (undated) RX ORDER — MORPHINE SULFATE 2 MG/ML
INJECTION, SOLUTION INTRAMUSCULAR; INTRAVENOUS
Status: DISPENSED
Start: 2025-04-19

## (undated) RX ORDER — KETOROLAC TROMETHAMINE 15 MG/ML
INJECTION, SOLUTION INTRAMUSCULAR; INTRAVENOUS
Status: DISPENSED
Start: 2022-07-12

## (undated) RX ORDER — ONDANSETRON 2 MG/ML
INJECTION INTRAMUSCULAR; INTRAVENOUS
Status: DISPENSED
Start: 2020-01-31

## (undated) RX ORDER — CEFAZOLIN SODIUM IN 0.9 % NACL 3 G/100 ML
INTRAVENOUS SOLUTION, PIGGYBACK (ML) INTRAVENOUS
Status: DISPENSED
Start: 2020-01-31

## (undated) RX ORDER — LIDOCAINE HYDROCHLORIDE 20 MG/ML
INJECTION, SOLUTION EPIDURAL; INFILTRATION; INTRACAUDAL; PERINEURAL
Status: DISPENSED
Start: 2022-05-12

## (undated) RX ORDER — SODIUM CHLORIDE 9 MG/ML
INJECTION, SOLUTION INTRAVENOUS
Status: DISPENSED
Start: 2021-11-07

## (undated) RX ORDER — KETOROLAC TROMETHAMINE 30 MG/ML
INJECTION, SOLUTION INTRAMUSCULAR; INTRAVENOUS
Status: DISPENSED
Start: 2022-05-12

## (undated) RX ORDER — PROPOFOL 10 MG/ML
INJECTION, EMULSION INTRAVENOUS
Status: DISPENSED
Start: 2022-05-12

## (undated) RX ORDER — DEXAMETHASONE SODIUM PHOSPHATE 4 MG/ML
INJECTION, SOLUTION INTRA-ARTICULAR; INTRALESIONAL; INTRAMUSCULAR; INTRAVENOUS; SOFT TISSUE
Status: DISPENSED
Start: 2022-05-12

## (undated) RX ORDER — KETOROLAC TROMETHAMINE 30 MG/ML
INJECTION, SOLUTION INTRAMUSCULAR; INTRAVENOUS
Status: DISPENSED
Start: 2024-10-17

## (undated) RX ORDER — DIPHENHYDRAMINE HYDROCHLORIDE 50 MG/ML
INJECTION, SOLUTION INTRAMUSCULAR; INTRAVENOUS
Status: DISPENSED
Start: 2025-04-19

## (undated) RX ORDER — FENTANYL CITRATE 50 UG/ML
INJECTION, SOLUTION INTRAMUSCULAR; INTRAVENOUS
Status: DISPENSED
Start: 2021-11-29

## (undated) RX ORDER — FENTANYL CITRATE 50 UG/ML
INJECTION, SOLUTION INTRAMUSCULAR; INTRAVENOUS
Status: DISPENSED
Start: 2018-09-27

## (undated) RX ORDER — CEFAZOLIN SODIUM/WATER 2 G/20 ML
SYRINGE (ML) INTRAVENOUS
Status: DISPENSED
Start: 2022-05-12

## (undated) RX ORDER — DEXMEDETOMIDINE HYDROCHLORIDE 4 UG/ML
INJECTION, SOLUTION INTRAVENOUS
Status: DISPENSED
Start: 2022-05-12

## (undated) RX ORDER — MORPHINE SULFATE 4 MG/ML
INJECTION, SOLUTION INTRAMUSCULAR; INTRAVENOUS
Status: DISPENSED
Start: 2025-04-19

## (undated) RX ORDER — PHENYLEPHRINE HCL IN 0.9% NACL 20MG/250ML
PLASTIC BAG, INJECTION (ML) INTRAVENOUS
Status: DISPENSED
Start: 2020-01-31

## (undated) RX ORDER — BUPIVACAINE HYDROCHLORIDE 2.5 MG/ML
INJECTION, SOLUTION EPIDURAL; INFILTRATION; INTRACAUDAL
Status: DISPENSED
Start: 2022-05-12

## (undated) RX ORDER — DEXAMETHASONE SODIUM PHOSPHATE 10 MG/ML
INJECTION, SOLUTION INTRAMUSCULAR; INTRAVENOUS
Status: DISPENSED
Start: 2022-05-12

## (undated) RX ORDER — ONDANSETRON 2 MG/ML
INJECTION INTRAMUSCULAR; INTRAVENOUS
Status: DISPENSED
Start: 2022-05-12

## (undated) RX ORDER — ONDANSETRON 2 MG/ML
INJECTION INTRAMUSCULAR; INTRAVENOUS
Status: DISPENSED
Start: 2018-09-27

## (undated) RX ORDER — DEXAMETHASONE SODIUM PHOSPHATE 10 MG/ML
INJECTION, SOLUTION INTRAMUSCULAR; INTRAVENOUS
Status: DISPENSED
Start: 2021-11-29

## (undated) RX ORDER — NEOSTIGMINE METHYLSULFATE 0.5 MG/ML
INJECTION INTRAVENOUS
Status: DISPENSED
Start: 2022-05-12

## (undated) RX ORDER — BUPIVACAINE HYDROCHLORIDE 5 MG/ML
INJECTION, SOLUTION EPIDURAL; INTRACAUDAL
Status: DISPENSED
Start: 2021-11-29

## (undated) RX ORDER — LIDOCAINE HYDROCHLORIDE 10 MG/ML
INJECTION, SOLUTION EPIDURAL; INFILTRATION; INTRACAUDAL; PERINEURAL
Status: DISPENSED
Start: 2020-06-02

## (undated) RX ORDER — CEFAZOLIN SODIUM 2 G/100ML
INJECTION, SOLUTION INTRAVENOUS
Status: DISPENSED
Start: 2021-11-29

## (undated) RX ORDER — MORPHINE SULFATE 0.5 MG/ML
INJECTION, SOLUTION EPIDURAL; INTRATHECAL; INTRAVENOUS
Status: DISPENSED
Start: 2020-01-31

## (undated) RX ORDER — ONDANSETRON 2 MG/ML
INJECTION INTRAMUSCULAR; INTRAVENOUS
Status: DISPENSED
Start: 2022-07-12

## (undated) RX ORDER — PIPERACILLIN SODIUM, TAZOBACTAM SODIUM 4; .5 G/20ML; G/20ML
INJECTION, POWDER, LYOPHILIZED, FOR SOLUTION INTRAVENOUS
Status: DISPENSED
Start: 2025-04-19

## (undated) RX ORDER — BUPIVACAINE HYDROCHLORIDE AND EPINEPHRINE 5; 5 MG/ML; UG/ML
INJECTION, SOLUTION EPIDURAL; INTRACAUDAL; PERINEURAL
Status: DISPENSED
Start: 2018-09-27

## (undated) RX ORDER — KETOROLAC TROMETHAMINE 30 MG/ML
INJECTION, SOLUTION INTRAMUSCULAR; INTRAVENOUS
Status: DISPENSED
Start: 2020-01-31

## (undated) RX ORDER — BETAMETHASONE SODIUM PHOSPHATE AND BETAMETHASONE ACETATE 3; 3 MG/ML; MG/ML
INJECTION, SUSPENSION INTRA-ARTICULAR; INTRALESIONAL; INTRAMUSCULAR; SOFT TISSUE
Status: DISPENSED
Start: 2021-11-12

## (undated) RX ORDER — ACETAMINOPHEN 325 MG/1
TABLET ORAL
Status: DISPENSED
Start: 2021-11-07

## (undated) RX ORDER — PROPOFOL 10 MG/ML
INJECTION, EMULSION INTRAVENOUS
Status: DISPENSED
Start: 2018-09-27

## (undated) RX ORDER — OXYTOCIN 10 [USP'U]/ML
INJECTION, SOLUTION INTRAMUSCULAR; INTRAVENOUS
Status: DISPENSED
Start: 2020-01-31

## (undated) RX ORDER — HYDROCODONE BITARTRATE AND ACETAMINOPHEN 5; 325 MG/1; MG/1
TABLET ORAL
Status: DISPENSED
Start: 2022-04-06

## (undated) RX ORDER — FENTANYL CITRATE-0.9 % NACL/PF 10 MCG/ML
PLASTIC BAG, INJECTION (ML) INTRAVENOUS
Status: DISPENSED
Start: 2022-05-12

## (undated) RX ORDER — LIDOCAINE HYDROCHLORIDE 20 MG/ML
INJECTION, SOLUTION EPIDURAL; INFILTRATION; INTRACAUDAL; PERINEURAL
Status: DISPENSED
Start: 2018-09-27